# Patient Record
Sex: MALE | Race: WHITE | Employment: OTHER | URBAN - METROPOLITAN AREA
[De-identification: names, ages, dates, MRNs, and addresses within clinical notes are randomized per-mention and may not be internally consistent; named-entity substitution may affect disease eponyms.]

---

## 2017-09-26 ENCOUNTER — HOSPITAL ENCOUNTER (OUTPATIENT)
Dept: GENERAL RADIOLOGY | Age: 82
Discharge: HOME | End: 2017-09-26
Payer: MEDICARE

## 2017-09-26 DIAGNOSIS — C31.0 MALIGNANT NEOPLASM OF MAXILLARY SINUS (HCC): ICD-10-CM

## 2017-09-26 DIAGNOSIS — R05.9 COUGH: ICD-10-CM

## 2017-09-26 PROCEDURE — 71020 XR CHEST PA LAT: CPT

## 2017-10-05 ENCOUNTER — HOSPITAL ENCOUNTER (OUTPATIENT)
Dept: MRI IMAGING | Age: 82
Discharge: HOME | End: 2017-10-05
Attending: OTOLARYNGOLOGY
Payer: MEDICARE

## 2017-10-05 DIAGNOSIS — R05.9 COUGH: ICD-10-CM

## 2017-10-05 DIAGNOSIS — G51.0 FACIAL PARESIS: ICD-10-CM

## 2017-10-05 DIAGNOSIS — Z78.9 NONSMOKER: ICD-10-CM

## 2017-10-05 DIAGNOSIS — C31.0 MALIGNANT NEOPLASM OF MAXILLARY SINUS (HCC): ICD-10-CM

## 2017-10-05 PROCEDURE — 70543 MRI ORBT/FAC/NCK W/O &W/DYE: CPT

## 2017-10-05 PROCEDURE — 74011250636 HC RX REV CODE- 250/636: Performed by: OTOLARYNGOLOGY

## 2017-10-05 PROCEDURE — A9576 INJ PROHANCE MULTIPACK: HCPCS | Performed by: OTOLARYNGOLOGY

## 2017-10-05 RX ADMIN — GADOTERIDOL 18 ML: 279.3 INJECTION, SOLUTION INTRAVENOUS at 19:00

## 2017-10-13 ENCOUNTER — HOSPITAL ENCOUNTER (OUTPATIENT)
Dept: PREADMISSION TESTING | Age: 82
Discharge: HOME | End: 2017-10-13
Payer: MEDICARE

## 2017-10-13 VITALS
SYSTOLIC BLOOD PRESSURE: 125 MMHG | WEIGHT: 187.38 LBS | BODY MASS INDEX: 26.82 KG/M2 | TEMPERATURE: 97.7 F | DIASTOLIC BLOOD PRESSURE: 76 MMHG | HEART RATE: 56 BPM | HEIGHT: 70 IN

## 2017-10-13 LAB
ABO + RH BLD: NORMAL
ANION GAP SERPL CALC-SCNC: 9 MMOL/L (ref 5–15)
APTT PPP: 25.4 SEC (ref 22.1–32.5)
BASOPHILS # BLD: 0 K/UL (ref 0–0.1)
BASOPHILS NFR BLD: 0 % (ref 0–1)
BLOOD GROUP ANTIBODIES SERPL: NORMAL
BUN SERPL-MCNC: 28 MG/DL (ref 6–20)
BUN/CREAT SERPL: 28 (ref 12–20)
CALCIUM SERPL-MCNC: 8.4 MG/DL (ref 8.5–10.1)
CHLORIDE SERPL-SCNC: 100 MMOL/L (ref 97–108)
CO2 SERPL-SCNC: 26 MMOL/L (ref 21–32)
CREAT SERPL-MCNC: 1 MG/DL (ref 0.7–1.3)
EOSINOPHIL # BLD: 0 K/UL (ref 0–0.4)
EOSINOPHIL NFR BLD: 0 % (ref 0–7)
ERYTHROCYTE [DISTWIDTH] IN BLOOD BY AUTOMATED COUNT: 13.9 % (ref 11.5–14.5)
GLUCOSE SERPL-MCNC: 97 MG/DL (ref 65–100)
HCT VFR BLD AUTO: 37 % (ref 36.6–50.3)
HGB BLD-MCNC: 12.5 G/DL (ref 12.1–17)
INR PPP: 1.1 (ref 0.9–1.1)
LYMPHOCYTES # BLD: 1.6 K/UL (ref 0.8–3.5)
LYMPHOCYTES NFR BLD: 20 % (ref 12–49)
MCH RBC QN AUTO: 32 PG (ref 26–34)
MCHC RBC AUTO-ENTMCNC: 33.8 G/DL (ref 30–36.5)
MCV RBC AUTO: 94.6 FL (ref 80–99)
MONOCYTES # BLD: 0.7 K/UL (ref 0–1)
MONOCYTES NFR BLD: 8 % (ref 5–13)
NEUTS SEG # BLD: 5.7 K/UL (ref 1.8–8)
NEUTS SEG NFR BLD: 72 % (ref 32–75)
PLATELET # BLD AUTO: 272 K/UL (ref 150–400)
POTASSIUM SERPL-SCNC: 4.7 MMOL/L (ref 3.5–5.1)
PROTHROMBIN TIME: 10.8 SEC (ref 9–11.1)
RBC # BLD AUTO: 3.91 M/UL (ref 4.1–5.7)
SODIUM SERPL-SCNC: 135 MMOL/L (ref 136–145)
SPECIMEN EXP DATE BLD: NORMAL
THERAPEUTIC RANGE,PTTT: NORMAL SECS (ref 58–77)
WBC # BLD AUTO: 8 K/UL (ref 4.1–11.1)

## 2017-10-13 PROCEDURE — 86900 BLOOD TYPING SEROLOGIC ABO: CPT | Performed by: NEUROLOGICAL SURGERY

## 2017-10-13 PROCEDURE — 85610 PROTHROMBIN TIME: CPT | Performed by: NEUROLOGICAL SURGERY

## 2017-10-13 PROCEDURE — 85025 COMPLETE CBC W/AUTO DIFF WBC: CPT | Performed by: NEUROLOGICAL SURGERY

## 2017-10-13 PROCEDURE — 80048 BASIC METABOLIC PNL TOTAL CA: CPT | Performed by: NEUROLOGICAL SURGERY

## 2017-10-13 PROCEDURE — 36415 COLL VENOUS BLD VENIPUNCTURE: CPT | Performed by: NEUROLOGICAL SURGERY

## 2017-10-13 PROCEDURE — 85730 THROMBOPLASTIN TIME PARTIAL: CPT | Performed by: NEUROLOGICAL SURGERY

## 2017-10-13 PROCEDURE — 93005 ELECTROCARDIOGRAM TRACING: CPT

## 2017-10-13 RX ORDER — LISINOPRIL 40 MG/1
40 TABLET ORAL
COMMUNITY
End: 2017-11-25

## 2017-10-13 RX ORDER — DEXAMETHASONE 4 MG/1
4 TABLET ORAL EVERY 12 HOURS
Status: ON HOLD | COMMUNITY
End: 2017-10-20

## 2017-10-13 RX ORDER — GLUCOSAMINE SULFATE 1500 MG
1000 POWDER IN PACKET (EA) ORAL DAILY
COMMUNITY

## 2017-10-13 RX ORDER — LANOLIN ALCOHOL/MO/W.PET/CERES
1000 CREAM (GRAM) TOPICAL DAILY
COMMUNITY

## 2017-10-13 RX ORDER — GABAPENTIN 600 MG/1
600 TABLET ORAL 2 TIMES DAILY
COMMUNITY
End: 2017-11-25

## 2017-10-13 RX ORDER — ACETAMINOPHEN 500 MG
1000 TABLET ORAL AS NEEDED
Status: ON HOLD | COMMUNITY
End: 2017-11-25

## 2017-10-13 NOTE — PERIOP NOTES
PT/FAMILY PROVIDED AND REVIEWED PRE-OP INSTRUCTION SHEET. PATIENT GIVEN SURGICAL SITE INFORMATION FAQS INFORMATION HANDOUT. PT PROVIDED WITH GOOD HAND HYGIENE TIPS. PT GIVEN OPPORTUNITY TO ASK QUESTIONS.   PATIENT PROVIDED WITH PARAM WIPES, ORAL AND WRITTEN INSTRUCTIONS

## 2017-10-14 LAB
ATRIAL RATE: 47 BPM
CALCULATED P AXIS, ECG09: 60 DEGREES
CALCULATED R AXIS, ECG10: -14 DEGREES
CALCULATED T AXIS, ECG11: 2 DEGREES
DIAGNOSIS, 93000: NORMAL
P-R INTERVAL, ECG05: 224 MS
Q-T INTERVAL, ECG07: 494 MS
QRS DURATION, ECG06: 136 MS
QTC CALCULATION (BEZET), ECG08: 437 MS
VENTRICULAR RATE, ECG03: 47 BPM

## 2017-10-18 ENCOUNTER — ANESTHESIA EVENT (OUTPATIENT)
Dept: SURGERY | Age: 82
DRG: 025 | End: 2017-10-18
Payer: MEDICARE

## 2017-10-19 ENCOUNTER — ANESTHESIA (OUTPATIENT)
Dept: SURGERY | Age: 82
DRG: 025 | End: 2017-10-19
Payer: MEDICARE

## 2017-10-19 ENCOUNTER — HOSPITAL ENCOUNTER (INPATIENT)
Age: 82
LOS: 2 days | Discharge: HOME | DRG: 025 | End: 2017-10-21
Attending: NEUROLOGICAL SURGERY | Admitting: NEUROLOGICAL SURGERY
Payer: MEDICARE

## 2017-10-19 ENCOUNTER — APPOINTMENT (OUTPATIENT)
Dept: CT IMAGING | Age: 82
DRG: 025 | End: 2017-10-19
Attending: NEUROLOGICAL SURGERY
Payer: MEDICARE

## 2017-10-19 PROBLEM — D49.6 BRAIN TUMOR (HCC): Status: ACTIVE | Noted: 2017-10-19

## 2017-10-19 LAB
GLUCOSE BLD STRIP.AUTO-MCNC: 91 MG/DL (ref 65–100)
SERVICE CMNT-IMP: NORMAL

## 2017-10-19 PROCEDURE — 74011250636 HC RX REV CODE- 250/636: Performed by: NEUROLOGICAL SURGERY

## 2017-10-19 PROCEDURE — 77030026438 HC STYL ET INTUB CARD -A: Performed by: ANESTHESIOLOGY

## 2017-10-19 PROCEDURE — 82962 GLUCOSE BLOOD TEST: CPT

## 2017-10-19 PROCEDURE — 77030003892 HC BIT DRL TWST MEDT -B: Performed by: NEUROLOGICAL SURGERY

## 2017-10-19 PROCEDURE — 77030005645 HC GRFT SUB DURAGN INLC -F: Performed by: NEUROLOGICAL SURGERY

## 2017-10-19 PROCEDURE — 77030002933 HC SUT MCRYL J&J -A: Performed by: NEUROLOGICAL SURGERY

## 2017-10-19 PROCEDURE — 77030020782 HC GWN BAIR PAWS FLX 3M -B

## 2017-10-19 PROCEDURE — 77030034849: Performed by: NEUROLOGICAL SURGERY

## 2017-10-19 PROCEDURE — 74011250636 HC RX REV CODE- 250/636

## 2017-10-19 PROCEDURE — 77030003029 HC SUT VCRL J&J -B: Performed by: NEUROLOGICAL SURGERY

## 2017-10-19 PROCEDURE — 77030012893

## 2017-10-19 PROCEDURE — 77030009081 HC CLP NEUR GUN SET MEDT -B: Performed by: NEUROLOGICAL SURGERY

## 2017-10-19 PROCEDURE — 77030002946 HC SUT NRLN J&J -B: Performed by: NEUROLOGICAL SURGERY

## 2017-10-19 PROCEDURE — 65610000006 HC RM INTENSIVE CARE

## 2017-10-19 PROCEDURE — 77030014647 HC SEAL FBRN TISSL BAXT -D: Performed by: NEUROLOGICAL SURGERY

## 2017-10-19 PROCEDURE — 77030012602 HC SPNG PTTY NEUR J&J -B: Performed by: NEUROLOGICAL SURGERY

## 2017-10-19 PROCEDURE — 74011250636 HC RX REV CODE- 250/636: Performed by: ANESTHESIOLOGY

## 2017-10-19 PROCEDURE — 74011000272 HC RX REV CODE- 272: Performed by: NEUROLOGICAL SURGERY

## 2017-10-19 PROCEDURE — 77030020263 HC SOL INJ SOD CL0.9% LFCR 1000ML: Performed by: NEUROLOGICAL SURGERY

## 2017-10-19 PROCEDURE — 74011636320 HC RX REV CODE- 636/320: Performed by: NEUROLOGICAL SURGERY

## 2017-10-19 PROCEDURE — 74011000250 HC RX REV CODE- 250: Performed by: NEUROLOGICAL SURGERY

## 2017-10-19 PROCEDURE — 88331 PATH CONSLTJ SURG 1 BLK 1SPC: CPT | Performed by: NEUROLOGICAL SURGERY

## 2017-10-19 PROCEDURE — 76010000172 HC OR TIME 2.5 TO 3 HR INTENSV-TIER 1: Performed by: NEUROLOGICAL SURGERY

## 2017-10-19 PROCEDURE — 70460 CT HEAD/BRAIN W/DYE: CPT

## 2017-10-19 PROCEDURE — 77030011640 HC PAD GRND REM COVD -A: Performed by: NEUROLOGICAL SURGERY

## 2017-10-19 PROCEDURE — 77030004391 HC BUR FLUT MEDT -C: Performed by: NEUROLOGICAL SURGERY

## 2017-10-19 PROCEDURE — 77030013797 HC KT TRNSDUC PRSSR EDWD -A

## 2017-10-19 PROCEDURE — 76060000036 HC ANESTHESIA 2.5 TO 3 HR: Performed by: NEUROLOGICAL SURGERY

## 2017-10-19 PROCEDURE — 8E09XBZ COMPUTER ASSISTED PROCEDURE OF HEAD AND NECK REGION: ICD-10-PCS | Performed by: NEUROLOGICAL SURGERY

## 2017-10-19 PROCEDURE — 77030014355 HC CVR BUR H TI BIOM -C: Performed by: NEUROLOGICAL SURGERY

## 2017-10-19 PROCEDURE — 77030018846 HC SOL IRR STRL H20 ICUM -A: Performed by: NEUROLOGICAL SURGERY

## 2017-10-19 PROCEDURE — 88307 TISSUE EXAM BY PATHOLOGIST: CPT | Performed by: NEUROLOGICAL SURGERY

## 2017-10-19 PROCEDURE — 74011250637 HC RX REV CODE- 250/637: Performed by: NEUROLOGICAL SURGERY

## 2017-10-19 PROCEDURE — 77030010507 HC ADH SKN DERMBND J&J -B: Performed by: NEUROLOGICAL SURGERY

## 2017-10-19 PROCEDURE — 77010033678 HC OXYGEN DAILY

## 2017-10-19 PROCEDURE — 77030018836 HC SOL IRR NACL ICUM -A: Performed by: NEUROLOGICAL SURGERY

## 2017-10-19 PROCEDURE — 77030013079 HC BLNKT BAIR HGGR 3M -A: Performed by: ANESTHESIOLOGY

## 2017-10-19 PROCEDURE — 76210000016 HC OR PH I REC 1 TO 1.5 HR: Performed by: NEUROLOGICAL SURGERY

## 2017-10-19 PROCEDURE — 00B00ZZ EXCISION OF BRAIN, OPEN APPROACH: ICD-10-PCS | Performed by: NEUROLOGICAL SURGERY

## 2017-10-19 PROCEDURE — 77030004472 HC BUR TAPR MEDT -B: Performed by: NEUROLOGICAL SURGERY

## 2017-10-19 PROCEDURE — 77030019908 HC STETH ESOPH SIMS -A: Performed by: ANESTHESIOLOGY

## 2017-10-19 PROCEDURE — 74011000258 HC RX REV CODE- 258: Performed by: NEUROLOGICAL SURGERY

## 2017-10-19 PROCEDURE — 77030008684 HC TU ET CUF COVD -B: Performed by: ANESTHESIOLOGY

## 2017-10-19 PROCEDURE — 77030032490 HC SLV COMPR SCD KNE COVD -B: Performed by: NEUROLOGICAL SURGERY

## 2017-10-19 PROCEDURE — C1713 ANCHOR/SCREW BN/BN,TIS/BN: HCPCS | Performed by: NEUROLOGICAL SURGERY

## 2017-10-19 PROCEDURE — 74011000250 HC RX REV CODE- 250

## 2017-10-19 PROCEDURE — 77030005402 HC CATH RAD ART LN KT TELE -B

## 2017-10-19 DEVICE — DURAGEN® PLUS DURAL REGENERATION MATRIX, 3 IN X 3 IN (7.5 CM X 7.5 CM)
Type: IMPLANTABLE DEVICE | Site: BRAIN | Status: FUNCTIONAL
Brand: DURAGEN® PLUS

## 2017-10-19 DEVICE — PLATE BONE LNG L16MM THK0.6MM 2 H TI STR FOR 1.5MM SCR: Type: IMPLANTABLE DEVICE | Site: SKULL | Status: FUNCTIONAL

## 2017-10-19 DEVICE — SCREW BNE L3.5MM DIA1.5MM CORT MAXILLOMANDIBULAR GRN TI: Type: IMPLANTABLE DEVICE | Site: SKULL | Status: FUNCTIONAL

## 2017-10-19 DEVICE — COVER BUR H SM DIA13MM THK0.5MM 5 H NEURO TI FOR 1.5MM SCR: Type: IMPLANTABLE DEVICE | Site: SKULL | Status: FUNCTIONAL

## 2017-10-19 RX ORDER — LIDOCAINE HYDROCHLORIDE 20 MG/ML
INJECTION, SOLUTION EPIDURAL; INFILTRATION; INTRACAUDAL; PERINEURAL AS NEEDED
Status: DISCONTINUED | OUTPATIENT
Start: 2017-10-19 | End: 2017-10-19 | Stop reason: HOSPADM

## 2017-10-19 RX ORDER — ONDANSETRON 2 MG/ML
4 INJECTION INTRAMUSCULAR; INTRAVENOUS
Status: DISCONTINUED | OUTPATIENT
Start: 2017-10-19 | End: 2017-10-21 | Stop reason: HOSPADM

## 2017-10-19 RX ORDER — HYDROMORPHONE HYDROCHLORIDE 1 MG/ML
0.2 INJECTION, SOLUTION INTRAMUSCULAR; INTRAVENOUS; SUBCUTANEOUS
Status: COMPLETED | OUTPATIENT
Start: 2017-10-19 | End: 2017-10-19

## 2017-10-19 RX ORDER — GLYCOPYRROLATE 0.2 MG/ML
INJECTION INTRAMUSCULAR; INTRAVENOUS AS NEEDED
Status: DISCONTINUED | OUTPATIENT
Start: 2017-10-19 | End: 2017-10-19 | Stop reason: HOSPADM

## 2017-10-19 RX ORDER — MELATONIN
1000 DAILY
Status: DISCONTINUED | OUTPATIENT
Start: 2017-10-20 | End: 2017-10-21 | Stop reason: HOSPADM

## 2017-10-19 RX ORDER — SODIUM CHLORIDE 0.9 % (FLUSH) 0.9 %
5-10 SYRINGE (ML) INJECTION EVERY 8 HOURS
Status: DISCONTINUED | OUTPATIENT
Start: 2017-10-19 | End: 2017-10-21 | Stop reason: HOSPADM

## 2017-10-19 RX ORDER — SODIUM CHLORIDE, SODIUM LACTATE, POTASSIUM CHLORIDE, CALCIUM CHLORIDE 600; 310; 30; 20 MG/100ML; MG/100ML; MG/100ML; MG/100ML
100 INJECTION, SOLUTION INTRAVENOUS CONTINUOUS
Status: DISCONTINUED | OUTPATIENT
Start: 2017-10-19 | End: 2017-10-19 | Stop reason: HOSPADM

## 2017-10-19 RX ORDER — MORPHINE SULFATE 10 MG/ML
2 INJECTION, SOLUTION INTRAMUSCULAR; INTRAVENOUS
Status: DISCONTINUED | OUTPATIENT
Start: 2017-10-19 | End: 2017-10-19 | Stop reason: HOSPADM

## 2017-10-19 RX ORDER — ACETAMINOPHEN 10 MG/ML
INJECTION, SOLUTION INTRAVENOUS AS NEEDED
Status: DISCONTINUED | OUTPATIENT
Start: 2017-10-19 | End: 2017-10-19 | Stop reason: HOSPADM

## 2017-10-19 RX ORDER — SODIUM CHLORIDE 0.9 % (FLUSH) 0.9 %
5-10 SYRINGE (ML) INJECTION EVERY 8 HOURS
Status: DISCONTINUED | OUTPATIENT
Start: 2017-10-19 | End: 2017-10-19 | Stop reason: HOSPADM

## 2017-10-19 RX ORDER — CEFAZOLIN SODIUM IN 0.9 % NACL 2 G/50 ML
2 INTRAVENOUS SOLUTION, PIGGYBACK (ML) INTRAVENOUS
Status: COMPLETED | OUTPATIENT
Start: 2017-10-19 | End: 2017-10-19

## 2017-10-19 RX ORDER — MIDAZOLAM HYDROCHLORIDE 1 MG/ML
1 INJECTION, SOLUTION INTRAMUSCULAR; INTRAVENOUS AS NEEDED
Status: DISCONTINUED | OUTPATIENT
Start: 2017-10-19 | End: 2017-10-19 | Stop reason: HOSPADM

## 2017-10-19 RX ORDER — SODIUM CHLORIDE 0.9 % (FLUSH) 0.9 %
5-10 SYRINGE (ML) INJECTION AS NEEDED
Status: DISCONTINUED | OUTPATIENT
Start: 2017-10-19 | End: 2017-10-21 | Stop reason: HOSPADM

## 2017-10-19 RX ORDER — OXYCODONE AND ACETAMINOPHEN 5; 325 MG/1; MG/1
1 TABLET ORAL
Status: DISCONTINUED | OUTPATIENT
Start: 2017-10-19 | End: 2017-10-21 | Stop reason: HOSPADM

## 2017-10-19 RX ORDER — SODIUM CHLORIDE 0.9 % (FLUSH) 0.9 %
5-10 SYRINGE (ML) INJECTION AS NEEDED
Status: DISCONTINUED | OUTPATIENT
Start: 2017-10-19 | End: 2017-10-19 | Stop reason: HOSPADM

## 2017-10-19 RX ORDER — FENTANYL CITRATE 50 UG/ML
50 INJECTION, SOLUTION INTRAMUSCULAR; INTRAVENOUS AS NEEDED
Status: DISCONTINUED | OUTPATIENT
Start: 2017-10-19 | End: 2017-10-19 | Stop reason: HOSPADM

## 2017-10-19 RX ORDER — NEOSTIGMINE METHYLSULFATE 1 MG/ML
INJECTION INTRAVENOUS AS NEEDED
Status: DISCONTINUED | OUTPATIENT
Start: 2017-10-19 | End: 2017-10-19 | Stop reason: HOSPADM

## 2017-10-19 RX ORDER — ONDANSETRON 2 MG/ML
INJECTION INTRAMUSCULAR; INTRAVENOUS AS NEEDED
Status: DISCONTINUED | OUTPATIENT
Start: 2017-10-19 | End: 2017-10-19 | Stop reason: HOSPADM

## 2017-10-19 RX ORDER — CEFAZOLIN SODIUM IN 0.9 % NACL 2 G/50 ML
2 INTRAVENOUS SOLUTION, PIGGYBACK (ML) INTRAVENOUS EVERY 8 HOURS
Status: COMPLETED | OUTPATIENT
Start: 2017-10-19 | End: 2017-10-20

## 2017-10-19 RX ORDER — MIDAZOLAM HYDROCHLORIDE 1 MG/ML
0.5 INJECTION, SOLUTION INTRAMUSCULAR; INTRAVENOUS
Status: DISCONTINUED | OUTPATIENT
Start: 2017-10-19 | End: 2017-10-19 | Stop reason: HOSPADM

## 2017-10-19 RX ORDER — GABAPENTIN 600 MG/1
600 TABLET ORAL 2 TIMES DAILY
Status: DISCONTINUED | OUTPATIENT
Start: 2017-10-19 | End: 2017-10-21 | Stop reason: HOSPADM

## 2017-10-19 RX ORDER — ATORVASTATIN CALCIUM 10 MG/1
10 TABLET, FILM COATED ORAL
Status: DISCONTINUED | OUTPATIENT
Start: 2017-10-19 | End: 2017-10-21 | Stop reason: HOSPADM

## 2017-10-19 RX ORDER — ROPIVACAINE HYDROCHLORIDE 5 MG/ML
150 INJECTION, SOLUTION EPIDURAL; INFILTRATION; PERINEURAL AS NEEDED
Status: DISCONTINUED | OUTPATIENT
Start: 2017-10-19 | End: 2017-10-19 | Stop reason: HOSPADM

## 2017-10-19 RX ORDER — FENTANYL CITRATE 50 UG/ML
INJECTION, SOLUTION INTRAMUSCULAR; INTRAVENOUS AS NEEDED
Status: DISCONTINUED | OUTPATIENT
Start: 2017-10-19 | End: 2017-10-19 | Stop reason: HOSPADM

## 2017-10-19 RX ORDER — DEXAMETHASONE SODIUM PHOSPHATE 4 MG/ML
4 INJECTION, SOLUTION INTRA-ARTICULAR; INTRALESIONAL; INTRAMUSCULAR; INTRAVENOUS; SOFT TISSUE EVERY 6 HOURS
Status: DISCONTINUED | OUTPATIENT
Start: 2017-10-19 | End: 2017-10-20

## 2017-10-19 RX ORDER — FAMOTIDINE 20 MG/1
20 TABLET, FILM COATED ORAL 2 TIMES DAILY
Status: DISCONTINUED | OUTPATIENT
Start: 2017-10-19 | End: 2017-10-21 | Stop reason: HOSPADM

## 2017-10-19 RX ORDER — LIDOCAINE HYDROCHLORIDE 10 MG/ML
0.1 INJECTION, SOLUTION EPIDURAL; INFILTRATION; INTRACAUDAL; PERINEURAL AS NEEDED
Status: DISCONTINUED | OUTPATIENT
Start: 2017-10-19 | End: 2017-10-19 | Stop reason: HOSPADM

## 2017-10-19 RX ORDER — SODIUM CHLORIDE 0.9 % (FLUSH) 0.9 %
10 SYRINGE (ML) INJECTION
Status: COMPLETED | OUTPATIENT
Start: 2017-10-19 | End: 2017-10-19

## 2017-10-19 RX ORDER — DEXAMETHASONE SODIUM PHOSPHATE 4 MG/ML
INJECTION, SOLUTION INTRA-ARTICULAR; INTRALESIONAL; INTRAMUSCULAR; INTRAVENOUS; SOFT TISSUE AS NEEDED
Status: DISCONTINUED | OUTPATIENT
Start: 2017-10-19 | End: 2017-10-19 | Stop reason: HOSPADM

## 2017-10-19 RX ORDER — DOCUSATE SODIUM 100 MG/1
200 CAPSULE, LIQUID FILLED ORAL DAILY
Status: DISCONTINUED | OUTPATIENT
Start: 2017-10-20 | End: 2017-10-21 | Stop reason: HOSPADM

## 2017-10-19 RX ORDER — ACETAMINOPHEN 325 MG/1
650 TABLET ORAL
Status: DISCONTINUED | OUTPATIENT
Start: 2017-10-19 | End: 2017-10-21 | Stop reason: HOSPADM

## 2017-10-19 RX ORDER — HYDROMORPHONE HYDROCHLORIDE 1 MG/ML
0.5 INJECTION, SOLUTION INTRAMUSCULAR; INTRAVENOUS; SUBCUTANEOUS
Status: DISCONTINUED | OUTPATIENT
Start: 2017-10-19 | End: 2017-10-21 | Stop reason: HOSPADM

## 2017-10-19 RX ORDER — LISINOPRIL 20 MG/1
40 TABLET ORAL
Status: DISCONTINUED | OUTPATIENT
Start: 2017-10-19 | End: 2017-10-21 | Stop reason: HOSPADM

## 2017-10-19 RX ORDER — ROCURONIUM BROMIDE 10 MG/ML
INJECTION, SOLUTION INTRAVENOUS AS NEEDED
Status: DISCONTINUED | OUTPATIENT
Start: 2017-10-19 | End: 2017-10-19 | Stop reason: HOSPADM

## 2017-10-19 RX ORDER — DIPHENHYDRAMINE HYDROCHLORIDE 50 MG/ML
12.5 INJECTION, SOLUTION INTRAMUSCULAR; INTRAVENOUS AS NEEDED
Status: DISCONTINUED | OUTPATIENT
Start: 2017-10-19 | End: 2017-10-19 | Stop reason: HOSPADM

## 2017-10-19 RX ORDER — OXCARBAZEPINE 150 MG/1
150 TABLET, FILM COATED ORAL DAILY
Status: DISCONTINUED | OUTPATIENT
Start: 2017-10-20 | End: 2017-10-21 | Stop reason: HOSPADM

## 2017-10-19 RX ORDER — SODIUM CHLORIDE, SODIUM LACTATE, POTASSIUM CHLORIDE, CALCIUM CHLORIDE 600; 310; 30; 20 MG/100ML; MG/100ML; MG/100ML; MG/100ML
INJECTION, SOLUTION INTRAVENOUS
Status: DISCONTINUED | OUTPATIENT
Start: 2017-10-19 | End: 2017-10-19 | Stop reason: HOSPADM

## 2017-10-19 RX ORDER — LIDOCAINE HYDROCHLORIDE AND EPINEPHRINE 10; 10 MG/ML; UG/ML
INJECTION, SOLUTION INFILTRATION; PERINEURAL AS NEEDED
Status: DISCONTINUED | OUTPATIENT
Start: 2017-10-19 | End: 2017-10-19 | Stop reason: HOSPADM

## 2017-10-19 RX ORDER — SODIUM CHLORIDE AND POTASSIUM CHLORIDE .9; .15 G/100ML; G/100ML
SOLUTION INTRAVENOUS CONTINUOUS
Status: DISCONTINUED | OUTPATIENT
Start: 2017-10-19 | End: 2017-10-20

## 2017-10-19 RX ORDER — BACITRACIN 500 [USP'U]/G
OINTMENT OPHTHALMIC DAILY
Status: DISCONTINUED | OUTPATIENT
Start: 2017-10-20 | End: 2017-10-21 | Stop reason: HOSPADM

## 2017-10-19 RX ORDER — FENTANYL CITRATE 50 UG/ML
25 INJECTION, SOLUTION INTRAMUSCULAR; INTRAVENOUS
Status: DISCONTINUED | OUTPATIENT
Start: 2017-10-19 | End: 2017-10-19 | Stop reason: HOSPADM

## 2017-10-19 RX ORDER — PROPOFOL 10 MG/ML
INJECTION, EMULSION INTRAVENOUS AS NEEDED
Status: DISCONTINUED | OUTPATIENT
Start: 2017-10-19 | End: 2017-10-19 | Stop reason: HOSPADM

## 2017-10-19 RX ORDER — LANOLIN ALCOHOL/MO/W.PET/CERES
1000 CREAM (GRAM) TOPICAL DAILY
Status: DISCONTINUED | OUTPATIENT
Start: 2017-10-20 | End: 2017-10-21 | Stop reason: HOSPADM

## 2017-10-19 RX ADMIN — GABAPENTIN 600 MG: 600 TABLET, FILM COATED ORAL at 18:38

## 2017-10-19 RX ADMIN — ACETAMINOPHEN 1000 MG: 10 INJECTION, SOLUTION INTRAVENOUS at 15:14

## 2017-10-19 RX ADMIN — LISINOPRIL 40 MG: 20 TABLET ORAL at 22:39

## 2017-10-19 RX ADMIN — SODIUM CHLORIDE, POTASSIUM CHLORIDE, SODIUM LACTATE AND CALCIUM CHLORIDE: 600; 310; 30; 20 INJECTION, SOLUTION INTRAVENOUS at 12:06

## 2017-10-19 RX ADMIN — DEXAMETHASONE SODIUM PHOSPHATE 4 MG: 4 INJECTION, SOLUTION INTRAMUSCULAR; INTRAVENOUS at 18:38

## 2017-10-19 RX ADMIN — HYDROMORPHONE HYDROCHLORIDE 0.2 MG: 1 INJECTION, SOLUTION INTRAMUSCULAR; INTRAVENOUS; SUBCUTANEOUS at 16:08

## 2017-10-19 RX ADMIN — LEVETIRACETAM 500 MG: 100 INJECTION, SOLUTION, CONCENTRATE INTRAVENOUS at 18:43

## 2017-10-19 RX ADMIN — PROPOFOL 80 MG: 10 INJECTION, EMULSION INTRAVENOUS at 12:57

## 2017-10-19 RX ADMIN — IOPAMIDOL 100 ML: 612 INJECTION, SOLUTION INTRAVENOUS at 11:08

## 2017-10-19 RX ADMIN — PROPOFOL 50 MG: 10 INJECTION, EMULSION INTRAVENOUS at 14:40

## 2017-10-19 RX ADMIN — DEXAMETHASONE SODIUM PHOSPHATE 8 MG: 4 INJECTION, SOLUTION INTRA-ARTICULAR; INTRALESIONAL; INTRAMUSCULAR; INTRAVENOUS; SOFT TISSUE at 13:22

## 2017-10-19 RX ADMIN — ROCURONIUM BROMIDE 20 MG: 10 INJECTION, SOLUTION INTRAVENOUS at 14:00

## 2017-10-19 RX ADMIN — ROCURONIUM BROMIDE 45 MG: 10 INJECTION, SOLUTION INTRAVENOUS at 12:58

## 2017-10-19 RX ADMIN — CEFAZOLIN 2 G: 1 INJECTION, POWDER, FOR SOLUTION INTRAMUSCULAR; INTRAVENOUS; PARENTERAL at 20:31

## 2017-10-19 RX ADMIN — FAMOTIDINE 20 MG: 20 TABLET ORAL at 18:38

## 2017-10-19 RX ADMIN — SODIUM CHLORIDE, SODIUM LACTATE, POTASSIUM CHLORIDE, CALCIUM CHLORIDE: 600; 310; 30; 20 INJECTION, SOLUTION INTRAVENOUS at 12:01

## 2017-10-19 RX ADMIN — HYDROMORPHONE HYDROCHLORIDE 0.2 MG: 1 INJECTION, SOLUTION INTRAMUSCULAR; INTRAVENOUS; SUBCUTANEOUS at 16:38

## 2017-10-19 RX ADMIN — HYDROMORPHONE HYDROCHLORIDE 0.2 MG: 1 INJECTION, SOLUTION INTRAMUSCULAR; INTRAVENOUS; SUBCUTANEOUS at 16:48

## 2017-10-19 RX ADMIN — POTASSIUM CHLORIDE AND SODIUM CHLORIDE: 900; 150 INJECTION, SOLUTION INTRAVENOUS at 17:08

## 2017-10-19 RX ADMIN — SODIUM CHLORIDE 100 ML: 900 INJECTION, SOLUTION INTRAVENOUS at 11:08

## 2017-10-19 RX ADMIN — ONDANSETRON 4 MG: 2 INJECTION INTRAMUSCULAR; INTRAVENOUS at 15:22

## 2017-10-19 RX ADMIN — HYDROMORPHONE HYDROCHLORIDE 0.2 MG: 1 INJECTION, SOLUTION INTRAMUSCULAR; INTRAVENOUS; SUBCUTANEOUS at 16:18

## 2017-10-19 RX ADMIN — GLYCOPYRROLATE 0.4 MG: 0.2 INJECTION INTRAMUSCULAR; INTRAVENOUS at 15:22

## 2017-10-19 RX ADMIN — NEOSTIGMINE METHYLSULFATE 3 MG: 1 INJECTION INTRAVENOUS at 15:22

## 2017-10-19 RX ADMIN — CEFAZOLIN 2 G: 1 INJECTION, POWDER, FOR SOLUTION INTRAMUSCULAR; INTRAVENOUS; PARENTERAL at 13:23

## 2017-10-19 RX ADMIN — Medication 10 ML: at 22:40

## 2017-10-19 RX ADMIN — FENTANYL CITRATE 100 MCG: 50 INJECTION, SOLUTION INTRAMUSCULAR; INTRAVENOUS at 12:57

## 2017-10-19 RX ADMIN — HYDROMORPHONE HYDROCHLORIDE 0.2 MG: 1 INJECTION, SOLUTION INTRAMUSCULAR; INTRAVENOUS; SUBCUTANEOUS at 16:28

## 2017-10-19 RX ADMIN — LIDOCAINE HYDROCHLORIDE 60 MG: 20 INJECTION, SOLUTION EPIDURAL; INFILTRATION; INTRACAUDAL; PERINEURAL at 12:57

## 2017-10-19 RX ADMIN — Medication 10 ML: at 11:08

## 2017-10-19 RX ADMIN — ROCURONIUM BROMIDE 5 MG: 10 INJECTION, SOLUTION INTRAVENOUS at 12:57

## 2017-10-19 RX ADMIN — ATORVASTATIN CALCIUM 10 MG: 10 TABLET, FILM COATED ORAL at 22:39

## 2017-10-19 RX ADMIN — Medication 10 ML: at 18:38

## 2017-10-19 NOTE — ANESTHESIA PREPROCEDURE EVALUATION
Anesthetic History   No history of anesthetic complications            Review of Systems / Medical History  Patient summary reviewed, nursing notes reviewed and pertinent labs reviewed    Pulmonary  Within defined limits                 Neuro/Psych   Within defined limits           Cardiovascular    Hypertension        Dysrhythmias            GI/Hepatic/Renal     GERD           Endo/Other        Arthritis     Other Findings              Physical Exam    Airway  Mallampati: II  TM Distance: > 6 cm  Neck ROM: normal range of motion   Mouth opening: Normal     Cardiovascular  Regular rate and rhythm,  S1 and S2 normal,  no murmur, click, rub, or gallop             Dental  No notable dental hx       Pulmonary  Breath sounds clear to auscultation               Abdominal  GI exam deferred       Other Findings            Anesthetic Plan    ASA: 3  Anesthesia type: general    Monitoring Plan: Arterial line      Induction: Intravenous  Anesthetic plan and risks discussed with: Patient

## 2017-10-19 NOTE — PERIOP NOTES
TRANSFER - OUT REPORT:    Verbal report given to Aryan Lew RN on Leslie Salazar  being transferred to [address] [place] [address] for routine post - op       Report consisted of patients Situation, Background, Assessment and   Recommendations(SBAR). Time Pre op antibiotic given:1323  Anesthesia Stop time: 4679  Diaz Present on Transfer to floor:Yes   Order for Diaz on Chart:yes    Information from the following report(s) SBAR, Kardex, OR Summary, Procedure Summary, Intake/Output and MAR was reviewed with the receiving nurse. Opportunity for questions and clarification was provided. Is the patient on 02? YES       L/Min 3       Other     Is the patient on a monitor? YES    Is the nurse transporting with the patient? YES    Surgical Waiting Area notified of patient's transfer from PACU? YES      The following personal items collected during your admission accompanied patient upon transfer:   Dental Appliance: Dental Appliances:  (dentures in cup placed inside clothing bag in pacu)  Vision: Visual Aid: Glasses  Hearing Aid: Hearing Aid: Bilateral  Jewelry: Jewelry: None  Clothing: Clothing:  (clothing bag placed on pt's bed in pacu)  Other Valuables:  Other Valuables:  (specs and hearing aids placed inside clothing bag in pacu)  Valuables sent to safe:

## 2017-10-19 NOTE — PERIOP NOTES
[facility] /  REF [id number] LOT [id number] EXP 2018-11-30   TO [facility] FOR PRN HEMOSTASIS INTRAOPERATIVELY

## 2017-10-19 NOTE — BRIEF OP NOTE
BRIEF OPERATIVE NOTE    Date of Procedure: 10/19/2017   Preoperative Diagnosis: LEFT TEMPORAL TUMOR   Postoperative Diagnosis: LEFT TEMPORAL TUMOR     Procedure(s):  BRAIN LAB CT GUIDED LEFT TEMPORAL CRANIOTOMY RESECTION OF TUMOR   Surgeon(s) and Role:     * Abelardo Gallo MD - Primary         Assistant Staff:       Surgical Staff:  Circ-1: Colby Rocha RN  Circ-2: Anabell Cool RN  Circ-Relief: Theresa Chua; Anabell Cool RN  Scrub RN-1: Theresa Chua  Scrub RN-2: Sylvia Chiu RN  Scrub RN-Relief: Anabell Cool RN  Surg Asst-1: Maral Escobar  Event Time In   Incision Start 1330   Incision Close      Anesthesia: General   Estimated Blood Loss: 100  Specimens:   ID Type Source Tests Collected by Time Destination   1 : LEFT TEMPORAL MASS Frozen Section Tissue  Abelardo Gallo MD 10/19/2017 1414 Pathology   2 : LEFT TEMPORAL MASS Fresh Tissue  Abelardo Gallo MD 10/19/2017 1439 Pathology      Findings: necrotic tissue   Complications: no  Implants:   Implant Name Type Inv.  Item Serial No.  Lot No. LRB No. Used Action   COVER BUR H SM 0.5X13MM TI --  - [id number]  COVER BUR H SM 0.5X13MM TI --   [employer] NA Left 2 Implanted   PLATE BNE NEUR GAP STR 1.5MM -- 2H - [id number]  PLATE BNE NEUR GAP STR 1.5MM -- 2H  [employer] NA Left 2 Implanted   SCR BNE ST HI TORQ 1.5X3.5 --  - SN/A  SCR BNE ST HI TORQ 1.5X3.5 --  N/A [employer] N/A Left 10 Implanted   GRAFT CLLGN MTRX 3X3IN 5PK -- DURAG[employer] PLUS - SN/A   GRAFT CLLGN MTRX 3X3IN 5PK -- [employer]UR[employer] PLUS N/A [employer] [id number] Left 1 Implanted

## 2017-10-19 NOTE — ANESTHESIA PROCEDURE NOTES
Arterial Line Placement    Performed by: Nilam Agustin  Authorized by: Nilam Agustin     Pre-Procedure  Indications:  Arterial pressure monitoring and blood sampling  Preanesthetic Checklist: patient identified, risks and benefits discussed, anesthesia consent, site marked, patient being monitored, timeout performed and patient being monitored      Procedure:   Prep:  Chlorhexidine  Seldinger Technique?: Yes    Orientation:  Right  Location:  Radial artery  Catheter size:  20 G  Number of attempts:  1    Assessment:   Post-procedure:  Line secured and sterile dressing applied  Patient Tolerance:  Patient tolerated the procedure well with no immediate complications

## 2017-10-19 NOTE — IP AVS SNAPSHOT
[address] [facility] [address] [place] [address] 
[phone] Patient: Zurdo Watt MRN: [id number] QNW:[id number] You are allergic to the following Allergen Reactions Adhesive Tape-Silicones Other (comments) REDNESS Aggrenox (Aspirin-Dipyridamole) Other (comments)  
 headache Amlodipine Rash Hydrochlorothiazide Other (comments) LOW NA  
  
Recent Documentation Height Weight BMI Smoking Status 1.765 m 84.8 kg 27.21 kg/m2 Former Smoker Emergency Contacts Name Discharge Info Relation Home Work Mobile [facility] DISCHARGE CAREGIVER [3] Girlfriend [18] [phone] About your hospitalization You were admitted on:  October 19, 2017 You last received care in the:  [facility] 6S NEURO-SCI TELE You were discharged on:  October 21, 2017 Unit phone number:  [phone] Why you were hospitalized Your primary diagnosis was:  Not on File Your diagnoses also included:  Brain Tumor (Hcc) Providers Seen During Your Hospitalizations Provider Role Specialty Primary office phone Latrice Ayala MD Attending Provider Neurosurgery [phone] Your Primary Care Physician (PCP) Primary Care Physician Office Phone Office Fax Nadia Carrasco [phone] Follow-up Information Follow up With Details Comments Contact Info Latrice Ayala MD Schedule an appointment as soon as possible for a visit in 2 weeks For wound re-check [place] [address] [facility] [address] [phone] Braydon Diaz MD   [address] [place] [address] 
[phone] Current Discharge Medication List  
  
START taking these medications Dose & Instructions Dispensing Information Comments Morning Noon Evening Bedtime  
 levETIRAcetam 500 mg tablet Commonly known as:  KEPPRA Your last dose was: Your next dose is:    
   
   
 Dose:  500 mg Take 1 Tab by mouth two (2) times a day. Quantity:  60 Tab Refills:  0  
     
   
   
   
  
 oxyCODONE-acetaminophen 5-325 mg per tablet Commonly known as:  PERCOCET Your last dose was: Your next dose is:    
   
   
 Dose:  1 Tab Take 1 Tab by mouth every four (4) hours as needed. Max Daily Amount: 6 Tabs. Quantity:  22 Tab Refills:  0 CONTINUE these medications which have CHANGED Dose & Instructions Dispensing Information Comments Morning Noon Evening Bedtime  
 dexamethasone 4 mg tablet Commonly known as:  DECADRON What changed:   
- how much to take 
- how to take this - when to take this 
- additional instructions Your last dose was: Your next dose is: Take 1 tab PO every 6 hours x 2 days then 1 tab PO every 8 hours x 2 days then 1/2 tab PO every 8 hours x 2 days then 1/2 tab PO every 12 hours x 2 days then 1/2 tab PO daily x 2 days then stop. Quantity:  20 Tab Refills:  0 CONTINUE these medications which have NOT CHANGED Dose & Instructions Dispensing Information Comments Morning Noon Evening Bedtime  
 bacitracin ophthalmic ointment Your last dose was: Your next dose is:    
   
   
 Administer  to left eye daily. Refills:  2  
     
   
   
   
  
 * docusate sodium 100 mg capsule Commonly known as:  Vickey Carney Your last dose was: Your next dose is:    
   
   
 Dose:  200 mg Take 200 mg by mouth daily. 200mg in the morning and 100mg QHS Refills:  0  
     
   
   
   
  
 * docusate sodium 100 mg capsule Commonly known as:  Vickey Carney Your last dose was: Your next dose is:    
   
   
 Dose:  100 mg Take 100 mg by mouth nightly. Refills:  0  
     
   
   
   
  
 gabapentin 600 mg tablet Commonly known as:  NEURONTIN Your last dose was: Your next dose is:    
   
   
 Dose:  600 mg Take 600 mg by mouth two (2) times a day. Refills:  0 GLUCOSAMINE-CHONDROITIN PO Your last dose was: Your next dose is:    
   
   
 Dose:  1 Tab Take 1 Tab by mouth daily. Refills:  0 LIPITOR 10 mg tablet Generic drug:  atorvastatin Your last dose was: Your next dose is:    
   
   
 Dose:  10 mg Take 10 mg by mouth nightly. Refills:  0  
     
   
   
   
  
 lisinopril 40 mg tablet Commonly known as:  Katia Hamilton Your last dose was: Your next dose is:    
   
   
 Dose:  40 mg Take 40 mg by mouth nightly. Refills:  0 PLAVIX 75 mg Tab Generic drug:  clopidogrel Your last dose was: Your next dose is:    
   
   
 Dose:  75 mg Take 75 mg by mouth daily. TAKES IN AM  
 Refills:  0  
     
   
   
   
  
 TRILEPTAL 300 mg tablet Generic drug:  OXcarbazepine Your last dose was: Your next dose is:    
   
   
 Dose:  150 mg Take 150 mg by mouth daily. Indications: TRIGEMINAL NEURALGIA Refills:  0  
     
   
   
   
  
 TYLENOL EXTRA STRENGTH 500 mg tablet Generic drug:  acetaminophen Your last dose was: Your next dose is:    
   
   
 Dose:  1000 mg Take 1,000 mg by mouth as needed for Pain. Refills:  0  
     
   
   
   
  
 VITAMIN B-12 1,000 mcg tablet Generic drug:  cyanocobalamin Your last dose was: Your next dose is:    
   
   
 Dose:  1000 mcg Take 1,000 mcg by mouth daily. Refills:  0  
     
   
   
   
  
 VITAMIN D3 1,000 unit Cap Generic drug:  cholecalciferol Your last dose was: Your next dose is:    
   
   
 Dose:  1000 Units Take 1,000 Units by mouth daily. Refills:  0 * Notice: This list has 2 medication(s) that are the same as other medications prescribed for you. Read the directions carefully, and ask your doctor or other care provider to review them with you. Where to Get Your Medications Information on where to get these meds will be given to you by the nurse or doctor. ! Ask your nurse or doctor about these medications  
  dexamethasone 4 mg tablet  
 levETIRAcetam 500 mg tablet  
 oxyCODONE-acetaminophen 5-325 mg per tablet Discharge Instructions After Hospital Care Plan:  Discharge Instructions [facility] Patient Name: Dennise Summers Date of procedure: 10/19/2017  Date of discharge: 10/20/2017 Procedure: Procedure(s): BRAIN LAB CT GUIDED LEFT TEMPORAL CRANIOTOMY RESECTION OF TUMOR   PCP: Kady Bailey MD 
 
Follow up appointments Follow up with your neurosurgeon in 10-14 days. Call [phone] to make an appointment as soon as you get home from the hospital.  Follow-up care is a key part of your treatment and safety. Be sure to make and go to all appointments, and call your doctor if you are having problems. It's also a good idea to know your test results and keep a list of the medicines you take. A craniotomy is surgery to open your skull to fix a problem in your brain. It can be done for many reasons. For example, you may need a craniotomy if your brain or blood vessels are damaged or if you have a tumor or an infection in your brain. You will probably feel very tired for several weeks after surgery. You may also have headaches or problems concentrating. It can take 4 to 8 weeks to recover from surgery. Your cuts (incisions) may be sore for about 5 days after surgery. You may also have numbness and shooting pains near your wound, or swelling and bruising around your eyes.   As your wound starts to heal, it may begin to itch. Medicines and ice packs can help with headaches, pain, swelling, and itching. The stitches that hold your incisions together may go away on their own or will be removed in 7 to 10 days. This depends on the type of stitches the doctor uses. Staples are usually removed in 10-14 days. It is common for your scalp to swell with fluid. After the swelling goes down, you may have a dent in your head. Some kinds of plates stay attached to hold the skull flap to your head. If your head was shaved, you may wear clean hats or scarves on your head until your hair grows back. This care sheet gives you a general idea about how long it will take for you to recover. But each person recovers at a different pace. Follow the steps below to get better as quickly as possible. When to call your Neurosurgeon:  You have trouble thinking clearly.  You are sleeping more than you are awake.  You have a fever with a stiff neck or a severe headache.  You have any sudden vision changes.  Nausea or vomiting, severe headache.  Loss of bowel or bladder function, inability to urinate.  Increased weakness-greater than before your surgery.  Severe pain or pain not relieved by medications.  Signs of a blood clot in your leg-calf pain, tenderness, redness, swelling of lower leg.  Signs of infection-if your incision is red; continues to have drainage; drainage has a foul odor or if you have a persistent fever over 101 degrees for 24 hours.  You fall and hit your head. When to call your Primary Care Physician:  Concerns about medical conditions such as diabetes, high blood pressure, asthma, congestive heart failure.  Call if blood sugars are elevated, persistent headache or dizziness, coughing or congestion, constipation or diarrhea, burning with urination, abnormal heart rate. When to call 911 and go to the nearest emergency room:  Acute onset of chest pain, shortness of breath, difficulty breathing  You passed out (lost consciousness).  It is hard to think, move, speak, or see.  Your body is jerking or shaking. Activity ? Rest when you feel tired. It is normal to want to sleep during the day. It is a good idea to plan to take a nap every day. Getting enough sleep will help you recover. ? Try not to lie flat when you rest or sleep. You can use a wedge pillow, or you can put a rolled towel or foam padding under your pillow. You can also raise the head of your bed by putting bricks or wooden blocks under the bed legs. ? After lying down, bring your head up slowly. This can prevent headaches or dizziness. ? Try to walk each day. Start by walking a little more than you did the day before. Bit by bit, increase the amount you walk. Walking boosts blood flow and helps prevent pneumonia and constipation. ? Avoid heavy lifting until your doctor says it is okay. ? Do not drive for 2 to 3 weeks or until your doctor says it is okay. When you begin driving again, start with short, familiar routes in the daytime. ? Ask your doctor if it is safe for you to travel by plane. ? Avoid risky activities, such as climbing a ladder, for 3 months after surgery. ? Avoid strenuous activities, such as bicycle riding, jogging, weight lifting, or aerobic exercise, for 3 months or until your doctor says it is okay. ? Do not play any rough or contact sports for 3 months or until your doctor says it is okay. ? You may engage in sexual activity. Diet ? Resume usual diet; drink plenty of fluids; eat foods high in fiber ? It is important to have regular bowel movements. Pain medications may cause constipation. You may want to take a stool softener (such as Senokot-S or Colace) to prevent constipation. ? If constipation occurs, take a laxative (such as Dulcolax tablets, Milk of Magnesia, or a suppository).   Laxatives should only be used if the above preventable measures have failed and you still have not had a bowel movement after three days ? Try to avoid constipation and straining with bowel movements. Incision Care     You can wash your hair 2 to 3 days after your surgery. But do not soak your head or swim for 2 to 3 weeks.  Do not dye or color your hair for 4 weeks after your surgery.  Do not rub or apply any lotions or ointments to your incision site.  Do not scrub your wound Medicines  If your doctor or nurse practitioner prescribed antibiotics, take them as directed. Do not stop taking them just because you feel better. You need to take the full course of antibiotics.  If you get medicines to prevent seizures, take them exactly as directed.  If the doctor or nurse practitioner gave you a prescription medicine for pain, take it as prescribed.  If you are not taking a prescription pain medicine, ask your doctor or nurse practitioner if you can take an over-the-counter medicine.   
Pain Medication Safety DO: 
 Read the Medication Guide  Take your medicine exactly as prescribed  Store your medicine away from children and in a safe place  Call your healthcare provider for medical advice about side effects. You may report side effects to FDA at 8-177-FDA-9434.  Please be aware that many medications contain Tylenol. We do not want you to over medicate so please read the information below as a guide. Do not take more than 4 Grams of Tylenol in a 24 hour period if you are under age 79 or 3 Grams in 24 hours if you are over 79years old. (There are 1000 milligrams in one Gram) o Percocet contains 325 mg of Tylenol per tablet (do not take more than 12 tablets in 24 hours) 
o Lortab contains 500 mg of Tylenol per tablet (do not take more than 8 tablets in 24 hours) o Norco contains 325 mg of Tylenol per tablet (do not take more than 12 tablets in 24 hours). DO NOT: 
 Do not give your medicine to others.  Do not take medicine unless it was prescribed for you.  Do not stop taking your medicine without talking to your healthcare provider.  Do not break, chew, crush, dissolve, or inject your medicine. If you cannot swallow your medicine whole, talk to your healthcare provider.  Do not drink alcohol while taking this medicine.  Do not take anti-inflammatory medications or aspirin unless instructed by your physician. Discharge Orders None [employer] Announcement We are excited to announce that we are making your provider's discharge notes available to you in [employer]. You will see these notes when they are completed and signed by the physician that discharged you from your recent hospital stay. If you have any questions or concerns about any information you see in [employer], please call the Health Information Department where you were seen or reach out to your Primary Care Provider for more information about your plan of care. Introducing [facility] SERVICES! Petr Wilson introduces [employer] patient portal. Now you can access parts of your medical record, email your doctor's office, and request medication refills online. 1. In your internet browser, go to [email or web address] 2. Click on the First Time User? Click Here link in the Sign In box. You will see the New Member Sign Up page. 3. Enter your [employer] Access Code exactly as it appears below. You will not need to use this code after youve completed the sign-up process. If you do not sign up before the expiration date, you must request a new code. · [employer] Access Code: Gaby Brewster Expires: 1/2/2018 12:02 PM 
 
4. Enter the last four digits of your Social Security Number (xxxx) and Date of Birth (mm/dd/yyyy) as indicated and click Submit. You will be taken to the next sign-up page. 5. Create a [employer] ID. This will be your [employer] login ID and cannot be changed, so think of one that is secure and easy to remember. 6. Create a [employer] password. You can change your password at any time. 7. Enter your Password Reset Question and Answer. This can be used at a later time if you forget your password. 8. Enter your e-mail address. You will receive e-mail notification when new information is available in [address] 9. Click Sign Up. You can now view and download portions of your medical record. 10. Click the Download Summary menu link to download a portable copy of your medical information. If you have questions, please visit the Frequently Asked Questions section of the [employer] website. Remember, [employer] is NOT to be used for urgent needs. For medical emergencies, dial 911. Now available from your iPhone and Android! General Information Please provide this summary of care documentation to your next provider. Patient Signature:  ____________________________________________________________ Date:  ____________________________________________________________  
  
Nabeel Montana Provider Signature:  ____________________________________________________________ Date:  ____________________________________________________________

## 2017-10-19 NOTE — ANESTHESIA POSTPROCEDURE EVALUATION
Post-Anesthesia Evaluation and Assessment    Patient: Author Anaya MRN: [id number]  SSN: [id number]    YOB: 1930  Age: 80 y.o. Sex: male       Cardiovascular Function/Vital Signs  Visit Vitals    /70    Pulse (!) 43    Temp 36.4 °C (97.5 °F)    Resp 13    Ht 5' 9.5\" (1.765 m)    Wt 85 kg (187 lb 6 oz)    SpO2 95%    BMI 27.27 kg/m2       Patient is status post general anesthesia for Procedure(s):  BRAIN LAB CT GUIDED LEFT TEMPORAL CRANIOTOMY RESECTION OF TUMOR . Nausea/Vomiting: None    Postoperative hydration reviewed and adequate. Pain:  Pain Scale 1: Numeric (0 - 10) (10/19/17 1606)  Pain Intensity 1: 10 (10/19/17 1608)   Managed    Neurological Status:   Neuro (WDL): Exceptions to WDL (left side drooping ) (10/19/17 1014)   At baseline    Mental Status and Level of Consciousness: Arousable    Pulmonary Status:   O2 Device: Nasal cannula (transported to PACU with nc 3l) (10/19/17 2297)   Adequate oxygenation and airway patent    Complications related to anesthesia: None    Post-anesthesia assessment completed.  No concerns    Signed By: Wendi Roman MD     October 19, 2017

## 2017-10-19 NOTE — ROUTINE PROCESS
Patient: Brayan Antony MRN: [id number]  SSN: [id number]   YOB: 1930  Age: 80 y.o. Sex: male     Patient is status post Procedure(s):  BRAIN LAB CT GUIDED LEFT TEMPORAL CRANIOTOMY RESECTION OF TUMOR .     Surgeon(s) and Role:     * Aaron Velazquez MD - Primary    Local/Dose/Irrigation:  See Emar; lidocaine 1% with epi 10 ml                    Arterial Line 10/19/17 Right Radial artery (Active)          Airway - Endotracheal Tube 10/19/17 Oral (Active)                   Dressing/Packing:  Wound Head Left-DRESSING TYPE: Topical skin adhesive/glue (10/19/17 1500)  Splint/Cast:  ]    Other:  Scds; schafer

## 2017-10-19 NOTE — ADDENDUM NOTE
Addendum  created 10/19/17 1732 by Little Holstein, CRNA    Anesthesia Intra Flowsheets edited, Flowsheet data copied forward

## 2017-10-19 NOTE — PROGRESS NOTES
1705- TRANSFER - IN REPORT:    Verbal report received from CRUZ Holland(name) on Mary Estrella  being received from [facility]) for routine post - op      Report consisted of patients Situation, Background, Assessment and   Recommendations(SBAR). Information from the following report(s) SBAR, Kardex, OR Summary, Intake/Output, MAR, Accordion, Recent Results, Med Rec Status, Cardiac Rhythm sinus mary lou and Alarm Parameters  was reviewed with the receiving nurse. Opportunity for questions and clarification was provided. Assessment completed upon patients arrival to unit and care assumed. 1930- Bedside shift change report given to Sonu Panda RN (oncoming nurse) by Yeimi Richardson RN (offgoing nurse). Report included the following information SBAR, Kardex, ED Summary, OR Summary, Intake/Output, MAR, Accordion, Recent Results, Med Rec Status, Cardiac Rhythm sinus mary lou and Alarm Parameters .

## 2017-10-20 ENCOUNTER — APPOINTMENT (OUTPATIENT)
Dept: CT IMAGING | Age: 82
DRG: 025 | End: 2017-10-20
Attending: NEUROLOGICAL SURGERY
Payer: MEDICARE

## 2017-10-20 LAB
ANION GAP SERPL CALC-SCNC: 6 MMOL/L (ref 5–15)
BASOPHILS # BLD: 0 K/UL (ref 0–0.1)
BASOPHILS NFR BLD: 0 % (ref 0–1)
BUN SERPL-MCNC: 14 MG/DL (ref 6–20)
BUN/CREAT SERPL: 17 (ref 12–20)
CALCIUM SERPL-MCNC: 7.4 MG/DL (ref 8.5–10.1)
CHLORIDE SERPL-SCNC: 101 MMOL/L (ref 97–108)
CO2 SERPL-SCNC: 25 MMOL/L (ref 21–32)
CREAT SERPL-MCNC: 0.82 MG/DL (ref 0.7–1.3)
DIFFERENTIAL METHOD BLD: ABNORMAL
EOSINOPHIL # BLD: 0 K/UL (ref 0–0.4)
EOSINOPHIL NFR BLD: 0 % (ref 0–7)
ERYTHROCYTE [DISTWIDTH] IN BLOOD BY AUTOMATED COUNT: 14.4 % (ref 11.5–14.5)
GLUCOSE SERPL-MCNC: 124 MG/DL (ref 65–100)
HCT VFR BLD AUTO: 34.1 % (ref 36.6–50.3)
HGB BLD-MCNC: 11.4 G/DL (ref 12.1–17)
LYMPHOCYTES # BLD: 0.8 K/UL (ref 0.8–3.5)
LYMPHOCYTES NFR BLD: 6 % (ref 12–49)
MCH RBC QN AUTO: 31.8 PG (ref 26–34)
MCHC RBC AUTO-ENTMCNC: 33.4 G/DL (ref 30–36.5)
MCV RBC AUTO: 95.3 FL (ref 80–99)
MONOCYTES # BLD: 1 K/UL (ref 0–1)
MONOCYTES NFR BLD: 7 % (ref 5–13)
NEUTS SEG # BLD: 12 K/UL (ref 1.8–8)
NEUTS SEG NFR BLD: 87 % (ref 32–75)
PLATELET # BLD AUTO: 178 K/UL (ref 150–400)
POTASSIUM SERPL-SCNC: 4.7 MMOL/L (ref 3.5–5.1)
RBC # BLD AUTO: 3.58 M/UL (ref 4.1–5.7)
RBC MORPH BLD: ABNORMAL
SODIUM SERPL-SCNC: 132 MMOL/L (ref 136–145)
WBC # BLD AUTO: 13.8 K/UL (ref 4.1–11.1)

## 2017-10-20 PROCEDURE — 85025 COMPLETE CBC W/AUTO DIFF WBC: CPT | Performed by: NEUROLOGICAL SURGERY

## 2017-10-20 PROCEDURE — G8978 MOBILITY CURRENT STATUS: HCPCS

## 2017-10-20 PROCEDURE — G8979 MOBILITY GOAL STATUS: HCPCS

## 2017-10-20 PROCEDURE — 65660000000 HC RM CCU STEPDOWN

## 2017-10-20 PROCEDURE — 77010033678 HC OXYGEN DAILY

## 2017-10-20 PROCEDURE — 92610 EVALUATE SWALLOWING FUNCTION: CPT

## 2017-10-20 PROCEDURE — 70450 CT HEAD/BRAIN W/O DYE: CPT

## 2017-10-20 PROCEDURE — 74011250636 HC RX REV CODE- 250/636: Performed by: NEUROLOGICAL SURGERY

## 2017-10-20 PROCEDURE — 80048 BASIC METABOLIC PNL TOTAL CA: CPT | Performed by: NEUROLOGICAL SURGERY

## 2017-10-20 PROCEDURE — 36415 COLL VENOUS BLD VENIPUNCTURE: CPT | Performed by: NEUROLOGICAL SURGERY

## 2017-10-20 PROCEDURE — 74011250637 HC RX REV CODE- 250/637: Performed by: NEUROLOGICAL SURGERY

## 2017-10-20 PROCEDURE — 74011000258 HC RX REV CODE- 258: Performed by: NEUROLOGICAL SURGERY

## 2017-10-20 PROCEDURE — 97165 OT EVAL LOW COMPLEX 30 MIN: CPT | Performed by: OCCUPATIONAL THERAPIST

## 2017-10-20 PROCEDURE — 97116 GAIT TRAINING THERAPY: CPT

## 2017-10-20 PROCEDURE — 97161 PT EVAL LOW COMPLEX 20 MIN: CPT

## 2017-10-20 PROCEDURE — 74011000250 HC RX REV CODE- 250: Performed by: NEUROLOGICAL SURGERY

## 2017-10-20 RX ORDER — LEVETIRACETAM 500 MG/1
500 TABLET ORAL 2 TIMES DAILY
Qty: 60 TAB | Refills: 0 | Status: SHIPPED | OUTPATIENT
Start: 2017-10-20 | End: 2017-11-20

## 2017-10-20 RX ORDER — DEXAMETHASONE 4 MG/1
4 TABLET ORAL EVERY 8 HOURS
Status: DISCONTINUED | OUTPATIENT
Start: 2017-10-20 | End: 2017-10-21

## 2017-10-20 RX ORDER — LEVETIRACETAM 500 MG/1
500 TABLET ORAL 2 TIMES DAILY
Status: DISCONTINUED | OUTPATIENT
Start: 2017-10-20 | End: 2017-10-21 | Stop reason: HOSPADM

## 2017-10-20 RX ORDER — DEXAMETHASONE 4 MG/1
TABLET ORAL
Qty: 20 TAB | Refills: 0 | Status: SHIPPED | OUTPATIENT
Start: 2017-10-20 | End: 2017-11-20

## 2017-10-20 RX ORDER — OXYCODONE AND ACETAMINOPHEN 5; 325 MG/1; MG/1
1 TABLET ORAL
Qty: 22 TAB | Refills: 0 | Status: SHIPPED | OUTPATIENT
Start: 2017-10-20 | End: 2017-11-20

## 2017-10-20 RX ADMIN — CEFAZOLIN 2 G: 1 INJECTION, POWDER, FOR SOLUTION INTRAMUSCULAR; INTRAVENOUS; PARENTERAL at 05:47

## 2017-10-20 RX ADMIN — LISINOPRIL 40 MG: 20 TABLET ORAL at 23:00

## 2017-10-20 RX ADMIN — DOCUSATE SODIUM 200 MG: 100 CAPSULE, LIQUID FILLED ORAL at 08:39

## 2017-10-20 RX ADMIN — ATORVASTATIN CALCIUM 10 MG: 10 TABLET, FILM COATED ORAL at 23:00

## 2017-10-20 RX ADMIN — BACITRACIN: 500 OINTMENT OPHTHALMIC at 08:55

## 2017-10-20 RX ADMIN — FAMOTIDINE 20 MG: 20 TABLET ORAL at 08:39

## 2017-10-20 RX ADMIN — CEFAZOLIN 2 G: 1 INJECTION, POWDER, FOR SOLUTION INTRAMUSCULAR; INTRAVENOUS; PARENTERAL at 13:12

## 2017-10-20 RX ADMIN — FAMOTIDINE 20 MG: 20 TABLET ORAL at 17:31

## 2017-10-20 RX ADMIN — LEVETIRACETAM 500 MG: 100 INJECTION, SOLUTION, CONCENTRATE INTRAVENOUS at 06:47

## 2017-10-20 RX ADMIN — DEXAMETHASONE SODIUM PHOSPHATE 4 MG: 4 INJECTION, SOLUTION INTRAMUSCULAR; INTRAVENOUS at 06:47

## 2017-10-20 RX ADMIN — Medication 10 ML: at 23:01

## 2017-10-20 RX ADMIN — Medication 1000 MCG: at 08:39

## 2017-10-20 RX ADMIN — Medication 10 ML: at 05:47

## 2017-10-20 RX ADMIN — LEVETIRACETAM 500 MG: 500 TABLET, FILM COATED ORAL at 08:39

## 2017-10-20 RX ADMIN — LEVETIRACETAM 500 MG: 500 TABLET, FILM COATED ORAL at 17:31

## 2017-10-20 RX ADMIN — DEXAMETHASONE SODIUM PHOSPHATE 4 MG: 4 INJECTION, SOLUTION INTRAMUSCULAR; INTRAVENOUS at 00:35

## 2017-10-20 RX ADMIN — GABAPENTIN 600 MG: 600 TABLET, FILM COATED ORAL at 08:39

## 2017-10-20 RX ADMIN — OXCARBAZEPINE 150 MG: 150 TABLET ORAL at 08:39

## 2017-10-20 RX ADMIN — GABAPENTIN 600 MG: 600 TABLET, FILM COATED ORAL at 17:31

## 2017-10-20 RX ADMIN — VITAMIN D, TAB 1000IU (100/BT) 1000 UNITS: 25 TAB at 08:39

## 2017-10-20 RX ADMIN — DEXAMETHASONE 4 MG: 4 TABLET ORAL at 23:01

## 2017-10-20 RX ADMIN — DEXAMETHASONE 4 MG: 4 TABLET ORAL at 13:14

## 2017-10-20 RX ADMIN — DEXAMETHASONE 4 MG: 4 TABLET ORAL at 08:39

## 2017-10-20 NOTE — PROGRESS NOTES
Speech Pathology bedside swallow evaluation/discharge  Patient: Rosales Gonzalez (65 y.o. male)  Date: 10/20/2017  Primary Diagnosis: LEFT TEMPORAL TUMOR   Brain tumor ([address]  Procedure(s) (LRB):  BRAIN LAB CT GUIDED LEFT TEMPORAL CRANIOTOMY RESECTION OF TUMOR  (N/A) 1 Day Post-Op   Precautions:        ASSESSMENT :  Patient with trigeminal neuralgia and L side facial numbness which is baseline. This does not impact his ability to masticate solid foods and clear oral cavity completely. Observed patient eating a regular breakfast tray. He demonstrated timely mastication of solids. Mild lingual residue that cleared with liquid wash. Pharyngeal swallow initiation wfl for age. No overt s/s aspiration with successive straw sips of thin or solid. Patient went to Dr. Omkar Barrera office with expressive aphasia and balance problems over the summer and a MRI showed a mass in the left temporal lobe with marked cerebral edema of the left temporal lobe, extending into the left parieto-occipital lobe. Dr. Jimenez started him on a course of dexamethasone and patient reports improvements in speech. Patient now POD 1 s/p resection. Patient and his daughter initially denied any changes in speech/language. When probed further about trouble finding the right words, patient and daughter bedside report improvement even from yesterday with word finding. Suspect this will continue to improve as swelling reduces. This morning, patient was able to carry on a fluent conversation, effectively explain to daughter how to put in his hearing aid batteries. He was oriented. He is anxious to move downstairs and then go home. Per neurosurgery note, likely d/c tomorrow. RN did note some word finding deficits this morning. No word retrieval deficits noted during my time in room and does not seem to be impacting patient's ability to communicate at this time. He and his daughter do not feel like he needs ST.  Certainly if difficulties arise post d/c could always go to OP. Skilled therapy provided by a speech-language pathologist is not indicated at this time. PLAN :  Recommendations:  -- If any word finding deficits persist upon discharge and patient feels he would like ST, could always consider OP but after discussion, unlikely. Discharge Recommendations: None vs OP     SUBJECTIVE:   Patient stated I am doing fine. OBJECTIVE:     Past Medical History:   Diagnosis Date    Arthritis     HANDS    Beta-blocker therapy     Cancer [address] [address] 2013    MAXILLARY SINUS CANCER, RADIATION AND CHEMO    Cancer ([facility] [address]     SKIN CA ON NOSE    GERD (gastroesophageal reflux disease)     Hypercholesterolemia     Hypertension     pt denies    Nasal sinus tumor     Numbness of LEFT hand & LEFT face 2010    RBBB (right bundle branch block)     TIA (transient ischemic attack)     12 TIA's over 9 YRS.1ST ONE IN 8/03- LAST IN 2/12      Trigeminal neuralgia      Past Surgical History:   Procedure Laterality Date    HX CATARACT REMOVAL Bilateral     HX GI      COLONOSCOPY    HX HEENT      BIOPSY OF SINUS     HX HEENT Left     sew eye closed    HX HERNIA REPAIR Right     INGUINAL    HX KNEE ARTHROSCOPY Right 2007    HX KNEE REPLACEMENT Right 2012    Total Knee replacement- right    HX ROTATOR CUFF REPAIR Right     right rotator cuff repair     Prior Level of Function/Home Situation:      Diet prior to admission: regular/thin   Current Diet:  Regular/thin    Cognitive and Communication Status:  Neurologic State: Alert  Orientation Level: Oriented X4  Cognition: Follows commands  Perception: Appears intact  Perseveration: No perseveration noted  Safety/Judgement: Awareness of environment  Oral Assessment:  Oral Assessment  Labial: Left droop (baseline )  Dentition: Upper & lower dentures  Oral Hygiene:  (clean, moist)  Lingual: Decreased rate  Velum: Unable to visualize  Mandible: No impairment  P.O.  Trials:  Patient Position:  (upright in bed)  Vocal quality prior to P.O.: No impairment  Consistency Presented: Thin liquid; Solid  How Presented: Successive swallows;Straw;Self-fed/presented     Bolus Acceptance: No impairment  Bolus Formation/Control: No impairment     Propulsion: No impairment  Oral Residue: Less than 10% of bolus; Lingual (cleared with liquid wash)  Initiation of Swallow: Delayed (# of seconds)  Laryngeal Elevation: Functional  Aspiration Signs/Symptoms: None                Oral Phase Severity: No impairment  Pharyngeal Phase Severity : No impairment  NOMS:   The NOMS functional outcome measure was used to quantify this patient's level of swallowing impairment. Based on the NOMS, the patient was determined to be at level 7 for swallow function     G Codes: In compliance with CMSs Claims Based Outcome Reporting, the following G-code set was chosen for this patient based the use of the NOMS functional outcome to quantify this patient's level of swallowing impairment. Using the NOMS, the patient was determined to be at level 7 for swallow function which correlates with the CH= 0% level of severity. Based on the objective assessment provided within this note, the current, goal, and discharge g-codes are as follows:    Swallow  Swallowing:   Swallow Current Status CH= 0%   Swallow Goal Status CH= 0%   Swallow D/C Status CH= 0%        NOMS Swallowing Levels:  Level 1 (CN): NPO  Level 2 (CM): NPO but takes consistency in therapy  Level 3 (CL): Takes less than 50% of nutrition p.o. and continues with nonoral feedings; and/or safe with mod cues; and/or max diet restriction  Level 4 (CK): Safe swallow but needs mod cues; and/or mod diet restriction; and/or still requires some nonoral feeding/supplements  Level 5 (CJ): Safe swallow with min diet restriction; and/or needs min cues  Level 6 (CI): Independent with p.o.; rare cues; usually self cues; may need to avoid some foods or needs extra time  Level 7 [facility]): Independent for all p.o.  GIFTY. (2003). National Outcomes Measurement System (NOMS): Adult Speech-Language Pathology User's Guide. Pain:Pain Scale 1: Numeric (0 - 10)  Pain Intensity 1: 0     After treatment:   [] Patient left in no apparent distress sitting up in chair  [x] Patient left in no apparent distress in bed   [x] Call bell left within reach  [x] Nursing notified  [x] Caregiver present  [] Bed alarm activated    COMMUNICATION/EDUCATION:   The patients plan of care including findings, recommendations, and recommended diet changes were discussed with: Registered Nurse.    [] Posted safety precautions in patient's room. [x] Patient/family have participated as able and agree with findings and recommendations. [] Patient is unable to participate in plan of care at this time.     Thank you for this referral.  Arville Shone M.S. CCC-SLP  Time Calculation: 15 mins

## 2017-10-20 NOTE — PROGRESS NOTES
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 He was treated with gamma knife radiosurgery for left trigeminal neuralgia pain in . He underwent chemotherapy and radiation for the squamous cell cancer in . Over this past summer, the patient had an acute onset of word finding difficulties and balance issues. He saw Dr. Jimenez who ordered an MRI of his brain. This revealed an approximately 3 cm mass in the left temporal lobe with cerebral edema. There was concern for malignancy versus radiation necrosis. He has had some improvement from decadron. He underwent a left temporal craniotomy yesterday with resection of the tumor. 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 He is doing great this morning and wants to go home tomorrow. Objective:     Vital signs  Temp (24hrs), Av.6 °F (36.4 °C), Min:97.3 °F (36.3 °C), Max:98.5 °F (36.9 °C)      10/18 1901 - 10/20 0700  In: 2986.7 [I.V.:2986.7]  Out: 8970 [Urine:5075]    Visit Vitals    /63    Pulse (!) 47    Temp 98.5 °F (36.9 °C)    Resp 14    Ht 5' 9.5\" (1.765 m)    Wt 85 kg (187 lb 6 oz)    SpO2 91%    BMI 27.27 kg/m2    O2 Flow Rate (L/min): 2 l/min O2 Device: Room air     Pain control  Pain Assessment  Pain Scale 1: Numeric (0 - 10)  Pain Intensity 1: 0  Pain Onset 1: post op  Pain Location 1: Head  Pain Orientation 1: Posterior  Pain Description 1: Pressure, Aching  Pain Intervention(s) 1: Emotional support    PT/OT  Gait                 Physical Exam:  Gen:NAD. Neuro: A&Ox3. Follows commands. Speech clear.  Affect normal.  Right pupil round and reactive to light. Left eye enucleated. REOMI. Face with some flattening of left nasolabial fold. Tongue midline. YEH. Strength 5/5 in UE and LE BL. Negative drift. Gait deferred. Skin: Left temporal dressing C/D/I    CT head without contrast on 10/20/17 shows radiology read pending. 24 hour results:    Recent Results (from the past 24 hour(s))   GLUCOSE, POC    Collection Time: 10/19/17 10:34 AM   Result Value Ref Range    Glucose (POC) 91 65 - 100 mg/dL    Performed by Sosa Mrainelli    CBC WITH AUTOMATED DIFF    Collection Time: 10/20/17  4:06 AM   Result Value Ref Range    WBC 13.8 (H) 4.1 - 11.1 K/uL    RBC 3.58 (L) 4.10 - 5.70 M/uL    HGB 11.4 (L) 12.1 - 17.0 g/dL    HCT 34.1 (L) 36.6 - 50.3 %    MCV 95.3 80.0 - 99.0 FL    MCH 31.8 26.0 - 34.0 PG    MCHC 33.4 30.0 - 36.5 g/dL    RDW 14.4 11.5 - 14.5 %    PLATELET 451 628 - 480 K/uL    NEUTROPHILS 87 (H) 32 - 75 %    LYMPHOCYTES 6 (L) 12 - 49 %    MONOCYTES 7 5 - 13 %    EOSINOPHILS 0 0 - 7 %    BASOPHILS 0 0 - 1 %    ABS. NEUTROPHILS 12.0 (H) 1.8 - 8.0 K/UL    ABS. LYMPHOCYTES 0.8 0.8 - 3.5 K/UL    ABS. MONOCYTES 1.0 0.0 - 1.0 K/UL    ABS. EOSINOPHILS 0.0 0.0 - 0.4 K/UL    ABS. BASOPHILS 0.0 0.0 - 0.1 K/UL    DF SMEAR SCANNED      RBC COMMENTS ANISOCYTOSIS  1+        RBC COMMENTS MACROCYTOSIS  1+        RBC COMMENTS OVALOCYTES  PRESENT       METABOLIC PANEL, BASIC    Collection Time: 10/20/17  4:06 AM   Result Value Ref Range    Sodium 132 (L) 136 - 145 mmol/L    Potassium 4.7 3.5 - 5.1 mmol/L    Chloride 101 97 - 108 mmol/L    CO2 25 21 - 32 mmol/L    Anion gap 6 5 - 15 mmol/L    Glucose 124 (H) 65 - 100 mg/dL    BUN 14 6 - 20 MG/DL    Creatinine 0.82 0.70 - 1.30 MG/DL    BUN/Creatinine ratio 17 12 - 20      GFR est AA >60 >60 ml/min/1.73m2    GFR est non-AA >60 >60 ml/min/1.73m2    Calcium 7.4 (L) 8.5 - 10.1 MG/DL          Assessment:     Active Problems:    Brain tumor ([facility] [address] (10/19/2017)        Plan:   1. Left temporal mass   - s/p crani 10/19.  Pathology pending   - cont decadron   - cont keppra   - PT/OT/Speech   - Normalize and mobilize   - Transfer to [facility]  2. Brain compression and cerebral edema   - due to #1   - plans as above  3. Chronic hyponatremia   - at baseline   - cont to monitor  4. Metastatic squamous cell carcinoma   - followed by Dr. Jerica Hammond [facility]  5. Left trigeminal neuralgia   - Cont gabapentin and trileptal  6. Leukocytosis   - due to steroids   - afebrile   - cont to monitor  7. HTN   - Cont lisinopril from home  8.  Dyslipidemia   - cont Lipitor from home    Activity: up with assist  DVT ppx: SCDs  Dispo: home likely tomorrow    Plan d/w Dr. Joel Hinton, NP

## 2017-10-20 NOTE — PROGRESS NOTES
0101- Primary Nurse Hakan Edwards RN and Nuzhat Landis RN performed a dual skin assessment on this patient No impairment noted  Davy score is 20

## 2017-10-20 NOTE — DISCHARGE SUMMARY
Discharge Summary     Patient ID:  Christo Castillo  [id number]   39 y.o.  11/10/1930    Admit date: 10/19/2017    Discharge Date: 10/20/2017      Admitting Physician: Praveen Eli MD     Discharge Physician: Margarita Nogueira NP    Admission Diagnoses: LEFT TEMPORAL TUMOR   Brain tumor [facility])    Last Procedure: Procedure(s):  BRAIN LAB CT GUIDED LEFT TEMPORAL CRANIOTOMY RESECTION OF TUMOR     Discharge Diagnoses: Active Problems:    Brain tumor ([address] (10/19/2017)         Consults: None    Significant Diagnostic Studies:   1. CT head with contrast on 10/19/17 shows mildly ring-enhancing lesion of the left temporal lobe with surrounding edema. 2. CT head without contrast on 10/20/17 shows expected postoperative changes following left parietotemporal craniotomy. No midline shift or herniation. 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 He was treated with gamma knife radiosurgery for left trigeminal neuralgia pain in 2013. He underwent chemotherapy and radiation for the squamous cell cancer in 2015. Over this past summer, the patient had an acute onset of word finding difficulties and balance issues. He saw Dr. Jimenez who ordered an MRI of his brain. This revealed an approximately 3 cm mass in the left temporal lobe with cerebral edema. There was concern for malignancy versus radiation necrosis. He has had some improvement from decadron. He underwent a left temporal craniotomy on 10/19/17 with resection of the tumor. The intraoperative findings can be found in Dr. Magda Lofton operative report. 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 Post-operatively, the patient was transferred to the ICU. He was doing well. He was taking PO well, voiding on his own, and ambulating. He was transferred to the [facility] on POD1.  He worked with physical therapy, occupational therapy and speech therapies. He was afebrile and vital signs stable. He was ready for discharge on POD2 and will follow-up with Dr. Stacie Sherwood in 2 weeks. Disposition: Home    Patient Instructions:   Current Discharge Medication List      START taking these medications    Details   levETIRAcetam (KEPPRA) 500 mg tablet Take 1 Tab by mouth two (2) times a day. Qty: 60 Tab, Refills: 0      oxyCODONE-acetaminophen (PERCOCET) 5-325 mg per tablet Take 1 Tab by mouth every four (4) hours as needed. Max Daily Amount: 6 Tabs. Qty: 22 Tab, Refills: 0         CONTINUE these medications which have CHANGED    Details   dexamethasone (DECADRON) 4 mg tablet Take 1 tab PO every 6 hours x 2 days then 1 tab PO every 8 hours x 2 days then 1/2 tab PO every 8 hours x 2 days then 1/2 tab PO every 12 hours x 2 days then 1/2 tab PO daily x 2 days then stop. Qty: 20 Tab, Refills: 0         CONTINUE these medications which have NOT CHANGED    Details   lisinopril (PRINIVIL, ZESTRIL) 40 mg tablet Take 40 mg by mouth nightly.      gabapentin (NEURONTIN) 600 mg tablet Take 600 mg by mouth two (2) times a day. cyanocobalamin (VITAMIN B-12) 1,000 mcg tablet Take 1,000 mcg by mouth daily. cholecalciferol (VITAMIN D3) 1,000 unit cap Take 1,000 Units by mouth daily. acetaminophen (TYLENOL EXTRA STRENGTH) 500 mg tablet Take 1,000 mg by mouth as needed for Pain. !! docusate sodium (COLACE) 100 mg capsule Take 100 mg by mouth nightly. OXcarbazepine (TRILEPTAL) 300 mg tablet Take 150 mg by mouth daily. Indications: TRIGEMINAL NEURALGIA      bacitracin ophthalmic ointment Administer  to left eye daily. Refills: 2      !! docusate sodium (COLACE) 100 mg capsule Take 200 mg by mouth daily. 200mg in the morning and 100mg QHS      GLUCOSAMINE/CHONDROITIN SULF A (GLUCOSAMINE-CHONDROITIN PO) Take 1 Tab by mouth daily. atorvastatin (LIPITOR) 10 mg tablet Take 10 mg by mouth nightly.       clopidogrel (PLAVIX) 75 mg tablet Take 75 mg by mouth daily. TAKES IN AM       !! - Potential duplicate medications found. Please discuss with provider. Diet: Reference my discharge instructions. Activity: Reference my discharge instructions. EXAM:   Gen:NAD. Neuro: A&Ox3. Follows commands. Speech clear. Affect normal.  Right pupil round and reactive to light. Left eye enucleated. REOMI. Face with some flattening of left nasolabial fold. Tongue midline. YEH. Strength 5/5 in UE and LE BL. Negative drift. Gait deferred. Skin: Left temporal dressing C/D/I  Heart RRR  Lungs CTA BL  Abd soft, NT  Ext no edema       Total time discharging patient took greater than 30 minutes.     Signed:  Alis Newman NP  11/08/17 0910

## 2017-10-20 NOTE — PROGRESS NOTES
0800: Report received from Marycruz Boone RN using SBAR format. Care assumed. Pt resting in bed, rouses easily to voice. Denies any c/o pain. See doc flow sheets for assessment. 1645: TRANSFER - OUT REPORT:  Verbal report given to [facility] RN on Geremias Blackmon  being transferred to [facility] for routine progression of careReport consisted of patients Situation, Background, Assessment and Recommendations(SBAR). Information from the following report(s) SBAR, MAR, Recent Results and Cardiac Rhythm SB with 1st degree block was reviewed with the receiving nurse. Lines:   Peripheral IV 10/19/17 Right Wrist (Active)   Site Assessment Clean, dry, & intact 10/20/2017  4:00 PM   Phlebitis Assessment 0 10/20/2017  4:00 PM   Infiltration Assessment 0 10/20/2017  4:00 PM   Dressing Status Clean, dry, & intact 10/20/2017  4:00 PM   Dressing Type Tape;Transparent 10/20/2017  4:00 PM   Hub Color/Line Status Green;Capped 10/20/2017  4:00 PM   Action Taken Open ports on tubing capped 10/20/2017  4:00 PM   Alcohol Cap Used Yes 10/20/2017  4:00 PM       Peripheral IV 10/19/17 Left Hand (Active)   Site Assessment Clean, dry, & intact 10/20/2017  4:00 PM   Phlebitis Assessment 0 10/20/2017  4:00 PM   Infiltration Assessment 0 10/20/2017  4:00 PM   Dressing Status Clean, dry, & intact 10/20/2017  4:00 PM   Dressing Type Tape;Transparent 10/20/2017  4:00 PM   Hub Color/Line Status Green;Capped 10/20/2017  4:00 PM   Action Taken Open ports on tubing capped 10/20/2017  4:00 PM   Alcohol Cap Used Yes 10/20/2017  4:00 PM   Opportunity for questions and clarification was provided.     Patient transported with:  Monitor  Patient-specific medications from Pharmacy  Registered Nurse

## 2017-10-20 NOTE — PROGRESS NOTES
Problem: Mobility Impaired (Adult and Pediatric)  Goal: *Acute Goals and Plan of Care (Insert Text)  Physical Therapy Goals  Initiated 10/20/2017  1. Patient will move from supine to sit and sit to supine  and scoot up and down in bed with modified independence within 7 day(s). 2.  Patient will transfer from bed to chair and chair to bed with modified independence using the least restrictive device within 7 day(s). 3.  Patient will perform sit to stand with modified independence within 7 day(s). 4.  Patient will ambulate with modified independence for 300 feet with the least restrictive device within 7 day(s). 5.  Patient will ascend/descend 3 stairs with handrail(s) with supervision/set-up within 7 day(s). 6.  Patient will complete Ramon Balance  within 7 days. physical Therapy EVALUATION- neuro population    Patient: Pat Cordero (89 y.o. male)  Date: 10/20/2017  Primary Diagnosis: LEFT TEMPORAL TUMOR   Brain tumor ([facility] [address]  Procedure(s) (LRB):  BRAIN LAB CT GUIDED LEFT TEMPORAL CRANIOTOMY RESECTION OF TUMOR  (N/A) 1 Day Post-Op   Precautions:   Fall    ASSESSMENT :  Based on the objective data described below, the patient presents with mildly impaired functional mobility as compared to baseline level 2* gait and balance impairments s/p L temporal craniotomy with tumor resection, POD 1. Cleared for mobility by RN however somewhat limited by Nelly. Prior to this admission, pt reports that he lived with his significant other and was indep with all ADLs and mobility w/o use of AD - does own SPC and RW \"just in case\". He presents with symmetrical LE and  strengths, good coordination, and ability to track x4 quadrants with R eye (decreased smooth pursuit noted), minor L sided facial droop but reports this is baseline from previous sx. He was able to mobilize to EOB and take several steps at EOB over to chair with SUP to SBA w/o overt LOB or difficulty observed.  Anticipate that pt his near his functional baseline and will be appropriate for discharge home without additional therapy pending continued progress. Will plan for at least one additional visit while admitted to assess longer gait distance and higher level dynamic balance activities. Recommend OOB to chair and ambulating to bathroom with RN staff assist to reduce adverse effects of prolonged immobility. Patient will benefit from skilled intervention to address the above impairments. Patients rehabilitation potential is considered to be Good  Factors which may influence rehabilitation potential include:   [x]           None noted  []           Mental ability/status  []           Medical condition  []           Home/family situation and support systems  []           Safety awareness  []           Pain tolerance/management  []           Other:      PLAN :  Recommendations and Planned Interventions:  [x]             Bed Mobility Training             [x]      Neuromuscular Re-Education  [x]             Transfer Training                   []      Orthotic/Prosthetic Training  [x]             Gait Training                         []      Modalities  [x]             Therapeutic Exercises           []      Edema Management/Control  [x]             Therapeutic Activities            [x]      Patient and Family Training/Education  []             Other (comment):  Frequency/Duration: Patient will be followed by physical therapy daily to address goals. Discharge Recommendations: None and To Be Determined  Further Equipment Recommendations for Discharge: None- has Rw and SPC     SUBJECTIVE:   Patient stated I am feeling great.     OBJECTIVE DATA SUMMARY:   HISTORY:    Past Medical History:   Diagnosis Date    Arthritis     HANDS    Beta-blocker therapy     Cancer ([facility] [address] 2013    MAXILLARY SINUS CANCER, RADIATION AND CHEMO    [facility]     SKIN CA ON NOSE    GERD (gastroesophageal reflux disease)     Hypercholesterolemia     Hypertension     pt denies    Nasal sinus tumor     Numbness of LEFT hand & LEFT face 2010    RBBB (right bundle branch block)     TIA (transient ischemic attack)     12 TIA's over 9 YRS.1ST ONE IN 8/03- LAST IN 2/12      Trigeminal neuralgia      Past Surgical History:   Procedure Laterality Date    HX CATARACT REMOVAL Bilateral     HX GI      COLONOSCOPY    HX HEENT      BIOPSY OF SINUS     HX HEENT Left     sew eye closed    HX HERNIA REPAIR Right     INGUINAL    HX KNEE ARTHROSCOPY Right 2007    HX KNEE REPLACEMENT Right 2012    Total Knee replacement- right    HX ROTATOR CUFF REPAIR Right     right rotator cuff repair     Prior Level of Function/Home Situation: Lives at home with significant other; was indep with all ADLs and mobility w/o use of AD. Good family support  Personal factors and/or comorbidities impacting plan of care:     Home Situation  Home Environment: Private residence  # Steps to Enter: 3  Rails to Enter: Yes  One/Two Story Residence: One story  Living Alone: No  Support Systems: Spouse/Significant Other/Partner  Patient Expects to be Discharged to[de-identified] Private residence  Current DME Used/Available at Home: [place], straight, Walker, rolling  Tub or Shower Type: Shower    EXAMINATION/PRESENTATION/DECISION MAKING:   Critical Behavior:  Neurologic State: Alert, Appropriate for age  Orientation Level: Oriented X4  Cognition: Appropriate for age attention/concentration, Follows commands  Safety/Judgement: Awareness of environment, Fall prevention, Insight into deficits, Driving appropriateness, Home safety  Hearing:   Auditory  Auditory Impairment: Hard of hearing, bilateral  Hearing Aids/Status: Bilateral  Skin:  Incisions open to air, no significant drainage noted  Edema: none noted  Range Of Motion:  AROM: Generally decreased, functional  PROM: Generally decreased, functional     Strength:    Strength: Generally decreased, functional     Tone & Sensation:   Tone: Normal   Sensation: Intact      Coordination:  Coordination: Generally decreased, functional  Vision:   Tracking: Able to track stimulus in all quadrants w/o difficulty  Diplopia: No  Acuity: Able to read clock/calendar on wall without difficulty (blind in L eye)  Functional Mobility:  Bed Mobility:     Supine to Sit: Supervision      Transfers:  Sit to Stand: Stand-by asssistance  Stand to Sit: Stand-by asssistance     Balance:   Sitting: Intact  Standing: Impaired  Standing - Static: Good  Standing - Dynamic : Fair  Ambulation/Gait Training:  Distance (ft): 5 Feet (ft)  Assistive Device: Gait belt  Ambulation - Level of Assistance: Contact guard assistance;Stand-by asssistance  Gait Description (WDL): Exceptions to WDL  Gait Abnormalities: Decreased step clearance;Shuffling gait  Speed/Olive: Slow  Step Length: Right shortened;Left shortened         G codes: In compliance with CMSs Claims Based Outcome Reporting, the following G-code set was chosen for this patient based on their primary functional limitation being treated:      ?  Mobility - Walking and Moving Around:     - CURRENT STATUS: CK - 40%-59% impaired, limited or restricted    - GOAL STATUS: CJ - 20%-39% impaired, limited or restricted    - D/C STATUS:  ---------------To be determined---------------     Physical Therapy Evaluation Charge Determination   History Examination Presentation Decision-Making   HIGH Complexity :3+ comorbidities / personal factors will impact the outcome/ POC  MEDIUM Complexity : 3 Standardized tests and measures addressing body structure, function, activity limitation and / or participation in recreation  LOW Complexity : Stable, uncomplicated  MEDIUM Complexity : FOTO score of 26-74      Based on the above components, the patient evaluation is determined to be of the following complexity level: LOW     Pain:  Pain Scale 1: Numeric (0 - 10)  Pain Intensity 1: 0     Activity Tolerance:   VSS  Please refer to the flowsheet for vital signs taken during this treatment. After treatment:   [x]     Patient left in no apparent distress sitting up in chair  []     Patient left in no apparent distress in bed  [x]     Call bell left within reach  [x]     Nursing notified  []     Caregiver present  []     Bed alarm activated    COMMUNICATION/EDUCATION:   The patients plan of care was discussed with: Occupational Therapist and Registered Nurse. [x]  Fall prevention education was provided and the patient/caregiver indicated understanding. [x]  Patient/family have participated as able in goal setting and plan of care. []  Patient/family agree to work toward stated goals and plan of care. []  Patient understands intent and goals of therapy, but is neutral about his/her participation. []  Patient is unable to participate in goal setting and plan of care.     Thank you for this referral.  Tatiana Armas, PT, DPT   Time Calculation: 23 mins

## 2017-10-20 NOTE — DISCHARGE INSTRUCTIONS
After Hospital Care Plan:  Discharge Instructions Craniot[facility]    Patient Name: Carlos A Huntley    Date of procedure: 10/19/2017  Date of discharge: 10/20/2017    Procedure: Procedure(s):  BRAIN LAB CT GUIDED LEFT TEMPORAL CRANIOTOMY RESECTION OF TUMOR   PCP: Fremont Severs, MD    Follow up appointments  Follow up with your neurosurgeon in 10-14 days. Call [phone] to make an appointment as soon as you get home from the hospital.  Follow-up care is a key part of your treatment and safety. Be sure to make and go to all appointments, and call your doctor if you are having problems. It's also a good idea to know your test results and keep a list of the medicines you take. A craniotomy is surgery to open your skull to fix a problem in your brain. It can be done for many reasons. For example, you may need a craniotomy if your brain or blood vessels are damaged or if you have a tumor or an infection in your brain. You will probably feel very tired for several weeks after surgery. You may also have headaches or problems concentrating. It can take 4 to 8 weeks to recover from surgery. Your cuts (incisions) may be sore for about 5 days after surgery. You may also have numbness and shooting pains near your wound, or swelling and bruising around your eyes. As your wound starts to heal, it may begin to itch. Medicines and ice packs can help with headaches, pain, swelling, and itching. The stitches that hold your incisions together may go away on their own or will be removed in 7 to 10 days. This depends on the type of stitches the doctor uses. Staples are usually removed in 10-14 days. It is common for your scalp to swell with fluid. After the swelling goes down, you may have a dent in your head. Some kinds of plates stay attached to hold the skull flap to your head. If your head was shaved, you may wear clean hats or scarves on your head until your hair grows back.      This care sheet gives you a general idea about how long it will take for you to recover. But each person recovers at a different pace. Follow the steps below to get better as quickly as possible. When to call your Neurosurgeon:   You have trouble thinking clearly.  You are sleeping more than you are awake.  You have a fever with a stiff neck or a severe headache.  You have any sudden vision changes.  Nausea or vomiting, severe headache.  Loss of bowel or bladder function, inability to urinate.  Increased weakness-greater than before your surgery.  Severe pain or pain not relieved by medications.  Signs of a blood clot in your leg-calf pain, tenderness, redness, swelling of lower leg.  Signs of infection-if your incision is red; continues to have drainage; drainage has a foul odor or if you have a persistent fever over 101 degrees for 24 hours.  You fall and hit your head. When to call your Primary Care Physician:   Concerns about medical conditions such as diabetes, high blood pressure, asthma, congestive heart failure.  Call if blood sugars are elevated, persistent headache or dizziness, coughing or congestion, constipation or diarrhea, burning with urination, abnormal heart rate. When to call 911 and go to the nearest emergency room:   Acute onset of chest pain, shortness of breath, difficulty breathing   You passed out (lost consciousness).  It is hard to think, move, speak, or see.  Your body is jerking or shaking. Activity   Rest when you feel tired. It is normal to want to sleep during the day. It is a good idea to plan to take a nap every day. Getting enough sleep will help you recover.  Try not to lie flat when you rest or sleep. You can use a wedge pillow, or you can put a rolled towel or foam padding under your pillow. You can also raise the head of your bed by putting bricks or wooden blocks under the bed legs.  After lying down, bring your head up slowly.  This can prevent headaches or dizziness.  Try to walk each day. Start by walking a little more than you did the day before. Bit by bit, increase the amount you walk. Walking boosts blood flow and helps prevent pneumonia and constipation.  Avoid heavy lifting until your doctor says it is okay.  Do not drive for 2 to 3 weeks or until your doctor says it is okay. When you begin driving again, start with short, familiar routes in the daytime.  Ask your doctor if it is safe for you to travel by plane.  Avoid risky activities, such as climbing a ladder, for 3 months after surgery.  Avoid strenuous activities, such as bicycle riding, jogging, weight lifting, or aerobic exercise, for 3 months or until your doctor says it is okay.  Do not play any rough or contact sports for 3 months or until your doctor says it is okay.  You may engage in sexual activity. Diet   Resume usual diet; drink plenty of fluids; eat foods high in fiber   It is important to have regular bowel movements. Pain medications may cause constipation. You may want to take a stool softener (such as Senokot-S or Colace) to prevent constipation.  If constipation occurs, take a laxative (such as Dulcolax tablets, Milk of Magnesia, or a suppository). Laxatives should only be used if the above preventable measures have failed and you still have not had a bowel movement after three days   Try to avoid constipation and straining with bowel movements. Incision Care      You can wash your hair 2 to 3 days after your surgery. But do not soak your head or swim for 2 to 3 weeks.  Do not dye or color your hair for 4 weeks after your surgery.  Do not rub or apply any lotions or ointments to your incision site.  Do not scrub your wound    Medicines   If your doctor or nurse practitioner prescribed antibiotics, take them as directed. Do not stop taking them just because you feel better. You need to take the full course of antibiotics.    If you get medicines to prevent seizures, take them exactly as directed.  If the doctor or nurse practitioner gave you a prescription medicine for pain, take it as prescribed.  If you are not taking a prescription pain medicine, ask your doctor or nurse practitioner if you can take an over-the-counter medicine.    Pain Medication Safety  DO:   Read the Medication Guide    Take your medicine exactly as prescribed    Store your medicine away from children and in a safe place    Call your healthcare provider for medical advice about side effects. You may report side effects to FDA at 1-825-FDA-0291.  Please be aware that many medications contain Tylenol. We do not want you to over medicate so please read the information below as a guide. Do not take more than 4 Grams of Tylenol in a 24 hour period if you are under age 79 or 3 Grams in 24 hours if you are over 79years old. (There are 1000 milligrams in one Gram)  o Percocet contains 325 mg of Tylenol per tablet (do not take more than 12 tablets in 24 hours)  o Lortab contains 500 mg of Tylenol per tablet (do not take more than 8 tablets in 24 hours)  o Norco contains 325 mg of Tylenol per tablet (do not take more than 12 tablets in 24 hours). DO NOT:   Do not give your medicine to others.  Do not take medicine unless it was prescribed for you.  Do not stop taking your medicine without talking to your healthcare provider.  Do not break, chew, crush, dissolve, or inject your medicine. If you cannot swallow your medicine whole, talk to your healthcare provider.  Do not drink alcohol while taking this medicine.  Do not take anti-inflammatory medications or aspirin unless instructed by your physician.

## 2017-10-20 NOTE — PROGRESS NOTES
*ATTENTION:  This note has been created by a medical student for educational purposes only. Please do not refer to the content of this note for clinical decision-making, billing, or other purposes. Please see attending physicians note to obtain clinical information on this patient. *      Neurosurgery Progress Note  Smiley Brown [facility]-NP Student         Admit Date: 10/19/2017   LOS: 1 day        Daily Progress Note: 10/20/2017    POD:1 Day Post-Op    S/P: Procedure(s):  BRAIN LAB CT GUIDED LEFT TEMPORAL CRANIOTOMY RESECTION OF TUMOR    HPI: This 80-year-old pleasant male has a history of trigeminal neuralgia followed by Dr. 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 Mitchell Jimenez of ENT surgery. He was originally treated for the trigeminal neuralgia by Dr. Mike Lane with Gamma Knife in 2013 on the left. He was then diagnosed with maxillary cancer on the left side which was causing facial numbness involving the left orbit. He had chemo and radiation last in 2015 by Dr. Celestino Luu and has been followed by Dr. Lamine Velazquez since then. He presented to Dr. Ken Player office on 10/18/17 because of acute onset of expressive aphasia, inability to word find and also some balance problems over the summer. As this progressed, a MRI of the brain was ordered by Dr. Jimenez which revealed a 3cm mass heterogeneously enhancing in the left temporal lobe with marked cerebral edema of the left temporal lobe, extending into the left parieto-occipital lobe. Dr. Jimenez started him on a course of dexamethasone last week and patient reports some improvement of his speech. Dr. Rl Beach felt this was likely neoplasm vs radiation necrosis and surgical resection was offered to the patient. The patient presented to [facility] on 10/19/17 for a scheduled resection of the tumor. He was then transferred to ICU for routine post-op care. Subjective:   He is alert, pleasant, oriented x3.  No acute events overnight. He states he had a good nights sleep, is hungry and is ready to move out of ICU. 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 Denies numbness, tingling, chest pain, leg pain, nausea, vomiting, difficulty swallowing, headache, and dyspnea. Objective:     Vital signs  Temp (24hrs), Av.6 °F (36.4 °C), Min:97.3 °F (36.3 °C), Max:98.5 °F (36.9 °C)      10/18 1901 - 10/20 0700  In: 2986.7 [I.V.:2986.7]  Out: 09 [Urine:5075]    Visit Vitals    /63    Pulse (!) 47    Temp 98.5 °F (36.9 °C)    Resp 14    Ht 5' 9.5\" (1.765 m)    Wt 85 kg (187 lb 6 oz)    SpO2 91%    BMI 27.27 kg/m2    O2 Flow Rate (L/min): 2 l/min O2 Device: Room air     Pain control  Pain Assessment  Pain Scale 1: Numeric (0 - 10)  Pain Intensity 1: 0  Pain Onset 1: post op  Pain Location 1: Head  Pain Orientation 1: Posterior  Pain Description 1: Pressure, Aching  Pain Intervention(s) 1: Emotional support    PT/OT  Gait            Physical Exam:  Gen:NAD. Neuro: A&Ox3. Follows commands. Speech clear. Affect normal.  PERRL right eye. EOMI right eye. Left eye chronically sewn shut. Face symmetric. Tongue midline. Complete left facial numbness. YEH. Strength 5/5 in UE and LE BL. Negative drift. Gait deferred. Skin: Left scalp incision open to air with dermabond intact, incision well approximated. No drainage.      CT of head without contrast on 10/20/17: Results pending     24 hour results:    Recent Results (from the past 24 hour(s))   GLUCOSE, POC    Collection Time: 10/19/17 10:34 AM   Result Value Ref Range    Glucose (POC) 91 65 - 100 mg/dL    Performed by Lavonne Dillon    CBC WITH AUTOMATED DIFF    Collection Time: 10/20/17  4:06 AM   Result Value Ref Range    WBC 13.8 (H) 4.1 - 11.1 K/uL    RBC 3.58 (L) 4.10 - 5.70 M/uL    HGB 11.4 (L) 12.1 - 17.0 g/dL    HCT 34.1 (L) 36.6 - 50.3 %    MCV 95.3 80.0 - 99.0 FL    MCH 31.8 26.0 - 34.0 PG    MCHC 33.4 30.0 - 36.5 g/dL    RDW 14.4 11.5 - 14.5 %    PLATELET 278 231 - 473 K/uL    NEUTROPHILS 87 (H) 32 - 75 %    LYMPHOCYTES 6 (L) 12 - 49 %    MONOCYTES 7 5 - 13 %    EOSINOPHILS 0 0 - 7 %    BASOPHILS 0 0 - 1 %    ABS. NEUTROPHILS 12.0 (H) 1.8 - 8.0 K/UL    ABS. LYMPHOCYTES 0.8 0.8 - 3.5 K/UL    ABS. MONOCYTES 1.0 0.0 - 1.0 K/UL    ABS. EOSINOPHILS 0.0 0.0 - 0.4 K/UL    ABS. BASOPHILS 0.0 0.0 - 0.1 K/UL    DF SMEAR SCANNED      RBC COMMENTS ANISOCYTOSIS  1+        RBC COMMENTS MACROCYTOSIS  1+        RBC COMMENTS OVALOCYTES  PRESENT       METABOLIC PANEL, BASIC    Collection Time: 10/20/17  4:06 AM   Result Value Ref Range    Sodium 132 (L) 136 - 145 mmol/L    Potassium 4.7 3.5 - 5.1 mmol/L    Chloride 101 97 - 108 mmol/L    CO2 25 21 - 32 mmol/L    Anion gap 6 5 - 15 mmol/L    Glucose 124 (H) 65 - 100 mg/dL    BUN 14 6 - 20 MG/DL    Creatinine 0.82 0.70 - 1.30 MG/DL    BUN/Creatinine ratio 17 12 - 20      GFR est AA >60 >60 ml/min/1.73m2    GFR est non-AA >60 >60 ml/min/1.73m2    Calcium 7.4 (L) 8.5 - 10.1 MG/DL          Assessment:     Active Problems:    Brain tumor ([facility] [address] (10/19/2017)      Plan:   1. 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 Cerebral edema with brain compression  - Related to #1  - Dexamethasone taper     3. Hypertension  - SBP goal < 160  - Restart home lisinopril     4. Trigeminal Neuralgia  - Restart home dose oxcarbazepine and gabapentin     5. Hypercholesterolemia  - Restart home dose atorvastin     6. History of TIAs   - Plavix on hold     7. History of hyponatremia  - Na 132 on AM labs  - Tolerating PO fluids  - Monitor     8.  Leukocytosis  - WBC 13.8  - Likely reactive  - Afebrile  - Last dose of post-op Ancef today @ 1300   - Monitor    Activity: Up with assist   DVT ppx: SCDs  Dispo: Likely home tomorrow     Plan d/w Dr. Chava Romo, ICU RN, patient     [facility]-NP Student

## 2017-10-20 NOTE — OP NOTES
[address]   [address] [place] [address], [address]   OP NOTE       Name:  Geoffrey Cruz   MR#:  [id number]   :  11/10/1930   Account #:  [de-identified]    Surgery Date:  10/19/2017   Date of Adm:  10/19/2017       PREOPERATIVE DIAGNOSIS: Left temporal brain mass. POSTOPERATIVE DIAGNOSIS: Left temporal brain mass. PROCEDURES PERFORMED: Left BrainLAB guided stereotactic left   temporal craniotomy with resection of necrotic-appearing mass. Differential diagnosis is radiation necrosis versus metastatic tumor. Use of operating microscope and [address] stereotaxy. SURGEON: Dionisio Augustin. Chava Romo MD    ASSISTANT: jaron moore    ANESTHESIA: General endotracheal anesthesia. ESTIMATED BLOOD LOSS: 100 mL. COMPLICATIONS: None. SPECIMENS REMOVED: Mass for frozen and permanent section. OPERATIVE INDICATIONS: An 80year-old gentleman with a history   of head and neck squamous cell carcinoma. He had received previous   fractionated and stereotactic radiosurgery to the skull base. He   presented with more confusion and some aphasic episodes. MRI   showed a necrotic enhancing appearing lesion just lateral to the   temporal horn of the left ventricle with significant brain edema   extending from the temporal lobe into the parietal and frontal lobes. After talking with the patient and his family about the options and   differential diagnosis, was decided would proceed with surgery. DESCRIPTION OF PROCEDURE: The patient was taken to the   operating room, placed under general endotracheal anesthesia. All   necessary lines and monitors were placed. He was given appropriate   dose of IV antibiotics. SCDs and Diaz were placed. He was placed   supine on the operating table, a large shoulder roll was placed under   left shoulder, head turned to the right and affixed in Ortiz 3-point   head fixation.  He was registered for [employer], which was   used throughout the case for localization. The left temporal region was   clipped, prepped and draped in the standard sterile fashion. A linear incision was made anterior to the tragus at the level of the   zygoma rostrally to approximately the superficial temporal line with a   skin knife, carried down with Bovie electrocautery through the scalp. Francois clips were used for hemostasis. Periosteal was used to dissect   this from the temporalis muscle and fascia, this was then divided in a T   shape and spread with the  with a cerebellar retractor to expose the   temporal region. A high-speed Midas Joseph was used to perform a   craniotomy. The dura was torn somewhat in removing the cranial plate,   but there was no evidence of injury to the underlying brain. The dura   was further opened and identified pertinent anatomic landmarks. An   area in the inferior middle part of the middle temporal gyrus was then   dissected first with a corticectomy using bipolar to cauterize the tutu   arachnoid and then using microscissors to open this. I then performed   a subcortical resection down approximately 1.5 to 2 cm using   BrainLAB for guidance, there was edematous brain. I got to an area   where there was very bland whitish hypovascular firm tissue, some of   this was easily suckable, some was more rubbery, frozen section was   sent, this came back as necrotic tissue. I then circumferentially   removed this tumor or mass with suction and bipolar. I dissected   around the mass with microdissection using cotton patties to spread it   from the adjacent brain. On the deep surface of this I did enter the   temporal horn. I did have to cauterize a small amount of choroid plexus   and ependyma in order to provide hemostasis. The rest of the mass   was removed back to edematous white matter partially inferiorly and   anteriorly. This was sent for permanent section.  The wound was   copiously irrigated out, hemostasis was achieved with FloSeal and   cotton balls soaked in thrombin. and irrigated out. The surgical bed   was covered with Surgicel. The microscope was taken out of the field. The dura was reapproximated with 4-0 Nurolon. Duragen dural   allograft was then placed over the defect in the dura. The bone flap   was reaffixed with the Biomet craniofacial plating system. The wound   was irrigated with antibiotics. The temporalis muscle and fascia was   closed with 2-0 Vicryl. The galea was closed with inverted 2-0 Vicryl   and the skin was closed with running 4-0 Monocryl suture and   Dermabond. Wounds were cleaned, dried, dressed with sterile   dressing. The patient was then extubated and taken to recovery room   in stable condition. MD MANJINDER Zendejas / ILA   D:  10/19/2017   16:33   T:  10/19/2017   21:33   Job #:  [id number]

## 2017-10-20 NOTE — PROGRESS NOTES
Problem: Falls - Risk of  Goal: *Absence of Falls  Document Madhu Fall Risk and appropriate interventions in the flowsheet.    Outcome: Progressing Towards Goal  Fall Risk Interventions:            Medication Interventions: Assess postural VS orthostatic hypotension, Evaluate medications/consider consulting pharmacy, Patient to call before getting OOB, Teach patient to arise slowly, Utilize gait belt for transfers/ambulation    Elimination Interventions: Call light in reach, Patient to call for help with toileting needs, Toileting schedule/hourly rounds    History of Falls Interventions: Door open when patient unattended, Room close to nurse's station, Utilize gait belt for transfer/ambulation

## 2017-10-20 NOTE — PROGRESS NOTES
Occupational Therapy EVALUATION/discharge  Patient: Maulik Martinez (68 y.o. male)  Date: 10/20/2017  Primary Diagnosis: LEFT TEMPORAL TUMOR   Brain tumor ([facility] [address]  Procedure(s) (LRB):  BRAIN LAB CT GUIDED LEFT TEMPORAL CRANIOTOMY RESECTION OF TUMOR  (N/A) 1 Day Post-Op   Precautions:  Fall    ASSESSMENT:   Based on the objective data described below, the patient presents at an overall SBA level with LE ADLs, toileting and functional mobility in room s/p L temporal craniotomy to remove tumor. Per pt and his family his memory and balance have improved and he is doing much better now then he has in over 3 months. Pt demonstrated fair safety awareness and has good family and friend support to provide assist for safety at discharge. Further skilled acute occupational therapy is not indicated at this time. Discharge Recommendations: None for OT  Further Equipment Recommendations for Discharge: none for OT      SUBJECTIVE:   Patient stated I feel so much better.     OBJECTIVE DATA SUMMARY:   HISTORY:   Past Medical History:   Diagnosis Date    Arthritis     HANDS    Beta-blocker therapy     Cancer ([facility] [address] 2013    MAXILLARY SINUS CANCER, RADIATION AND CHEMO    Cancer ([facility] [address]     SKIN CA ON NOSE    GERD (gastroesophageal reflux disease)     Hypercholesterolemia     Hypertension     pt denies    Nasal sinus tumor     Numbness of LEFT hand & LEFT face 2010    RBBB (right bundle branch block)     TIA (transient ischemic attack)     12 TIA's over 9 YRS.1ST ONE IN 8/03- LAST IN 2/12      Trigeminal neuralgia      Past Surgical History:   Procedure Laterality Date    HX CATARACT REMOVAL Bilateral     HX GI      COLONOSCOPY    HX HEENT      BIOPSY OF SINUS     HX HEENT Left     sew eye closed    HX HERNIA REPAIR Right     INGUINAL    HX KNEE ARTHROSCOPY Right 2007    HX KNEE REPLACEMENT Right 2012    Total Knee replacement- right    HX ROTATOR CUFF REPAIR Right     right rotator cuff repair       Prior Level of Function/Home Situation: Independent with ADLs ans driving up until 3 weeks ago  [address]: Private residence  # Steps to Enter: 3  Rails to Enter: Yes  One/Two Story Residence: One story  Living Alone: No  Support Systems: Spouse/Significant Other/Partner  Patient Expects to be Discharged to[de-identified] Private residence  Current DME Used/Available at Home: Cane, straight, Walker, rolling  Tub or Shower Type: Shower  [x]  Right hand dominant   []  Left hand dominant    EXAMINATION OF PERFORMANCE DEFICITS:  Cognitive/Behavioral Status:  Neurologic State: Alert; Appropriate for age  Orientation Level: Oriented X4  Cognition: Appropriate for age attention/concentration; Follows commands  Perception: Appears intact  Perseveration: No perseveration noted  Safety/Judgement: Awareness of environment; Fall prevention; Insight into deficits;Driving appropriateness;Home safety    Hearing: Auditory  Auditory Impairment: Hard of hearing, bilateral  Hearing Aids/Status: Bilateral    Vision/Perceptual:    Acuity: Able to read clock/calendar on wall without difficulty (blind in L eye)         Range of Motion:  AROM: Generally decreased, functional  PROM: Generally decreased, functional                      Strength:  Strength: Generally decreased, functional                Coordination:  Coordination: Generally decreased, functional  Fine Motor Skills-Upper: Left Intact; Right Intact    Gross Motor Skills-Upper: Left Intact; Right Intact    Tone & Sensation:  Tone: Normal  Sensation: Intact                      Balance:  Sitting: Intact  Standing: Impaired  Standing - Static: Good  Standing - Dynamic : Fair    Functional Mobility and Transfers for ADLs:  Transfers:  Sit to Stand: Stand-by asssistance  Stand to Sit: Stand-by asssistance  Toilet Transfer : Stand-by asssistance    ADL Assessment:  Feeding: Modified independent    Oral Facial Hygiene/Grooming: Stand-by assistance (standing at sink)    Bathing: Stand-by assistance; Additional time (sponge bath- SBA for safety standing at sink)    Upper Body Dressing: Setup; Additional time    Lower Body Dressing: Stand-by assistance; Additional time (crossed leg to reach feet)    Toileting: Stand by assistance                ADL Intervention and task modifications:  Patient was educated on the benefits of maintaining activity tolerance, functional mobility, and independence with self care tasks during acute stay. Encouraged patient to be out of bed for all meals, perform daily ADLs (as approved by RN/MD regarding bathing etc), performing functional mobility to/from bathroom, and increasing time OOB daily with assist. Patient educated about the importance of maintaining activity tolerance to ensure safe return home and to baseline. Patient verbalized understanding of education. Cognitive Retraining  Safety/Judgement: Awareness of environment; Fall prevention; Insight into deficits;Driving appropriateness;Home safety    Functional Measure:  Barthel Index:    Bathin  Bladder: 0 (schafer)  Bowels: 10  Groomin  Dressin  Feeding: 10  Mobility: 0  Stairs: 0  Toilet Use: 5  Transfer (Bed to Chair and Back): 10  Total: 45       Barthel and G-code impairment scale:  Percentage of impairment CH  0% CI  1-19% CJ  20-39% CK  40-59% CL  60-79% CM  80-99% CN  100%   Barthel Score 0-100 100 99-80 79-60 59-40 20-39 1-19   0   Barthel Score 0-20 20 17-19 13-16 9-12 5-8 1-4 0      The Barthel ADL Index: Guidelines  1. The index should be used as a record of what a patient does, not as a record of what a patient could do. 2. The main aim is to establish degree of independence from any help, physical or verbal, however minor and for whatever reason. 3. The need for supervision renders the patient not independent. 4. A patient's performance should be established using the best available evidence.  Asking the patient, friends/relatives and nurses are the usual sources, but direct observation and common sense are also important. However direct testing is not needed. 5. Usually the patient's performance over the preceding 24-48 hours is important, but occasionally longer periods will be relevant. 6. Middle categories imply that the patient supplies over 50 per cent of the effort. 7. Use of aids to be independent is allowed. Janene Leonard., Barthel, D.W. ([id number]). Functional evaluation: the Barthel Index. [address] (14)2. Lissa Shaw cailin CHARLI Myers, Marilee Calix, Salbador Hooper., [place], [address] [facility] (1999). Measuring the change indisability after inpatient rehabilitation; comparison of the responsiveness of the Barthel Index and Functional Joes Measure. Journal of Neurology, Neurosurgery, and Psychiatry, 66(4), 473-596. ISMA Celaya, CHRISTINA Gregory, & Kassandra Saldana MLENNY. (2004.) Assessment of post-stroke quality of life in cost-effectiveness studies: The usefulness of the Barthel Index and the EuroQoL-5D. Quality of Life Research, 13, 473-05       G codes: In compliance with CMSs Claims Based Outcome Reporting, the following G-code set was chosen for this patient based on their primary functional limitation being treated: The outcome measure chosen to determine the severity of the functional limitation was the Barthel Index with a score of 45/100 which was correlated with the impairment scale. ?  Self Care:     - CURRENT STATUS: CK - 40%-59% impaired, limited or restricted    - GOAL STATUS: CK - 40%-59% impaired, limited or restricted    - D/C STATUS:  CK - 40%-59% impaired, limited or restricted       Occupational Therapy Evaluation Charge Determination   History Examination Decision-Making   LOW Complexity : Brief history review  LOW Complexity : 1-3 performance deficits relating to physical, cognitive , or psychosocial skils that result in activity limitations and / or participation restrictions  LOW Complexity : No comorbidities that affect functional and no verbal or physical assistance needed to complete eval tasks       Based on the above components, the patient evaluation is determined to be of the following complexity level: LOW   Pain:Pain Scale 1: Numeric (0 - 10)  Pain Intensity 1: 0              Activity Tolerance:   Good  Please refer to the flowsheet for vital signs taken during this treatment. After treatment:   [x]  Patient left in no apparent distress sitting up in chair  []  Patient left in no apparent distress in bed  [x]  Call bell left within reach  [x]  Nursing notified  [x]  Caregiver present  []  Bed alarm activated    COMMUNICATION/EDUCATION:   Communication/Collaboration:  [x]      Home safety education was provided and the patient/caregiver indicated understanding. [x]      Patient/family have participated as able and agree with findings and recommendations. []      Patient is unable to participate in plan of care at this time.   Findings and recommendations were discussed with: Physical Therapist and Registered Nurse    Elena Lee OT  Time Calculation: 15 mins

## 2017-10-21 VITALS
WEIGHT: 186.95 LBS | OXYGEN SATURATION: 94 % | SYSTOLIC BLOOD PRESSURE: 116 MMHG | HEART RATE: 71 BPM | HEIGHT: 70 IN | TEMPERATURE: 97.8 F | RESPIRATION RATE: 18 BRPM | BODY MASS INDEX: 26.76 KG/M2 | DIASTOLIC BLOOD PRESSURE: 65 MMHG

## 2017-10-21 PROCEDURE — 74011250637 HC RX REV CODE- 250/637: Performed by: NEUROLOGICAL SURGERY

## 2017-10-21 PROCEDURE — 97116 GAIT TRAINING THERAPY: CPT

## 2017-10-21 PROCEDURE — 97530 THERAPEUTIC ACTIVITIES: CPT

## 2017-10-21 PROCEDURE — 74011250636 HC RX REV CODE- 250/636: Performed by: NEUROLOGICAL SURGERY

## 2017-10-21 RX ORDER — DEXAMETHASONE 4 MG/1
4 TABLET ORAL EVERY 12 HOURS
Status: DISCONTINUED | OUTPATIENT
Start: 2017-10-21 | End: 2017-10-21 | Stop reason: HOSPADM

## 2017-10-21 RX ADMIN — Medication 1000 MCG: at 09:21

## 2017-10-21 RX ADMIN — GABAPENTIN 600 MG: 600 TABLET, FILM COATED ORAL at 09:21

## 2017-10-21 RX ADMIN — FAMOTIDINE 20 MG: 20 TABLET ORAL at 09:23

## 2017-10-21 RX ADMIN — LEVETIRACETAM 500 MG: 500 TABLET, FILM COATED ORAL at 09:22

## 2017-10-21 RX ADMIN — DOCUSATE SODIUM 200 MG: 100 CAPSULE, LIQUID FILLED ORAL at 09:21

## 2017-10-21 RX ADMIN — DEXAMETHASONE 4 MG: 4 TABLET ORAL at 05:13

## 2017-10-21 RX ADMIN — VITAMIN D, TAB 1000IU (100/BT) 1000 UNITS: 25 TAB at 09:21

## 2017-10-21 RX ADMIN — Medication 10 ML: at 05:09

## 2017-10-21 RX ADMIN — BACITRACIN: 500 OINTMENT OPHTHALMIC at 09:00

## 2017-10-21 RX ADMIN — OXCARBAZEPINE 150 MG: 150 TABLET ORAL at 09:21

## 2017-10-21 NOTE — PROGRESS NOTES
Problem: Mobility Impaired (Adult and Pediatric)  Goal: *Acute Goals and Plan of Care (Insert Text)  Physical Therapy Goals  Initiated 10/20/2017  1. Patient will move from supine to sit and sit to supine  and scoot up and down in bed with modified independence within 7 day(s). 2.  Patient will transfer from bed to chair and chair to bed with modified independence using the least restrictive device within 7 day(s). 3.  Patient will perform sit to stand with modified independence within 7 day(s). 4.  Patient will ambulate with modified independence for 300 feet with the least restrictive device within 7 day(s). 5.  Patient will ascend/descend 3 stairs with handrail(s) with supervision/set-up within 7 day(s). 6.  Patient will complete Valentino Balance  within 7 days. physical Therapy TREATMENT  Patient: Kaylan Cabrera (16 y.o. male)  Date: 10/21/2017  Diagnosis: LEFT TEMPORAL TUMOR   Brain tumor ([facility] [address] <principal problem not specified>  Procedure(s) (LRB):  BRAIN LAB CT GUIDED LEFT TEMPORAL CRANIOTOMY RESECTION OF TUMOR  (N/A) 2 Days Post-Op  Precautions: Fall    ASSESSMENT:  Patient progressing well with functional mobility. He was able to ambulate 300' without DME and ascended/descended 8 stairs modified independently. No overt LOB during gait with moderate challenge on the VALENTINO but he score a 41/56 indicating low fall risk. He lives with a supportive wife and has both a RW and cane at home. Discussed gradual activity progression at home to assess tolerance. He is clear for discharge with family when medically stable. His wife expressed some concern regarding pm confusion but therapy is unable to address this. Recommended additional assist in the home until this clears and to contact his MD if further mobility concerns arise post discharge.   Progression toward goals:  [x]    Improving appropriately and progressing toward goals  []    Improving slowly and progressing toward goals  [] Not making progress toward goals and plan of care will be adjusted     PLAN:  Patient continues to benefit from skilled intervention to address the above impairments. Continue treatment per established plan of care. Discharge Recommendations:  None  Further Equipment Recommendations for Discharge:  None     SUBJECTIVE:   Patient stated I feel great. Lets go.  \"It is so wonderful to have pants on. \"    OBJECTIVE DATA SUMMARY:   Critical Behavior:  Neurologic State: Alert  Orientation Level: Oriented X4  Cognition: Appropriate for age attention/concentration, Follows commands  Safety/Judgement: Awareness of environment, Fall prevention, Insight into deficits, Driving appropriateness, Home safety  Functional Mobility Training:  Bed Mobility:                    Transfers:  Sit to Stand: Modified independent  Stand to Sit: Modified independent                             Balance:     Ambulation/Gait Training:  Distance (ft): 300 Feet (ft)  Assistive Device: Gait belt  Ambulation - Level of Assistance: Independent        Gait Abnormalities: Decreased step clearance              Speed/Olive: Slow                       Stairs:  Number of Stairs Trained: 8  Stairs - Level of Assistance: Modified independent   Rail Use: Right   Neuro Re-Education:        Ramon Balance Test:    Sitting to Standing: 3  Standing Unsupported: 3  Sitting with Back Unsupported: 4  Standing to Sitting: 3  Transfers: 4  Standing Unsupported with Eyes Closed: 4  Standing Unsupported with Feet Together: 3  Reach Forward with Outstretched Arm: 3   Object: 4  Turn to Look Over Shoulders: 3  Turn 360 Degrees: 2  Alternate Foot on Step/Stool: 2  Standing Unsupported One Foot in Front: 2  Stand on One Le  Total: 41         56=Maximum possible score;   0-20=High fall risk  21-40=Moderate fall risk   41-56=Low fall risk     Ramon Balance Test and G-code impairment scale:  Percentage of Impairment CH    0%   CI    1-19% CJ    20-39% CK    40-59% CL    60-79% CM    80-99% CN     100%   Ramon   Score 0-56 56 45-55 34-44 23-33 12-22 1-11 0         Pain:  Pain Scale 1: Numeric (0 - 10)  Pain Intensity 1: 0              Activity Tolerance:     Please refer to the flowsheet for vital signs taken during this treatment.   After treatment:   [x]    Patient left in no apparent distress sitting up in chair  []    Patient left in no apparent distress in bed  [x]    Call bell left within reach  [x]    Nursing notified  []    Caregiver present  []    Bed alarm activated    COMMUNICATION/COLLABORATION:   The patients plan of care was discussed with: Registered Nurse    Artemio Jarrett, PT, DPT   Time Calculation: 31 mins

## 2017-10-21 NOTE — ROUTINE PROCESS
Bedside RN performed patient education and medication education. Discharge concerns initiated and discussed with patient, including clarification on \"who\" assists the patient at their home and instructions for when the home going patient should call their provider after discharge. Opportunity for questions and clarification was provided. Patient receptive to education: YES  Patient stated: I'm ready  Barriers to Education: None  Diagnosis Education given:  YES    Length of stay: 2  Expected Day of Discharge: 2  Ask if they have \"Help at Home\" & add to white board?   YES    Education Day #: 2    Medication Education Given:  YES  M in the box Medication name: Decadron    Pt aware of [facility]CA[facility] survey: YES

## 2017-10-21 NOTE — PROGRESS NOTES
Doing well. No deficits. Wants to go home. Mobilize further with pt/ot today. Home later today or tomorrow.   Wean dex

## 2017-10-21 NOTE — PROGRESS NOTES
Bedside shift change report given to Sienna (oncoming nurse) by Chapito Bray (offgoing nurse). Report included the following information SBAR, Kardex, Intake/Output, MAR, Accordion, Recent Results, Med Rec Status and Alarm Parameters .

## 2017-10-21 NOTE — PROGRESS NOTES
Problem: Falls - Risk of  Goal: *Absence of Falls  Document Madhu Fall Risk and appropriate interventions in the flowsheet.    Outcome: Progressing Towards Goal  Fall Risk Interventions:  Mobility Interventions: Bed/chair exit alarm         Medication Interventions: Bed/chair exit alarm    Elimination Interventions: Call light in reach, Bed/chair exit alarm    History of Falls Interventions: Bed/chair exit alarm

## 2017-11-20 ENCOUNTER — HOSPITAL ENCOUNTER (INPATIENT)
Age: 82
LOS: 5 days | Discharge: NURSING, REHAB OR LONG-TERM CARE | DRG: 871 | End: 2017-11-25
Attending: EMERGENCY MEDICINE | Admitting: FAMILY MEDICINE
Payer: MEDICARE

## 2017-11-20 ENCOUNTER — APPOINTMENT (OUTPATIENT)
Dept: CT IMAGING | Age: 82
DRG: 871 | End: 2017-11-20
Attending: EMERGENCY MEDICINE
Payer: MEDICARE

## 2017-11-20 ENCOUNTER — APPOINTMENT (OUTPATIENT)
Dept: GENERAL RADIOLOGY | Age: 82
DRG: 871 | End: 2017-11-20
Attending: FAMILY MEDICINE
Payer: MEDICARE

## 2017-11-20 ENCOUNTER — APPOINTMENT (OUTPATIENT)
Dept: MRI IMAGING | Age: 82
DRG: 871 | End: 2017-11-20
Attending: FAMILY MEDICINE
Payer: MEDICARE

## 2017-11-20 ENCOUNTER — APPOINTMENT (OUTPATIENT)
Dept: GENERAL RADIOLOGY | Age: 82
DRG: 871 | End: 2017-11-20
Attending: EMERGENCY MEDICINE
Payer: MEDICARE
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
## 2017-11-20 DIAGNOSIS — E87.1 HYPONATREMIA: ICD-10-CM

## 2017-11-20 DIAGNOSIS — D72.828 OTHER ELEVATED WHITE BLOOD CELL (WBC) COUNT: Primary | ICD-10-CM

## 2017-11-20 DIAGNOSIS — I67.89 NECROSIS OF BRAIN DUE TO RADIATION THERAPY: ICD-10-CM

## 2017-11-20 PROBLEM — R41.82 ALTERED MENTAL STATUS: Status: ACTIVE | Noted: 2017-11-20

## 2017-11-20 LAB
ALBUMIN SERPL-MCNC: 3 G/DL (ref 3.5–5)
ALBUMIN/GLOB SERPL: 0.6 {RATIO} (ref 1.1–2.2)
ALP SERPL-CCNC: 96 U/L (ref 45–117)
ALT SERPL-CCNC: 20 U/L (ref 12–78)
AMMONIA PLAS-SCNC: <10 UMOL/L
ANION GAP SERPL CALC-SCNC: 9 MMOL/L (ref 5–15)
APPEARANCE UR: CLEAR
APTT PPP: 33.9 SEC (ref 22.1–32.5)
AST SERPL-CCNC: 51 U/L (ref 15–37)
ATRIAL RATE: 86 BPM
BACTERIA URNS QL MICRO: NEGATIVE /HPF
BASOPHILS # BLD: 0.1 K/UL (ref 0–0.1)
BASOPHILS NFR BLD: 1 % (ref 0–1)
BILIRUB SERPL-MCNC: 0.7 MG/DL (ref 0.2–1)
BILIRUB UR QL: NEGATIVE
BUN SERPL-MCNC: 9 MG/DL (ref 6–20)
BUN/CREAT SERPL: 10 (ref 12–20)
CALCIUM SERPL-MCNC: 8.9 MG/DL (ref 8.5–10.1)
CALCULATED P AXIS, ECG09: 44 DEGREES
CALCULATED R AXIS, ECG10: -5 DEGREES
CALCULATED T AXIS, ECG11: -16 DEGREES
CHLORIDE SERPL-SCNC: 89 MMOL/L (ref 97–108)
CHOLEST SERPL-MCNC: 127 MG/DL
CK SERPL-CCNC: 1150 U/L (ref 39–308)
CO2 SERPL-SCNC: 27 MMOL/L (ref 21–32)
COLOR UR: ABNORMAL
CREAT SERPL-MCNC: 0.88 MG/DL (ref 0.7–1.3)
D DIMER PPP FEU-MCNC: 2.04 MG/L FEU (ref 0–0.65)
DIAGNOSIS, 93000: NORMAL
DIFFERENTIAL METHOD BLD: ABNORMAL
EOSINOPHIL # BLD: 0.1 K/UL (ref 0–0.4)
EOSINOPHIL NFR BLD: 1 % (ref 0–7)
EPITH CASTS URNS QL MICRO: ABNORMAL /LPF
ERYTHROCYTE [DISTWIDTH] IN BLOOD BY AUTOMATED COUNT: 14.1 % (ref 11.5–14.5)
FOLATE SERPL-MCNC: 8.6 NG/ML (ref 5–21)
GLOBULIN SER CALC-MCNC: 4.8 G/DL (ref 2–4)
GLUCOSE BLD STRIP.AUTO-MCNC: 108 MG/DL (ref 65–100)
GLUCOSE SERPL-MCNC: 101 MG/DL (ref 65–100)
GLUCOSE UR STRIP.AUTO-MCNC: NEGATIVE MG/DL
HCT VFR BLD AUTO: 38.5 % (ref 36.6–50.3)
HDLC SERPL-MCNC: 44 MG/DL
HDLC SERPL: 2.9 {RATIO} (ref 0–5)
HGB BLD-MCNC: 13.3 G/DL (ref 12.1–17)
HGB UR QL STRIP: NEGATIVE
HYALINE CASTS URNS QL MICRO: ABNORMAL /LPF (ref 0–5)
INR PPP: 1.1 (ref 0.9–1.1)
KETONES UR QL STRIP.AUTO: ABNORMAL MG/DL
LACTATE SERPL-SCNC: 1.1 MMOL/L (ref 0.4–2)
LACTATE SERPL-SCNC: 1.4 MMOL/L (ref 0.4–2)
LDLC SERPL CALC-MCNC: 68 MG/DL (ref 0–100)
LEUKOCYTE ESTERASE UR QL STRIP.AUTO: NEGATIVE
LIPID PROFILE,FLP: NORMAL
LYMPHOCYTES # BLD: 2.1 K/UL (ref 0.8–3.5)
LYMPHOCYTES NFR BLD: 23 % (ref 12–49)
MCH RBC QN AUTO: 31.7 PG (ref 26–34)
MCHC RBC AUTO-ENTMCNC: 34.5 G/DL (ref 30–36.5)
MCV RBC AUTO: 91.9 FL (ref 80–99)
METAMYELOCYTES NFR BLD MANUAL: 1 %
MONOCYTES # BLD: 1.5 K/UL (ref 0–1)
MONOCYTES NFR BLD: 17 % (ref 5–13)
MYELOCYTES NFR BLD MANUAL: 1 %
NEUTS BAND NFR BLD MANUAL: 1 %
NEUTS SEG # BLD: 5.1 K/UL (ref 1.8–8)
NEUTS SEG NFR BLD: 55 % (ref 32–75)
NITRITE UR QL STRIP.AUTO: NEGATIVE
OSMOLALITY UR: 691 MOSM/KG H2O
P-R INTERVAL, ECG05: 238 MS
PH UR STRIP: 7 [PH] (ref 5–8)
PLATELET # BLD AUTO: 351 K/UL (ref 150–400)
POTASSIUM SERPL-SCNC: 4.2 MMOL/L (ref 3.5–5.1)
PROT SERPL-MCNC: 7.8 G/DL (ref 6.4–8.2)
PROT UR STRIP-MCNC: NEGATIVE MG/DL
PROTHROMBIN TIME: 11.1 SEC (ref 9–11.1)
Q-T INTERVAL, ECG07: 402 MS
QRS DURATION, ECG06: 132 MS
QTC CALCULATION (BEZET), ECG08: 481 MS
RBC # BLD AUTO: 4.19 M/UL (ref 4.1–5.7)
RBC #/AREA URNS HPF: ABNORMAL /HPF (ref 0–5)
RBC MORPH BLD: ABNORMAL
SERVICE CMNT-IMP: ABNORMAL
SODIUM SERPL-SCNC: 125 MMOL/L (ref 136–145)
SODIUM UR-SCNC: 202 MMOL/L
SP GR UR REFRACTOMETRY: 1.02 (ref 1–1.03)
THERAPEUTIC RANGE,PTTT: ABNORMAL SECS (ref 58–77)
TRIGL SERPL-MCNC: 75 MG/DL (ref ?–150)
TROPONIN I SERPL-MCNC: <0.04 NG/ML
TROPONIN I SERPL-MCNC: <0.04 NG/ML
TSH SERPL DL<=0.05 MIU/L-ACNC: 1.23 UIU/ML (ref 0.36–3.74)
UA: UC IF INDICATED,UAUC: ABNORMAL
UROBILINOGEN UR QL STRIP.AUTO: 1 EU/DL (ref 0.2–1)
VENTRICULAR RATE, ECG03: 86 BPM
VIT B12 SERPL-MCNC: 1737 PG/ML (ref 211–911)
VLDLC SERPL CALC-MCNC: 15 MG/DL
WBC # BLD AUTO: 9.1 K/UL (ref 4.1–11.1)
WBC MORPH BLD: ABNORMAL
WBC URNS QL MICRO: ABNORMAL /HPF (ref 0–4)

## 2017-11-20 PROCEDURE — 84443 ASSAY THYROID STIM HORMONE: CPT | Performed by: FAMILY MEDICINE

## 2017-11-20 PROCEDURE — 83935 ASSAY OF URINE OSMOLALITY: CPT | Performed by: NEUROLOGICAL SURGERY

## 2017-11-20 PROCEDURE — 87040 BLOOD CULTURE FOR BACTERIA: CPT | Performed by: EMERGENCY MEDICINE

## 2017-11-20 PROCEDURE — 65660000000 HC RM CCU STEPDOWN

## 2017-11-20 PROCEDURE — 81001 URINALYSIS AUTO W/SCOPE: CPT | Performed by: EMERGENCY MEDICINE

## 2017-11-20 PROCEDURE — 74011250636 HC RX REV CODE- 250/636: Performed by: FAMILY MEDICINE

## 2017-11-20 PROCEDURE — 82550 ASSAY OF CK (CPK): CPT | Performed by: FAMILY MEDICINE

## 2017-11-20 PROCEDURE — 85730 THROMBOPLASTIN TIME PARTIAL: CPT | Performed by: EMERGENCY MEDICINE

## 2017-11-20 PROCEDURE — 74011250637 HC RX REV CODE- 250/637: Performed by: EMERGENCY MEDICINE

## 2017-11-20 PROCEDURE — 74011000258 HC RX REV CODE- 258: Performed by: FAMILY MEDICINE

## 2017-11-20 PROCEDURE — 80177 DRUG SCRN QUAN LEVETIRACETAM: CPT | Performed by: EMERGENCY MEDICINE

## 2017-11-20 PROCEDURE — C1758 CATHETER, URETERAL: HCPCS

## 2017-11-20 PROCEDURE — 82746 ASSAY OF FOLIC ACID SERUM: CPT | Performed by: FAMILY MEDICINE

## 2017-11-20 PROCEDURE — 71275 CT ANGIOGRAPHY CHEST: CPT

## 2017-11-20 PROCEDURE — 82962 GLUCOSE BLOOD TEST: CPT

## 2017-11-20 PROCEDURE — 77030011943

## 2017-11-20 PROCEDURE — 36415 COLL VENOUS BLD VENIPUNCTURE: CPT | Performed by: FAMILY MEDICINE

## 2017-11-20 PROCEDURE — 85379 FIBRIN DEGRADATION QUANT: CPT | Performed by: EMERGENCY MEDICINE

## 2017-11-20 PROCEDURE — 74011250636 HC RX REV CODE- 250/636: Performed by: EMERGENCY MEDICINE

## 2017-11-20 PROCEDURE — 84300 ASSAY OF URINE SODIUM: CPT | Performed by: NEUROLOGICAL SURGERY

## 2017-11-20 PROCEDURE — 80061 LIPID PANEL: CPT | Performed by: FAMILY MEDICINE

## 2017-11-20 PROCEDURE — 93005 ELECTROCARDIOGRAM TRACING: CPT

## 2017-11-20 PROCEDURE — 51701 INSERT BLADDER CATHETER: CPT

## 2017-11-20 PROCEDURE — 85610 PROTHROMBIN TIME: CPT | Performed by: EMERGENCY MEDICINE

## 2017-11-20 PROCEDURE — 83605 ASSAY OF LACTIC ACID: CPT | Performed by: FAMILY MEDICINE

## 2017-11-20 PROCEDURE — 71010 XR CHEST PORT: CPT

## 2017-11-20 PROCEDURE — 85025 COMPLETE CBC W/AUTO DIFF WBC: CPT | Performed by: EMERGENCY MEDICINE

## 2017-11-20 PROCEDURE — 70450 CT HEAD/BRAIN W/O DYE: CPT

## 2017-11-20 PROCEDURE — 70553 MRI BRAIN STEM W/O & W/DYE: CPT

## 2017-11-20 PROCEDURE — 99285 EMERGENCY DEPT VISIT HI MDM: CPT

## 2017-11-20 PROCEDURE — 74011636320 HC RX REV CODE- 636/320: Performed by: EMERGENCY MEDICINE

## 2017-11-20 PROCEDURE — 96360 HYDRATION IV INFUSION INIT: CPT

## 2017-11-20 PROCEDURE — 74011000258 HC RX REV CODE- 258: Performed by: EMERGENCY MEDICINE

## 2017-11-20 PROCEDURE — 82607 VITAMIN B-12: CPT | Performed by: FAMILY MEDICINE

## 2017-11-20 PROCEDURE — 84484 ASSAY OF TROPONIN QUANT: CPT | Performed by: EMERGENCY MEDICINE

## 2017-11-20 PROCEDURE — A9576 INJ PROHANCE MULTIPACK: HCPCS | Performed by: EMERGENCY MEDICINE

## 2017-11-20 PROCEDURE — 82140 ASSAY OF AMMONIA: CPT | Performed by: FAMILY MEDICINE

## 2017-11-20 PROCEDURE — 74177 CT ABD & PELVIS W/CONTRAST: CPT

## 2017-11-20 PROCEDURE — 80053 COMPREHEN METABOLIC PANEL: CPT | Performed by: EMERGENCY MEDICINE

## 2017-11-20 PROCEDURE — 83605 ASSAY OF LACTIC ACID: CPT | Performed by: EMERGENCY MEDICINE

## 2017-11-20 RX ORDER — SODIUM CHLORIDE 9 MG/ML
10 INJECTION, SOLUTION INTRAVENOUS
Status: ACTIVE | OUTPATIENT
Start: 2017-11-20 | End: 2017-11-21

## 2017-11-20 RX ORDER — ACETAMINOPHEN 325 MG/1
650 TABLET ORAL
Status: DISCONTINUED | OUTPATIENT
Start: 2017-11-20 | End: 2017-11-21

## 2017-11-20 RX ORDER — ATORVASTATIN CALCIUM 10 MG/1
10 TABLET, FILM COATED ORAL
Status: DISCONTINUED | OUTPATIENT
Start: 2017-11-20 | End: 2017-11-25 | Stop reason: HOSPADM

## 2017-11-20 RX ORDER — SODIUM CHLORIDE 9 MG/ML
75 INJECTION, SOLUTION INTRAVENOUS CONTINUOUS
Status: DISCONTINUED | OUTPATIENT
Start: 2017-11-20 | End: 2017-11-21

## 2017-11-20 RX ORDER — CEFEPIME HYDROCHLORIDE 2 G/1
2 INJECTION, POWDER, FOR SOLUTION INTRAVENOUS EVERY 12 HOURS
Status: DISCONTINUED | OUTPATIENT
Start: 2017-11-20 | End: 2017-11-20 | Stop reason: SDUPTHER

## 2017-11-20 RX ORDER — ACETAMINOPHEN 650 MG/1
650 SUPPOSITORY RECTAL
Status: COMPLETED | OUTPATIENT
Start: 2017-11-20 | End: 2017-11-20

## 2017-11-20 RX ORDER — SODIUM CHLORIDE 0.9 % (FLUSH) 0.9 %
10 SYRINGE (ML) INJECTION
Status: COMPLETED | OUTPATIENT
Start: 2017-11-20 | End: 2017-11-20

## 2017-11-20 RX ORDER — LEVETIRACETAM 250 MG/1
250 TABLET ORAL
COMMUNITY
End: 2017-11-25

## 2017-11-20 RX ORDER — VANCOMYCIN/0.9 % SOD CHLORIDE 1 G/100 ML
1000 PLASTIC BAG, INJECTION (ML) INTRAVENOUS
Status: DISCONTINUED | OUTPATIENT
Start: 2017-11-21 | End: 2017-11-22

## 2017-11-20 RX ORDER — VANCOMYCIN 2 GRAM/500 ML IN 0.9 % SODIUM CHLORIDE INTRAVENOUS
2000
Status: COMPLETED | OUTPATIENT
Start: 2017-11-20 | End: 2017-11-21

## 2017-11-20 RX ORDER — LEVETIRACETAM 500 MG/1
500 TABLET ORAL EVERY EVENING
COMMUNITY
End: 2017-11-25

## 2017-11-20 RX ORDER — LEVETIRACETAM 5 MG/ML
500 INJECTION INTRAVASCULAR EVERY 12 HOURS
Status: DISCONTINUED | OUTPATIENT
Start: 2017-11-20 | End: 2017-11-21 | Stop reason: CLARIF

## 2017-11-20 RX ORDER — CLOPIDOGREL BISULFATE 75 MG/1
75 TABLET ORAL DAILY
Status: DISCONTINUED | OUTPATIENT
Start: 2017-11-21 | End: 2017-11-20

## 2017-11-20 RX ORDER — OXCARBAZEPINE 150 MG/1
75 TABLET, FILM COATED ORAL
COMMUNITY
End: 2017-11-25

## 2017-11-20 RX ORDER — LEVOFLOXACIN 5 MG/ML
750 INJECTION, SOLUTION INTRAVENOUS EVERY 24 HOURS
Status: COMPLETED | OUTPATIENT
Start: 2017-11-20 | End: 2017-11-25

## 2017-11-20 RX ORDER — BACITRACIN 500 [USP'U]/G
OINTMENT OPHTHALMIC DAILY
Status: DISCONTINUED | OUTPATIENT
Start: 2017-11-21 | End: 2017-11-25 | Stop reason: HOSPADM

## 2017-11-20 RX ORDER — SODIUM CHLORIDE 0.9 % (FLUSH) 0.9 %
5-10 SYRINGE (ML) INJECTION EVERY 8 HOURS
Status: DISCONTINUED | OUTPATIENT
Start: 2017-11-20 | End: 2017-11-25 | Stop reason: HOSPADM

## 2017-11-20 RX ORDER — SODIUM CHLORIDE 0.9 % (FLUSH) 0.9 %
5-10 SYRINGE (ML) INJECTION AS NEEDED
Status: DISCONTINUED | OUTPATIENT
Start: 2017-11-20 | End: 2017-11-25 | Stop reason: HOSPADM

## 2017-11-20 RX ADMIN — SODIUM CHLORIDE 1000 ML: 900 INJECTION, SOLUTION INTRAVENOUS at 12:59

## 2017-11-20 RX ADMIN — CEFEPIME HYDROCHLORIDE 2 G: 2 INJECTION, POWDER, FOR SOLUTION INTRAVENOUS at 14:21

## 2017-11-20 RX ADMIN — Medication 10 ML: at 17:27

## 2017-11-20 RX ADMIN — LEVOFLOXACIN 750 MG: 5 INJECTION, SOLUTION INTRAVENOUS at 21:09

## 2017-11-20 RX ADMIN — SODIUM CHLORIDE 75 ML/HR: 900 INJECTION, SOLUTION INTRAVENOUS at 20:00

## 2017-11-20 RX ADMIN — ACETAMINOPHEN 650 MG: 650 SUPPOSITORY RECTAL at 13:11

## 2017-11-20 RX ADMIN — Medication 10 ML: at 21:09

## 2017-11-20 RX ADMIN — GADOTERIDOL 18 ML: 279.3 INJECTION, SOLUTION INTRAVENOUS at 16:50

## 2017-11-20 RX ADMIN — CEFTRIAXONE 2 G: 2 INJECTION, POWDER, FOR SOLUTION INTRAMUSCULAR; INTRAVENOUS at 23:10

## 2017-11-20 RX ADMIN — IOPAMIDOL 100 ML: 755 INJECTION, SOLUTION INTRAVENOUS at 18:10

## 2017-11-20 RX ADMIN — Medication 10 ML: at 16:50

## 2017-11-20 RX ADMIN — VANCOMYCIN HYDROCHLORIDE 2000 MG: 10 INJECTION, POWDER, LYOPHILIZED, FOR SOLUTION INTRAVENOUS at 15:33

## 2017-11-20 RX ADMIN — LEVETIRACETAM 500 MG: 5 INJECTION INTRAVENOUS at 15:04

## 2017-11-20 RX ADMIN — SODIUM CHLORIDE 100 ML: 900 INJECTION, SOLUTION INTRAVENOUS at 18:10

## 2017-11-20 NOTE — IP AVS SNAPSHOT
Summary of Care Report The Summary of Care report has been created to help improve care coordination. Users with access to [employer] or [address] (Web-based application) may access additional patient information including the Discharge Summary. If you are not currently a [address] user and need more information, please call the number listed below in the [place] [address] section and ask to be connected with Medical Records. Facility Information Name Address Phone [address] [facility] [place] [address] [phone] Patient Information Patient Name Sex  Johnny Vincent ([id number]) Male 11/10/1930 Discharge Information Admitting Provider Service Area Unit Rebekah Chew MD /  [address] [facility] / [phone] Discharge Provider Discharge Date/Time Discharge Disposition Destination (none) 2017 Midday (Pending) SHARATH (none) Patient Language Language ENGLISH [13] Hospital Problems as of 2017  Reviewed: 2017 10:33 AM by Shana Cannon MD  
  
  
  
 Class Noted - Resolved Last Modified POA Active Problems GERD (gastroesophageal reflux disease)  3/13/2012 - Present 2017 by Shana Cannon MD Yes Entered by Rickey Prado PA-C Leucocytosis  2017 - Present 2017 by Shana Cannon MD Unknown Entered by Shana Cannon MD  
  Hyponatremia  2017 - Present 2017 by Shana Cannon MD Yes Entered by Shana Cannon MD  
  Overview Signed 2017 10:31 AM by Shana Cannon MD  
   Acute on chronic Rhabdomyolysis  2017 - Present 2017 by Shana Cannon MD Yes Entered by Shana Cannon MD  
  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  
 Aspiration into airway  11/25/2017 - Present 11/25/2017 by Abraham Davis MD Yes Entered by Abraham Davis MD  
  Sepsis ([facility] [address]  11/25/2017 - Present 11/25/2017 by Abraham Davis MD Yes Entered by Abraham Davis MD  
  Trigeminal neuralgia (Chronic)  11/25/2017 - Present 11/25/2017 by Abraham Davis MD Clinically Undetermined Entered by Abraham Davis MD  
  
Non-Hospital Problems as of 11/25/2017  Reviewed: 11/25/2017 10:33 AM by Abraham Davis MD  
  
  
  
 Class Noted - Resolved Last Modified Active Problems TIA (transient ischemic attack)  12/23/2010 - Present 12/23/2010 Entered by Po Morrison HTN (hypertension)  12/23/2010 - Present 12/23/2010 Entered by Po Morrison Numbness and tingling in left hand  2/25/2012 - Present 2/25/2012 Entered by Shay Thomas MD  
  Right knee DJD  3/13/2012 - Present 3/16/2012 Entered by Viridiana Campuzano PA-C Overview Signed 3/13/2012  2:30 PM by Viridiana Campuzano PA-C Scheduled for RIGHT UNI vs TKR on 03-14-12 History of TIAs  3/13/2012 - Present 3/13/2012 by Viridiana Campuzano PA-C Entered by Viridiana Campuzano PA-C Overview Addendum 3/13/2012  2:31 PM by Viridiana Campuzano PA-C  
   11 in last 7 yrs Stroke ([facility] [address]  3/13/2012 - Present 3/13/2012 by Viridiana Campuzano PA-C Entered by Viridiana Campuzano PA-C Overview Signed 3/13/2012  2:32 PM by Viridiana Campuzano PA-C Residual left-sided facial numbness Hypercholesteremia  3/13/2012 - Present 3/13/2012 by Viridiana Campuzano PA-C Entered by Viridiana Campuzano PA-C Head and neck cancer ([facility] [address]  10/13/2014 - Present 10/13/2014 by Gabriel Guthrie NP Entered by Gabriel Guthrie NP Cancer of sinus ([facility] [address]  10/13/2014 - Present 10/13/2014 by Gabriel Guthrie NP Entered by Gabriel Guthrie NP   Pneumonia  10/23/2015 - Present 10/23/2015 by Kate Ricketts MD  
  Entered by Kate Ricketts MD  
 Brain tumor ([facility] [address]  10/19/2017 - Present 10/19/2017 by Rogelio Goldstein MD  
  Entered by Rogelio Goldstein MD  
  
You are allergic to the following Allergen Reactions Adhesive Tape-Silicones Other (comments) REDNESS Aggrenox (Aspirin-Dipyridamole) Other (comments)  
 headache Amlodipine Rash Hydrochlorothiazide Other (comments) LOW NA  
  
  
Current Discharge Medication List  
  
START taking these medications Dose & Instructions Dispensing Information Comments  
 dexamethasone 1 mg tablet Commonly known as:  DECADRON Take 4mg po x1 on 11/26, 3mg po x1 on 11/27, 2mg po x1 on 11/28, and 1mg po x1 on 11/29 then stop Quantity:  10 Tab Refills:  0  
   
 scopolamine 1 mg over 3 days Pt3d Commonly known as:  TRANSDERM-SCOP Start taking on:  11/27/2017 Dose:  1.5 mg  
1 Patch by TransDERmal route every seventy-two (72) hours. Indications: excessive secretions Quantity:  1 Patch Refills:  0 CONTINUE these medications which have CHANGED Dose & Instructions Dispensing Information Comments  
 acetaminophen 500 mg tablet Commonly known as:  [address] [facility] What changed:  additional instructions Dose:  1000 mg Take 2 Tabs by mouth as needed for Pain. No more than 3grams in 24hour period  Indications: Pain Quantity:  10 Tab Refills:  0  
   
 gabapentin 600 mg tablet Commonly known as:  NEURONTIN What changed:  how much to take Dose:  300 mg Take 0.5 Tabs by mouth two (2) times a day. Indications: trigeminal neuralgia Quantity:  30 Tab Refills:  0  
   
 levETIRAcetam 500 mg tablet Commonly known as:  KEPPRA What changed:   
- when to take this - Another medication with the same name was removed. Continue taking this medication, and follow the directions you see here. Dose:  500 mg Take 1 Tab by mouth two (2) times a day. Quantity:  60 Tab Refills:  0 CONTINUE these medications which have NOT CHANGED Dose & Instructions Dispensing Information Comments  
 bacitracin ophthalmic ointment Administer  to left eye daily. Refills:  2  
   
 docusate sodium 100 mg capsule Commonly known as:  Elisabeth Neighbor Dose:  100 mg Take 100 mg by mouth nightly. Refills:  0 LIPITOR 10 mg tablet Generic drug:  atorvastatin Dose:  10 mg Take 10 mg by mouth nightly. Refills:  0 PLAVIX 75 mg Tab Generic drug:  clopidogrel Dose:  75 mg Take 75 mg by mouth daily. TAKES IN AM  
 Refills:  0  
   
 SYSTANE (PROPYLENE GLYCOL) 0.4-0.3 % Drop Generic drug:  peg 400-propylene glycol Dose:  1 Drop Administer 1 Drop to right eye as needed. Indications: Dry Eye Refills:  0  
   
 VITAMIN B-12 1,000 mcg tablet Generic drug:  cyanocobalamin Dose:  1000 mcg Take 1,000 mcg by mouth daily. Refills:  0  
   
 VITAMIN D3 1,000 unit Cap Generic drug:  cholecalciferol Dose:  1000 Units Take 1,000 Units by mouth daily. Refills:  0 STOP taking these medications Comments GLUCOSAMINE-CHONDROITIN PO  
   
   
 lisinopril 40 mg tablet Commonly known as:  Kim Needy OXcarbazepine 150 mg tablet Commonly known as:  TRILEPTAL Current Immunizations Name Date Influenza Vaccine 10/1/2014 Influenza Vaccine Whole 10/15/2011 TDAP Vaccine 9/30/2012 ZZZ-RETIRED (DO NOT USE) Pneumococcal Vaccine (Unspecified Type) 10/15/2009 Follow-up Information Follow up With Details Comments Contact Info Kimmy Claudio MD In 1 week hospital follow up [address] [place] [address] [address] [place] [address] 
[phone] [address] [place] [address], [address]   3[address] [facility] [address] 
[phone] Erica Christian MD On 12/1/2017 Neurosurgery as scheduled on 12/1/17 at 11:30am [facility] [address] [address] [phone] Discharge Instructions ADDITIONAL CARE RECOMMENDATIONS:  
1. Take medications as prescribed. 2. Keep appointment(s) as recommended/scheduled/ 
3. If confusion develops, please check serum sodium and if low, may need to be placed on free water restriction. DIET: dental soft ACTIVITY: PT/OT Eval and Treat WOUND CARE: none EQUIPMENT needed: as per PT/OT Hyponatremia: Care Instructions Your Care Instructions Hyponatremia (say \"dc-rg-akx-TREE-lopez-uh\") means that you don't have enough sodium in your blood. It can cause nausea, vomiting, and headaches. Or you may not feel hungry. In serious cases, it can cause seizures, a coma, or even death. Hyponatremia is not a disease. It is a problem caused by something else, such as medicines or exercising for a long time in hot weather. You can get hyponatremia if you lose a lot of fluids and then you drink a lot of water or other liquids that don't have much sodium. You can also get it if you have kidney, liver, heart, or other health problems. Treatment is focused on getting your sodium levels back to normal. 
Follow-up care is a key part of your treatment and safety. Be sure to make and go to all appointments, and call your doctor if you are having problems. It's also a good idea to know your test results and keep a list of the medicines you take. How can you care for yourself at home? · If your doctor recommends it, drink fluids that have sodium. Sports drinks are a good choice. Or you can eat salty foods. · If your doctor recommends it, limit the amount of water you drink. And limit fluids that are mostly water. These include tea, coffee, and juice. · Take your medicines exactly as prescribed. Call your doctor if you have any problems with your medicine. · Get your sodium levels tested when your doctor tells you to. When should you call for help? Call 911 anytime you think you may need emergency care. For example, call if: 
? · You have a seizure. ? · You passed out (lost consciousness). ?Call your doctor now or seek immediate medical care if: 
? · You are confused or it is hard to focus. ? · You have little or no appetite. ? · You feel sick to your stomach or you vomit. ? · You have a headache. ? · You have mood changes. ? · You feel more tired than usual. ? Watch closely for changes in your health, and be sure to contact your doctor if: 
? · You do not get better as expected. Where can you learn more? Go to http://aixa-josette.info/. Enter G758 in the search box to learn more about \"Hyponatremia: Care Instructions. \" Current as of: October 14, 2016 Content Version: 11.4 © 7134-7973 [employer]. Care instructions adapted under license by [employer] (which disclaims liability or warranty for this information). If you have questions about a medical condition or this instruction, always ask your healthcare professional. [address] any warranty or liability for your use of this information. Rhabdomyolysis: Care Instructions Your Care Instructions When you have rhabdomyolysis (say \"xkb-njk-gf-AH-stephen-suss\"), dying muscle cells cause toxins to build up in the blood. If not treated, it can cause life-threatening damage to the body's organs. It can be caused by many things, such as severe muscle injury, some medicines (like statins), the flu, and certain blood infections. Symptoms may include weak muscles, pain, stiffness, fever, and nausea. Your urine may also be dark. You will get treatment in the hospital. If possible, the doctor will stop the cause of muscle cell death. The doctor will take steps to protect your organs. You may have to stop taking certain medicines if they are the cause of the problem. You will also get treatment to help the kidneys remove the toxins from your blood. This includes plenty of fluids. You may get fluids through a vein (by IV). You may also need dialysis. Follow-up care is a key part of your treatment and safety. Be sure to make and go to all appointments, and call your doctor if you are having problems. It's also a good idea to know your test results and keep a list of the medicines you take. How can you care for yourself at home? · Take pain medicines exactly as directed. ¨ If the doctor gave you a prescription medicine for pain, take it as prescribed. ¨ If you are not taking a prescription pain medicine, ask your doctor if you can take an over-the-counter medicine. · Talk to your doctor about whether you need to stop taking any medicines. Follow your doctor's instructions about stopping medicines. · Drink plenty of fluids, enough so that your urine is light yellow or clear like water. If you have kidney, heart, or liver disease and have to limit fluids, talk with your doctor before you increase the amount of fluids you drink. When should you call for help? Call your doctor now or seek immediate medical care if: 
? · You have new or worse muscle pain. ? · You have less urine than normal or no urine. ? · You have new swelling in your arms or feet. ? · You have blood in your urine. ? Watch closely for changes in your health, and be sure to contact your doctor if you do not get better as expected. Where can you learn more? Go to http://aixa-josette.info/. Enter F129 in the search box to learn more about \"Rhabdomyolysis: Care Instructions. \" Current as of: May 12, 2017 Content Version: 11.4 © 2471-6098 [employer]. Care instructions adapted under license by [employer] (which disclaims liability or warranty for this information).  If you have questions about a medical condition or this instruction, always ask your healthcare professional. [place] [address] any warranty or liability for your use of this information. Chart Review Routing History Recipient Method Report Sent By Pat Rosales MD [[id number]] 2/3/2012  2:45 AM 02/03/2012 Carli Kelly MD  
Phone: [phone] In Basket [facility] amb office visit enc summ w/hx Stef Bunn MD [[id number]] 9/15/2014  8:45 AM 09/12/2014 Danielito Rosales MD  
Fax: [phone] Phone: [phone] Fax Note Review Carli Kelly MD [[id number]] 9/21/2014 12:07 PM 09/21/2014 Darwin Gonzales MD  
Phone: [phone] In Basket Note Review Carli Kelly MD [[id number]] 9/21/2014 12:07 PM 09/21/2014 Li Patterson MD  
Phone: [phone] In Basket Note Review Carli Kelly MD [[id number]] 9/21/2014 12:07 PM 09/21/2014  
 Coni Joe MD  
Phone: [phone] In Basket Note Review Carli Kelly MD [[id number]] 9/21/2014 12:07 PM 09/21/2014 Danielito Rosales MD  
Fax: [phone] Phone: [phone] Fax Note Review Carli Kelly MD [[id number]] 10/13/2014  2:53 PM 10/13/2014 Darwin Gonzales MD  
Phone: [phone] In Basket Note Review Carli Kelly MD [[id number]] 10/13/2014  2:53 PM 10/13/2014  
 Coni Joe MD  
Phone: [phone] In Basket Note Review Carli Kelly MD [[id number]] 10/13/2014  2:53 PM 10/13/2014 Ya Castorena MD  
Fax: [phone] Phone: [phone] Fax Note Review Carli Kelly MD [[id number]] 10/13/2014  2:53 PM 10/13/2014 Sam Batista MD  
Phone: [phone] In Basket Note Review Carli Kelly MD [[id number]] 10/13/2014  2:53 PM 10/13/2014 Danielito Rosales MD  
Fax: [phone] Phone: [phone] Fax CYNDEEERIC CAMARA MD NOTES AUTO ROUTING REPORT Leonel Burr MD [[id number]] 10/23/2014  7:52 AM 10/23/2014 Danielito Rosales MD  
Fax: [phone] Phone: [phone]  Fax Parviz Kyle MD NOTES AUTO ROUTING REPORT Pablo Pascual MD [[id number]] 12/5/2014  3:58 PM 12/05/2014 Braydon Diaz MD  
Fax: [phone] Phone: [phone] Fax Note Review Pippa Olivia MD [[id number]] 12/29/2014 12:37 PM 12/29/2014 Chuy Díaz MD  
Phone: [phone] In Basket Note Review Pippa Olivia MD [[id number]] 12/29/2014 12:37 PM 12/29/2014 Christo Cuadra MD  
Phone: [phone] In Basket Note Review Pippa Olivia MD [[id number]] 12/29/2014 12:37 PM 12/29/2014 Yessica Griggs MD  
Fax: [phone] Phone: [phone] Fax Note Review Pippa Olivia MD [[id number]] 12/29/2014 12:37 PM 12/29/2014 Braydon Diaz MD  
Fax: [phone] Phone: [phone] Fax Note Review Pippa Olivia MD [[id number]] 3/6/2015 11:03 AM 03/06/2015 Sylvester Souza MD  
Phone: [phone] In Basket Note Review Pippa Olivia MD [[id number]] 3/6/2015 11:03 AM 03/06/2015 Christo Cuadra MD  
Phone: [phone] In Basket Note Review Pippa Olivia MD [[id number]] 3/6/2015 11:03 AM 03/06/2015 Braydon Diaz MD  
Fax: [phone] Phone: [phone] Fax Note Review Chuy Díaz MD [[id number]] 3/14/2015 10:45 AM 03/11/2015 Christo Cuadra MD  
Phone: [phone] In Basket Note Review Chuy Díaz MD [[id number]] 3/14/2015 10:45 AM 03/11/2015 Pippa Olivia MD  
Phone: [phone] In Basket Note Review Chuy Díaz MD [[id number]] 3/14/2015 10:45 AM 03/11/2015 Braydon Diaz MD  
Fax: [phone] Phone: [phone] Fax Keyla Marie MD NOTES AUTO ROUTING REPORT Bernard Zhao MD [[id number]] 6/26/2015  2:22 PM 06/26/2015 Braydon Diaz MD  
Fax: [phone] Phone: [phone] Fax Note Review Pippa Olivia MD [[id number]] 9/9/2015 12:04 PM 09/09/2015 Chuy Díaz MD  
Phone: [phone] In Basket Note Review Pippa Olivia MD [[id number]] 9/9/2015 12:04 PM 09/09/2015 Christo Cuadra MD  
Phone: [phone] In Basket Note Review Pippa Olivia MD [[id number]] 9/9/2015 12:04 PM 09/09/2015  Enrique Cuadra MD  
 Fax: [phone] Phone: [phone] Fax Note Review Radha Garcia MD [[id number]] 9/9/2015 12:04 PM 09/09/2015 Sendy Bernstein MD  
Fax: [phone] Phone: [phone] Fax Note Review Radha Garcia MD [[id number]] 9/9/2015 12:04 PM 09/09/2015 Jeff Garcia MD  
Fax: [phone] Phone: [phone] Fax BSI IP MD NOTES AUTO ROUTING REPORT Sueann Kawasaki, MD [[id number]] 10/23/2015 11:44 PM 10/23/2015 Jeff Garcia MD  
Fax: [phone] Phone: [phone] Fax [facility] MD NOTES AUTO ROUTING REPORT Sueann Kawasaki, MD [[id number]] 10/25/2015  6:50 AM 10/25/2015 Jeff Garcia MD  
Fax: [phone] Phone: [phone] Fax [facility] MD NOTES AUTO ROUTING REPORT Carolina Muñoz MD [[id number]] 10/27/2015  3:56 PM 10/27/2015 Jeff Garcia MD  
Fax: [phone] Phone: [phone] Fax IP Auto Routed Amadeo Larsen MD [[id number]] 10/23/2017  4:44 PM 10/23/2017 Praveen Eli MD  
Phone: [phone] In Basket IP Auto Routed Trans Praveen lEi MD [[id number]] 10/23/2017  4:44 PM 10/23/2017 Sendy Bernstein MD  
Fax: [phone] Phone: [phone] Fax IP Auto Routed Amadeo Larsen MD [[id number]] 10/23/2017  4:44 PM 10/23/2017 Jeff Garcia MD  
Fax: [phone] Phone: [phone] Fax [facility] Yvonne CAMARA MD NOTES AUTO ROUTING REPORT Otilio Restrepo MD [[id number]] 11/23/2017  1:01 PM 11/23/2017 Otilio Restrepo MD  
Fax: [phone] Phone: [phone] Fax Belen CAMARA MD NOTES AUTO ROUTING REPORT Otilio Restrepo MD [[id number]] 11/23/2017  1:01 PM 11/23/2017 Jeff Garcia MD  
Fax: [phone] Phone: [phone] Fax Belen CAMARA MD NOTES AUTO ROUTING REPORT Paul Anthony MD [[id number]] 11/25/2017 10:47 AM 11/25/2017

## 2017-11-20 NOTE — IP AVS SNAPSHOT
[address] [facility] [address] [place] [address] 
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  
hospital follow up Phone:  [phone] Where:  [facility] [address] [place] [id number] Follow up with [address] [facility], [address] Phone:  [phone] Where:  [address] [facility], [address] [facility] [address] Friday Dec 01, 2017 Follow up with Wilder Solano MD  
Neurosurgery as scheduled on 12/1/17 at 11:30am  
  
Phone:  [phone] Where:  [facility], [address] [place] [id number] Discharge Orders None A check radha indicates which time of day the medication should be taken. My Medications STOP taking these medications GLUCOSAMINE-CHONDROITIN PO  
   
  
 lisinopril 40 mg tablet Commonly known as:  Arnold Files OXcarbazepine 150 mg tablet Commonly known as:  TRILEPTAL  
   
  
  
TAKE these medications as instructed Instructions Each Dose to Equal  
 Morning Noon Evening Bedtime  
 acetaminophen 500 mg tablet Commonly known as:  [address] [facility] Your last dose was: Your next dose is: Take 2 Tabs by mouth as needed for Pain.  No more than 3grams in 24hour period  Indications: Pain  
 1000 mg  
    
   
   
   
 bacitracin ophthalmic ointment Your last dose was: Your next dose is:    
   
   
 Administer  to left eye daily. dexamethasone 1 mg tablet Commonly known as:  DECADRON Your last dose was: Your next dose is: Take 4mg po x1 on 11/26, 3mg po x1 on 11/27, 2mg po x1 on 11/28, and 1mg po x1 on 11/29 then stop  
     
   
   
   
  
 docusate sodium 100 mg capsule Commonly known as:  Mary Rai Your last dose was: Your next dose is: Take 100 mg by mouth nightly. 100 mg  
    
   
   
   
  
 gabapentin 600 mg tablet Commonly known as:  NEURONTIN Your last dose was: Your next dose is: Take 0.5 Tabs by mouth two (2) times a day. Indications: trigeminal neuralgia 300 mg  
    
   
   
   
  
 levETIRAcetam 500 mg tablet Commonly known as:  KEPPRA Your last dose was: Your next dose is: Take 1 Tab by mouth two (2) times a day. 500 mg  
    
   
   
   
  
 LIPITOR 10 mg tablet Generic drug:  atorvastatin Your last dose was: Your next dose is: Take 10 mg by mouth nightly. 10 mg  
    
   
   
   
  
 PLAVIX 75 mg Tab Generic drug:  clopidogrel Your last dose was: Your next dose is: Take 75 mg by mouth daily. TAKES IN AM  
 75 mg  
    
   
   
   
  
 scopolamine 1 mg over 3 days Pt3d Commonly known as:  TRANSDERM-SCOP Start taking on:  11/27/2017 Your last dose was: Your next dose is:    
   
   
 1 Patch by TransDERmal route every seventy-two (72) hours. Indications: excessive secretions 1.5 mg  
    
   
   
   
  
 SYSTANE (PROPYLENE GLYCOL) 0.4-0.3 % Drop Generic drug:  peg 400-propylene glycol Your last dose was: Your next dose is:    
   
   
 Administer 1 Drop to right eye as needed. Indications: Dry Eye  
 1 Drop VITAMIN B-12 1,000 mcg tablet Generic drug:  cyanocobalamin Your last dose was: Your next dose is: Take 1,000 mcg by mouth daily. 1000 mcg VITAMIN D3 1,000 unit Cap Generic drug:  cholecalciferol Your last dose was: Your next dose is: Take 1,000 Units by mouth daily. 1000 Units Where to Get Your Medications Information on where to get these meds will be given to you by the nurse or doctor. ! Ask your nurse or doctor about these medications  
  acetaminophen 500 mg tablet  
 dexamethasone 1 mg tablet  
 gabapentin 600 mg tablet  
 levETIRAcetam 500 mg tablet  
 scopolamine 1 mg over 3 days Pt3d Discharge Instructions ADDITIONAL CARE RECOMMENDATIONS:  
1. Take medications as prescribed. 2. Keep appointment(s) as recommended/scheduled/ 
3. If confusion develops, please check serum sodium and if low, may need to be placed on free water restriction. DIET: dental soft ACTIVITY: PT/OT Eval and Treat WOUND CARE: none EQUIPMENT needed: as per PT/OT Hyponatremia: Care Instructions Your Care Instructions Hyponatremia (say \"tf-or-uds-TREE-lopez-uh\") means that you don't have enough sodium in your blood. It can cause nausea, vomiting, and headaches. Or you may not feel hungry. In serious cases, it can cause seizures, a coma, or even death. Hyponatremia is not a disease. It is a problem caused by something else, such as medicines or exercising for a long time in hot weather. You can get hyponatremia if you lose a lot of fluids and then you drink a lot of water or other liquids that don't have much sodium. You can also get it if you have kidney, liver, heart, or other health problems. Treatment is focused on getting your sodium levels back to normal. 
Follow-up care is a key part of your treatment and safety.  Be sure to make and go to all appointments, and call your doctor if you are having problems. It's also a good idea to know your test results and keep a list of the medicines you take. How can you care for yourself at home? · If your doctor recommends it, drink fluids that have sodium. Sports drinks are a good choice. Or you can eat salty foods. · If your doctor recommends it, limit the amount of water you drink. And limit fluids that are mostly water. These include tea, coffee, and juice. · Take your medicines exactly as prescribed. Call your doctor if you have any problems with your medicine. · Get your sodium levels tested when your doctor tells you to. When should you call for help? Call 911 anytime you think you may need emergency care. For example, call if: 
? · You have a seizure. ? · You passed out (lost consciousness). ?Call your doctor now or seek immediate medical care if: 
? · You are confused or it is hard to focus. ? · You have little or no appetite. ? · You feel sick to your stomach or you vomit. ? · You have a headache. ? · You have mood changes. ? · You feel more tired than usual. ? Watch closely for changes in your health, and be sure to contact your doctor if: 
? · You do not get better as expected. Where can you learn more? Go to http://aixa-josette.info/. Enter S141 in the search box to learn more about \"Hyponatremia: Care Instructions. \" Current as of: October 14, 2016 Content Version: 11.4 © 1595-1312 [employer]wise[employer]. Care instructions adapted under license by [employer] (which disclaims liability or warranty for this information). If you have questions about a medical condition or this instruction, always ask your healthcare professional. [address] any warranty or liability for your use of this information. Rhabdomyolysis: Care Instructions Your Care Instructions When you have rhabdomyolysis (say \"vmo-pcx-ip-AH-stephen-brandys\"), dying muscle cells cause toxins to build up in the blood. If not treated, it can cause life-threatening damage to the body's organs. It can be caused by many things, such as severe muscle injury, some medicines (like statins), the flu, and certain blood infections. Symptoms may include weak muscles, pain, stiffness, fever, and nausea. Your urine may also be dark. You will get treatment in the hospital. If possible, the doctor will stop the cause of muscle cell death. The doctor will take steps to protect your organs. You may have to stop taking certain medicines if they are the cause of the problem. You will also get treatment to help the kidneys remove the toxins from your blood. This includes plenty of fluids. You may get fluids through a vein (by IV). You may also need dialysis. Follow-up care is a key part of your treatment and safety. Be sure to make and go to all appointments, and call your doctor if you are having problems. It's also a good idea to know your test results and keep a list of the medicines you take. How can you care for yourself at home? · Take pain medicines exactly as directed. ¨ If the doctor gave you a prescription medicine for pain, take it as prescribed. ¨ If you are not taking a prescription pain medicine, ask your doctor if you can take an over-the-counter medicine. · Talk to your doctor about whether you need to stop taking any medicines. Follow your doctor's instructions about stopping medicines. · Drink plenty of fluids, enough so that your urine is light yellow or clear like water. If you have kidney, heart, or liver disease and have to limit fluids, talk with your doctor before you increase the amount of fluids you drink. When should you call for help? Call your doctor now or seek immediate medical care if: 
? · You have new or worse muscle pain. ? · You have less urine than normal or no urine. ? · You have new swelling in your arms or feet. ? · You have blood in your urine. ? Watch closely for changes in your health, and be sure to contact your doctor if you do not get better as expected. Where can you learn more? Go to http://aixa-josette.info/. Enter F129 in the search box to learn more about \"Rhabdomyolysis: Care Instructions. \" Current as of: May 12, 2017 Content Version: 11.4 © 7960-2696 [employer]. Care instructions adapted under license by [employer] (which disclaims liability or warranty for this information). If you have questions about a medical condition or this instruction, always ask your healthcare professional. [address] any warranty or liability for your use of this information. Managed Objects Announcement We are excited to announce that we are making your provider's discharge notes available to you in Managed Objects. You will see these notes when they are completed and signed by the physician that discharged you from your recent hospital stay. If you have any questions or concerns about any information you see in Managed Objects, please call the Health Information Department where you were seen or reach out to your Primary Care Provider for more information about your plan of care. Introducing [facility]! Patience Leblanc introduces Managed Objects patient portal. Now you can access parts of your medical record, email your doctor's office, and request medication refills online. 1. In your internet browser, go to [email or web address] 2. Click on the First Time User? Click Here link in the Sign In box. You will see the New Member Sign Up page. 3. Enter your Managed Objects Access Code exactly as it appears below. You will not need to use this code after youve completed the sign-up process. If you do not sign up before the expiration date, you must request a new code. · GetMeMedia Access Code: Tenzin Lacey Expires: 1/2/2018 11:02 AM 
 
4. Enter the last four digits of your Social Security Number (xxxx) and Date of Birth (mm/dd/yyyy) as indicated and click Submit. You will be taken to the next sign-up page. 5. Create a PlateJoyt ID. This will be your GetMeMedia login ID and cannot be changed, so think of one that is secure and easy to remember. 6. Create a GetMeMedia password. You can change your password at any time. 7. Enter your Password Reset Question and Answer. This can be used at a later time if you forget your password. 8. Enter your e-mail address. You will receive e-mail notification when new information is available in [address] 9. Click Sign Up. You can now view and download portions of your medical record. 10. Click the Download Summary menu link to download a portable copy of your medical information. If you have questions, please visit the Frequently Asked Questions section of the GetMeMedia website. Remember, GetMeMedia is NOT to be used for urgent needs. For medical emergencies, dial 911. Now available from your iPhone and Android! Providers Seen During Your Hospitalization Provider Specialty Primary office phone Margot Ponce MD Emergency Medicine [phone] Jarad Nixon MD Hospitalist [phone] Marbella Dejesus MD Internal Medicine [phone] Your Primary Care Physician (PCP) Primary Care Physician Office Phone Office Fax Dora Wallace [phone] You are allergic to the following Allergen Reactions Adhesive Tape-Silicones Other (comments) REDNESS Aggrenox (Aspirin-Dipyridamole) Other (comments)  
 headache Amlodipine Rash Hydrochlorothiazide Other (comments) LOW NA  
  
Recent Documentation Height Weight BMI Smoking Status 1.753 m 84.3 kg 27.44 kg/m2 Former Smoker Emergency Contacts Name Discharge Info Relation Home Work Mobile [facility] DISCHARGE CAREGIVER [3] [employer] [18] [phone] Patient Belongings The following personal items are in your possession at time of discharge: 
  Dental Appliances: None  Visual Aid: Glasses, With patient      Home Medications: None   Jewelry: None  Clothing: At bedside    Other Valuables: None Please provide this summary of care documentation to your next provider. Signatures-by signing, you are acknowledging that this After Visit Summary has been reviewed with you and you have received a copy. Patient Signature:  ____________________________________________________________ Date:  ____________________________________________________________  
  
[facility] Provider Signature:  ____________________________________________________________ Date:  ____________________________________________________________

## 2017-11-20 NOTE — PROGRESS NOTES
Pt of Dr Ajay Connor s/p L T crani for what looks like Rad Necrosis. Apparently came into to ED today, was noted to have a fever, and question raised regarding infection  CT from 10/20 and 11/20 compared personally and no gross change. Worrisome features for infection such as increased edema and worsened pneumocephalus are not seen. We will see in consult later but he needs an MRI +/- Alvin to assess further.   Reportedly his scalp incision looks fine

## 2017-11-20 NOTE — ED PROVIDER NOTES
HPI Comments: 80 y.o. male with past medical history significant for GERD, hypercholesterolemia, beta blocker therapy, trigeminal neuralgia, nasal sinus tremor, TIA,  Skin CA, hernia repair, and orthopedic surgeries who presents from home with chief complaint of AMS. According to the relative, the pt has not been acting himself over the last 2 days in the he is somnolent and has had episodes of incontinence. The pt had recent brain surgery and was found not to have CA in the brain. The pt was put on Keppra prophylacticly after the seizure. After the surgery, the pt did well and his memory improved. Decadron has helped too. 2 days after being cleared to drive by the NP, the pt started to gradually deteriorate starting with confusion. The is on 500 mg BID, and the wife started to cut the morning pill in half. The pt complained about R thoracic pain a few days ago. The pt does not drive. The pt has not had a fever, vomiting, or HA. There are no other acute medical concerns at this time. Social hx: former smoker, EtOH use  PCP: Mikey Mariee MD  Neurosurgery: Aaron Walters MD    Full history, physical exam, and ROS unable to be obtained due to:  AMS. Note written by Kofi Maxwell, as dictated by Candis Montgomery MD 9:42 AM      The history is provided by the patient. No  was used.         Past Medical History:   Diagnosis Date    Arthritis     HANDS    Beta-blocker therapy     Cancer ([facility] [address] 2013    MAXILLARY SINUS CANCER, RADIATION AND CHEMO    Cancer ([facility] [address]     SKIN CA ON NOSE    GERD (gastroesophageal reflux disease)     Hypercholesterolemia     Hypertension     pt denies    Nasal sinus tumor     Numbness of LEFT hand & LEFT face 2010    RBBB (right bundle branch block)     TIA (transient ischemic attack)     12 TIA's over 9 YRS.1ST ONE IN 8/03- LAST IN 2/12      Trigeminal neuralgia        Past Surgical History:   Procedure Laterality Date    HX CATARACT REMOVAL Bilateral     HX GI      COLONOSCOPY    HX HEENT      BIOPSY OF SINUS     HX HEENT Left     sew eye closed    HX HERNIA REPAIR Right     INGUINAL    HX KNEE ARTHROSCOPY Right 2007    HX KNEE REPLACEMENT Right 2012    Total Knee replacement- right    HX ROTATOR CUFF REPAIR Right     right rotator cuff repair         Family History:   Problem Relation Age of Onset    Cancer Mother      colon    Cancer Father      throat    No Known Problems Brother     Anesth Problems Neg Hx        Social History     Social History    Marital status:      Spouse name: N/A    Number of children: N/A    Years of education: N/A     Occupational History    Not on file. Social History Main Topics    Smoking status: Former Smoker     Packs/day: 1.00     Years: 40.00     Quit date: 1/1/1982    Smokeless tobacco: Never Used      Comment: OCCAS CIGAR    Alcohol use 10.5 oz/week     21 Glasses of wine per week    Drug use: No    Sexual activity: Not on file     Other Topics Concern    Not on file     Social History Narrative         ALLERGIES: Adhesive tape-silicones; Aggrenox [aspirin-dipyridamole]; Amlodipine; and Hydrochlorothiazide    Review of Systems    There were no vitals filed for this visit. Physical Exam   Constitutional: He is oriented to person, place, and time. He appears well-developed and well-nourished. No distress. Elderly;  Chronically ill appearing; Somewhat confused;  Debilitated;   HENT:   Head: Normocephalic and atraumatic. L eye sown shut;     Eyes: Conjunctivae are normal. No scleral icterus. Neck: Neck supple. No tracheal deviation present. Cardiovascular: Normal rate, regular rhythm, normal heart sounds and intact distal pulses. Exam reveals no gallop and no friction rub. No murmur heard. Pulmonary/Chest: Effort normal and breath sounds normal. He has no wheezes. He has no rales. Abdominal: Soft. He exhibits no distension. There is no tenderness.  There is no rebound and no guarding. Musculoskeletal: He exhibits no edema. Neurological: He is alert and oriented to person, place, and time. No new focal neurological deficits;     Skin: Skin is warm and dry. No rash noted. Psychiatric: He has a normal mood and affect. Nursing note and vitals reviewed. Note written by Kofi Frias, as dictated by Savannah Miranda MD 10:00 AM    MDM  Number of Diagnoses or Management Options     Amount and/or Complexity of Data Reviewed  Clinical lab tests: ordered and reviewed  Tests in the radiology section of CPT®: ordered and reviewed  Tests in the medicine section of CPT®: ordered and reviewed  Discussion of test results with the performing providers: yes  Obtain history from someone other than the patient: yes  Discuss the patient with other providers: yes      ED Course       Procedures  ED EKG interpretation:  Rhythm: normal sinus rhythm; and regular . Rate (approx.): 86; Axis: normal; ST/T wave: normal; 1st degree AV block; RBBB  Note written by Kofi Frias, as dictated by Savannah Miranda MD 10:46 AM    12:27 PM  The pt has a sodium of 125 which may be a partial contributing factor to his AMS. Ct shows no acute findings. Will admit to the hospitalist service and consult neurosurgery. Also sent Keppra levels. 1:03 PM  The pt has spiked a fever of 101     CONSULT NOTE:  1:23 PM Savannah Miranda MD spoke with Dr. Amparo Looney MD, Consult for Hospitalist.  Discussed available diagnostic tests and clinical findings. He is in agreement with care plans as outlined. He will see and admit the pt. CONSULT NOTE:  1:32 PM Savannah Miranda MD spoke with Dr. Blair Cooper MD, Consult for Neurosurgery. Discussed available diagnostic tests and clinical findings. He is in agreement with care plans as outlined.

## 2017-11-20 NOTE — PROGRESS NOTES
Primary Nurse Maddie Chambers RN and Jaimee Darby RN performed a dual skin assessment on this patient No impairment noted  Davy score is 16

## 2017-11-20 NOTE — PROGRESS NOTES
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 Bull Hernandez on 10/19/17. He was discharged from the hospital on 10/21/17. He had some confusion last Monday, so the patient's seizure medication was decreased to 250 mg bid. His wife states his confusion has not changed since then. He has had some word finding problems. This morning, she was unable to wake the patient up. She states he has not talked to her today. She called EMS to take him to the ER. He had a fever when he presented to the ER of 101.4. His wife states his hand has been hot and cold at home, but no complaint of chills, headache, nausea, vomiting, neck stiffness. He has had some shortness of breath and pleuritic chest pain per his wife. His head CT shows increasing edema. There is no abscess on the head CT and his WBC count is normal. He has some bibasilar atelectasis on his chest CT. He is on Plavix at home for a history of TIAs. His last dose of Plavix was yesterday. He takes gabapentin and Trileptal for trigeminal neuralgia. His Trileptal is likely contributing to his hyponatremia. Objective:     Vital signs  Temp (24hrs), Av.8 °F (38.2 °C), Min:99.5 °F (37.5 °C), Max:101.4 °F (38.6 °C)   701 - 1900  In: -   Out: 500 [Urine:500]       Visit Vitals    /79    Pulse 88    Temp (!) 101.4 °F (38.6 °C)    Resp 22    Ht 5' 9\" (1.753 m)    Wt 88.2 kg (194 lb 8 oz)    SpO2 94%    BMI 28.72 kg/m2      O2 Device: Room air     Pain control  Pain Assessment  Pain Scale 1: Numeric (0 - 10)  Pain Intensity 1: 8  Pain Onset 1: 2 days (increased pain)  Pain Location 1:  (generalized)  Pain Orientation 1: Right  Pain Description 1: Constant    PT/OT  Gait                 Physical Exam:  Gen:NAD. No nuchal rigidity. Neuro: Lethargic. Opens right eye to voice. Left eye enucleated. Oriented to person. Cannot tell me where he is or year. Intermittently follows commands. Right pupil round and reactive to light. YEH spontaneously, just not always to command. Left temporal incision well-healed. No erythema, edema, or drainage. CT head without contrast on 11/20/17 shows status post left temporal craniotomy with left temporoparietal encephalomalacia and edema unchanged. No acute intracranial abnormality     24 hour results:    Recent Results (from the past 24 hour(s))   EKG, 12 LEAD, INITIAL    Collection Time: 11/20/17  9:30 AM   Result Value Ref Range    Ventricular Rate 86 BPM    Atrial Rate 86 BPM    P-R Interval 238 ms    QRS Duration 132 ms    Q-T Interval 402 ms    QTC Calculation (Bezet) 481 ms    Calculated P Axis 44 degrees    Calculated R Axis -5 degrees    Calculated T Axis -16 degrees    Diagnosis       Sinus rhythm with 1st degree AV block  Right bundle branch block  When compared with ECG of 13-OCT-2017 15:49,  Vent. rate has increased BY  39 BPM  T wave inversion now evident in Anterior leads  Confirmed by aSbi Campos MD, Antonio Bryant ([id number]) on 11/20/2017 11:36:58 AM     CBC WITH AUTOMATED DIFF    Collection Time: 11/20/17  9:45 AM   Result Value Ref Range    WBC 9.1 4.1 - 11.1 K/uL    RBC 4.19 4. 10 - 5.70 M/uL    HGB 13.3 12.1 - 17.0 g/dL    HCT 38.5 36.6 - 50.3 %    MCV 91.9 80.0 - 99.0 FL    MCH 31.7 26.0 - 34.0 PG    MCHC 34.5 30.0 - 36.5 g/dL    RDW 14.1 11.5 - 14.5 %    PLATELET 482 515 - 323 K/uL    NEUTROPHILS 55 32 - 75 %    BAND NEUTROPHILS 1 %    LYMPHOCYTES 23 12 - 49 %    MONOCYTES 17 (H) 5 - 13 %    EOSINOPHILS 1 0 - 7 %    BASOPHILS 1 0 - 1 %    METAMYELOCYTES 1 %    MYELOCYTES 1 %    ABS. NEUTROPHILS 5.1 1.8 - 8.0 K/UL    ABS. LYMPHOCYTES 2.1 0.8 - 3.5 K/UL    ABS. MONOCYTES 1.5 (H) 0.0 - 1.0 K/UL    ABS. EOSINOPHILS 0.1 0.0 - 0.4 K/UL    ABS.  BASOPHILS 0.1 0.0 - 0.1 K/UL    DF MANUAL RBC COMMENTS NORMOCYTIC, NORMOCHROMIC      WBC COMMENTS REACTIVE LYMPHS     METABOLIC PANEL, COMPREHENSIVE    Collection Time: 11/20/17  9:45 AM   Result Value Ref Range    Sodium 125 (L) 136 - 145 mmol/L    Potassium 4.2 3.5 - 5.1 mmol/L    Chloride 89 (L) 97 - 108 mmol/L    CO2 27 21 - 32 mmol/L    Anion gap 9 5 - 15 mmol/L    Glucose 101 (H) 65 - 100 mg/dL    BUN 9 6 - 20 MG/DL    Creatinine 0.88 0.70 - 1.30 MG/DL    BUN/Creatinine ratio 10 (L) 12 - 20      GFR est AA >60 >60 ml/min/1.73m2    GFR est non-AA >60 >60 ml/min/1.73m2    Calcium 8.9 8.5 - 10.1 MG/DL    Bilirubin, total 0.7 0.2 - 1.0 MG/DL    ALT (SGPT) 20 12 - 78 U/L    AST (SGOT) 51 (H) 15 - 37 U/L    Alk.  phosphatase 96 45 - 117 U/L    Protein, total 7.8 6.4 - 8.2 g/dL    Albumin 3.0 (L) 3.5 - 5.0 g/dL    Globulin 4.8 (H) 2.0 - 4.0 g/dL    A-G Ratio 0.6 (L) 1.1 - 2.2     LACTIC ACID    Collection Time: 11/20/17  9:45 AM   Result Value Ref Range    Lactic acid 1.4 0.4 - 2.0 MMOL/L   TROPONIN I    Collection Time: 11/20/17  9:45 AM   Result Value Ref Range    Troponin-I, Qt. <0.04 <0.05 ng/mL   PTT    Collection Time: 11/20/17  9:45 AM   Result Value Ref Range    aPTT 33.9 (H) 22.1 - 32.5 sec    aPTT, therapeutic range     58.0 - 77.0 SECS   PROTHROMBIN TIME + INR    Collection Time: 11/20/17  9:45 AM   Result Value Ref Range    INR 1.1 0.9 - 1.1      Prothrombin time 11.1 9.0 - 11.1 sec   D DIMER    Collection Time: 11/20/17  9:45 AM   Result Value Ref Range    D-dimer 2.04 (H) 0.00 - 0.65 mg/L FEU   URINALYSIS W/ REFLEX CULTURE    Collection Time: 11/20/17 10:04 AM   Result Value Ref Range    Color YELLOW/STRAW      Appearance CLEAR CLEAR      Specific gravity 1.018 1.003 - 1.030      pH (UA) 7.0 5.0 - 8.0      Protein NEGATIVE  NEG mg/dL    Glucose NEGATIVE  NEG mg/dL    Ketone TRACE (A) NEG mg/dL    Bilirubin NEGATIVE  NEG      Blood NEGATIVE  NEG      Urobilinogen 1.0 0.2 - 1.0 EU/dL    Nitrites NEGATIVE  NEG      Leukocyte Esterase NEGATIVE NEG      WBC 0-4 0 - 4 /hpf    RBC 0-5 0 - 5 /hpf    Epithelial cells FEW FEW /lpf    Bacteria NEGATIVE  NEG /hpf    UA:UC IF INDICATED CULTURE NOT INDICATED BY UA RESULT CNI      Hyaline cast 0-2 0 - 5 /lpf          Assessment:     Principal Problem:    Altered mental status (11/20/2017)        Plan:   1. Altered mental status   - Likely due to #2 and #3   - Correct underlying cause   - Meningitis would be low on differential. No HA or nuchal rigidity. LP will have to wait due to Plavix   - Hospitalist following  2. Hyponatremia   - Na 125   - Likely due to Trileptal   - May need to consider other agents for trigeminal neuralgia   - Hospitalist following  3. Fever   - UA negative   - Blood cx pending   - Bibasilar atlectasis on CXR. CT chest pending to rule out PE   - Pt started on Cefepime, Levaquin, and Vancomycin   - Hospitalist following  4. Cerebral edema    - Could potentially benefit from steroids but need to finish infectious work-up first   - Likely cause of word finding difficulties  5. TIA history    - Hold Plavix for now if LP desired    Activity: up with assist  DVT ppx: SCDs  Dispo: tbd    Plan d/w Dr. Rl Beach and [facility] nurse.       Candelario Juarez NP

## 2017-11-20 NOTE — ED TRIAGE NOTES
PT transported to the ER from home via EMS. PT with altered mental status and increased right sided weakness for past 2 days. PT usually able to walk with assistance and hold conversation, no longer able to do so. 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 PT also with complaints of frequent urination and feeling warm. PT s/p brain surgery 4 weeks ago.

## 2017-11-20 NOTE — H&P
[address] [place] [address], [address] [place] [address]   HISTORY AND PHYSICAL       Name:  Geoffrey Cruz   MR#:  [id number]   :  11/10/1930   Account #:  [de-identified]        Date of Adm:  2017       CHIEF COMPLAINT: Altered mental status. HISTORY OF PRESENT ILLNESS: The patient is an 55-year-old   gentleman with past medical history of nasal sinus tumor, status post   radiation, history of skin cancer, GERD, hypercholesterolemia,   trigeminal neuralgia, history of recent craniotomy. 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 He was treated with gamma knife   radiosurgery for left trigeminal neuralgia pain in . He underwent   chemotherapy and radiation for the squamous cell cancer in . The   patient started developing some word-finding difficulties and balance   issues, was seen by Neurosurgery, an MRI was done which showed a   3 cm mass in the left temporal lobe with cerebral edema. This was   concerned for malignancy versus radiation necrosis. He was given   some steroids that improved his symptoms to a certain degree, but he   underwent a left temporal craniotomy on 10/19/2017 with resection of   the tumor. 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 The patient comes in today with confusion, altered mental   status and a gradual decline in mental acuity per his caregiver. I could   not obtain any history from the patient because of his altered mental   status along with inability to hear well. The caregiver reports that the   patient post-surgery went home and was doing okay, was at baseline   health, was alert, awake and functioning well at home. Around postop   day number 10, they went to Dr. Chris Orona office and Dr. Eden Rodríguez was   very happy with his progress.  Two days after that, the patient started   having some decline in the mental status, became slightly more   confused, somewhat disoriented. The wife thought that it was because   of the [address] that the patient had been taking for the last 2 weeks and   she cut down the dose of Keppra to half, thinking that that should help. Initially, that helped a little bit, but he continued to have a gradual   decline. She called the primary care physician, who told her to monitor   the patient and if symptoms do not get better, bring him to the hospital.   The patient reports that in the last 2 days, the patient had a significant   decline in his mental status. He has not been able to get up. He has   been complaining of some left lateral chest pain when he tries to get   up with help, but has been having very poor appetite, not eating or   drinking well and has been very confused. The caregiver also reports   that the patient had an episode of hyponatremia late last year and that   was attributed to the patient taking the oxcarbazepine and that was   stopped, but the patient started having significant trigeminal neuralgia   and thus was started back on the same. She reports that this morning   the patient was not able to get out of bed, was very confused and   lethargic and had urinated on himself. She denies the patient   complaining of any blurry vision, any headache, any trouble   swallowing, any chest pain or shortness of breath. Does admit to the   patient having some cough, but reports that has been a chronic issue. Denies any constipation or diarrhea, any falls, injuries, hematemesis,   melena, hemoptysis. The patient's caregiver denies any other   complaints or problems. The patient's caregiver identifies herself as the   patient's girlfriend who has the durable power of . PAST MEDICAL HISTORY: See above. HOME MEDICATIONS: Currently the patient is on:   1. Keppra 500 mg b.i.d. that has been cut down to 250 mg b.i.d.   2. Trileptal  mg every morning. 3.  mg daily. 4. Gabapentin 600 mg b.i.d.   5. Cholecalciferol. 6. Acetaminophen. 7. Bacitracin. 8. Plavix 75 mg daily. 9. Lipitor 10 mg daily. SOCIAL HISTORY: Per chart, the patient is a former smoker, used to   smoke 1 pack per day for 40 years. The patient used to drink 21   glasses of wine per week, but has not been drinking much in the last 3-  4 weeks per his caregiver. No IV drug use. ALLERGIES   1. ADHESIVE TAPE. 2. AGGRENOX. 3. AMLODIPINE. 4. HYDROCHLOROTHIAZIDE. REVIEW OF SYSTEMS: Could not be obtained from the patient. FAMILY HISTORY: Per chart, mother had history of colon cancer. Father had history of throat cancer. PHYSICAL EXAMINATION   VITAL SIGNS: Temperature 101.4, pulse 91, respiratory rate 18, blood   pressure 162/77, pulse oximetry 95% on room air. GENERAL: The patient opens eyes to verbal command, but is alert x0,   is mumbling incoherently and that could be because the patient does   not have his hearing aids. Appears to be stated age. Does not appear   to be in any acute distress. HEENT: Surgical scar in the left temporal area appears to be clean,   dry and intact. Pupils equal and reactive to light. Dry mucous   membranes. Extraocular muscles could not be tested secondary to   patient noncompliance. NECK: Supple. No JVD. No meningeal signs. CHEST: Decreased basal breath sounds. CORONARY: S1, S2 were heard. ABDOMEN: Soft, nontender, nondistended. Bowel sounds are   physiological.   EXTREMITIES: No clubbing, no cyanosis, no edema. NEUROPSYCHIATRIC: Very limited exam. DTR 1+/4. Rest of the   exam could not be performed as the patient was not cooperating with   the exam. Does appear to be moving all 4, but strength could not be   tested. LABORATORY DATA: White count 9.1, hemoglobin 13.3, hematocrit   38.5, platelets 556. Urine shows no signs of infection.  Sodium 125,   potassium 4.2, chloride 89, bicarbonate 27, anion gap 9, glucose 101,   BUN 9, creatinine 0.88, calcium 8.9, bilirubin total 0.7, ALT 20, AST 51,   alkaline phosphatase 96. Lactic acid 1.4. Troponin less than 0.04. CT   of the head shows status post left temporal craniotomy with left   temporoparietal encephalomalacia and edema changes. No acute   intracranial abnormality that was noted. X-ray of the chest shows   shallow inspiration with bibasilar atelectasis. EKG shows normal sinus rhythm with first-degree AV block. ASSESSMENT AND PLAN:   1. Altered mental status, not sure of the etiology at this point of time. Differential includes infective pathology versus hyponatremia versus   postoperative intracranial abnormalities versus metabolic   encephalopathy versus other etiology. The patient will be admitted on   a telemetry bed. Will start the patient on IV fluids. Start the patient on   broad-spectrum IV antibiotics and I believe that the patient needs a   lumbar puncture. I spoke with the ER physician, who is hesitant in   performing the same. Thus, IR consult has been requested. Will check   the CSF. Will empirically cover with broad-spectrum IV antibiotics. Blood cultures and a UA has been ordered. The patient was   complaining of some  chest pain and the ER physician did order a CT   of the chest that has been ordered. Will await the results. Will provide   neurovascular checks, MRI of the brain. Neurosurgery consult has   been requested and their recommendations will be incorporated. TSH,   B12, folate, troponins and further intervention will be per hospital   course. Will keep a close eye and may consider further diagnostic   imaging if symptoms persist. Will reassess as needed. 2. Hyponatremia. Replace sodium. Will start gentle IV hydration and   repeat BMP in the morning, neurovascular checks, close monitoring. Further intervention will be per hospital course.  May consider getting a   Renal consult if symptoms persist. Will hold oxcarbazepine at this point   of time and further intervention will be per hospital course. 3. History of recent necrotic tissue removal, status post craniotomy. Will continue the patient on Keppra. Will put the patient on seizure   precautions and continue to monitor. 4. History of hypercholesterolemia. Continue statin. 5. Gastroesophageal reflux disease. Continue home medication. 6. Gastrointestinal/deep venous thrombosis. The patient will be on   sequential compression devices.         Susan Malloy MD MM / [employer] [facility]   D:  11/20/2017   14:26   T:  11/20/2017   15:36   Job #:  [id number]

## 2017-11-20 NOTE — PROGRESS NOTES
Pharmacist Note - Vancomycin Dosing    Consult provided for this 80 y.o. male for indication of sepsis/possible meningitis. Antibiotic regimen(s): Vanc + Levaquin + Ceftriaxone    Recent Labs      17   0945   WBC  9.1   CREA  0.88   BUN  9     Frequency of BMP: daily x 3  Height: 175.3 cm  Weight: 88.2 kg  Est CrCl: 65 ml/min; UO: n/a ml/kg/hr  Temp (24hrs), Av.5 °F (38.1 °C), Min:99.5 °F (37.5 °C), Max:101.4 °F (38.6 °C)    Cultures:   blood - pending    Goal trough = 15 - 20 mcg/mL    Therapy will be initiated with a loading dose of 2000 mg IV x 1 to be followed by a maintenance dose of 1250 mg IV every 16 hours. Pharmacy to follow patient daily and order levels / make dose adjustments as appropriate.

## 2017-11-20 NOTE — ED NOTES
TRANSFER - OUT REPORT:    Verbal report given to Gabbi Castellon RN(name) on [place]  being transferred to [facility](unit) for routine progression of care       Report consisted of patients Situation, Background, Assessment and   Recommendations(SBAR). Information from the following report(s) SBAR, ED Summary, [facility] and Recent Results was reviewed with the receiving nurse. Lines:   Peripheral IV 11/20/17 Right Forearm (Active)   Site Assessment Clean, dry, & intact 11/20/2017  9:47 AM   Phlebitis Assessment 0 11/20/2017  9:47 AM   Infiltration Assessment 0 11/20/2017  9:47 AM   Dressing Status Clean, dry, & intact 11/20/2017  9:47 AM   Dressing Type Bacteriocidal 11/20/2017  9:47 AM       Peripheral IV 11/20/17 Left Wrist (Active)   Site Assessment Clean, dry, & intact 11/20/2017  9:49 AM   Phlebitis Assessment 0 11/20/2017  9:49 AM   Infiltration Assessment 0 11/20/2017  9:49 AM   Dressing Status Clean, dry, & intact 11/20/2017  9:49 AM   Hub Color/Line Status Blue 11/20/2017  9:49 AM       Peripheral IV 11/20/17 Left Arm (Active)   Site Assessment Clean, dry, & intact 11/20/2017  3:05 PM   Phlebitis Assessment 0 11/20/2017  3:05 PM   Infiltration Assessment 0 11/20/2017  3:05 PM   Dressing Status Clean, dry, & intact 11/20/2017  3:05 PM   Hub Color/Line Status Pink 11/20/2017  3:05 PM        Opportunity for questions and clarification was provided.       Patient transported with:   Monitor

## 2017-11-20 NOTE — IP AVS SNAPSHOT
[address] 
[phone] Patient: Huang Moreno MRN: [id number] My Medications STOP taking these medications GLUCOSAMINE-CHONDROITIN PO  
   
  
 lisinopril 40 mg tablet Commonly known as:  Lawanda Brito OXcarbazepine 150 mg tablet Commonly known as:  TRILEPTAL  
   
  
  
TAKE these medications as instructed Instructions Each Dose to Equal  
 Morning Noon Evening Bedtime  
 acetaminophen 500 mg tablet Commonly known as:  Jr Maegan Martinez  Your last dose was: Your next dose is: Take 2 Tabs by mouth as needed for Pain. No more than 3grams in 24hour period  Indications: Pain  
 1000 mg  
    
   
   
   
  
 bacitracin ophthalmic ointment Your last dose was: Your next dose is:    
   
   
 Administer  to left eye daily. dexamethasone 1 mg tablet Commonly known as:  DECADRON Your last dose was: Your next dose is: Take 4mg po x1 on 11/26, 3mg po x1 on 11/27, 2mg po x1 on 11/28, and 1mg po x1 on 11/29 then stop  
     
   
   
   
  
 docusate sodium 100 mg capsule Commonly known as:  Lennie Peels Your last dose was: Your next dose is: Take 100 mg by mouth nightly. 100 mg  
    
   
   
   
  
 gabapentin 600 mg tablet Commonly known as:  NEURONTIN Your last dose was: Your next dose is: Take 0.5 Tabs by mouth two (2) times a day. Indications: trigeminal neuralgia 300 mg  
    
   
   
   
  
 levETIRAcetam 500 mg tablet Commonly known as:  KEPPRA Your last dose was: Your next dose is: Take 1 Tab by mouth two (2) times a day. 500 mg  
    
   
   
   
  
 LIPITOR 10 mg tablet Generic drug:  atorvastatin Your last dose was: Your next dose is: Take 10 mg by mouth nightly. 10 mg PLAVIX 75 mg Tab Generic drug:  clopidogrel Your last dose was: Your next dose is: Take 75 mg by mouth daily. TAKES IN AM  
 75 mg  
    
   
   
   
  
 scopolamine 1 mg over 3 days Pt3d Commonly known as:  TRANSDERM-SCOP Start taking on:  11/27/2017 Your last dose was: Your next dose is:    
   
   
 1 Patch by TransDERmal route every seventy-two (72) hours. Indications: excessive secretions 1.5 mg  
    
   
   
   
  
 SYSTANE (PROPYLENE GLYCOL) 0.4-0.3 % Drop Generic drug:  peg 400-propylene glycol Your last dose was: Your next dose is:    
   
   
 Administer 1 Drop to right eye as needed. Indications: Dry Eye  
 1 Drop VITAMIN B-12 1,000 mcg tablet Generic drug:  cyanocobalamin Your last dose was: Your next dose is: Take 1,000 mcg by mouth daily. 1000 mcg VITAMIN D3 1,000 unit Cap Generic drug:  cholecalciferol Your last dose was: Your next dose is: Take 1,000 Units by mouth daily. 1000 Units Where to Get Your Medications Information on where to get these meds will be given to you by the nurse or doctor. ! Ask your nurse or doctor about these medications  
  acetaminophen 500 mg tablet  
 dexamethasone 1 mg tablet  
 gabapentin 600 mg tablet  
 levETIRAcetam 500 mg tablet  
 scopolamine 1 mg over 3 days Pt3d

## 2017-11-20 NOTE — PROGRESS NOTES
Admission Medication Reconciliation:    Information obtained from: patient's wife, rx query, written medication list    Significant PMH/Disease States:   Past Medical History:   Diagnosis Date    Arthritis     HANDS    Beta-blocker therapy     Cancer ([facility] [address] 2013    MAXILLARY SINUS CANCER, RADIATION AND CHEMO    Cancer ([facility] [address]     SKIN CA ON NOSE    GERD (gastroesophageal reflux disease)     Hypercholesterolemia     Hypertension     pt denies    Nasal sinus tumor     Numbness of LEFT hand & LEFT face 2010    RBBB (right bundle branch block)     TIA (transient ischemic attack)     12 TIA's over 9 YRS.1ST ONE IN 8/03- LAST IN 2/12      Trigeminal neuralgia        Chief Complaint for this Admission:  AMS    Allergies:  Adhesive tape-silicones; Aggrenox [aspirin-dipyridamole]; Amlodipine; and Hydrochlorothiazide    Prior to Admission Medications:   Prior to Admission Medications   Prescriptions Last Dose Informant Patient Reported? Taking? GLUCOSAMINE/CHONDROITIN SULF A (GLUCOSAMINE-CHONDROITIN PO) 11/19/2017 Self Yes Yes   Sig: Take 1 Tab by mouth daily. OXcarbazepine (TRILEPTAL) 150 mg tablet 11/19/2017  Yes Yes   Sig: Take 75 mg by mouth every morning. acetaminophen (TYLENOL EXTRA STRENGTH) 500 mg tablet 11/19/2017 Self Yes Yes   Sig: Take 1,000 mg by mouth as needed for Pain. atorvastatin (LIPITOR) 10 mg tablet 11/19/2017 Self Yes Yes   Sig: Take 10 mg by mouth nightly. bacitracin ophthalmic ointment 11/19/2017 Self Yes Yes   Sig: Administer  to left eye daily. cholecalciferol (VITAMIN D3) 1,000 unit cap 11/19/2017 Self Yes Yes   Sig: Take 1,000 Units by mouth daily. clopidogrel (PLAVIX) 75 mg tablet 11/19/2017  Yes Yes   Sig: Take 75 mg by mouth daily. TAKES IN AM   cyanocobalamin (VITAMIN B-12) 1,000 mcg tablet 11/19/2017 Self Yes Yes   Sig: Take 1,000 mcg by mouth daily.    docusate sodium (COLACE) 100 mg capsule 11/19/2017 Self Yes Yes   Sig: Take 100 mg by mouth nightly.   gabapentin (NEURONTIN) 600 mg tablet 11/19/2017 Self Yes Yes   Sig: Take 600 mg by mouth two (2) times a day. levETIRAcetam (KEPPRA) 250 mg tablet 11/19/2017  Yes Yes   Sig: Take 250 mg by mouth every morning. levETIRAcetam (KEPPRA) 500 mg tablet 11/19/2017  Yes Yes   Sig: Take 500 mg by mouth every evening. lisinopril (PRINIVIL, ZESTRIL) 40 mg tablet 11/19/2017 Self Yes Yes   Sig: Take 40 mg by mouth nightly. peg 400-propylene glycol (SYSTANE, PROPYLENE GLYCOL,) 0.4-0.3 % drop 11/19/2017  Yes Yes   Sig: Administer 1 Drop to right eye as needed. Indications: Dry Eye      Facility-Administered Medications: None         Comments/Recommendations: Removed dexamethasone and percocet. Changed dose of docusate from 300 daily to 100 daily. Changed dose of keppra from 500 mg BID to 250 mg in the morning and 500 mg in the evening. Changed dose of trileptal from 150 mg daily to 75 mg daily. Added systane. Confirmed allergies.

## 2017-11-21 ENCOUNTER — APPOINTMENT (OUTPATIENT)
Dept: GENERAL RADIOLOGY | Age: 82
DRG: 871 | End: 2017-11-21
Attending: INTERNAL MEDICINE
Payer: MEDICARE

## 2017-11-21 ENCOUNTER — APPOINTMENT (OUTPATIENT)
Dept: GENERAL RADIOLOGY | Age: 82
DRG: 871 | End: 2017-11-21
Attending: FAMILY MEDICINE
Payer: MEDICARE

## 2017-11-21 LAB
ALBUMIN SERPL-MCNC: 2.5 G/DL (ref 3.5–5)
ALBUMIN/GLOB SERPL: 0.7 {RATIO} (ref 1.1–2.2)
ALP SERPL-CCNC: 79 U/L (ref 45–117)
ALT SERPL-CCNC: 16 U/L (ref 12–78)
ANION GAP SERPL CALC-SCNC: 11 MMOL/L (ref 5–15)
ANION GAP SERPL CALC-SCNC: 11 MMOL/L (ref 5–15)
ANION GAP SERPL CALC-SCNC: 12 MMOL/L (ref 5–15)
AST SERPL-CCNC: 38 U/L (ref 15–37)
BILIRUB SERPL-MCNC: 0.4 MG/DL (ref 0.2–1)
BUN SERPL-MCNC: 7 MG/DL (ref 6–20)
BUN SERPL-MCNC: 8 MG/DL (ref 6–20)
BUN SERPL-MCNC: 9 MG/DL (ref 6–20)
BUN/CREAT SERPL: 10 (ref 12–20)
BUN/CREAT SERPL: 12 (ref 12–20)
BUN/CREAT SERPL: 15 (ref 12–20)
CALCIUM SERPL-MCNC: 7.8 MG/DL (ref 8.5–10.1)
CALCIUM SERPL-MCNC: 8 MG/DL (ref 8.5–10.1)
CALCIUM SERPL-MCNC: 8.3 MG/DL (ref 8.5–10.1)
CHLORIDE SERPL-SCNC: 87 MMOL/L (ref 97–108)
CHLORIDE SERPL-SCNC: 88 MMOL/L (ref 97–108)
CHLORIDE SERPL-SCNC: 90 MMOL/L (ref 97–108)
CK SERPL-CCNC: 1038 U/L (ref 39–308)
CK SERPL-CCNC: 918 U/L (ref 39–308)
CK SERPL-CCNC: 988 U/L (ref 39–308)
CK SERPL-CCNC: 999 U/L (ref 39–308)
CO2 SERPL-SCNC: 22 MMOL/L (ref 21–32)
CO2 SERPL-SCNC: 22 MMOL/L (ref 21–32)
CO2 SERPL-SCNC: 23 MMOL/L (ref 21–32)
CORTIS SERPL-MCNC: 25.3 UG/DL
CREAT SERPL-MCNC: 0.61 MG/DL (ref 0.7–1.3)
CREAT SERPL-MCNC: 0.66 MG/DL (ref 0.7–1.3)
CREAT SERPL-MCNC: 0.69 MG/DL (ref 0.7–1.3)
GLOBULIN SER CALC-MCNC: 3.8 G/DL (ref 2–4)
GLUCOSE SERPL-MCNC: 109 MG/DL (ref 65–100)
GLUCOSE SERPL-MCNC: 129 MG/DL (ref 65–100)
GLUCOSE SERPL-MCNC: 90 MG/DL (ref 65–100)
LEVETIRACETAM SERPL-MCNC: 8.3 UG/ML (ref 10–40)
POTASSIUM SERPL-SCNC: 3.8 MMOL/L (ref 3.5–5.1)
POTASSIUM SERPL-SCNC: 4 MMOL/L (ref 3.5–5.1)
POTASSIUM SERPL-SCNC: 4.3 MMOL/L (ref 3.5–5.1)
PROT SERPL-MCNC: 6.3 G/DL (ref 6.4–8.2)
SODIUM SERPL-SCNC: 121 MMOL/L (ref 136–145)
SODIUM SERPL-SCNC: 121 MMOL/L (ref 136–145)
SODIUM SERPL-SCNC: 124 MMOL/L (ref 136–145)
TROPONIN I SERPL-MCNC: <0.04 NG/ML

## 2017-11-21 PROCEDURE — 82533 TOTAL CORTISOL: CPT | Performed by: NEUROLOGICAL SURGERY

## 2017-11-21 PROCEDURE — 77030032490 HC SLV COMPR SCD KNE COVD -B

## 2017-11-21 PROCEDURE — 80053 COMPREHEN METABOLIC PANEL: CPT | Performed by: INTERNAL MEDICINE

## 2017-11-21 PROCEDURE — 80048 BASIC METABOLIC PNL TOTAL CA: CPT | Performed by: INTERNAL MEDICINE

## 2017-11-21 PROCEDURE — 74011250636 HC RX REV CODE- 250/636: Performed by: INTERNAL MEDICINE

## 2017-11-21 PROCEDURE — 71010 XR CHEST PORT: CPT

## 2017-11-21 PROCEDURE — 74011250636 HC RX REV CODE- 250/636: Performed by: FAMILY MEDICINE

## 2017-11-21 PROCEDURE — 92610 EVALUATE SWALLOWING FUNCTION: CPT | Performed by: SPEECH-LANGUAGE PATHOLOGIST

## 2017-11-21 PROCEDURE — 74011250636 HC RX REV CODE- 250/636: Performed by: NEUROLOGICAL SURGERY

## 2017-11-21 PROCEDURE — 74011000250 HC RX REV CODE- 250: Performed by: FAMILY MEDICINE

## 2017-11-21 PROCEDURE — 74011250637 HC RX REV CODE- 250/637: Performed by: FAMILY MEDICINE

## 2017-11-21 PROCEDURE — 36415 COLL VENOUS BLD VENIPUNCTURE: CPT | Performed by: FAMILY MEDICINE

## 2017-11-21 PROCEDURE — 80048 BASIC METABOLIC PNL TOTAL CA: CPT | Performed by: FAMILY MEDICINE

## 2017-11-21 PROCEDURE — 74011000258 HC RX REV CODE- 258: Performed by: FAMILY MEDICINE

## 2017-11-21 PROCEDURE — 74011250637 HC RX REV CODE- 250/637: Performed by: INTERNAL MEDICINE

## 2017-11-21 PROCEDURE — 82550 ASSAY OF CK (CPK): CPT | Performed by: FAMILY MEDICINE

## 2017-11-21 PROCEDURE — 65660000000 HC RM CCU STEPDOWN

## 2017-11-21 PROCEDURE — 74011000258 HC RX REV CODE- 258: Performed by: INTERNAL MEDICINE

## 2017-11-21 RX ORDER — 3% SODIUM CHLORIDE 3 G/100ML
30 INJECTION, SOLUTION INTRAVENOUS CONTINUOUS
Status: DISPENSED | OUTPATIENT
Start: 2017-11-21 | End: 2017-11-21

## 2017-11-21 RX ORDER — SCOLOPAMINE TRANSDERMAL SYSTEM 1 MG/1
1.5 PATCH, EXTENDED RELEASE TRANSDERMAL
Status: DISCONTINUED | OUTPATIENT
Start: 2017-11-21 | End: 2017-11-25 | Stop reason: HOSPADM

## 2017-11-21 RX ORDER — DEXAMETHASONE SODIUM PHOSPHATE 4 MG/ML
4 INJECTION, SOLUTION INTRA-ARTICULAR; INTRALESIONAL; INTRAMUSCULAR; INTRAVENOUS; SOFT TISSUE EVERY 8 HOURS
Status: DISCONTINUED | OUTPATIENT
Start: 2017-11-21 | End: 2017-11-25 | Stop reason: HOSPADM

## 2017-11-21 RX ORDER — LEVETIRACETAM 5 MG/ML
500 INJECTION INTRAVASCULAR EVERY 12 HOURS
Status: DISCONTINUED | OUTPATIENT
Start: 2017-11-21 | End: 2017-11-24

## 2017-11-21 RX ORDER — ACETAMINOPHEN 10 MG/ML
1000 INJECTION, SOLUTION INTRAVENOUS ONCE
Status: COMPLETED | OUTPATIENT
Start: 2017-11-21 | End: 2017-11-22

## 2017-11-21 RX ADMIN — ACETAMINOPHEN 1000 MG: 10 INJECTION, SOLUTION INTRAVENOUS at 13:21

## 2017-11-21 RX ADMIN — LEVOFLOXACIN 750 MG: 5 INJECTION, SOLUTION INTRAVENOUS at 13:20

## 2017-11-21 RX ADMIN — DEXAMETHASONE SODIUM PHOSPHATE 4 MG: 4 INJECTION, SOLUTION INTRAMUSCULAR; INTRAVENOUS at 21:55

## 2017-11-21 RX ADMIN — LEVETIRACETAM 500 MG: 5 INJECTION INTRAVENOUS at 04:34

## 2017-11-21 RX ADMIN — SODIUM CHLORIDE 30 ML/HR: 3 INJECTION, SOLUTION INTRAVENOUS at 15:48

## 2017-11-21 RX ADMIN — ATORVASTATIN CALCIUM 10 MG: 10 TABLET, FILM COATED ORAL at 21:55

## 2017-11-21 RX ADMIN — Medication 10 ML: at 13:21

## 2017-11-21 RX ADMIN — VANCOMYCIN HYDROCHLORIDE 1000 MG: 10 INJECTION, POWDER, LYOPHILIZED, FOR SOLUTION INTRAVENOUS at 21:56

## 2017-11-21 RX ADMIN — CEFTRIAXONE 2 G: 2 INJECTION, POWDER, FOR SOLUTION INTRAMUSCULAR; INTRAVENOUS at 09:13

## 2017-11-21 RX ADMIN — Medication 10 ML: at 06:59

## 2017-11-21 RX ADMIN — DEXAMETHASONE SODIUM PHOSPHATE 4 MG: 4 INJECTION, SOLUTION INTRAMUSCULAR; INTRAVENOUS at 13:21

## 2017-11-21 RX ADMIN — Medication 10 ML: at 21:55

## 2017-11-21 RX ADMIN — CEFTRIAXONE 2 G: 2 INJECTION, POWDER, FOR SOLUTION INTRAMUSCULAR; INTRAVENOUS at 21:56

## 2017-11-21 RX ADMIN — VANCOMYCIN HYDROCHLORIDE 1000 MG: 10 INJECTION, POWDER, LYOPHILIZED, FOR SOLUTION INTRAVENOUS at 06:59

## 2017-11-21 RX ADMIN — LEVETIRACETAM 500 MG: 5 INJECTION INTRAVENOUS at 18:17

## 2017-11-21 RX ADMIN — BACITRACIN: 500 OINTMENT OPHTHALMIC at 09:13

## 2017-11-21 NOTE — PROGRESS NOTES
Problem: Dysphagia (Adult)  Goal: *Acute Goals and Plan of Care (Insert Text)  Speech Goals   Initiated 11/21/2017  1. Patient will participate in re-assessment of swallow function within 7 days  Speech LAnguage Pathology bedside swallow evaluation  Patient: Shelton Garcia (53 y.o. male)  Date: 11/21/2017  Primary Diagnosis: Altered mental status        Precautions: Aspiration       ASSESSMENT :  Based on the objective data described below, the patient presents with severe oral and  pharyngeal dysphagia. Patient with absent bolus acceptance despite max verbal and tactile cues. Patient with severely decreased hyolaryngeal elevation and excursion via palpation when swallowing saliva. Repeated audible and wet swallow noted with increasing wetness in vocal quality with each swallow initiated. Patient with repeated cough and throat clear on secretions with frequent oral suction required throughout session for thick, bloody, brown secretions. Of note, deep suction performed by RT just prior to SLP arrival and within 3 minutes, severely wet vocal quality was noted. Patient with inability to manage secretions and is at high risk for aspiration pneumonia based on secretion management. D/w MD concerns regarding secretions and consideration of anticholinergenic to aid in management. Patient will benefit from skilled intervention to address the above impairments.   Patients rehabilitation potential is considered to be Fair   Factors which may influence rehabilitation potential include:   []            None noted  [x]            Mental ability/status  [x]            Medical condition  []            Home/family situation and support systems  []            Safety awareness  []            Pain tolerance/management  []            Other:      PLAN :  Recommendations and Planned Interventions:  -- Recommend strict NPO with oral/deep suction to aid in secretion management as tolerated   --consideration of anticholinergenic to reduce secretions and risks of aspiration pneumonia  -- SLP to follow for re-assessment of swallow function as mental status and secretion management allow  Frequency/Duration: Patient will be followed by speech-language pathology 3 times a week to address goals. Discharge Recommendations: To Be Determined     SUBJECTIVE:   Patient stated No no when positioning bed. Patient alert, with no command following and did not recognize ice chip when presented. Patient's family at bedside educated on aspiration and pneumonia risks. Family with appropriate questions all of which were answered.      OBJECTIVE:     Past Medical History:   Diagnosis Date    Arthritis     HANDS    Beta-blocker therapy     Cancer ([facility] [address] 2013    MAXILLARY SINUS CANCER, RADIATION AND CHEMO    Cancer ([facility] [address]     SKIN CA ON NOSE    GERD (gastroesophageal reflux disease)     Hypercholesterolemia     Hypertension     pt denies    Nasal sinus tumor     Numbness of LEFT hand & LEFT face 2010    RBBB (right bundle branch block)     TIA (transient ischemic attack)     12 TIA's over 9 YRS.1ST ONE IN 8/03- LAST IN 2/12      Trigeminal neuralgia      Past Surgical History:   Procedure Laterality Date    HX CATARACT REMOVAL Bilateral     HX GI      COLONOSCOPY    HX HEENT      BIOPSY OF SINUS     HX HEENT Left     sew eye closed    HX HERNIA REPAIR Right     INGUINAL    HX KNEE ARTHROSCOPY Right 2007    HX KNEE REPLACEMENT Right 2012    Total Knee replacement- right    HX ROTATOR CUFF REPAIR Right     right rotator cuff repair     Prior Level of Function/Home Situation:   Home Situation  Home Environment: Private residence  One/Two Story Residence: One story  Living Alone: Yes  Support Systems: Friends \ neighbors, Family member(s)  Patient Expects to be Discharged to[de-identified] Other (comment)  Current DME Used/Available at Home: Other (comment)  Diet prior to admission: Regular  Current Diet:  Regular   Cognitive and Communication Status:  Neurologic State: Alert  Orientation Level: Unable to verbalize  Cognition: No command following, Decreased attention/concentration  Perception: Appears intact  Perseveration: No perseveration noted  Safety/Judgement: Decreased awareness of environment, Decreased awareness of need for assistance, Decreased awareness of need for safety, Decreased insight into deficits  Oral Assessment:  Oral Assessment  Labial: Other (comment) (would not follow commands to assess)  Dentition: Natural  Oral Hygiene: moist mucosa  Lingual: Other (comment) (would not follow commands to assess)  Velum: Unable to visualize  Mandible: No impairment  P.O. Trials:  Patient Position: upright in bed  Vocal quality prior to P.O.: Wet  Consistency Presented: Ice chips  How Presented: SLP-fed/presented;Spoon     Bolus Acceptance: Absent  Bolus Formation/Control:  (could not assess due to absent acceptance)              Laryngeal Elevation: Decreased  Aspiration Signs/Symptoms: Clear throat;Weak cough (with secretions)                Oral Phase Severity: Severe  Pharyngeal Phase Severity : Severe    NOMS:   The NOMS functional outcome measure was used to quantify this patient's level of swallowing impairment. Based on the NOMS, the patient was determined to be at level 1 for swallow function     G Codes: In compliance with CMSs Claims Based Outcome Reporting, the following G-code set was chosen for this patient based the use of the NOMS functional outcome to quantify this patient's level of swallowing impairment. Using the NOMS, the patient was determined to be at level 1 for swallow function which correlates with the CN= 100% level of severity.     Based on the objective assessment provided within this note, the current, goal, and discharge g-codes are as follows:    Swallow  Swallowing:   Swallow Current Status CN= 100%   Swallow Goal Status CM= 80-99%      NOMS Swallowing Levels:  Level 1 (CN): NPO  Level 2 (CM): NPO but takes consistency in therapy  Level 3 (CL): Takes less than 50% of nutrition p.o. and continues with nonoral feedings; and/or safe with mod cues; and/or max diet restriction  Level 4 (CK): Safe swallow but needs mod cues; and/or mod diet restriction; and/or still requires some nonoral feeding/supplements  Level 5 (CJ): Safe swallow with min diet restriction; and/or needs min cues  Level 6 (CI): Independent with p.o.; rare cues; usually self cues; may need to avoid some foods or needs extra time  Level 7 ([address] [place] [address]): Independent for all p.o.  GIFTY. (2003). National Outcomes Measurement System (NOMS): Adult Speech-Language Pathology User's Guide. Pain:  Pain Scale 1: Numeric (0 - 10)  Pain Intensity 1: 0     After treatment:   []            Patient left in no apparent distress sitting up in chair  [x]            Patient left in no apparent distress in bed  [x]            Call bell left within reach  [x]            Nursing notified  [x]            Caregiver present  []            Bed alarm activated    COMMUNICATION/EDUCATION:   The patients plan of care including recommendations, planned interventions, and recommended diet changes were discussed with: Registered Nurse. Patient was educated regarding His deficit(s) of dysphagia as this relates to His diagnosis of altered mental status. He demonstrated Guarded understanding as evidenced by no response to education. [x]            Patient/family have participated as able in goal setting and plan of care. [x]            Patient/family agree to work toward stated goals and plan of care. []            Patient understands intent and goals of therapy, but is neutral about his/her participation. []            Patient is unable to participate in goal setting and plan of care. Thank you for this referral.  Vernadine Epley Student SLP   Time Calculation: 18 mins    Regarding student involvement in patient care:  A student participated in this treatment session. Per CMS Medicare statements and APTA guidelines I certify that the following was true:  1. I was present and directly observed the entire session. 2. I made all skilled judgments and clinical decisions regarding care. 3. I am the practitioner responsible for assessment, treatment, and documentation.

## 2017-11-21 NOTE — PROGRESS NOTES
Pt seen and family counseled. Hard to interpret mri but no abscess, cva or clear signs of infxn. May be having progression of xrt necrosis. Clearly with worsening hyponatremia. trileptal stopped. Renal to see. No signs of infxn other than temp yesterday. Will re-start steroids in case there is addisonian component or xrt nec progression. On broad spectrum abx.   Could not get lp b/c plavix

## 2017-11-21 NOTE — CONSULTS
NEPHROLOGY CONSULT NOTE     Patient: Author Anaya MRN: [id number]  PCP: Cecilia Polanco MD   :     11/10/1930  Age:   80 y.o. Sex:  male      Referring physician: Traci Aldridge MD  Reason for consultation: 80 y.o. 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  - the NS from yesterday likely dropped his Na further  - Trileptal can make this worse, so agree with holding this  - on decadron for cerebral edema  - cortisol sent this AM, TSH ok  - repeat Na now, if lower, will start 3% saline to slowly correct his Na and prevent further cerebral edema  - Q 6 hr Na checks for now    Fever:  - on ceftiazone, levaquin, and vanco  - no evidence of abscess on CT or MRI  - w/u per primary team    AMS:  - 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  male who has been admitted to the hospital for AMS. 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 He was brought in for confusion. CT head was negative for infectious process or abscess and showed stable edema. His Na was found to be 125 yesterday on admission. He was given 1 liter of NS in the ED yesterday. Repeat Na this AM was 121. The patient was on trileptal at home.   He did have hyponatremia in the past, probably related to this, and it was stopped. However, the patient began to experience trigenimal neuralgia and this was restarted recently. Urine Na was 202 and urine osms from 11/20 were 600s. He is also febrile, tmax 101.4 yesterday. He is altered at this time and unable to provide any information. Past Medical Hx:   Past Medical History:   Diagnosis Date    Arthritis     HANDS    Beta-blocker therapy     Cancer ([facility] [address] 2013    MAXILLARY SINUS CANCER, RADIATION AND CHEMO    Cancer ([facility] [address]     SKIN CA ON NOSE    GERD (gastroesophageal reflux disease)     Hypercholesterolemia     Hypertension     pt denies    Nasal sinus tumor     Numbness of LEFT hand & LEFT face 2010    RBBB (right bundle branch block)     TIA (transient ischemic attack)     12 TIA's over 9 YRS.1ST ONE IN 8/03- LAST IN 2/12      Trigeminal neuralgia         Past Surgical Hx:     Past Surgical History:   Procedure Laterality Date    HX CATARACT REMOVAL Bilateral     HX GI      COLONOSCOPY    HX HEENT      BIOPSY OF SINUS     HX HEENT Left     sew eye closed    HX HERNIA REPAIR Right     INGUINAL    HX KNEE ARTHROSCOPY Right 2007    HX KNEE REPLACEMENT Right 2012    Total Knee replacement- right    HX ROTATOR CUFF REPAIR Right     right rotator cuff repair       Medications:  Prior to Admission medications    Medication Sig Start Date End Date Taking? Authorizing Provider   levETIRAcetam (KEPPRA) 500 mg tablet Take 500 mg by mouth every evening. Yes Historical Provider   levETIRAcetam (KEPPRA) 250 mg tablet Take 250 mg by mouth every morning. Yes Historical Provider   OXcarbazepine (TRILEPTAL) 150 mg tablet Take 75 mg by mouth every morning. Yes Historical Provider   peg 400-propylene glycol (SYSTANE, PROPYLENE GLYCOL,) 0.4-0.3 % drop Administer 1 Drop to right eye as needed. Indications: Dry Eye   Yes Historical Provider   lisinopril (PRINIVIL, ZESTRIL) 40 mg tablet Take 40 mg by mouth nightly.    Yes Historical Provider   gabapentin (NEURONTIN) 600 mg tablet Take 600 mg by mouth two (2) times a day. Yes Historical Provider   cyanocobalamin (VITAMIN B-12) 1,000 mcg tablet Take 1,000 mcg by mouth daily. Yes Historical Provider   cholecalciferol (VITAMIN D3) 1,000 unit cap Take 1,000 Units by mouth daily. Yes Historical Provider   acetaminophen (TYLENOL EXTRA STRENGTH) 500 mg tablet Take 1,000 mg by mouth as needed for Pain. Yes Historical Provider   docusate sodium (COLACE) 100 mg capsule Take 100 mg by mouth nightly. Yes Historical Provider   bacitracin ophthalmic ointment Administer  to left eye daily. 1/2/15  Yes Historical Provider   clopidogrel (PLAVIX) 75 mg tablet Take 75 mg by mouth daily. TAKES IN AM   Yes Historical Provider   GLUCOSAMINE/CHONDROITIN SULF A (GLUCOSAMINE-CHONDROITIN PO) Take 1 Tab by mouth daily. Yes Historical Provider   atorvastatin (LIPITOR) 10 mg tablet Take 10 mg by mouth nightly. Yes Historical Provider       Allergies   Allergen Reactions    Adhesive Tape-Silicones Other (comments)     REDNESS    Aggrenox [Aspirin-Dipyridamole] Other (comments)     headache    Amlodipine Rash    Hydrochlorothiazide Other (comments)     LOW NA       Social Hx:  reports that he quit smoking about 35 years ago. He has a 40.00 pack-year smoking history. He has never used smokeless tobacco. He reports that he drinks about 10.5 oz of alcohol per week  He reports that he does not use illicit drugs.      Family History   Problem Relation Age of Onset    Cancer Mother      colon    Cancer Father      throat    No Known Problems Brother     Anesth Problems Neg Hx        Review of Systems:  Unable to obtain due to patient's mental status     Objective:    Vitals:    Vitals:    11/20/17 2300 11/21/17 0300 11/21/17 0700 11/21/17 1100   BP: 154/80 146/73 146/78 146/77   Pulse: 90 96 94 89   Resp: 15 23 25 25   Temp: 98.2 °F (36.8 °C) 98.9 °F (37.2 °C) 99.4 °F (37.4 °C) 97.3 °F (36.3 °C)   SpO2: 97% 98% 98% 100%   Weight:  85.3 kg (188 lb 0.8 oz)     Height:         I&O's:  11/20 0701 - 11/21 0700  In: 1000 [I.V.:1000]  Out: 1100 [Urine:1100]  Visit Vitals    /77 (BP 1 Location: Right arm)    Pulse 89    Temp 97.3 °F (36.3 °C)    Resp 25    Ht 5' 9\" (1.753 m)    Wt 85.3 kg (188 lb 0.8 oz)    SpO2 100%    BMI 27.77 kg/m2       Physical Exam:  General:Altered, chronically ill-appearing  HEENT: old craniotomy incisions c/d/i  Neck:Supple,no mass palpable  Lungs : Clears to auscultation Bilaterally, Normal respiratory effort  CVS: RRR, S1 S2 normal, No rub,  no LE edema  Abdomen: Soft, Non tender, No hepatosplenomegaly, bowel sounds present  Extremities: No cyanosis, No clubbing  Skin: No rash or lesions.   Lymph nodes: No palpable nodes  MS: No joint swelling, erythema, warmth  Neurologic: confused on exam  Psych: unable to assess    Laboratory Results:    Lab Results   Component Value Date    BUN 7 11/21/2017     (L) 11/21/2017    K 3.8 11/21/2017    CL 88 (L) 11/21/2017    CO2 22 11/21/2017       Lab Results   Component Value Date    BUN 7 11/21/2017    BUN 9 11/20/2017    BUN 14 10/20/2017    BUN 28 (H) 10/13/2017    BUN 9 10/25/2015    K 3.8 11/21/2017    K 4.2 11/20/2017    K 4.7 10/20/2017    K 4.7 10/13/2017    K 3.6 10/25/2015       Lab Results   Component Value Date    WBC 9.1 11/20/2017    RBC 4.19 11/20/2017    HGB 13.3 11/20/2017    HCT 38.5 11/20/2017    MCV 91.9 11/20/2017    MCH 31.7 11/20/2017    RDW 14.1 11/20/2017     11/20/2017       Lab Results   Component Value Date    PHOS 3.0 10/24/2015       Urine dipstick:   Lab Results   Component Value Date/Time    Color YELLOW/STRAW 11/20/2017 10:04 AM    Appearance CLEAR 11/20/2017 10:04 AM    Specific gravity 1.018 11/20/2017 10:04 AM    pH (UA) 7.0 11/20/2017 10:04 AM    Protein NEGATIVE  11/20/2017 10:04 AM    Glucose NEGATIVE  11/20/2017 10:04 AM    Ketone TRACE 11/20/2017 10:04 AM    Bilirubin NEGATIVE  11/20/2017 10:04 AM    Urobilinogen 1.0 11/20/2017 10:04 AM Nitrites NEGATIVE  11/20/2017 10:04 AM    Leukocyte Esterase NEGATIVE  11/20/2017 10:04 AM    Epithelial cells FEW 11/20/2017 10:04 AM    Bacteria NEGATIVE  11/20/2017 10:04 AM    WBC 0-4 11/20/2017 10:04 AM    RBC 0-5 11/20/2017 10:04 AM       I have reviewed the following: All pertinent labs, microbiology data, radiology imaging for my assessment             Thank you for allowing us to participate in the care of this patient. We will follow patient.  Please dont hesitate to call with any questions    Clovis Palacios MD  11/21/2017    [address] [place] [address]

## 2017-11-21 NOTE — PROGRESS NOTES
Bedside and Verbal shift change report given to [address] (oncoming nurse) by ARACELIS Courtney RN (offgoing nurse). Report given with SBAR, Kardex, Intake/Output, MAR and Recent Results.      Daughter Perez Sharpe # [phone]

## 2017-11-21 NOTE — PROGRESS NOTES
Hospitalist Progress Note  Sudhir Sharpe MD  Answering service: [phone] from in house phone      Date of Service:  2017  NAME:  Thea Simon  :  11/10/1930  MRN:  [id number]      Admission Summary:   81 yo man with GERD, HLD, trigeminal neuralgia, h/o left maxillary sinus SCC, h/o TIA, h/o skin CA, and s/p recent craniotomy presented to the ED from home on 17 with confusion. He was treated with gamma knife radiosurgery for left trigeminal neuralgia pain in . He underwent chemotherapy and radiation for the squamous cell cancer in . The patient started developing some word-finding difficulties and balance   issues, was seen by Neurosurgery, an MRI was done which showed a 3 cm mass in the left temporal lobe with cerebral edema. This was concerning for malignancy versus radiation necrosis. He was given some steroids that improved his symptoms to a certain degree, but he underwent a left temporal craniotomy on 10/19/2017 with resection of the tumor. 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 He was admitted for acute encephalopathy.     Interval history / Subjective:   Keeps saying \"ya\" to every question including pain; limited due to garbled speech; daughter concerned about his nasal and chest congestion; Tmax 101.4F; seen with nurse and CM during rounds; family at bedside     Assessment & Plan:     Acute encephalopathy (POA)  - likely due to acute on chronic hyponatremia  - stop IVNS and place on free water restriction  - imaging no acute pathology  - NSGY consulted  - continue steroid  - TSH WNL; check B12, ammonia  - minimally improved per nurse and family at bedside    Acute on chronic hyponatremia (POA)  - likely due to SIADH +/- oxcarbazepine (on hold)  - stop IVNS and place on free water restriction  - Renal consulted at [address] [place] [address] rec    Acute rhabdomyolysis (POA) - CK trending down with IVF    S/p craniotomy - continue Keppra and steroid; NSGY consulted    GERD - resume home med    Nasal and chest congestion  - difficulty clearing secretions; deep NT suction ordered  - start scopolamine patch per Nutrition rec    Sepsis with possible aspiration PNA (POA)  - fever, tachypnea on admission  - SLP following  - empiric ceftriaxone, vancomycin, levofloxacin started; de-escalate as indicated  - BCx 11/20 NGTD    Code status: full  DVT prophylaxis: SCDs    Care Plan discussed with: Patient/Family, Nurse,  and Consultant NSGY, Renal  Disposition: TBD. Came from home but may need placement on discharge     Hospital Problems  Date Reviewed: 11/20/2017          Codes Class Noted POA    * (Principal)Altered mental status ICD-10-CM: R41.82  ICD-9-CM: 780.97  11/20/2017 Unknown            Review of Systems:   Review of systems not obtained due to patient factors. Vital Signs:    Last 24hrs VS reviewed since prior progress note.  Most recent are:  Visit Vitals    /77 (BP 1 Location: Right arm)    Pulse 89    Temp 97.3 °F (36.3 °C)    Resp 25    Ht 5' 9\" (1.753 m)    Wt 85.3 kg (188 lb 0.8 oz)    SpO2 100%    BMI 27.77 kg/m2       Intake/Output Summary (Last 24 hours) at 11/21/17 1227  Last data filed at 11/21/17 0434   Gross per 24 hour   Intake             1000 ml   Output              600 ml   Net              400 ml      Physical Examination:     Constitutional:  lethargic but arousable, groaning, no acute respiratory distress   ENT:  oral mucosa dry with dried blood  Neck supple,    Resp:  diffuse rhonchi b/l   CV:  regular rhythm, normal rate, no m/r/g appreciated, no edema, +pulses    GI:  +BS, soft, non distended, non tender     Musculoskeletal:  moves all extremities    Neurologic:  confused, agitated, groaning, not following commands     Skin:  warm, dry  Eyes:  unable to open left eyelid    Data Review:    Review and/or order of clinical lab test  Review and/or order of tests in the radiology section of CPT  Review and/or order of tests in the medicine section of CPT    Labs:     Recent Labs      11/20/17   0945   WBC  9.1   HGB  13.3   HCT  38.5   PLT  351     Recent Labs      11/21/17   0436  11/20/17   0945   NA  121*  125*   K  3.8  4.2   CL  88*  89*   CO2  22  27   BUN  7  9   CREA  0.69*  0.88   GLU  90  101*   CA  8.0*  8.9     Recent Labs      11/20/17   0945   SGOT  51*   ALT  20   AP  96   TBILI  0.7   TP  7.8   ALB  3.0*   GLOB  4.8*     Recent Labs      11/20/17   0945   INR  1.1   PTP  11.1   APTT  33.9*      No results for input(s): FE, TIBC, PSAT, FERR in the last 72 hours. Lab Results   Component Value Date/Time    Folate 8.6 11/20/2017 03:03 PM      No results for input(s): PH, PCO2, PO2 in the last 72 hours. Recent Labs      11/21/17   1113  11/21/17   0436  11/20/17   2323  11/20/17   1503   11/20/17   0945   CPK  988*  999*  1038*  1150*   < >   --    TROIQ   --    --   <0.04  <0.04   --   <0.04    < > = values in this interval not displayed.      Lab Results   Component Value Date/Time    Cholesterol, total 127 11/20/2017 03:03 PM    HDL Cholesterol 44 11/20/2017 03:03 PM    LDL, calculated 68 11/20/2017 03:03 PM    Triglyceride 75 11/20/2017 03:03 PM    CHOL/HDL Ratio 2.9 11/20/2017 03:03 PM     Lab Results   Component Value Date/Time    Glucose (POC) 108 11/20/2017 06:45 PM    Glucose (POC) 91 10/19/2017 10:34 AM    Glucose (POC) 112 12/22/2010 11:21 PM    Glucose (POC) 77 07/30/2009 10:57 AM    Glucose (POC) 102 07/30/2009 10:47 AM     Lab Results   Component Value Date/Time    Color YELLOW/STRAW 11/20/2017 10:04 AM    Appearance CLEAR 11/20/2017 10:04 AM    Specific gravity 1.018 11/20/2017 10:04 AM    pH (UA) 7.0 11/20/2017 10:04 AM    Protein NEGATIVE  11/20/2017 10:04 AM    Glucose NEGATIVE  11/20/2017 10:04 AM    Ketone TRACE 11/20/2017 10:04 AM    Bilirubin NEGATIVE  11/20/2017 10:04 AM Urobilinogen 1.0 11/20/2017 10:04 AM    Nitrites NEGATIVE  11/20/2017 10:04 AM    Leukocyte Esterase NEGATIVE  11/20/2017 10:04 AM    Epithelial cells FEW 11/20/2017 10:04 AM    Bacteria NEGATIVE  11/20/2017 10:04 AM    WBC 0-4 11/20/2017 10:04 AM    RBC 0-5 11/20/2017 10:04 AM     Medications Reviewed:     Current Facility-Administered Medications   Medication Dose Route Frequency    levETIRAcetam (KEPPRA) 500 mg in saline (iso-osm) 100 ml IVPB  500 mg IntraVENous Q12H    dexamethasone (DECADRON) 4 mg/mL injection 4 mg  4 mg IntraVENous Q8H    acetaminophen (OFIRMEV) infusion 1,000 mg  1,000 mg IntraVENous ONCE    atorvastatin (LIPITOR) tablet 10 mg  10 mg Oral QHS    bacitracin 500 unit/gram ophthalmic ointment   Left Eye DAILY    sodium chloride (NS) flush 5-10 mL  5-10 mL IntraVENous Q8H    sodium chloride (NS) flush 5-10 mL  5-10 mL IntraVENous PRN    vancomycin (VANCOCIN) 1000 mg in  ml infusion  1,000 mg IntraVENous Q16H    levoFLOXacin (LEVAQUIN) 750 mg in D5W IVPB  750 mg IntraVENous Q24H    cefTRIAXone (ROCEPHIN) 2 g in 0.9% sodium chloride (MBP/ADV) 50 mL  2 g IntraVENous Q12H    Vancomycin - pharmacy to dose   Other Rx Dosing/Monitoring     ______________________________________________________________________  EXPECTED LENGTH OF STAY: 3d 12h  ACTUAL LENGTH OF STAY:          1                 Marbella Dejesus MD

## 2017-11-21 NOTE — PROGRESS NOTES
Rounded on Christian patients and provided An[facility]ing [facility] the Sick at request of family.     Tanesha Flanagan

## 2017-11-21 NOTE — PROGRESS NOTES
NUTRITION COMPLETE ASSESSMENT    RECOMMENDATIONS:   1. If unable to allow PO, place Dobbhoff and once placement confirmed begin TF with flushes     2. TF: Begin Jevity 1.5 at 30 mL/hr & increase by 10mL every 12 hrs to goal of 60mL/hr continuous, flush with 60 mL every 4 hours (adjust IVF accordingly, increase flush to 150mL if IVF stopped)     3. Give 1 packet of Liquid ProSource via feeding tube & flush with 30mL (Do not mix with TF formula)      Interventions/Plan:   Food/Nutrient Delivery:            Consider Jevity 1.5 @ goal of 60mL/hr continuous, 1 packet ProSource daily to provide: 2220 kcal/d, 1094 mL free water, (92+15) 107g Protein/day   water flushes: 360mL with IVF or 900mL w/o IVF. Nutrition Education:     Coordination of Care: Collaboration with other providers  Nutrition Counseling:        Assessment:   Reason for Assessment:   [] Provider Consult  [x]BPA/MST Referral - poor PO  []LOS   []Reassessment   []NPO/Clear Liquid   []At Nutrition Risk  []Other    Diet: NPO  Supplements: none   Nutritionally Significant Medications: [x] Reviewed & Includes: rocephin, keppra, levaquin,     Meal Intake: No data found. NPO    Subjective:  Daughter and girlfriend at bedside when checked on, pt with difficulty communicating, nodded head when asked if he was in pain, notified RN who then DW NP.     Objective:  Elderly male appears well-built, nourished admitted with AMS, CECELIA. Has recent hx of squamous cell Ca of L maxillary sinus s/p radiation, temporal craniotomy with mass consistent with radiation necrosis on 10/19/17. Pt's weight appears to have been suprisingly stable over last 2 years per medical records. Pt's symptoms are new onset. Appetite and intake were appropriate prior to events over this last few days per girlfriend. Await SLP to evaluate swallow however, pt does not appear safe for PO at this time. Nutrition Support may be indicated, RD recs as above to meet nutrient needs.      Estimated Nutrition Needs:   Kcals/day: 1975 Kcals/day  Protein: 102 g (1.2 g/kg of current wt)  Fluid: 1975 ml (1 mL/kcal of est needs)     Based On: Yuri Chino (AF 1.3)  Weight Used: Actual wt (85.3 kg)    Pt expected to meet estimated nutrient needs:    []   Yes     []  No       [x] Unable to predict at this time      Nutrition Diagnosis:   1. Inadequate energy intake related to NPO status  as evidenced by does not appear safe for PO at this time, unable to manipulate own secretions, await SLP evaluation, AMS, difficulty communicating - all new onset with unclear etilogy       Goals:     Meet 90% of nutrient needs within 3 days     Monitoring & Evaluation:    - Total energy intake, Enteral/parenteral nutrition intake   -     - Acceptance of assist with eating    Previous Nutrition Goals Met:  N/A    Previous Recommendations:      N/A    Education & Discharge Needs:   [x] None Identified   [] Identified and addressed    [x] Participated in care plan, discharge planning, and/or interdisciplinary rounds        Cultural, Cheondoism and ethnic food preferences identified:   None    Skin Integrity: []Intact  [x] mouth cut or bitten tongue   Edema: [x]None []Other  Last BM: 11/20/17  ABD: active  Food Allergies: [x]None []Other    Anthropometrics:    Weight Loss Metrics 11/21/2017 10/21/2017 10/19/2017 10/13/2017 3/14/2016 10/23/2015 9/9/2015   Today's Wt 188 lb 0.8 oz 186 lb 15.2 oz - 187 lb 6 oz 190 lb 184 lb 183 lb   BMI 27.77 kg/m2 - 27.21 kg/m2 27.27 kg/m2 27.26 kg/m2 26.4 kg/m2 26.65 kg/m2      Last 3 Recorded Weights in this Encounter    11/20/17 0941 11/21/17 0300 11/21/17 1155   Weight: 88.2 kg (194 lb 8 oz) 85.3 kg (188 lb 0.8 oz) 85.3 kg (188 lb 0.8 oz)      Weight Source: Bed  Height: 5' 9\" (175.3 cm),    Body mass index is 27.77 kg/(m^2).   IBW : 72.6 kg (160 lb), % IBW (Calculated): 117.53 %   ,      Labs:    Lab Results   Component Value Date/Time    Sodium 121 11/21/2017 04:36 AM    Potassium 3.8 11/21/2017 04:36 AM    Chloride 88 11/21/2017 04:36 AM    CO2 22 11/21/2017 04:36 AM    Glucose 90 11/21/2017 04:36 AM    BUN 7 11/21/2017 04:36 AM    Creatinine 0.69 11/21/2017 04:36 AM    Calcium 8.0 11/21/2017 04:36 AM    Magnesium 1.9 10/24/2015 05:57 AM    Phosphorus 3.0 10/24/2015 04:34 AM    Albumin 3.0 11/20/2017 09:45 AM     Lab Results   Component Value Date/Time    Hemoglobin A1c 5.9 02/23/2012 09:30 AM     Lab Results   Component Value Date/Time    Glucose 90 11/21/2017 04:36 AM    Glucose (POC) 108 11/20/2017 06:45 PM      Lab Results   Component Value Date/Time    ALT (SGPT) 20 11/20/2017 09:45 AM    AST (SGOT) 51 11/20/2017 09:45 AM    Alk.  phosphatase 96 11/20/2017 09:45 AM    Bilirubin, total 0.7 11/20/2017 09:45 AM        Micheline Dickey RD, MS, CDE

## 2017-11-21 NOTE — INTERDISCIPLINARY ROUNDS
IDR/SLIDR Summary          Patient: Johnny Jo MRN: [id number]    Age: 80 y.o. YOB: 1930 Room/Bed: [facility] [place]   Admit Diagnosis: Altered mental status  Principal Diagnosis: Altered mental status   Goals: safety  Readmission: NO  Quality Measure:   VTE Prophylaxis: Mechanical  Influenza Vaccine screening completed? YES  Pneumococcal Vaccine screening completed? NO  Mobility needs: Yes   Nutrition plan:Yes  Consults:P.T, O.T. and Case Management    Financial concerns:No  Escalated to CM? YES  RRAT Score: 14   Interventions:  Testing due for pt today?  NO  LOS: 1 days Expected length of stay 2 days  Discharge plan: TBD   PCP: Emmett Maldonado MD  Transportation needs: Yes    Days before discharge:two or more days before discharge   Discharge disposition:     Signed:     Rumalda Boas  11/21/2017  8:30 AM

## 2017-11-21 NOTE — PROGRESS NOTES
CM met with pt and his girlfriend Cookie Aguilar at pt's bedside. Pt unable to participate in conversation. Cm introduced Ms. Bonilla to the role of CM and transition of care. She verbalized understanding. This pt was staying with Ms. Bonilla prior to admission. He was fairly independent prior to admission. CM will follow for d/c needs. [address] Management Interventions  PCP Verified by CM: Yes  Palliative Care Criteria Met (RRAT>21 & CHF Dx)?: No  Transition of Care Consult (CM Consult): Discharge Planning  MyChart Signup: No  Discharge Durable Medical Equipment: No  Physical Therapy Consult: No  Occupational Therapy Consult: No  Speech Therapy Consult: No  Current Support Network: Other (This pt was staying at his girlfriend's home.)  Confirm Follow Up Transport: Family  Plan discussed with Pt/Family/Caregiver: Yes  Freedom of Choice Offered:  Yes

## 2017-11-22 ENCOUNTER — APPOINTMENT (OUTPATIENT)
Dept: GENERAL RADIOLOGY | Age: 82
DRG: 871 | End: 2017-11-22
Attending: FAMILY MEDICINE
Payer: MEDICARE

## 2017-11-22 LAB
ANION GAP SERPL CALC-SCNC: 10 MMOL/L (ref 5–15)
ANION GAP SERPL CALC-SCNC: 13 MMOL/L (ref 5–15)
ANION GAP SERPL CALC-SCNC: 13 MMOL/L (ref 5–15)
ANION GAP SERPL CALC-SCNC: 9 MMOL/L (ref 5–15)
BUN SERPL-MCNC: 11 MG/DL (ref 6–20)
BUN SERPL-MCNC: 15 MG/DL (ref 6–20)
BUN SERPL-MCNC: 15 MG/DL (ref 6–20)
BUN SERPL-MCNC: 9 MG/DL (ref 6–20)
BUN/CREAT SERPL: 14 (ref 12–20)
BUN/CREAT SERPL: 14 (ref 12–20)
BUN/CREAT SERPL: 21 (ref 12–20)
BUN/CREAT SERPL: 21 (ref 12–20)
CALCIUM SERPL-MCNC: 8.2 MG/DL (ref 8.5–10.1)
CALCIUM SERPL-MCNC: 8.3 MG/DL (ref 8.5–10.1)
CALCIUM SERPL-MCNC: 8.5 MG/DL (ref 8.5–10.1)
CALCIUM SERPL-MCNC: 8.8 MG/DL (ref 8.5–10.1)
CHLORIDE SERPL-SCNC: 101 MMOL/L (ref 97–108)
CHLORIDE SERPL-SCNC: 91 MMOL/L (ref 97–108)
CHLORIDE SERPL-SCNC: 92 MMOL/L (ref 97–108)
CHLORIDE SERPL-SCNC: 92 MMOL/L (ref 97–108)
CK SERPL-CCNC: 637 U/L (ref 39–308)
CK SERPL-CCNC: 717 U/L (ref 39–308)
CK SERPL-CCNC: 845 U/L (ref 39–308)
CO2 SERPL-SCNC: 22 MMOL/L (ref 21–32)
CO2 SERPL-SCNC: 22 MMOL/L (ref 21–32)
CO2 SERPL-SCNC: 23 MMOL/L (ref 21–32)
CO2 SERPL-SCNC: 25 MMOL/L (ref 21–32)
CREAT SERPL-MCNC: 0.65 MG/DL (ref 0.7–1.3)
CREAT SERPL-MCNC: 0.71 MG/DL (ref 0.7–1.3)
CREAT SERPL-MCNC: 0.72 MG/DL (ref 0.7–1.3)
CREAT SERPL-MCNC: 0.77 MG/DL (ref 0.7–1.3)
DATE LAST DOSE: NORMAL
GLUCOSE BLD STRIP.AUTO-MCNC: 134 MG/DL (ref 65–100)
GLUCOSE BLD STRIP.AUTO-MCNC: 142 MG/DL (ref 65–100)
GLUCOSE SERPL-MCNC: 114 MG/DL (ref 65–100)
GLUCOSE SERPL-MCNC: 124 MG/DL (ref 65–100)
GLUCOSE SERPL-MCNC: 125 MG/DL (ref 65–100)
GLUCOSE SERPL-MCNC: 126 MG/DL (ref 65–100)
OSMOLALITY UR: 212 MOSM/KG H2O
OSMOLALITY UR: 470 MOSM/KG H2O
POTASSIUM SERPL-SCNC: 3.7 MMOL/L (ref 3.5–5.1)
POTASSIUM SERPL-SCNC: 3.8 MMOL/L (ref 3.5–5.1)
POTASSIUM SERPL-SCNC: 3.9 MMOL/L (ref 3.5–5.1)
POTASSIUM SERPL-SCNC: 4.2 MMOL/L (ref 3.5–5.1)
REPORTED DOSE,DOSE: NORMAL UNITS
REPORTED DOSE/TIME,TMG: NORMAL
SERVICE CMNT-IMP: ABNORMAL
SERVICE CMNT-IMP: ABNORMAL
SODIUM SERPL-SCNC: 126 MMOL/L (ref 136–145)
SODIUM SERPL-SCNC: 127 MMOL/L (ref 136–145)
SODIUM SERPL-SCNC: 127 MMOL/L (ref 136–145)
SODIUM SERPL-SCNC: 133 MMOL/L (ref 136–145)
VANCOMYCIN TROUGH SERPL-MCNC: 9.4 UG/ML (ref 5–10)

## 2017-11-22 PROCEDURE — 80048 BASIC METABOLIC PNL TOTAL CA: CPT | Performed by: INTERNAL MEDICINE

## 2017-11-22 PROCEDURE — 74011250636 HC RX REV CODE- 250/636: Performed by: FAMILY MEDICINE

## 2017-11-22 PROCEDURE — 65660000000 HC RM CCU STEPDOWN

## 2017-11-22 PROCEDURE — 92526 ORAL FUNCTION THERAPY: CPT | Performed by: SPEECH-LANGUAGE PATHOLOGIST

## 2017-11-22 PROCEDURE — 80202 ASSAY OF VANCOMYCIN: CPT | Performed by: FAMILY MEDICINE

## 2017-11-22 PROCEDURE — 80048 BASIC METABOLIC PNL TOTAL CA: CPT | Performed by: FAMILY MEDICINE

## 2017-11-22 PROCEDURE — G8978 MOBILITY CURRENT STATUS: HCPCS

## 2017-11-22 PROCEDURE — 74011000258 HC RX REV CODE- 258: Performed by: INTERNAL MEDICINE

## 2017-11-22 PROCEDURE — 82962 GLUCOSE BLOOD TEST: CPT

## 2017-11-22 PROCEDURE — 82550 ASSAY OF CK (CPK): CPT | Performed by: FAMILY MEDICINE

## 2017-11-22 PROCEDURE — 97535 SELF CARE MNGMENT TRAINING: CPT

## 2017-11-22 PROCEDURE — 97165 OT EVAL LOW COMPLEX 30 MIN: CPT

## 2017-11-22 PROCEDURE — 74011250636 HC RX REV CODE- 250/636: Performed by: NEUROLOGICAL SURGERY

## 2017-11-22 PROCEDURE — 83935 ASSAY OF URINE OSMOLALITY: CPT | Performed by: INTERNAL MEDICINE

## 2017-11-22 PROCEDURE — 74011000258 HC RX REV CODE- 258: Performed by: FAMILY MEDICINE

## 2017-11-22 PROCEDURE — 97116 GAIT TRAINING THERAPY: CPT

## 2017-11-22 PROCEDURE — G8979 MOBILITY GOAL STATUS: HCPCS

## 2017-11-22 PROCEDURE — 36415 COLL VENOUS BLD VENIPUNCTURE: CPT | Performed by: FAMILY MEDICINE

## 2017-11-22 PROCEDURE — 97161 PT EVAL LOW COMPLEX 20 MIN: CPT

## 2017-11-22 RX ORDER — 3% SODIUM CHLORIDE 3 G/100ML
30 INJECTION, SOLUTION INTRAVENOUS CONTINUOUS
Status: DISPENSED | OUTPATIENT
Start: 2017-11-22 | End: 2017-11-22

## 2017-11-22 RX ORDER — VANCOMYCIN/0.9 % SOD CHLORIDE 1 G/100 ML
1000 PLASTIC BAG, INJECTION (ML) INTRAVENOUS EVERY 12 HOURS
Status: DISCONTINUED | OUTPATIENT
Start: 2017-11-23 | End: 2017-11-23

## 2017-11-22 RX ADMIN — Medication 10 ML: at 05:57

## 2017-11-22 RX ADMIN — LEVOFLOXACIN 750 MG: 5 INJECTION, SOLUTION INTRAVENOUS at 14:19

## 2017-11-22 RX ADMIN — Medication 10 ML: at 21:22

## 2017-11-22 RX ADMIN — BACITRACIN: 500 OINTMENT OPHTHALMIC at 09:18

## 2017-11-22 RX ADMIN — SODIUM CHLORIDE 30 ML/HR: 3 INJECTION, SOLUTION INTRAVENOUS at 16:10

## 2017-11-22 RX ADMIN — DEXAMETHASONE SODIUM PHOSPHATE 4 MG: 4 INJECTION, SOLUTION INTRAMUSCULAR; INTRAVENOUS at 21:21

## 2017-11-22 RX ADMIN — Medication 10 ML: at 13:28

## 2017-11-22 RX ADMIN — DEXAMETHASONE SODIUM PHOSPHATE 4 MG: 4 INJECTION, SOLUTION INTRAMUSCULAR; INTRAVENOUS at 05:57

## 2017-11-22 RX ADMIN — CEFTRIAXONE 2 G: 2 INJECTION, POWDER, FOR SOLUTION INTRAMUSCULAR; INTRAVENOUS at 21:20

## 2017-11-22 RX ADMIN — LEVETIRACETAM 500 MG: 5 INJECTION INTRAVENOUS at 05:57

## 2017-11-22 RX ADMIN — VANCOMYCIN HYDROCHLORIDE 1000 MG: 10 INJECTION, POWDER, LYOPHILIZED, FOR SOLUTION INTRAVENOUS at 13:27

## 2017-11-22 RX ADMIN — DEXAMETHASONE SODIUM PHOSPHATE 4 MG: 4 INJECTION, SOLUTION INTRAMUSCULAR; INTRAVENOUS at 13:27

## 2017-11-22 RX ADMIN — LEVETIRACETAM 500 MG: 5 INJECTION INTRAVENOUS at 18:19

## 2017-11-22 RX ADMIN — CEFTRIAXONE 2 G: 2 INJECTION, POWDER, FOR SOLUTION INTRAMUSCULAR; INTRAVENOUS at 09:17

## 2017-11-22 NOTE — PROGRESS NOTES
Pharmacist Note - Vancomycin Dosing  Therapy day 3  Indication: Sepsis, possible aspiration PNA  Current regimen: 1250mg Q16h    A Trough Level resulted at 9.4 mcg/mL which was obtained 15.25 hrs post-dose. The extrapolated \"true\" trough is approximately 8-9 mcg/mL based on the patient's known kinetics. Goal trough: 15 - 20 mcg/mL     Plan: Change to 1250mg Q12h . Pharmacy will continue to monitor this patient daily for changes in clinical status and renal function.

## 2017-11-22 NOTE — PROGRESS NOTES
2051-Spoke with On call Dr. Francisco Horta with post completion of 3% Na lab result of 124.  Orders received to recheck at 2330 and to call with a result below 124 or above 128.    0103- Na 126

## 2017-11-22 NOTE — PROGRESS NOTES
Nephrology Progress Note  Stef Huitron  Date of Admission : 11/20/2017    CC: Follow up for  hyponatremia       Assessment and Plan     Acute on Chronic Hyponatremia:  - this appears to be SIADH, likely from his CNS process  - urine osms support this  - Na up to 127 (6meq in 24 hrs) which is appropriate  - repeat Na at noon - goal 130  - daily Na if stable at noon     Fever:  - on ceftiazone, levaquin, and vanco  - no evidence of abscess on CT or MRI  - w/u per primary team     AMS:  - infectious vs worsening radiation necrosis  - on broad spectrum abx     Cerebral Edema:  - decadron per NSG     Radiation necrosis s/p L temporal craniotomy:  - on steroids now and NSG following  - concern for progression since his last admission     Hx of squamous cell ca of maxillary sinus s/p radtation       Interval History:  Seen and examined. Na 127 this am.  More alert and talkative. No cp, or sob. Current Medications: all current  Medications have been eviewed in EPIC  Review of Systems: Pertinent items are noted in HPI.     Objective:  Vitals:    Vitals:    11/22/17 0300 11/22/17 0700 11/22/17 0757 11/22/17 1100   BP: 118/61 138/84  130/75   Pulse: 69 68  61   Resp: 13 16  20   Temp: 98 °F (36.7 °C) 98.4 °F (36.9 °C)  97 °F (36.1 °C)   SpO2: 100% 100% 100% 100%   Weight: 86.3 kg (190 lb 4.1 oz)      Height:         Intake and Output:     11/20 1901 - 11/22 0700  In: 1000 [I.V.:1000]  Out: 2000 [Urine:2000]    Physical Examination:  General: NAD,Conversant   Neck:  Supple, no mass  Resp:  Lungs CTA B/L, no wheezing , normal respiratory effort  CV:  RRR,  no murmur or rub, trace LE edema  GI:  Soft, NT, + Bowel sounds, no hepatosplenomegaly  Neurologic:  Non focal  Psych:             AAO x 3 appropriate affect   Skin:  No Rash    []    High complexity decision making was performed  []    Patient is at high-risk of decompensation with multiple organ involvement    Lab Data Personally Reviewed: I have reviewed all the pertinent labs, microbiology data and radiology studies during assessment. Recent Labs      11/22/17   0621  11/22/17   0006  11/21/17   1930  11/21/17   1113   11/20/17   0945   NA  127*  126*  124*  121*   < >  125*   K  3.8  4.2  4.0  4.3   < >  4.2   CL  92*  91*  90*  87*   < >  89*   CO2  22  22  22  23   < >  27   GLU  125*  124*  129*  109*   < >  101*   BUN  11  9  9  8   < >  9   CREA  0.77  0.65*  0.61*  0.66*   < >  0.88   CA  8.3*  8.5  8.3*  7.8*   < >  8.9   ALB   --    --    --   2.5*   --   3.0*   SGOT   --    --    --   38*   --   51*   ALT   --    --    --   16   --   20   INR   --    --    --    --    --   1.1    < > = values in this interval not displayed.      Recent Labs      11/20/17   0945   WBC  9.1   HGB  13.3   HCT  38.5   PLT  351     No results found for: SDES  Lab Results   Component Value Date/Time    Culture result: NO GROWTH 2 DAYS 11/20/2017 09:45 AM    Culture result: HEAVY  NORMAL RESPIRATORY TRACIE   10/24/2015 04:03 PM    Culture result: MODERATE  YEAST   10/24/2015 04:03 PM     Recent Results (from the past 24 hour(s))   CK    Collection Time: 11/21/17  6:22 PM   Result Value Ref Range     (H) 39 - 586 U/L   METABOLIC PANEL, BASIC    Collection Time: 11/21/17  7:30 PM   Result Value Ref Range    Sodium 124 (L) 136 - 145 mmol/L    Potassium 4.0 3.5 - 5.1 mmol/L    Chloride 90 (L) 97 - 108 mmol/L    CO2 22 21 - 32 mmol/L    Anion gap 12 5 - 15 mmol/L    Glucose 129 (H) 65 - 100 mg/dL    BUN 9 6 - 20 MG/DL    Creatinine 0.61 (L) 0.70 - 1.30 MG/DL    BUN/Creatinine ratio 15 12 - 20      GFR est AA >60 >60 ml/min/1.73m2    GFR est non-AA >60 >60 ml/min/1.73m2    Calcium 8.3 (L) 8.5 - 10.1 MG/DL   CK    Collection Time: 11/22/17 12:06 AM   Result Value Ref Range     (H) 39 - 928 U/L   METABOLIC PANEL, BASIC    Collection Time: 11/22/17 12:06 AM   Result Value Ref Range    Sodium 126 (L) 136 - 145 mmol/L    Potassium 4.2 3.5 - 5.1 mmol/L    Chloride 91 (L) 97 - 108 mmol/L    CO2 22 21 - 32 mmol/L    Anion gap 13 5 - 15 mmol/L    Glucose 124 (H) 65 - 100 mg/dL    BUN 9 6 - 20 MG/DL    Creatinine 0.65 (L) 0.70 - 1.30 MG/DL    BUN/Creatinine ratio 14 12 - 20      GFR est AA >60 >60 ml/min/1.73m2    GFR est non-AA >60 >60 ml/min/1.73m2    Calcium 8.5 8.5 - 10.1 MG/DL   CK    Collection Time: 11/22/17  6:21 AM   Result Value Ref Range     (H) 39 - 701 U/L   METABOLIC PANEL, BASIC    Collection Time: 11/22/17  6:21 AM   Result Value Ref Range    Sodium 127 (L) 136 - 145 mmol/L    Potassium 3.8 3.5 - 5.1 mmol/L    Chloride 92 (L) 97 - 108 mmol/L    CO2 22 21 - 32 mmol/L    Anion gap 13 5 - 15 mmol/L    Glucose 125 (H) 65 - 100 mg/dL    BUN 11 6 - 20 MG/DL    Creatinine 0.77 0.70 - 1.30 MG/DL    BUN/Creatinine ratio 14 12 - 20      GFR est AA >60 >60 ml/min/1.73m2    GFR est non-AA >60 >60 ml/min/1.73m2    Calcium 8.3 (L) 8.5 - 10.1 MG/DL               Tina Balderas MD  [address]  Phone - [phone]   Fax - [phone]  [email or web address]

## 2017-11-22 NOTE — PROGRESS NOTES
Problem: Mobility Impaired (Adult and Pediatric)  Goal: *Acute Goals and Plan of Care (Insert Text)  Physical Therapy Goals  Initiated 11/22/2017  1. Patient will move from supine to sit and sit to supine , scoot up and down and roll side to side in bed with modified independence within 7 day(s). 2.  Patient will transfer from bed to chair and chair to bed with modified independence using the least restrictive device within 7 day(s). 3.  Patient will perform sit to stand with modified independence within 7 day(s). 4.  Patient will ambulate with modified independence for 300 feet with the least restrictive device within 7 day(s). 5.  Patient will ascend/descend 2 stairs with 0 handrail(s) with supervision/set-up within 7 day(s). physical Therapy EVALUATION  Patient: Cristina Melvin (6 y.o. male)  Date: 11/22/2017  Primary Diagnosis: Altered mental status        Precautions:   Fall    ASSESSMENT :  Based on the objective data described below, the patient presents with generalized weakness, decreased balance especially with dynamic standing, confusion, and overall decreased independence from baseline following admission for AMS. Patient recently discharged from this facility home, independent for mobility and had return to driving. Currently, patient requires mod A for bed mobility and min A for transfers. Requires increased time for processing and command following this session. Patient able to ambulate 20 feet with HHA and min A x 2. Patient with slight path deviations to the R with ambulation, decreased awareness to balance deficits noted. Remained OOB in chair at end of session. Recommend IP rehab at discharge to decrease risk of readmission and improve patient ability to return to independent level of mobility. Patient will benefit from skilled intervention to address the above impairments.   Patients rehabilitation potential is considered to be Good  Factors which may influence rehabilitation potential include:   []         None noted  [x]         Mental ability/status  [x]         Medical condition  []         Home/family situation and support systems  [x]         Safety awareness  []         Pain tolerance/management  []         Other:      PLAN :  Recommendations and Planned Interventions:  [x]           Bed Mobility Training             [x]    Neuromuscular Re-Education  [x]           Transfer Training                   []    Orthotic/Prosthetic Training  [x]           Gait Training                         []    Modalities  [x]           Therapeutic Exercises           []    Edema Management/Control  [x]           Therapeutic Activities            [x]    Patient and Family Training/Education  []           Other (comment):    Frequency/Duration: Patient will be followed by physical therapy  5 times a week to address goals. Discharge Recommendations: Inpatient Rehab  Further Equipment Recommendations for Discharge: TBD     SUBJECTIVE:   Patient stated Wait a minute.     OBJECTIVE DATA SUMMARY:   HISTORY:    Past Medical History:   Diagnosis Date    Arthritis     HANDS    Beta-blocker therapy     Cancer ([facility] [address] 2013    MAXILLARY SINUS CANCER, RADIATION AND CHEMO    Cancer ([facility] [address]     SKIN CA ON NOSE    GERD (gastroesophageal reflux disease)     Hypercholesterolemia     Hypertension     pt denies    Nasal sinus tumor     Numbness of LEFT hand & LEFT face 2010    RBBB (right bundle branch block)     TIA (transient ischemic attack)     12 TIA's over 9 YRS.1ST ONE IN 8/03- LAST IN 2/12      Trigeminal neuralgia      Past Surgical History:   Procedure Laterality Date    HX CATARACT REMOVAL Bilateral     HX GI      COLONOSCOPY    HX HEENT      BIOPSY OF SINUS     HX HEENT Left     sew eye closed    HX HERNIA REPAIR Right     INGUINAL    HX KNEE ARTHROSCOPY Right 2007    HX KNEE REPLACEMENT Right 2012    Total Knee replacement- right    HX ROTATOR CUFF REPAIR Right     right rotator cuff repair     Prior Level of Function/Home Situation: had returned to independence with ambulation and driving following last admission  Personal factors and/or comorbidities impacting plan of care:     Home Situation  Home Environment: Private residence  # Steps to Enter: 3  Rails to Enter: No  One/Two Story Residence: One story  Living Alone: No  Support Systems: Friends \ neighbors, Family member(s)  Patient Expects to be Discharged to[de-identified] Other (comment)  Current DME Used/Available at Home: Other (comment)    EXAMINATION/PRESENTATION/DECISION MAKING:   Critical Behavior:  Neurologic State: Alert  Orientation Level: Oriented to place, Oriented to person  Cognition: Follows commands  Safety/Judgement: Decreased awareness of environment, Decreased awareness of need for assistance, Decreased awareness of need for safety, Decreased insight into deficits  Hearing: Auditory  Auditory Impairment: Hard of hearing, bilateral  Skin:    Edema:   Range Of Motion:  AROM: Generally decreased, functional           PROM: Within functional limits           Strength:    Strength: Generally decreased, functional                    Tone & Sensation:                                  Coordination:     Vision:      Functional Mobility:  Bed Mobility:     Supine to Sit: Moderate assistance; Additional time        Transfers:  Sit to Stand: Minimum assistance;Contact guard assistance;Assist x2  Stand to Sit: Minimum assistance        Bed to Chair: Minimum assistance;Assist x2              Balance:   Sitting: Intact; With support  Standing: Impaired  Standing - Static: Good;Constant support  Standing - Dynamic : Fair  Ambulation/Gait Training:  Distance (ft): 20 Feet (ft)  Assistive Device: Gait belt (HHA)  Ambulation - Level of Assistance: Minimal assistance;Assist x2        Gait Abnormalities: Decreased step clearance; Path deviations (slight veer to the R)        Base of Support: Narrowed     Speed/Olive: Pace decreased (<100 feet/min)  Step Length: Right shortened;Left shortened                     Stairs: Therapeutic Exercises:       Functional Measure:  Timed up and go:    Timed Get Up And Go Test: 20 (inferred)     Timed Up and Go and G-code impairment scale:  Percentage of Impairment CH    0%   CI    1-19% CJ    20-39% CK    40-59% CL    60-79% CM    80-99% CN     100%   Timed   Score 0-56 10 11-12 13-14 15-16 17-18 19 20       < than 10 seconds=Normal  Greater then 13.5 seconds (in elderly)=Increased fall risk   Renny WHITEHEAD, Neno Pate. Predicting the probability for falls in community dwelling older adults using the Timed Up and Go Test. Phys Ther. 2000;80:896-903. G codes: In compliance with CMSs Claims Based Outcome Reporting, the following G-code set was chosen for this patient based on their primary functional limitation being treated: The outcome measure chosen to determine the severity of the functional limitation was the TUG with a score of 20 seconds which was correlated with the impairment scale. ? Mobility - Walking and Moving Around:     - CURRENT STATUS: CN - 100% impaired, limited or restricted    - GOAL STATUS: CM - 80%-99% impaired, limited or restricted    - D/C STATUS:  ---------------To be determined---------------          Pain:  Pain Scale 1: Numeric (0 - 10)  Pain Intensity 1: 0              Activity Tolerance:   Improving, limited by confusion  Please refer to the flowsheet for vital signs taken during this treatment. After treatment:   [x]         Patient left in no apparent distress sitting up in chair  []         Patient left in no apparent distress in bed  [x]         Call bell left within reach  [x]         Nursing notified  []         Caregiver present  [x]         Chair alarm activated    COMMUNICATION/EDUCATION:   The patients plan of care was discussed with: Registered Nurse.   [x]         Fall prevention education was provided and the patient/caregiver indicated understanding. [x]         Patient/family have participated as able in goal setting and plan of care. [x]         Patient/family agree to work toward stated goals and plan of care. []         Patient understands intent and goals of therapy, but is neutral about his/her participation. []         Patient is unable to participate in goal setting and plan of care.     Thank you for this referral.  Myrna Poon, PT   Time Calculation: 22 mins

## 2017-11-22 NOTE — PROGRESS NOTES
Better. Awakens. Speaking phrases. fc bilat. Course bs.   cont correction of na per renal. Iv steroids for now.   Follow pulm status

## 2017-11-22 NOTE — PROGRESS NOTES
Repeat Na still at 127  Pt not cleared for po intake  Will give 3% saline - 30cc/hr x 4 hours  Repeat Na 8pm

## 2017-11-22 NOTE — PROGRESS NOTES
Problem: Dysphagia (Adult)  Goal: *Acute Goals and Plan of Care (Insert Text)  Speech Goals   Initiated 11/21/2017  1. Patient will participate in re-assessment of swallow function within 7 days   Speech language pathology dysphagia treatment  Patient: Lorena Garay (24 y.o. male)  Date: 11/22/2017  Diagnosis: Altered mental status Altered mental status       Precautions: aspiration      ASSESSMENT:  Patient presents with significantly improved mental status and secretion management with increased recognition of PO and command following. Tolerated ice chips with timely mastication and propulsion, however trials were limited and deficits may present with other consistencies. Continues to present with severe pharyngeal dysphagia characterized by decreased hyolaryngeal elevation and excursion. Patient with cough and throat clear on ice chip trials and multiple swallows concerning for pharyngeal residue. PO remains inappropriate due to high risk of aspiration. Educated family on aspiration risks and progress to date and they are in agreement with continued NPO. Progression toward goals:  []         Improving appropriately and progressing toward goals  [x]         Improving slowly and progressing toward goals  []         Not making progress toward goals and plan of care will be adjusted     PLAN:  Recommendations and Planned Interventions:  -- Recommend continue NPO  -- SLP to follow for swallow re-assessment  Patient continues to benefit from skilled intervention to address the above impairments. Continue treatment per established plan of care. Discharge Recommendations: To Be Determined     SUBJECTIVE:   Patient stated Chay Patel you all for coming after session. Patient was alert and conversing with short phrases.      OBJECTIVE:   Cognitive and Communication Status:  Neurologic State: Alert  Orientation Level: Oriented to person  Cognition: Decreased attention/concentration  Perception: Appears intact  Perseveration: No perseveration noted  Safety/Judgement: Decreased awareness of environment, Decreased awareness of need for assistance, Decreased awareness of need for safety, Decreased insight into deficits  Dysphagia Treatment:  Oral Assessment:  Oral Assessment  Labial: No impairment  Dentition: Natural  Oral Hygiene: moist mucosa  Lingual: Decreased rate  Mandible: No impairment  P.O. Trials:  Patient Position: upright in bed  Vocal quality prior to P.O.: Wet, but cleared with spontaneous cough   Consistency Presented: Ice chips  How Presented: SLP-fed/presented;Spoon     Bolus Acceptance: No impairment  Bolus Formation/Control: No impairment     Propulsion: No impairment  Oral Residue: None  Initiation of Swallow: No impairment  Laryngeal Elevation: Weak  Aspiration Signs/Symptoms: Clear throat;Weak cough  Pharyngeal Phase Characteristics: Audible swallow, multiple swallows, suspect pharyngeal residue             Oral Phase Severity: No impairment (with limited trials of ice chips)  Pharyngeal Phase Severity : Severe     Pain:  Pain Scale 1: Numeric (0 - 10)  Pain Intensity 1: 0     After treatment:   []              Patient left in no apparent distress sitting up in chair  [x]              Patient left in no apparent distress in bed  [x]              Call bell left within reach  [x]              Nursing notified  [x]              Caregiver present  []              Bed alarm activated    COMMUNICATION/EDUCATION:       The patients plan of care including recommendations, planned interventions, and recommended diet changes were discussed with: Registered Nurse. [x]              Posted safety precautions in patient's room. Ino Mosqueda Student SLP   Time Calculation: 10 mins     Regarding student involvement in patient care:  A student participated in this treatment session. Per CMS Medicare statements and APTA guidelines I certify that the following was true:  1.  I was present and directly observed the entire session. 2. I made all skilled judgments and clinical decisions regarding care. 3. I am the practitioner responsible for assessment, treatment, and documentation.

## 2017-11-22 NOTE — PROGRESS NOTES
Bedside shift change report given to Sanam Wagner (oncoming nurse) by Gumaro Stapleton RN (offgoing nurse). Report included the following information SBAR, Kardex, MAR, Recent Results and Cardiac Rhythm NSR 1st AVB.

## 2017-11-22 NOTE — INTERDISCIPLINARY ROUNDS
IDR/SLIDR Summary          Patient: Carlos A Huntley MRN: [id number]    Age: 80 y.o. YOB: 1930 Room/Bed: [facility]   Admit Diagnosis: Altered mental status  Principal Diagnosis: Altered mental status   Goals: safety  Readmission: NO  Quality Measure:   VTE Prophylaxis: Mechanical  Influenza Vaccine screening completed? YES  Pneumococcal Vaccine screening completed? NO  Mobility needs: Yes   Nutrition plan:Yes  Consults:P.T, O.T. and Case Management    Financial concerns:No  Escalated to CM? YES  RRAT Score: 14   Interventions:  Testing due for pt today?  NO  LOS: 2 days Expected length of stay 2 days  Discharge plan: TBD   PCP: Fremont Severs, MD  Transportation needs: Yes    Days before discharge:two or more days before discharge   Discharge disposition:     Signed:     Simran Mcintosh RN  11/22/17  [id number]

## 2017-11-22 NOTE — PROGRESS NOTES
Problem: Falls - Risk of  Goal: *Absence of Falls  Document Madhu Fall Risk and appropriate interventions in the flowsheet.    Outcome: Progressing Towards Goal  Fall Risk Interventions:  Mobility Interventions: Bed/chair exit alarm, PT Consult for mobility concerns, PT Consult for assist device competence, Strengthening exercises (ROM-active/passive)    Mentation Interventions: Bed/chair exit alarm, Door open when patient unattended, Eyeglasses and hearing aids, Increase mobility, More frequent rounding, Reorient patient    Medication Interventions: Bed/chair exit alarm    Elimination Interventions: Call light in reach, Patient to call for help with toileting needs, Toileting schedule/hourly rounds

## 2017-11-22 NOTE — ROUTINE PROCESS
Bedside and Verbal shift change report given to [address] Maegan (oncoming nurse) by Renard Navarro (offgoing nurse). Report included the following information SBAR, Kardex, Procedure Summary, Intake/Output, MAR, Accordion, Recent Results, Med Rec Status and Cardiac Rhythm NSR/ 1*AVB.

## 2017-11-22 NOTE — PROGRESS NOTES
Problem: Self Care Deficits Care Plan (Adult)  Goal: *Acute Goals and Plan of Care (Insert Text)  Occupational Therapy Goals  Initiated 11/22/2017  1. Patient will perform ADLs standing 5 mins without fatigue or LOB with modified independence within 7 day(s). 2.  Patient will perform lower body ADLs with modified independence within 7 day(s). 3.  Patient will perform bathing with modified independence within 7 day(s). 4.  Patient will perform toilet transfers with modified independence within 7 day(s). 5.  Patient will perform all aspects of toileting with independence within 7 day(s). 6.  Patient will participate in upper extremity therapeutic exercise/activities to increase independence with ADLs with independence for 5 minutes within 7 day(s). Occupational Therapy EVALUATION  Patient: Taj Lara (89 y.o. male)  Date: 11/22/2017  Primary Diagnosis: Altered mental status        Precautions:   Fall    ASSESSMENT :  Based on the objective data described below, the patient presents with overall Min A x2 for functional mobility, up to Total A for lower body ADLs, and independent-supervision for upper body ADLs s/p AMS. Patient underwent L temporal craniotomy on 10/19/2017, discharged home at independent level, cognitively intact. Girlfriend reporting decline in mental status and mobility leading up to this admission. Patient presenting with decreased insight into deficits, disorientation to situation/time, decreased command following, impaired balance, impaired functional reach to BLEs, and requiring assist in 50% of his daily ADLs. Patient will require inpatient rehab at this time, will continue to follow 4-5x/week and reassess for d/c needs as patient progresses. Patient will benefit from skilled intervention to address the above impairments.   Patients rehabilitation potential is considered to be Good  Factors which may influence rehabilitation potential include:   []             None noted  []             Mental ability/status  []             Medical condition  []             Home/family situation and support systems  []             Safety awareness  []             Pain tolerance/management  []             Other:      PLAN :  Recommendations and Planned Interventions:  [x]               Self Care Training                  [x]        Therapeutic Activities  [x]               Functional Mobility Training    []        Cognitive Retraining  [x]               Therapeutic Exercises           [x]        Endurance Activities  [x]               Balance Training                   []        Neuromuscular Re-Education  []               Visual/Perceptual Training     [x]   Home Safety Training  [x]               Patient Education                 [x]        Family Training/Education  []               Other (comment):    Frequency/Duration: Patient will be followed by occupational therapy 5 times a week to address goals. Discharge Recommendations: Inpatient Rehab  Further Equipment Recommendations for Discharge: TBD     SUBJECTIVE:   Patient stated Wait a minute. Wait a minute.     OBJECTIVE DATA SUMMARY:   HISTORY:   Past Medical History:   Diagnosis Date    Arthritis     HANDS    Beta-blocker therapy     Cancer ([facility] [address] 2013    MAXILLARY SINUS CANCER, RADIATION AND CHEMO    Cancer ([facility] [address]     SKIN CA ON NOSE    GERD (gastroesophageal reflux disease)     Hypercholesterolemia     Hypertension     pt denies    Nasal sinus tumor     Numbness of LEFT hand & LEFT face 2010    RBBB (right bundle branch block)     TIA (transient ischemic attack)     12 TIA's over 9 YRS.1ST ONE IN 8/03- LAST IN 2/12      Trigeminal neuralgia      Past Surgical History:   Procedure Laterality Date    HX CATARACT REMOVAL Bilateral     HX GI      COLONOSCOPY    HX HEENT      BIOPSY OF SINUS     HX HEENT Left     sew eye closed    HX HERNIA REPAIR Right     INGUINAL    HX KNEE ARTHROSCOPY Right 2007    HX KNEE REPLACEMENT Right 2012    Total Knee replacement- right    HX ROTATOR CUFF REPAIR Right     right rotator cuff repair       Prior Level of Function/Environment/Context: Per patient report, lives at home with girlfriend. Patient is independent at baseline. Drives. Completes all ADLs. Home Situation  Home Environment: Private residence  # Steps to Enter: 3  Rails to Enter: No  One/Two Story Residence: One story  Living Alone: No  Support Systems: Friends \ neighbors, Family member(s)  Patient Expects to be Discharged to[de-identified] Other (comment)  Current DME Used/Available at Home: Other (comment)  []  Right hand dominant   []  Left hand dominant    EXAMINATION OF PERFORMANCE DEFICITS:  Cognitive/Behavioral Status:  Neurologic State: Alert  Orientation Level: Oriented to place;Oriented to person  Cognition: Follows commands  Perception: Appears intact  Perseveration: No perseveration noted  Safety/Judgement: Decreased awareness of environment;Decreased awareness of need for assistance;Decreased awareness of need for safety;Decreased insight into deficits    Skin: Appears intact    Edema: None noted in BUEs    Hearing: Auditory  Auditory Impairment: Hard of hearing, bilateral    Vision/Perceptual:    Tracking: Able to track stimulus in all quadrants w/o difficulty (L eye surgical closed)                      Acuity: Within Defined Limits    Corrective Lenses: Glasses    Range of Motion:  AROM: Generally decreased, functional  PROM: Within functional limits                      Strength:  Strength: Generally decreased, functional                Coordination:  Coordination: Within functional limits  Fine Motor Skills-Upper: Left Intact; Right Intact    Gross Motor Skills-Upper: Left Intact; Right Intact    Tone & Sensation:  Tone: Normal  Sensation: Intact                      Balance:  Sitting: Intact; With support  Standing: Impaired  Standing - Static: Good;Constant support  Standing - Dynamic : Fair    Functional Mobility and Transfers for ADLs:  Bed Mobility:  Supine to Sit: Moderate assistance; Additional time    Transfers:  Sit to Stand: Minimum assistance;Contact guard assistance;Assist x2  Stand to Sit: Minimum assistance  Bed to Chair: Minimum assistance;Assist x2  Toilet Transfer : Minimum assistance;Assist x2 (Inferred per observations of functional mobility)    ADL Assessment:  Feeding: Supervision    Oral Facial Hygiene/Grooming: Supervision    Bathing: Moderate assistance    Upper Body Dressing: Supervision    Lower Body Dressing: Moderate assistance    Toileting: Moderate assistance      * Inferred per obs of BUE ROM, functional mobility, activity tolerance, safety awareness, and cognitive state          ADL Intervention and task modifications:     Lower Body Dressing Assistance  Socks: Total assistance (dependent)  Leg Crossed Method Used: Yes  Position Performed: Seated edge of bed    Cognitive Retraining  Safety/Judgement: Decreased awareness of environment;Decreased awareness of need for assistance;Decreased awareness of need for safety;Decreased insight into deficits    Functional Measure:  Barthel Index:    Bathin  Bladder: 10  Bowels: 10  Groomin  Dressin  Feedin  Mobility: 0  Stairs: 0  Toilet Use: 5  Transfer (Bed to Chair and Back): 10  Total: 50       Barthel and G-code impairment scale:  Percentage of impairment CH  0% CI  1-19% CJ  20-39% CK  40-59% CL  60-79% CM  80-99% CN  100%   Barthel Score 0-100 100 99-80 79-60 59-40 20-39 1-19   0   Barthel Score 0-20 20 17-19 13-16 9-12 5-8 1-4 0      The Barthel ADL Index: Guidelines  1. The index should be used as a record of what a patient does, not as a record of what a patient could do. 2. The main aim is to establish degree of independence from any help, physical or verbal, however minor and for whatever reason. 3. The need for supervision renders the patient not independent.   4. A patient's performance should be established using the best available evidence. Asking the patient, friends/relatives and nurses are the usual sources, but direct observation and common sense are also important. However direct testing is not needed. 5. Usually the patient's performance over the preceding 24-48 hours is important, but occasionally longer periods will be relevant. 6. Middle categories imply that the patient supplies over 50 per cent of the effort. 7. Use of aids to be independent is allowed. Niki Marcelino., Barthel, D.W. ([id number]). Functional evaluation: the Barthel Index. [address] [facility] (14)2. Robby Mendoza cailin CHARLI Myers, Germaine Hernandez., Brad Dodd., [place], [address] (1999). Measuring the change indisability after inpatient rehabilitation; comparison of the responsiveness of the Barthel Index and Functional [place] Measure. Journal of Neurology, Neurosurgery, and Psychiatry, 66(4), 375-866. ISMA Le, CHRISTINA Gregory, & Demar Beckman M.A. (2004.) Assessment of post-stroke quality of life in cost-effectiveness studies: The usefulness of the Barthel Index and the EuroQoL-5D. Quality of Life Research, 13, 903-15         G codes: In compliance with CMSs Claims Based Outcome Reporting, the following G-code set was chosen for this patient based on their primary functional limitation being treated: The outcome measure chosen to determine the severity of the functional limitation was the Barthel Index with a score of 50/100 which was correlated with the impairment scale. ?  Self Care:     - CURRENT STATUS: CK - 40%-59% impaired, limited or restricted    - GOAL STATUS: CJ - 20%-39% impaired, limited or restricted    - D/C STATUS:  ---------------To be determined---------------     Occupational Therapy Evaluation Charge Determination   History Examination Decision-Making   LOW Complexity : Brief history review  MEDIUM Complexity : 3-5 performance deficits relating to physical, cognitive , or psychosocial skils that result in activity limitations and / or participation restrictions MEDIUM Complexity : Patient may present with comorbidities that affect occupational performnce. Miniml to moderate modification of tasks or assistance (eg, physical or verbal ) with assesment(s) is necessary to enable patient to complete evaluation       Based on the above components, the patient evaluation is determined to be of the following complexity level: LOW   Pain:  Pain Scale 1: Numeric (0 - 10)  Pain Intensity 1: 0              Activity Tolerance:   Good. Please refer to the flowsheet for vital signs taken during this treatment. After treatment:   [x] Patient left in no apparent distress sitting up in chair  [] Patient left in no apparent distress in bed  [x] Call bell left within reach  [x] Nursing notified  [x] Caregiver present  [x] Chair alarm activated    COMMUNICATION/EDUCATION:   The patients plan of care was discussed with: Physical Therapist and Registered Nurse. [x] Home safety education was provided and the patient/caregiver indicated understanding. [x] Patient/family have participated as able in goal setting and plan of care. [] Patient/family agree to work toward stated goals and plan of care. [] Patient understands intent and goals of therapy, but is neutral about his/her participation. [] Patient is unable to participate in goal setting and plan of care. This patients plan of care is appropriate for delegation to \[facility]\"".     Thank you for this referral.  Sheyla Woo OT  Time Calculation: 22 mins

## 2017-11-22 NOTE — PROGRESS NOTES
NUTRITION      RECOMMENDATIONS:   1. Suggest to place Dobbhoff and once placement confirmed begin TF with flushes      2. TF: Begin Jevity 1.5 at 30 mL/hr & increase by 10mL every 12 hrs to goal of 60mL/hr continuous, flush with 60 mL every 4 hours (adjust IVF accordingly, increase flush to 150mL if IVF stopped)      3. Give 1 packet of Liquid ProSource via feeding tube & flush with 30mL (Do not mix with TF formula    Estimated Nutrition Needs:   Kcals/day: 1975 Kcals/day  Protein: 102 g (1.2 g/kg of current wt)  Fluid: 1975 ml (1 mL/kcal of est needs)   Based On:  Yuri Chino (AF 1.3)  Weight Used: Actual wt (85.3 kg)      Hany Lewis RD

## 2017-11-22 NOTE — PROGRESS NOTES
Hospitalist Progress Note  Hank Stubbs MD  Answering service: [phone] from in house phone      Date of Service:  2017  NAME:  Ariel Crawford  :  11/10/1930  MRN:  [id number]      Admission Summary:   81 yo man with GERD, HLD, trigeminal neuralgia, h/o left maxillary sinus SCC, h/o TIA, h/o skin CA, and s/p recent craniotomy presented to the ED from home on 17 with confusion. He was treated with gamma knife radiosurgery for left trigeminal neuralgia pain in . He underwent chemotherapy and radiation for the squamous cell cancer in . The patient started developing some word-finding difficulties and balance   issues, was seen by Neurosurgery, an MRI was done which showed a 3 cm mass in the left temporal lobe with cerebral edema. This was concerning for malignancy versus radiation necrosis. He was given some steroids that improved his symptoms to a certain degree, but he underwent a left temporal craniotomy on 10/19/2017 with resection of the tumor. 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 He was admitted for acute encephalopathy.     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 (on hold)  - stop IVNS and place on free water restriction  - Renal consulted at [address] rec  - improving s/p 3% saline yesterday    Acute rhabdomyolysis (POA) - CK trending down with IVF    S/p craniotomy - continue Keppra and steroid; NSGY following, stable    GERD - resume home med    Nasal and chest congestion  - difficulty clearing secretions; s/p deep NT suction  - much improved with scopolamine patch per Nutrition rec    Sepsis with possible aspiration PNA (POA)  - fever, tachypnea on admission  - SLP following  - empiric ceftriaxone, vancomycin, levofloxacin started; de-escalate as indicated  - BCx 11/20 NGTD    Code status: full  DVT prophylaxis: SCDs    Care Plan discussed with: Patient/Family, Nurse and   Disposition: TBD. Came from home but may need placement on discharge     Hospital Problems  Date Reviewed: 11/20/2017          Codes Class Noted POA    * (Principal)Altered mental status ICD-10-CM: R41.82  ICD-9-CM: 780.97  11/20/2017 Unknown            Review of Systems:   Review of systems not obtained due to patient factors. Vital Signs:    Last 24hrs VS reviewed since prior progress note.  Most recent are:  Visit Vitals    /84 (BP 1 Location: Right arm, BP Patient Position: At rest)    Pulse 68    Temp 98.4 °F (36.9 °C)    Resp 16    Ht 5' 9\" (1.753 m)    Wt 86.3 kg (190 lb 4.1 oz)    SpO2 100%    BMI 28.1 kg/m2       Intake/Output Summary (Last 24 hours) at 11/22/17 1022  Last data filed at 11/22/17 0000   Gross per 24 hour   Intake                0 ml   Output             1400 ml   Net            -1400 ml      Physical Examination:     Constitutional:  lethargic but arousable, groaning, no acute respiratory distress   ENT:  oral mucosa dry with dried blood  Neck supple,    Resp:  CTAB, no w/r/r   CV:  regular rhythm, normal rate, no m/r/g appreciated, no edema, +pulses    GI:  +BS, soft, non distended, non tender     Musculoskeletal:  moves all extremities    Neurologic:  less confused, AOx1, following simple commands     Skin:  warm, dry  Eyes:  unable to open left eyelid    Data Review:    Review and/or order of clinical lab test  Review and/or order of tests in the radiology section of CPT  Review and/or order of tests in the medicine section of CPT    Labs:     Recent Labs      11/20/17   0945   WBC  9.1   HGB  13.3   HCT  38.5   PLT  351     Recent Labs      11/22/17   0621  11/22/17   0006  11/21/17   1930   NA  127*  126*  124*   K  3.8  4.2  4.0   CL  92*  91*  90*   CO2  22  22  22   BUN  11  9  9   CREA  0.77  0.65*  0.61*   GLU  125*  124*  129*   CA  8.3*  8.5  8.3*     Recent Labs      11/21/17   1113  11/20/17   0945   SGOT  38*  51*   ALT  16  20   AP  79  96   TBILI  0.4  0.7   TP  6.3*  7.8   ALB  2.5*  3.0*   GLOB  3.8  4.8*     Recent Labs      11/20/17   0945   INR  1.1   PTP  11.1   APTT  33.9*      No results for input(s): FE, TIBC, PSAT, FERR in the last 72 hours. Lab Results   Component Value Date/Time    Folate 8.6 11/20/2017 03:03 PM      No results for input(s): PH, PCO2, PO2 in the last 72 hours. Recent Labs      11/22/17   0621  11/22/17   0006  11/21/17   1822   11/20/17   2323  11/20/17   1503   11/20/17   0945   CPK  717*  845*  918*   < >  1038*  1150*   < >   --    TROIQ   --    --    --    --   <0.04  <0.04   --   <0.04    < > = values in this interval not displayed.      Lab Results   Component Value Date/Time    Cholesterol, total 127 11/20/2017 03:03 PM    HDL Cholesterol 44 11/20/2017 03:03 PM    LDL, calculated 68 11/20/2017 03:03 PM    Triglyceride 75 11/20/2017 03:03 PM    CHOL/HDL Ratio 2.9 11/20/2017 03:03 PM     Lab Results   Component Value Date/Time    Glucose (POC) 108 11/20/2017 06:45 PM    Glucose (POC) 91 10/19/2017 10:34 AM    Glucose (POC) 112 12/22/2010 11:21 PM    Glucose (POC) 77 07/30/2009 10:57 AM    Glucose (POC) 102 07/30/2009 10:47 AM     Lab Results   Component Value Date/Time    Color YELLOW/STRAW 11/20/2017 10:04 AM    Appearance CLEAR 11/20/2017 10:04 AM    Specific gravity 1.018 11/20/2017 10:04 AM    pH (UA) 7.0 11/20/2017 10:04 AM    Protein NEGATIVE  11/20/2017 10:04 AM    Glucose NEGATIVE  11/20/2017 10:04 AM    Ketone TRACE 11/20/2017 10:04 AM    Bilirubin NEGATIVE  11/20/2017 10:04 AM    Urobilinogen 1.0 11/20/2017 10:04 AM    Nitrites NEGATIVE  11/20/2017 10:04 AM    Leukocyte Esterase NEGATIVE  11/20/2017 10:04 AM    Epithelial cells FEW 11/20/2017 10:04 AM    Bacteria NEGATIVE  11/20/2017 10:04 AM    WBC 0-4 11/20/2017 10:04 AM    RBC 0-5 11/20/2017 10:04 AM     Medications Reviewed:     Current Facility-Administered Medications   Medication Dose Route Frequency    levETIRAcetam (KEPPRA) 500 mg in saline (iso-osm) 100 ml IVPB  500 mg IntraVENous Q12H    dexamethasone (DECADRON) 4 mg/mL injection 4 mg  4 mg IntraVENous Q8H    sodium chloride (OCEAN) 0.65 % nasal spray 2 Spray  2 Spray Both Nostrils Q2H PRN    scopolamine (TRANSDERM-SCOP) 1.5 mg  1.5 mg TransDERmal Q72H    atorvastatin (LIPITOR) tablet 10 mg  10 mg Oral QHS    bacitracin 500 unit/gram ophthalmic ointment   Left Eye DAILY    sodium chloride (NS) flush 5-10 mL  5-10 mL IntraVENous Q8H    sodium chloride (NS) flush 5-10 mL  5-10 mL IntraVENous PRN    vancomycin (VANCOCIN) 1000 mg in  ml infusion  1,000 mg IntraVENous Q16H    levoFLOXacin (LEVAQUIN) 750 mg in D5W IVPB  750 mg IntraVENous Q24H    cefTRIAXone (ROCEPHIN) 2 g in 0.9% sodium chloride (MBP/ADV) 50 mL  2 g IntraVENous Q12H    Vancomycin - pharmacy to dose   Other Rx Dosing/Monitoring     ______________________________________________________________________  EXPECTED LENGTH OF STAY: 4d 21h  ACTUAL LENGTH OF STAY:          2                 Sudhir Sharpe MD

## 2017-11-23 ENCOUNTER — APPOINTMENT (OUTPATIENT)
Dept: GENERAL RADIOLOGY | Age: 82
DRG: 871 | End: 2017-11-23
Attending: FAMILY MEDICINE
Payer: MEDICARE

## 2017-11-23 LAB
ANION GAP SERPL CALC-SCNC: 12 MMOL/L (ref 5–15)
BUN SERPL-MCNC: 18 MG/DL (ref 6–20)
BUN/CREAT SERPL: 24 (ref 12–20)
CALCIUM SERPL-MCNC: 8.3 MG/DL (ref 8.5–10.1)
CHLORIDE SERPL-SCNC: 98 MMOL/L (ref 97–108)
CK SERPL-CCNC: 436 U/L (ref 39–308)
CO2 SERPL-SCNC: 22 MMOL/L (ref 21–32)
CREAT SERPL-MCNC: 0.76 MG/DL (ref 0.7–1.3)
ERYTHROCYTE [DISTWIDTH] IN BLOOD BY AUTOMATED COUNT: 13.9 % (ref 11.5–14.5)
GLUCOSE BLD STRIP.AUTO-MCNC: 111 MG/DL (ref 65–100)
GLUCOSE BLD STRIP.AUTO-MCNC: 130 MG/DL (ref 65–100)
GLUCOSE BLD STRIP.AUTO-MCNC: 132 MG/DL (ref 65–100)
GLUCOSE SERPL-MCNC: 133 MG/DL (ref 65–100)
HCT VFR BLD AUTO: 32.6 % (ref 36.6–50.3)
HGB BLD-MCNC: 11.5 G/DL (ref 12.1–17)
MCH RBC QN AUTO: 31.9 PG (ref 26–34)
MCHC RBC AUTO-ENTMCNC: 35.3 G/DL (ref 30–36.5)
MCV RBC AUTO: 90.3 FL (ref 80–99)
PLATELET # BLD AUTO: 302 K/UL (ref 150–400)
POTASSIUM SERPL-SCNC: 3.9 MMOL/L (ref 3.5–5.1)
RBC # BLD AUTO: 3.61 M/UL (ref 4.1–5.7)
SERVICE CMNT-IMP: ABNORMAL
SODIUM SERPL-SCNC: 132 MMOL/L (ref 136–145)
WBC # BLD AUTO: 14.6 K/UL (ref 4.1–11.1)

## 2017-11-23 PROCEDURE — 80048 BASIC METABOLIC PNL TOTAL CA: CPT | Performed by: INTERNAL MEDICINE

## 2017-11-23 PROCEDURE — 36415 COLL VENOUS BLD VENIPUNCTURE: CPT | Performed by: INTERNAL MEDICINE

## 2017-11-23 PROCEDURE — 82550 ASSAY OF CK (CPK): CPT | Performed by: INTERNAL MEDICINE

## 2017-11-23 PROCEDURE — 74011250637 HC RX REV CODE- 250/637: Performed by: INTERNAL MEDICINE

## 2017-11-23 PROCEDURE — 82962 GLUCOSE BLOOD TEST: CPT

## 2017-11-23 PROCEDURE — 74011250636 HC RX REV CODE- 250/636: Performed by: FAMILY MEDICINE

## 2017-11-23 PROCEDURE — 74011250636 HC RX REV CODE- 250/636: Performed by: NEUROLOGICAL SURGERY

## 2017-11-23 PROCEDURE — 85027 COMPLETE CBC AUTOMATED: CPT | Performed by: INTERNAL MEDICINE

## 2017-11-23 PROCEDURE — 65660000000 HC RM CCU STEPDOWN

## 2017-11-23 PROCEDURE — 74011000258 HC RX REV CODE- 258: Performed by: FAMILY MEDICINE

## 2017-11-23 RX ADMIN — Medication 10 ML: at 05:39

## 2017-11-23 RX ADMIN — LEVETIRACETAM 500 MG: 5 INJECTION INTRAVENOUS at 19:39

## 2017-11-23 RX ADMIN — VANCOMYCIN HYDROCHLORIDE 1000 MG: 10 INJECTION, POWDER, LYOPHILIZED, FOR SOLUTION INTRAVENOUS at 01:19

## 2017-11-23 RX ADMIN — DEXAMETHASONE SODIUM PHOSPHATE 4 MG: 4 INJECTION, SOLUTION INTRAMUSCULAR; INTRAVENOUS at 21:22

## 2017-11-23 RX ADMIN — Medication 10 ML: at 16:32

## 2017-11-23 RX ADMIN — LEVETIRACETAM 500 MG: 5 INJECTION INTRAVENOUS at 05:36

## 2017-11-23 RX ADMIN — LEVOFLOXACIN 750 MG: 5 INJECTION, SOLUTION INTRAVENOUS at 16:38

## 2017-11-23 RX ADMIN — CEFTRIAXONE 2 G: 2 INJECTION, POWDER, FOR SOLUTION INTRAMUSCULAR; INTRAVENOUS at 09:40

## 2017-11-23 RX ADMIN — DEXAMETHASONE SODIUM PHOSPHATE 4 MG: 4 INJECTION, SOLUTION INTRAMUSCULAR; INTRAVENOUS at 05:38

## 2017-11-23 RX ADMIN — DEXAMETHASONE SODIUM PHOSPHATE 4 MG: 4 INJECTION, SOLUTION INTRAMUSCULAR; INTRAVENOUS at 16:32

## 2017-11-23 RX ADMIN — BACITRACIN: 500 OINTMENT OPHTHALMIC at 09:49

## 2017-11-23 RX ADMIN — Medication 10 ML: at 22:24

## 2017-11-23 RX ADMIN — SALINE NASAL SPRAY 2 SPRAY: 1.5 SOLUTION NASAL at 09:46

## 2017-11-23 RX ADMIN — CEFTRIAXONE 2 G: 2 INJECTION, POWDER, FOR SOLUTION INTRAMUSCULAR; INTRAVENOUS at 21:17

## 2017-11-23 NOTE — PROGRESS NOTES
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 Na 132 today - stable; Patient conversing normally. Current Medications: all current  Medications have been eviewed in EPIC  Review of Systems: Pertinent items are noted in HPI. Objective:  Vitals:    Vitals:    11/22/17 2300 11/23/17 0300 11/23/17 0700 11/23/17 1100   BP: 131/69 135/76 133/71 138/65   Pulse: 71 74 (!) 57 67   Resp: 16 17 16 18   Temp: 98.2 °F (36.8 °C) 98 °F (36.7 °C) 98.3 °F (36.8 °C) 97.7 °F (36.5 °C)   SpO2: 99% 100% 99% 94%   Weight:  86.2 kg (190 lb 0.6 oz)     Height:         Intake and Output:     11/21 1901 - 11/23 0700  In: -   Out: 0326 [Urine:1850]    Physical Examination:  General: A+O, not SOB  Neck:  Supple, no mass  Resp:  Lungs few expir ronchi  CV:  RRR,  no murmur or rub, trace LE edema  GI:  Soft, NT, + Bowel sounds  Neurologic:  Non focal  Psych:             AAO x 3 appropriate affect   Skin:  No Rash    []    High complexity decision making was performed  []    Patient is at high-risk of decompensation with multiple organ involvement    Lab Data Personally Reviewed: I have reviewed all the pertinent labs, microbiology data and radiology studies during assessment.     Recent Labs      11/23/17   0550  11/22/17   2049  11/22/17   1311   11/21/17   1113   NA  132*  133*  127*   < >  121*   K  3.9  3.7  3.9   < >  4.3   CL  98  101  92*   < >  87* CO2  22  23  25   < >  23   GLU  133*  114*  126*   < >  109*   BUN  18  15  15   < >  8   CREA  0.76  0.72  0.71   < >  0.66*   CA  8.3*  8.2*  8.8   < >  7.8*   ALB   --    --    --    --   2.5*   SGOT   --    --    --    --   38*   ALT   --    --    --    --   16    < > = values in this interval not displayed.      Recent Labs      11/23/17   0550   WBC  14.6*   HGB  11.5*   HCT  32.6*   PLT  302     No results found for: SDES  Lab Results   Component Value Date/Time    Culture result: NO GROWTH 3 DAYS 11/20/2017 09:45 AM    Culture result: HEAVY  NORMAL RESPIRATORY TRACIE   10/24/2015 04:03 PM    Culture result: MODERATE  YEAST   10/24/2015 04:03 PM     Recent Results (from the past 24 hour(s))   OSMOLALITY, UR    Collection Time: 11/22/17  2:30 PM   Result Value Ref Range    Osmolality,urine 212 MOSM/kg H2O   OSMOLALITY, UR    Collection Time: 11/22/17  3:45 PM   Result Value Ref Range    Osmolality,urine 470 MOSM/kg H2O   GLUCOSE, POC    Collection Time: 11/22/17  5:50 PM   Result Value Ref Range    Glucose (POC) 142 (H) 65 - 100 mg/dL    Performed by [employer]    METABOLIC PANEL, BASIC    Collection Time: 11/22/17  8:49 PM   Result Value Ref Range    Sodium 133 (L) 136 - 145 mmol/L    Potassium 3.7 3.5 - 5.1 mmol/L    Chloride 101 97 - 108 mmol/L    CO2 23 21 - 32 mmol/L    Anion gap 9 5 - 15 mmol/L    Glucose 114 (H) 65 - 100 mg/dL    BUN 15 6 - 20 MG/DL    Creatinine 0.72 0.70 - 1.30 MG/DL    BUN/Creatinine ratio 21 (H) 12 - 20      GFR est AA >60 >60 ml/min/1.73m2    GFR est non-AA >60 >60 ml/min/1.73m2    Calcium 8.2 (L) 8.5 - 10.1 MG/DL   GLUCOSE, POC    Collection Time: 11/22/17 11:26 PM   Result Value Ref Range    Glucose (POC) 134 (H) 65 - 100 mg/dL    Performed by Karene Pancoast    CK    Collection Time: 11/23/17  5:50 AM   Result Value Ref Range     (H) 39 - 088 U/L   METABOLIC PANEL, BASIC    Collection Time: 11/23/17  5:50 AM   Result Value Ref Range    Sodium 132 (L) 136 - 145 mmol/L Potassium 3.9 3.5 - 5.1 mmol/L    Chloride 98 97 - 108 mmol/L    CO2 22 21 - 32 mmol/L    Anion gap 12 5 - 15 mmol/L    Glucose 133 (H) 65 - 100 mg/dL    BUN 18 6 - 20 MG/DL    Creatinine 0.76 0.70 - 1.30 MG/DL    BUN/Creatinine ratio 24 (H) 12 - 20      GFR est AA >60 >60 ml/min/1.73m2    GFR est non-AA >60 >60 ml/min/1.73m2    Calcium 8.3 (L) 8.5 - 10.1 MG/DL   CBC W/O DIFF    Collection Time: 11/23/17  5:50 AM   Result Value Ref Range    WBC 14.6 (H) 4.1 - 11.1 K/uL    RBC 3.61 (L) 4.10 - 5.70 M/uL    HGB 11.5 (L) 12.1 - 17.0 g/dL    HCT 32.6 (L) 36.6 - 50.3 %    MCV 90.3 80.0 - 99.0 FL    MCH 31.9 26.0 - 34.0 PG    MCHC 35.3 30.0 - 36.5 g/dL    RDW 13.9 11.5 - 14.5 %    PLATELET 111 807 - 182 K/uL   GLUCOSE, POC    Collection Time: 11/23/17 11:45 AM   Result Value Ref Range    Glucose (POC) 130 (H) 65 - 100 mg/dL    Performed by Yvette Cortes MD  [address]  Phone - [phone]   Fax - [phone]  www. [facility]

## 2017-11-23 NOTE — ROUTINE PROCESS
Bedside and Verbal shift change report given to [address] [place] [address] Street (oncoming nurse) by Renard Navarro (offgoing nurse). Report included the following information SBAR, Kardex, ED Summary, OR Summary, Procedure Summary, Intake/Output, MAR, Accordion, Recent Results, Med Rec Status and Cardiac Rhythm NSR.

## 2017-11-23 NOTE — PROGRESS NOTES
Hospitalist Progress Note  Iwona Durbin MD  Answering service: [phone] from in house phone      Date of Service:  2017  NAME:  Shruthi Garrett  :  11/10/1930  MRN:  [id number]      Admission Summary:   79 yo man with GERD, HLD, trigeminal neuralgia, h/o left maxillary sinus SCC, h/o TIA, h/o skin CA, and s/p recent craniotomy presented to the ED from home on 17 with confusion. He was treated with gamma knife radiosurgery for left trigeminal neuralgia pain in . He underwent chemotherapy and radiation for the squamous cell cancer in . The patient started developing some word-finding difficulties and balance   issues, was seen by Neurosurgery, an MRI was done which showed a 3 cm mass in the left temporal lobe with cerebral edema. This was concerning for malignancy versus radiation necrosis. He was given some steroids that improved his symptoms to a certain degree, but he underwent a left temporal craniotomy on 10/19/2017 with resection of the tumor. 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 He was admitted for acute encephalopathy.     Interval history / Subjective:   No complaints today; denies pain; awake and alert and appropriate; seen with nurse     Assessment & Plan:     Acute encephalopathy (POA)  - likely due to acute on chronic hyponatremia  - stop IVNS and placed on free water restriction  - imaging no acute pathology  - NSGY consulted  - continue steroid  - TSH WNL; check B12  - resolving with improvement in hyponatremia    Afebrile leucocytosis - likely due to IV steroid    Acute on chronic hyponatremia (POA)  - likely due to SIADH +/- oxcarbazepine (on hold)  - stopped IVNS and placed on free water restriction  - Renal consulted at [address] rec  - improving s/p 3% saline x2     Acute rhabdomyolysis (POA) - CK trending down with IVF    S/p craniotomy - continue Keppra and steroid; NSGY following, stable    GERD - resume home med    Nasal and chest congestion  - difficulty clearing secretions; s/p deep NT suction  - much improved with scopolamine patch per Nutrition rec    Sepsis with possible aspiration PNA (POA)  - fever, tachypnea on admission  - SLP following  - empiric ceftriaxone, levofloxacin started; de-escalate as indicated  - s/p vancomycin   - BCx 11/20 NGTD  - order DHT placement and start TFs    Code status: full  DVT prophylaxis: Alexus Carmona discussed with: Patient/Family, Nurse and   Disposition: TBD. Came from home but will need SNF on discharge     Hospital Problems  Date Reviewed: 11/20/2017          Codes Class Noted POA    * (Principal)Altered mental status ICD-10-CM: R41.82  ICD-9-CM: 780.97  11/20/2017 Unknown            Review of Systems:   Review of systems not obtained due to patient factors. Vital Signs:    Last 24hrs VS reviewed since prior progress note.  Most recent are:  Visit Vitals    /65 (BP 1 Location: Left arm, BP Patient Position: At rest;Supine)    Pulse 67    Temp 97.7 °F (36.5 °C)    Resp 18    Ht 5' 9\" (1.753 m)    Wt 86.2 kg (190 lb 0.6 oz)    SpO2 94%    BMI 28.06 kg/m2       Intake/Output Summary (Last 24 hours) at 11/23/17 1215  Last data filed at 11/22/17 1542   Gross per 24 hour   Intake                0 ml   Output              450 ml   Net             -450 ml      Physical Examination:     Constitutional:  awake, mild dysarthria, no acute respiratory distress   ENT:  oral mucosa dry  Neck supple,    Resp:  CTAB, no w/r/r   CV:  regular rhythm, normal rate, no m/r/g appreciated, no edema, +pulses    GI:  +BS, soft, non distended, non tender     Musculoskeletal:  moves all extremities    Neurologic:  awake, AOx3, following simple commands     Skin:  warm, dry  Eyes:  unable to open left eyelid    Data Review:    Review and/or order of clinical lab test  Review and/or order of tests in the radiology section of CPT  Review and/or order of tests in the medicine section of CPT    Labs:     Recent Labs      11/23/17   0550   WBC  14.6*   HGB  11.5*   HCT  32.6*   PLT  302     Recent Labs      11/23/17   0550  11/22/17   2049  11/22/17   1311   NA  132*  133*  127*   K  3.9  3.7  3.9   CL  98  101  92*   CO2  22  23  25   BUN  18  15  15   CREA  0.76  0.72  0.71   GLU  133*  114*  126*   CA  8.3*  8.2*  8.8     Recent Labs      11/21/17   1113   SGOT  38*   ALT  16   AP  79   TBILI  0.4   TP  6.3*   ALB  2.5*   GLOB  3.8     No results for input(s): INR, PTP, APTT in the last 72 hours. No lab exists for component: INREXT, INREXT   No results for input(s): FE, TIBC, PSAT, FERR in the last 72 hours. Lab Results   Component Value Date/Time    Folate 8.6 11/20/2017 03:03 PM      No results for input(s): PH, PCO2, PO2 in the last 72 hours. Recent Labs      11/23/17   0550  11/22/17   1311  11/22/17   0621   11/20/17   2323  11/20/17   1503   CPK  436*  637*  717*   < >  1038*  1150*   TROIQ   --    --    --    --   <0.04  <0.04    < > = values in this interval not displayed.      Lab Results   Component Value Date/Time    Cholesterol, total 127 11/20/2017 03:03 PM    HDL Cholesterol 44 11/20/2017 03:03 PM    LDL, calculated 68 11/20/2017 03:03 PM    Triglyceride 75 11/20/2017 03:03 PM    CHOL/HDL Ratio 2.9 11/20/2017 03:03 PM     Lab Results   Component Value Date/Time    Glucose (POC) 130 11/23/2017 11:45 AM    Glucose (POC) 134 11/22/2017 11:26 PM    Glucose (POC) 142 11/22/2017 05:50 PM    Glucose (POC) 108 11/20/2017 06:45 PM    Glucose (POC) 91 10/19/2017 10:34 AM     Lab Results   Component Value Date/Time    Color YELLOW/STRAW 11/20/2017 10:04 AM    Appearance CLEAR 11/20/2017 10:04 AM    Specific gravity 1.018 11/20/2017 10:04 AM    pH (UA) 7.0 11/20/2017 10:04 AM    Protein NEGATIVE  11/20/2017 10:04 AM    Glucose NEGATIVE  11/20/2017 10:04 AM    Ketone TRACE 11/20/2017 10:04 AM    Bilirubin NEGATIVE  11/20/2017 10:04 AM    Urobilinogen 1.0 11/20/2017 10:04 AM    Nitrites NEGATIVE  11/20/2017 10:04 AM    Leukocyte Esterase NEGATIVE  11/20/2017 10:04 AM    Epithelial cells FEW 11/20/2017 10:04 AM    Bacteria NEGATIVE  11/20/2017 10:04 AM    WBC 0-4 11/20/2017 10:04 AM    RBC 0-5 11/20/2017 10:04 AM     Medications Reviewed:     Current Facility-Administered Medications   Medication Dose Route Frequency    levETIRAcetam (KEPPRA) 500 mg in saline (iso-osm) 100 ml IVPB  500 mg IntraVENous Q12H    dexamethasone (DECADRON) 4 mg/mL injection 4 mg  4 mg IntraVENous Q8H    sodium chloride (OCEAN) 0.65 % nasal spray 2 Spray  2 Spray Both Nostrils Q2H PRN    scopolamine (TRANSDERM-SCOP) 1.5 mg  1.5 mg TransDERmal Q72H    atorvastatin (LIPITOR) tablet 10 mg  10 mg Oral QHS    bacitracin 500 unit/gram ophthalmic ointment   Left Eye DAILY    sodium chloride (NS) flush 5-10 mL  5-10 mL IntraVENous Q8H    sodium chloride (NS) flush 5-10 mL  5-10 mL IntraVENous PRN    levoFLOXacin (LEVAQUIN) 750 mg in D5W IVPB  750 mg IntraVENous Q24H    cefTRIAXone (ROCEPHIN) 2 g in 0.9% sodium chloride (MBP/ADV) 50 mL  2 g IntraVENous Q12H     ______________________________________________________________________  EXPECTED LENGTH OF STAY: 4d 21h  ACTUAL LENGTH OF STAY:          Junior Gavin MD

## 2017-11-23 NOTE — PROGRESS NOTES
2230- Orders to call Nephrologist on call if Na below 127 or above 131 for 2000 lab draw. Result was 133, Dr. Mariam Sal paged and informed. No orders received.

## 2017-11-23 NOTE — PROGRESS NOTES
Problem: Falls - Risk of  Goal: *Absence of Falls  Document Madhu Fall Risk and appropriate interventions in the flowsheet.    Outcome: Progressing Towards Goal  Fall Risk Interventions:  Mobility Interventions: Communicate number of staff needed for ambulation/transfer, OT consult for ADLs, Patient to call before getting OOB, PT Consult for mobility concerns, PT Consult for assist device competence, Strengthening exercises (ROM-active/passive), Utilize walker, cane, or other assitive device, Utilize gait belt for transfers/ambulation    Mentation Interventions: Adequate sleep, hydration, pain control, Door open when patient unattended, Eyeglasses and hearing aids, Gait belt with transfers/ambulation, Increase mobility, More frequent rounding, Toileting rounds    Medication Interventions: Assess postural VS orthostatic hypotension, Evaluate medications/consider consulting pharmacy, Patient to call before getting OOB, Teach patient to arise slowly    Elimination Interventions: Call light in reach, Patient to call for help with toileting needs, Toileting schedule/hourly rounds, Urinal in reach

## 2017-11-23 NOTE — PROGRESS NOTES
Bedside shift change report given to Cam Lyn (oncoming nurse) by Antonia Rucker RN (offgoing nurse). Report included the following information SBAR, Kardex, Intake/Output, MAR and Cardiac Rhythm NSR.

## 2017-11-24 ENCOUNTER — APPOINTMENT (OUTPATIENT)
Dept: GENERAL RADIOLOGY | Age: 82
DRG: 871 | End: 2017-11-24
Attending: FAMILY MEDICINE
Payer: MEDICARE

## 2017-11-24 ENCOUNTER — APPOINTMENT (OUTPATIENT)
Dept: GENERAL RADIOLOGY | Age: 82
DRG: 871 | End: 2017-11-24
Attending: INTERNAL MEDICINE
Payer: MEDICARE

## 2017-11-24 LAB
ANION GAP SERPL CALC-SCNC: 11 MMOL/L (ref 5–15)
BASOPHILS # BLD: 0 K/UL (ref 0–0.1)
BASOPHILS NFR BLD: 0 % (ref 0–1)
BUN SERPL-MCNC: 21 MG/DL (ref 6–20)
BUN/CREAT SERPL: 28 (ref 12–20)
CALCIUM SERPL-MCNC: 8.2 MG/DL (ref 8.5–10.1)
CHLORIDE SERPL-SCNC: 99 MMOL/L (ref 97–108)
CK SERPL-CCNC: 333 U/L (ref 39–308)
CO2 SERPL-SCNC: 23 MMOL/L (ref 21–32)
CREAT SERPL-MCNC: 0.75 MG/DL (ref 0.7–1.3)
DIFFERENTIAL METHOD BLD: ABNORMAL
EOSINOPHIL # BLD: 0 K/UL (ref 0–0.4)
EOSINOPHIL NFR BLD: 0 % (ref 0–7)
ERYTHROCYTE [DISTWIDTH] IN BLOOD BY AUTOMATED COUNT: 14.3 % (ref 11.5–14.5)
GLUCOSE BLD STRIP.AUTO-MCNC: 113 MG/DL (ref 65–100)
GLUCOSE BLD STRIP.AUTO-MCNC: 117 MG/DL (ref 65–100)
GLUCOSE BLD STRIP.AUTO-MCNC: 126 MG/DL (ref 65–100)
GLUCOSE BLD STRIP.AUTO-MCNC: 198 MG/DL (ref 65–100)
GLUCOSE SERPL-MCNC: 120 MG/DL (ref 65–100)
HCT VFR BLD AUTO: 32.5 % (ref 36.6–50.3)
HGB BLD-MCNC: 11.1 G/DL (ref 12.1–17)
LYMPHOCYTES # BLD: 0.5 K/UL (ref 0.8–3.5)
LYMPHOCYTES NFR BLD: 4 % (ref 12–49)
MCH RBC QN AUTO: 31.5 PG (ref 26–34)
MCHC RBC AUTO-ENTMCNC: 34.2 G/DL (ref 30–36.5)
MCV RBC AUTO: 92.3 FL (ref 80–99)
MONOCYTES # BLD: 0.5 K/UL (ref 0–1)
MONOCYTES NFR BLD: 4 % (ref 5–13)
MYELOCYTES NFR BLD MANUAL: 1 %
NEUTS BAND NFR BLD MANUAL: 1 % (ref 0–6)
NEUTS SEG # BLD: 11.5 K/UL (ref 1.8–8)
NEUTS SEG NFR BLD: 90 % (ref 32–75)
PLATELET # BLD AUTO: 323 K/UL (ref 150–400)
PLATELET COMMENTS,PCOM: ABNORMAL
POTASSIUM SERPL-SCNC: 3.8 MMOL/L (ref 3.5–5.1)
RBC # BLD AUTO: 3.52 M/UL (ref 4.1–5.7)
RBC MORPH BLD: ABNORMAL
RBC MORPH BLD: ABNORMAL
SERVICE CMNT-IMP: ABNORMAL
SODIUM SERPL-SCNC: 133 MMOL/L (ref 136–145)
VIT B12 SERPL-MCNC: 1322 PG/ML (ref 211–911)
WBC # BLD AUTO: 12.6 K/UL (ref 4.1–11.1)

## 2017-11-24 PROCEDURE — 82550 ASSAY OF CK (CPK): CPT | Performed by: INTERNAL MEDICINE

## 2017-11-24 PROCEDURE — 74011250637 HC RX REV CODE- 250/637: Performed by: INTERNAL MEDICINE

## 2017-11-24 PROCEDURE — 80048 BASIC METABOLIC PNL TOTAL CA: CPT | Performed by: INTERNAL MEDICINE

## 2017-11-24 PROCEDURE — 36415 COLL VENOUS BLD VENIPUNCTURE: CPT | Performed by: INTERNAL MEDICINE

## 2017-11-24 PROCEDURE — 71010 XR CHEST PORT: CPT

## 2017-11-24 PROCEDURE — 82962 GLUCOSE BLOOD TEST: CPT

## 2017-11-24 PROCEDURE — 74011250636 HC RX REV CODE- 250/636: Performed by: FAMILY MEDICINE

## 2017-11-24 PROCEDURE — 85025 COMPLETE CBC W/AUTO DIFF WBC: CPT | Performed by: INTERNAL MEDICINE

## 2017-11-24 PROCEDURE — 74011000258 HC RX REV CODE- 258: Performed by: FAMILY MEDICINE

## 2017-11-24 PROCEDURE — 92526 ORAL FUNCTION THERAPY: CPT | Performed by: SPEECH-LANGUAGE PATHOLOGIST

## 2017-11-24 PROCEDURE — 97116 GAIT TRAINING THERAPY: CPT

## 2017-11-24 PROCEDURE — 82607 VITAMIN B-12: CPT | Performed by: INTERNAL MEDICINE

## 2017-11-24 PROCEDURE — 65660000000 HC RM CCU STEPDOWN

## 2017-11-24 PROCEDURE — 74011250637 HC RX REV CODE- 250/637: Performed by: FAMILY MEDICINE

## 2017-11-24 PROCEDURE — 74011250636 HC RX REV CODE- 250/636: Performed by: NEUROLOGICAL SURGERY

## 2017-11-24 RX ORDER — LEVETIRACETAM 500 MG/1
500 TABLET ORAL 2 TIMES DAILY
Status: DISCONTINUED | OUTPATIENT
Start: 2017-11-24 | End: 2017-11-25 | Stop reason: HOSPADM

## 2017-11-24 RX ORDER — GABAPENTIN 600 MG/1
300 TABLET ORAL 2 TIMES DAILY
Status: DISCONTINUED | OUTPATIENT
Start: 2017-11-24 | End: 2017-11-25 | Stop reason: HOSPADM

## 2017-11-24 RX ADMIN — DEXAMETHASONE SODIUM PHOSPHATE 4 MG: 4 INJECTION, SOLUTION INTRAMUSCULAR; INTRAVENOUS at 16:51

## 2017-11-24 RX ADMIN — DEXAMETHASONE SODIUM PHOSPHATE 4 MG: 4 INJECTION, SOLUTION INTRAMUSCULAR; INTRAVENOUS at 06:47

## 2017-11-24 RX ADMIN — BACITRACIN: 500 OINTMENT OPHTHALMIC at 10:06

## 2017-11-24 RX ADMIN — Medication 10 ML: at 06:47

## 2017-11-24 RX ADMIN — CEFTRIAXONE 2 G: 2 INJECTION, POWDER, FOR SOLUTION INTRAMUSCULAR; INTRAVENOUS at 21:00

## 2017-11-24 RX ADMIN — DEXAMETHASONE SODIUM PHOSPHATE 4 MG: 4 INJECTION, SOLUTION INTRAMUSCULAR; INTRAVENOUS at 21:00

## 2017-11-24 RX ADMIN — LEVETIRACETAM 500 MG: 500 TABLET ORAL at 20:59

## 2017-11-24 RX ADMIN — LEVETIRACETAM 500 MG: 5 INJECTION INTRAVENOUS at 06:50

## 2017-11-24 RX ADMIN — CEFTRIAXONE 2 G: 2 INJECTION, POWDER, FOR SOLUTION INTRAMUSCULAR; INTRAVENOUS at 09:30

## 2017-11-24 RX ADMIN — Medication 10 ML: at 21:00

## 2017-11-24 RX ADMIN — Medication 10 ML: at 16:51

## 2017-11-24 RX ADMIN — GABAPENTIN 300 MG: 600 TABLET, FILM COATED ORAL at 20:59

## 2017-11-24 RX ADMIN — LEVOFLOXACIN 750 MG: 5 INJECTION, SOLUTION INTRAVENOUS at 15:28

## 2017-11-24 RX ADMIN — SALINE NASAL SPRAY 2 SPRAY: 1.5 SOLUTION NASAL at 10:06

## 2017-11-24 RX ADMIN — ATORVASTATIN CALCIUM 10 MG: 10 TABLET, FILM COATED ORAL at 21:00

## 2017-11-24 NOTE — PROGRESS NOTES
Bedside shift change report given to CRUZ Serrato (oncoming nurse) by Kelly Funez (offgoing nurse). Report included the following information SBAR, Kardex, Intake/Output, MAR, Recent Results and Cardiac Rhythm Sinus mary lou with 1degree AV block.

## 2017-11-24 NOTE — PROGRESS NOTES
Problem: Mobility Impaired (Adult and Pediatric)  Goal: *Acute Goals and Plan of Care (Insert Text)  Physical Therapy Goals  Initiated 11/22/2017  1. Patient will move from supine to sit and sit to supine , scoot up and down and roll side to side in bed with modified independence within 7 day(s). 2.  Patient will transfer from bed to chair and chair to bed with modified independence using the least restrictive device within 7 day(s). 3.  Patient will perform sit to stand with modified independence within 7 day(s). 4.  Patient will ambulate with modified independence for 300 feet with the least restrictive device within 7 day(s). 5.  Patient will ascend/descend 2 stairs with 0 handrail(s) with supervision/set-up within 7 day(s). physical Therapy TREATMENT  Patient: Melanie Leung (41 y.o. male)  Date: 11/24/2017  Diagnosis: Altered mental status Altered mental status       Precautions: Fall    ASSESSMENT:  Patient continues to progress toward goals. Requires increased time for all mobility, appears to be related to [facility] [employer] Overall remains mod A for bed mobility and min A for gait. Increased ambulation to 40 feet with HHA although mod A x 1 due to path deviations and generally unsteady, tends to demonstrate LOB posteriorly. Patient able to stand for toileting and to wash hands, min A for balance with both tasks. Patient remained OOB in chair at end of session with family present. Continue to recommend IP rehab at discharge. Over the weekend recommend patient OOB to chair for all meals-family aware. Progression toward goals:  []    Improving appropriately and progressing toward goals  [x]    Improving slowly and progressing toward goals  []    Not making progress toward goals and plan of care will be adjusted     PLAN:  Patient continues to benefit from skilled intervention to address the above impairments. Continue treatment per established plan of care.   Discharge Recommendations:  Inpatient Rehab  Further Equipment Recommendations for Discharge:  TBD     SUBJECTIVE:   Patient stated I need to pee.     OBJECTIVE DATA SUMMARY:   Critical Behavior:  Neurologic State: Alert  Orientation Level: Oriented to person, Oriented to place, Disoriented to time, Disoriented to situation (knows November)  Cognition: Decreased command following, Decreased attention/concentration  Safety/Judgement: Decreased awareness of environment, Decreased awareness of need for assistance, Decreased awareness of need for safety, Decreased insight into deficits  Functional Mobility Training:  Bed Mobility:     Supine to Sit: Moderate assistance;Assist x1;Additional time              Transfers:  Sit to Stand: Minimum assistance  Stand to Sit: Minimum assistance  Stand Pivot Transfers: Minimum assistance                          Balance:  Sitting: Intact; With support  Standing: Impaired  Standing - Static: Fair;Constant support (slight posterior COG)  Standing - Dynamic : Fair  Ambulation/Gait Training:  Distance (ft): 40 Feet (ft)  Assistive Device: Gait belt (HHA)  Ambulation - Level of Assistance: Moderate assistance        Gait Abnormalities: Decreased step clearance        Base of Support: Narrowed     Speed/Olive: Pace decreased (<100 feet/min)  Step Length: Right shortened;Left shortened                    Stairs:            Neuro Re-Education:    Therapeutic Exercises:     Pain:  Pain Scale 1: Numeric (0 - 10)  Pain Intensity 1: 0              Activity Tolerance:   Improving  Please refer to the flowsheet for vital signs taken during this treatment.   After treatment:   [x]    Patient left in no apparent distress sitting up in chair  []    Patient left in no apparent distress in bed  [x]    Call bell left within reach  [x]    Nursing notified  [x]    Caregiver present  [x]    Chair alarm activated    COMMUNICATION/COLLABORATION:   The patients plan of care was discussed with: Registered Nurse    Shelli Holland, PT   Time Calculation: 15 mins

## 2017-11-24 NOTE — PROGRESS NOTES
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 Na 133 today - stable; A+O     Current Medications: all current  Medications have been eviewed in EPIC  Review of Systems: Pertinent items are noted in HPI. Objective:  Vitals:    Vitals:    11/23/17 2300 11/24/17 0300 11/24/17 0700 11/24/17 1100   BP: 132/73 137/70 154/73 146/81   Pulse: 67 (!) 53 (!) 52 (!) 54   Resp: 21 17 17 19   Temp: 97.8 °F (36.6 °C) 97.6 °F (36.4 °C) 97.6 °F (36.4 °C) 97.6 °F (36.4 °C)   SpO2: 94%  (!) 1% 96%   Weight:  86.4 kg (190 lb 7.6 oz)     Height:         Intake and Output:          Physical Examination:  General: A+O, not SOB  Neck:  Supple, no mass  Resp:  Few ronchi  CV:  RRR,  no murmur or rub, trace LE edema  GI:  Soft, NT, + Bowel sounds  Neurologic:  Non focal  Psych:             AAO x 3 appropriate affect   Skin:  No Rash    []    High complexity decision making was performed  []    Patient is at high-risk of decompensation with multiple organ involvement    Lab Data Personally Reviewed: I have reviewed all the pertinent labs, microbiology data and radiology studies during assessment.     Recent Labs      11/24/17   0242  11/23/17   0550  11/22/17 2049   NA  133*  132*  133*   K  3.8  3.9  3.7   CL  99  98  101   CO2  23  22  23   GLU  120*  133*  114*   BUN  21*  18  15   CREA  0.75  0.76  0.72   CA  8.2*  8.3*  8.2*     Recent Labs 11/24/17   0242  11/23/17   0550   WBC  12.6*  14.6*   HGB  11.1*  11.5*   HCT  32.5*  32.6*   PLT  323  302     No results found for: SDES  Lab Results   Component Value Date/Time    Culture result: NO GROWTH 4 DAYS 11/20/2017 09:45 AM    Culture result: HEAVY  NORMAL RESPIRATORY TRACIE   10/24/2015 04:03 PM    Culture result: MODERATE  YEAST   10/24/2015 04:03 PM     Recent Results (from the past 24 hour(s))   GLUCOSE, POC    Collection Time: 11/23/17  5:56 PM   Result Value Ref Range    Glucose (POC) 132 (H) 65 - 100 mg/dL    Performed by Cheryl Anglin    GLUCOSE, POC    Collection Time: 11/23/17 11:34 PM   Result Value Ref Range    Glucose (POC) 111 (H) 65 - 100 mg/dL    Performed by Karely Durant    VITAMIN B12    Collection Time: 11/24/17  2:42 AM   Result Value Ref Range    Vitamin B12 1322 (H) 211 - 823 pg/mL   METABOLIC PANEL, BASIC    Collection Time: 11/24/17  2:42 AM   Result Value Ref Range    Sodium 133 (L) 136 - 145 mmol/L    Potassium 3.8 3.5 - 5.1 mmol/L    Chloride 99 97 - 108 mmol/L    CO2 23 21 - 32 mmol/L    Anion gap 11 5 - 15 mmol/L    Glucose 120 (H) 65 - 100 mg/dL    BUN 21 (H) 6 - 20 MG/DL    Creatinine 0.75 0.70 - 1.30 MG/DL    BUN/Creatinine ratio 28 (H) 12 - 20      GFR est AA >60 >60 ml/min/1.73m2    GFR est non-AA >60 >60 ml/min/1.73m2    Calcium 8.2 (L) 8.5 - 10.1 MG/DL   CBC WITH AUTOMATED DIFF    Collection Time: 11/24/17  2:42 AM   Result Value Ref Range    WBC 12.6 (H) 4.1 - 11.1 K/uL    RBC 3.52 (L) 4.10 - 5.70 M/uL    HGB 11.1 (L) 12.1 - 17.0 g/dL    HCT 32.5 (L) 36.6 - 50.3 %    MCV 92.3 80.0 - 99.0 FL    MCH 31.5 26.0 - 34.0 PG    MCHC 34.2 30.0 - 36.5 g/dL    RDW 14.3 11.5 - 14.5 %    PLATELET 568 145 - 480 K/uL    NEUTROPHILS 90 (H) 32 - 75 %    BAND NEUTROPHILS 1 0 - 6 %    LYMPHOCYTES 4 (L) 12 - 49 %    MONOCYTES 4 (L) 5 - 13 %    EOSINOPHILS 0 0 - 7 %    BASOPHILS 0 0 - 1 %    MYELOCYTES 1 (H) 0 %    ABS. NEUTROPHILS 11.5 (H) 1.8 - 8.0 K/UL    ABS.  LYMPHOCYTES 0.5 (L) 0.8 - 3.5 K/UL    ABS. MONOCYTES 0.5 0.0 - 1.0 K/UL    ABS. EOSINOPHILS 0.0 0.0 - 0.4 K/UL    ABS. BASOPHILS 0.0 0.0 - 0.1 K/UL    DF MANUAL      PLATELET COMMENTS LARGE PLATELETS      RBC COMMENTS NURIS CELLS  PRESENT        RBC COMMENTS ANISOCYTOSIS  1+       CK    Collection Time: 11/24/17  2:42 AM   Result Value Ref Range     (H) 39 - 308 U/L   GLUCOSE, POC    Collection Time: 11/24/17  7:23 AM   Result Value Ref Range    Glucose (POC) 126 (H) 65 - 100 mg/dL    Performed by [facility], POC    Collection Time: 11/24/17 11:57 AM   Result Value Ref Range    Glucose (POC) 198 (H) 65 - 100 mg/dL    Performed by Kreg Goodpasture, MD  [address]  Phone - [phone]   Fax - [phone]  www. [facility]

## 2017-11-24 NOTE — PROGRESS NOTES
Bedside and Verbal shift change report given to Hernesto Fong (oncoming nurse) by [address] (offgoing nurse). Report included the following information SBAR, Kardex and Recent Results.

## 2017-11-24 NOTE — PROGRESS NOTES
Care Management Update    1900  Received call from [facility], they can accept for acute inpatient rehab. Dr. Yesenia Billings Accepting MD  Please call report [phone]  Please fax discharge/AVS [phone]  Ambulance form/PCS on bedside chart **will need to call to set up ambulance**  EMTALA    1300    Received referral for acute inpatient rehab, met with patient and his girlfriend at bedside. Discussed freedom of choice, they would like referral sent to [facility]. Referral sent via electrotonic referral system. Care Management will continue to follow for discharge planning.

## 2017-11-24 NOTE — PROGRESS NOTES
Problem: Dysphagia (Adult)  Goal: *Acute Goals and Plan of Care (Insert Text)  Speech Goals   Initiated 11/24/2017   1. Patient will tolerate dental soft diet without s/s of aspiration within 7 days   Initiated 11/21/2017  1. Patient will participate in re-assessment of swallow function within 7 days MET 11/24/2017   Speech language pathology dysphagia treatment  Patient: Zurdo Watt (85 y.o. male)  Date: 11/24/2017  Diagnosis: Altered mental status Altered mental status       Precautions: aspiration Fall    ASSESSMENT:  Patient with significant improvement in swallow function. He now has appropriate management of secretions with no further need for deep suction and clear strong vocal quality. Tolerated trials with mastication appropriate for dentition, timely posterior propulsion, timely swallow initiation and only mildly reduced hyolaryngeal excursion via palpation. No s/s of aspiration with any trials including large successive straw sips of thins. Progression toward goals:  [x]         Improving appropriately and progressing toward goals  []         Improving slowly and progressing toward goals  []         Not making progress toward goals and plan of care will be adjusted     PLAN:  Recommendations and Planned Interventions:  --recommend dental soft diet. Straws for liquids given left sided facial numbness with need for straw at baseline. Place straw on right side of mouth to improve seal.    --will follow for diet tolerance  Patient continues to benefit from skilled intervention to address the above impairments. Continue treatment per established plan of care. Discharge Recommendations: To Be Determined     SUBJECTIVE:   Patient stated I live about [place] on [facility] [address] [facility].  significantly improved mental status today. Remains somewhat confused, but more interactive, following commands. Has aphasia at baseline.       OBJECTIVE:   Cognitive and Communication Status:  Neurologic State: Alert  Orientation Level: Oriented to person, Oriented to place, Disoriented to time, Disoriented to situation (knows November)  Cognition: Decreased command following, Decreased attention/concentration  Perception: Appears intact  Perseveration: No perseveration noted  Safety/Judgement: Decreased awareness of environment, Decreased awareness of need for assistance, Decreased awareness of need for safety, Decreased insight into deficits  Dysphagia Treatment:  Oral Assessment:  Oral Assessment  Labial: Decreased seal;Other (comment) (left side numbness at baseline )  Dentition: Natural;Limited (dentures at bedside )  Oral Hygiene: moist mucosa   Lingual: No impairment  Velum: Unable to visualize  Mandible: No impairment  P.O. Trials:  Patient Position: upright in bed   Vocal quality prior to P.O.: No impairment  Consistency Presented: Thin liquid;Puree; Solid; Ice chips  How Presented: SLP-fed/presented;Self-fed/presented;Cup/sip;Straw;Successive swallows;Spoon     Bolus Acceptance: No impairment  Bolus Formation/Control: No impairment     Propulsion: No impairment  Oral Residue: None  Initiation of Swallow: No impairment  Laryngeal Elevation: Decreased (mild; significantly improved )  Aspiration Signs/Symptoms: None  Pharyngeal Phase Characteristics: Other (comment) (mildly reduced strength )             Oral Phase Severity: No impairment  Pharyngeal Phase Severity : Mild                                                                                Pain:  Pain Scale 1: Numeric (0 - 10)  Pain Intensity 1: 0     After treatment:   []              Patient left in no apparent distress sitting up in chair  [x]              Patient left in no apparent distress in bed  [x]              Call bell left within reach  [x]              Nursing notified  [x]              Caregiver present  []              Bed alarm activated    COMMUNICATION/EDUCATION:       The patients plan of care including recommendations, planned interventions, and recommended diet changes were discussed with: Registered Nurse. [x]              Posted safety precautions in patient's room. Rajesh Clemons M.CD.  CCC-SLP   Time Calculation: 20 mins

## 2017-11-24 NOTE — PROGRESS NOTES
Bedside shift change report given to CRUZ Serrato (oncoming nurse) by Klever Nelson  (offgoing nurse). Report included the following information SBAR, Kardex, Intake/Output, MAR, Recent Results and Cardiac Rhythm NSR 1st degree AV Block.

## 2017-11-24 NOTE — PROGRESS NOTES
Hospitalist Progress Note  Yemi Andino MD  Answering service: [phone] from in house phone      Date of Service:  2017  NAME:  Geremias Blackmon  :  11/10/1930  MRN:  [id number]      Admission Summary:   79 yo man with GERD, HLD, trigeminal neuralgia, h/o left maxillary sinus SCC, h/o TIA, h/o skin CA, and s/p recent craniotomy presented to the ED from home on 17 with confusion. He was treated with gamma knife radiosurgery for left trigeminal neuralgia pain in . He underwent chemotherapy and radiation for the squamous cell cancer in . The patient started developing some word-finding difficulties and balance   issues, was seen by Neurosurgery, an MRI was done which showed a 3 cm mass in the left temporal lobe with cerebral edema. This was concerning for malignancy versus radiation necrosis. He was given some steroids that improved his symptoms to a certain degree, but he underwent a left temporal craniotomy on 10/19/2017 with resection of the tumor. 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 He was admitted for acute encephalopathy.     Interval history / Subjective:   No complaints today, sitting in chair; denies pain; awake and alert and appropriate; seen with nurse; multiple family members at bedside    D/c to [facility] tomorrow morning; NSGY to determine steroid dosing on discharge     Assessment & Plan:     Acute encephalopathy (POA)  - likely due to acute on chronic hyponatremia  - stop IVNS and placed on free water restriction  - imaging no acute pathology  - NSGY consulted  - continue steroid  - TSH and B12 WNL  - resolved    Afebrile leucocytosis - likely due to IV steroid    Acute on chronic hyponatremia (POA)  - likely due to SIADH +/- oxcarbazepine (on hold)  - stopped IVNS and placed on free water restriction  - Renal following  - resolving s/p 3% saline x2     Acute rhabdomyolysis (POA) - CK trending down with IVF    Radiation necrosis s/p craniotomy   - continue Keppra and steroid  - NSGY following, stable  - need to know steroid dosing on discharge    GERD - resume home med    Nasal and chest congestion  - difficulty clearing secretions; s/p deep NT suction  - much improved with scopolamine patch per Nutrition rec    Sepsis with possible aspiration PNA (POA)  - fever, tachypnea on admission  - empiric ceftriaxone, levofloxacin started; de-escalate as indicated  - s/p vancomycin   - BCx 11/20 NGTD  - SLP started po diet; no need for DHT  - check CXR and stop abx if clear    Trigeminal neuralgia   - hold oxcarbazepine indefinitely  - resume gabapentin at lower dose    Code status: full  DVT prophylaxis: SCDs    Care Plan discussed with: Patient/Family, Nurse and   Disposition: D/C to [facility] Problems  Date Reviewed: 11/20/2017          Codes Class Noted POA    * (Principal)Altered mental status ICD-10-CM: R41.82  ICD-9-CM: 780.97  11/20/2017 Unknown            Review of Systems:   Pertinent items are noted in HPI. Vital Signs:    Last 24hrs VS reviewed since prior progress note.  Most recent are:  Visit Vitals    /81 (BP 1 Location: Left arm, BP Patient Position: At rest;Supine)    Pulse (!) 54    Temp 97.6 °F (36.4 °C)    Resp 19    Ht 5' 9\" (1.753 m)    Wt 86.4 kg (190 lb 7.6 oz)    SpO2 96%    BMI 28.13 kg/m2     No intake or output data in the 24 hours ending 11/24/17 0332     Physical Examination:     Constitutional:  awake, mild dysarthria, no acute respiratory distress   ENT:  oral mucosa dry  Neck supple,    Resp:  CTAB, no w/r/r   CV:  regular rhythm, normal rate, no m/r/g appreciated, no edema, +pulses    GI:  +BS, soft, non distended, non tender     Musculoskeletal:  moves all extremities    Neurologic:  awake, AOx3, following simple commands     Skin:  warm, dry  Eyes:  unable to open left eyelid    Data Review:    Review and/or order of clinical lab test  Review and/or order of tests in the radiology section of CPT  Review and/or order of tests in the medicine section of CPT    Labs:     Recent Labs      11/24/17   0242  11/23/17   0550   WBC  12.6*  14.6*   HGB  11.1*  11.5*   HCT  32.5*  32.6*   PLT  323  302     Recent Labs      11/24/17   0242  11/23/17   0550  11/22/17   2049   NA  133*  132*  133*   K  3.8  3.9  3.7   CL  99  98  101   CO2  23  22  23   BUN  21*  18  15   CREA  0.75  0.76  0.72   GLU  120*  133*  114*   CA  8.2*  8.3*  8.2*     No results for input(s): SGOT, GPT, ALT, AP, TBIL, TBILI, TP, ALB, GLOB, GGT, AML, LPSE in the last 72 hours. No lab exists for component: AMYP, HLPSE  No results for input(s): INR, PTP, APTT in the last 72 hours. No lab exists for component: INREXT, INREXT   No results for input(s): FE, TIBC, PSAT, FERR in the last 72 hours. Lab Results   Component Value Date/Time    Folate 8.6 11/20/2017 03:03 PM      No results for input(s): PH, PCO2, PO2 in the last 72 hours.   Recent Labs      11/24/17   0242  11/23/17   0550  11/22/17   1311   CPK  333*  436*  637*     Lab Results   Component Value Date/Time    Cholesterol, total 127 11/20/2017 03:03 PM    HDL Cholesterol 44 11/20/2017 03:03 PM    LDL, calculated 68 11/20/2017 03:03 PM    Triglyceride 75 11/20/2017 03:03 PM    CHOL/HDL Ratio 2.9 11/20/2017 03:03 PM     Lab Results   Component Value Date/Time    Glucose (POC) 198 11/24/2017 11:57 AM    Glucose (POC) 126 11/24/2017 07:23 AM    Glucose (POC) 111 11/23/2017 11:34 PM    Glucose (POC) 132 11/23/2017 05:56 PM    Glucose (POC) 130 11/23/2017 11:45 AM     Lab Results   Component Value Date/Time    Color YELLOW/STRAW 11/20/2017 10:04 AM    Appearance CLEAR 11/20/2017 10:04 AM    Specific gravity 1.018 11/20/2017 10:04 AM    pH (UA) 7.0 11/20/2017 10:04 AM    Protein NEGATIVE  11/20/2017 10:04 AM    Glucose NEGATIVE 11/20/2017 10:04 AM    Ketone TRACE 11/20/2017 10:04 AM    Bilirubin NEGATIVE  11/20/2017 10:04 AM    Urobilinogen 1.0 11/20/2017 10:04 AM    Nitrites NEGATIVE  11/20/2017 10:04 AM    Leukocyte Esterase NEGATIVE  11/20/2017 10:04 AM    Epithelial cells FEW 11/20/2017 10:04 AM    Bacteria NEGATIVE  11/20/2017 10:04 AM    WBC 0-4 11/20/2017 10:04 AM    RBC 0-5 11/20/2017 10:04 AM     Medications Reviewed:     Current Facility-Administered Medications   Medication Dose Route Frequency    levETIRAcetam (KEPPRA) 500 mg in saline (iso-osm) 100 ml IVPB  500 mg IntraVENous Q12H    dexamethasone (DECADRON) 4 mg/mL injection 4 mg  4 mg IntraVENous Q8H    sodium chloride (OCEAN) 0.65 % nasal spray 2 Spray  2 Spray Both Nostrils Q2H PRN    scopolamine (TRANSDERM-SCOP) 1.5 mg  1.5 mg TransDERmal Q72H    atorvastatin (LIPITOR) tablet 10 mg  10 mg Oral QHS    bacitracin 500 unit/gram ophthalmic ointment   Left Eye DAILY    sodium chloride (NS) flush 5-10 mL  5-10 mL IntraVENous Q8H    sodium chloride (NS) flush 5-10 mL  5-10 mL IntraVENous PRN    levoFLOXacin (LEVAQUIN) 750 mg in D5W IVPB  750 mg IntraVENous Q24H    cefTRIAXone (ROCEPHIN) 2 g in 0.9% sodium chloride (MBP/ADV) 50 mL  2 g IntraVENous Q12H     ______________________________________________________________________  EXPECTED LENGTH OF STAY: 4d 21h  ACTUAL LENGTH OF STAY:          170 Giacomo Nelson MD

## 2017-11-25 VITALS
HEART RATE: 68 BPM | DIASTOLIC BLOOD PRESSURE: 66 MMHG | TEMPERATURE: 97 F | WEIGHT: 185.85 LBS | RESPIRATION RATE: 15 BRPM | SYSTOLIC BLOOD PRESSURE: 109 MMHG | HEIGHT: 69 IN | OXYGEN SATURATION: 98 % | BODY MASS INDEX: 27.53 KG/M2

## 2017-11-25 PROBLEM — E87.1 HYPONATREMIA: Status: ACTIVE | Noted: 2017-11-25

## 2017-11-25 PROBLEM — T17.908A ASPIRATION INTO AIRWAY: Status: ACTIVE | Noted: 2017-11-25

## 2017-11-25 PROBLEM — A41.9 SEPSIS (HCC): Status: ACTIVE | Noted: 2017-11-25

## 2017-11-25 PROBLEM — I67.89 NECROSIS OF BRAIN DUE TO RADIATION THERAPY: Status: ACTIVE | Noted: 2017-11-25

## 2017-11-25 PROBLEM — D72.829 LEUCOCYTOSIS: Status: ACTIVE | Noted: 2017-11-25
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
## 2017-11-25 PROBLEM — M62.82 RHABDOMYOLYSIS: Status: ACTIVE | Noted: 2017-11-25

## 2017-11-25 PROBLEM — G93.40 ACUTE ENCEPHALOPATHY: Status: RESOLVED | Noted: 2017-11-20 | Resolved: 2017-11-24

## 2017-11-25 PROBLEM — G50.0 TRIGEMINAL NEURALGIA: Chronic | Status: ACTIVE | Noted: 2017-11-25

## 2017-11-25 LAB
ALBUMIN SERPL-MCNC: 2.5 G/DL (ref 3.5–5)
ANION GAP SERPL CALC-SCNC: 10 MMOL/L (ref 5–15)
BACTERIA SPEC CULT: NORMAL
BASOPHILS # BLD: 0 K/UL (ref 0–0.1)
BASOPHILS NFR BLD: 0 % (ref 0–1)
BUN SERPL-MCNC: 19 MG/DL (ref 6–20)
BUN/CREAT SERPL: 22 (ref 12–20)
CALCIUM SERPL-MCNC: 8.3 MG/DL (ref 8.5–10.1)
CHLORIDE SERPL-SCNC: 99 MMOL/L (ref 97–108)
CK SERPL-CCNC: 173 U/L (ref 39–308)
CO2 SERPL-SCNC: 23 MMOL/L (ref 21–32)
CREAT SERPL-MCNC: 0.87 MG/DL (ref 0.7–1.3)
EOSINOPHIL # BLD: 0 K/UL (ref 0–0.4)
EOSINOPHIL NFR BLD: 0 % (ref 0–7)
ERYTHROCYTE [DISTWIDTH] IN BLOOD BY AUTOMATED COUNT: 14.3 % (ref 11.5–14.5)
GLUCOSE BLD STRIP.AUTO-MCNC: 128 MG/DL (ref 65–100)
GLUCOSE BLD STRIP.AUTO-MCNC: 197 MG/DL (ref 65–100)
GLUCOSE SERPL-MCNC: 127 MG/DL (ref 65–100)
HCT VFR BLD AUTO: 35 % (ref 36.6–50.3)
HGB BLD-MCNC: 12.2 G/DL (ref 12.1–17)
LYMPHOCYTES # BLD: 1.4 K/UL (ref 0.8–3.5)
LYMPHOCYTES NFR BLD: 13 % (ref 12–49)
MAGNESIUM SERPL-MCNC: 2.5 MG/DL (ref 1.6–2.4)
MCH RBC QN AUTO: 32.1 PG (ref 26–34)
MCHC RBC AUTO-ENTMCNC: 34.9 G/DL (ref 30–36.5)
MCV RBC AUTO: 92.1 FL (ref 80–99)
MONOCYTES # BLD: 0.7 K/UL (ref 0–1)
MONOCYTES NFR BLD: 7 % (ref 5–13)
NEUTS SEG # BLD: 9 K/UL (ref 1.8–8)
NEUTS SEG NFR BLD: 80 % (ref 32–75)
PHOSPHATE SERPL-MCNC: 2.7 MG/DL (ref 2.6–4.7)
PLATELET # BLD AUTO: 331 K/UL (ref 150–400)
POTASSIUM SERPL-SCNC: 4.1 MMOL/L (ref 3.5–5.1)
RBC # BLD AUTO: 3.8 M/UL (ref 4.1–5.7)
SERVICE CMNT-IMP: ABNORMAL
SERVICE CMNT-IMP: ABNORMAL
SERVICE CMNT-IMP: NORMAL
SODIUM SERPL-SCNC: 132 MMOL/L (ref 136–145)
WBC # BLD AUTO: 11.1 K/UL (ref 4.1–11.1)

## 2017-11-25 PROCEDURE — 85025 COMPLETE CBC W/AUTO DIFF WBC: CPT | Performed by: INTERNAL MEDICINE

## 2017-11-25 PROCEDURE — 83735 ASSAY OF MAGNESIUM: CPT | Performed by: INTERNAL MEDICINE

## 2017-11-25 PROCEDURE — 74011000258 HC RX REV CODE- 258: Performed by: INTERNAL MEDICINE

## 2017-11-25 PROCEDURE — 74011250636 HC RX REV CODE- 250/636: Performed by: NEUROLOGICAL SURGERY

## 2017-11-25 PROCEDURE — 82962 GLUCOSE BLOOD TEST: CPT

## 2017-11-25 PROCEDURE — 82550 ASSAY OF CK (CPK): CPT | Performed by: INTERNAL MEDICINE

## 2017-11-25 PROCEDURE — 36415 COLL VENOUS BLD VENIPUNCTURE: CPT | Performed by: INTERNAL MEDICINE

## 2017-11-25 PROCEDURE — 74011250637 HC RX REV CODE- 250/637: Performed by: INTERNAL MEDICINE

## 2017-11-25 PROCEDURE — 74011250636 HC RX REV CODE- 250/636: Performed by: INTERNAL MEDICINE

## 2017-11-25 PROCEDURE — 80069 RENAL FUNCTION PANEL: CPT | Performed by: INTERNAL MEDICINE

## 2017-11-25 RX ORDER — ACETAMINOPHEN 500 MG
1000 TABLET ORAL AS NEEDED
Qty: 10 TAB | Refills: 0 | Status: SHIPPED
Start: 2017-11-25

## 2017-11-25 RX ORDER — DEXAMETHASONE 1 MG/1
TABLET ORAL
Qty: 10 TAB | Refills: 0 | Status: SHIPPED
Start: 2017-11-25 | End: 2018-07-14

## 2017-11-25 RX ORDER — LEVETIRACETAM 500 MG/1
500 TABLET ORAL 2 TIMES DAILY
Qty: 60 TAB | Refills: 0 | Status: SHIPPED
Start: 2017-11-25

## 2017-11-25 RX ORDER — SCOLOPAMINE TRANSDERMAL SYSTEM 1 MG/1
1.5 PATCH, EXTENDED RELEASE TRANSDERMAL
Qty: 1 PATCH | Refills: 0 | Status: SHIPPED | OUTPATIENT
Start: 2017-11-27 | End: 2018-07-14

## 2017-11-25 RX ORDER — GABAPENTIN 600 MG/1
300 TABLET ORAL 2 TIMES DAILY
Qty: 30 TAB | Refills: 0 | Status: SHIPPED | OUTPATIENT
Start: 2017-11-25

## 2017-11-25 RX ADMIN — DEXAMETHASONE SODIUM PHOSPHATE 4 MG: 4 INJECTION, SOLUTION INTRAMUSCULAR; INTRAVENOUS at 05:48

## 2017-11-25 RX ADMIN — BACITRACIN: 500 OINTMENT OPHTHALMIC at 09:43

## 2017-11-25 RX ADMIN — CEFTRIAXONE 2 G: 2 INJECTION, POWDER, FOR SOLUTION INTRAMUSCULAR; INTRAVENOUS at 09:34

## 2017-11-25 RX ADMIN — LEVOFLOXACIN 750 MG: 5 INJECTION, SOLUTION INTRAVENOUS at 10:23

## 2017-11-25 RX ADMIN — Medication 10 ML: at 05:48

## 2017-11-25 RX ADMIN — GABAPENTIN 300 MG: 600 TABLET, FILM COATED ORAL at 09:45

## 2017-11-25 RX ADMIN — LEVETIRACETAM 500 MG: 500 TABLET ORAL at 09:44

## 2017-11-25 RX ADMIN — SALINE NASAL SPRAY 2 SPRAY: 1.5 SOLUTION NASAL at 09:44

## 2017-11-25 NOTE — PROGRESS NOTES
Stable neuro exam  Transferring to rehab today  Discussed with hospitalist that steroid taper over next several days is appropriate

## 2017-11-25 NOTE — PROGRESS NOTES
Cm informed that patient is stable for discharge today, will be going to [facility]. Patients' significant other will be providing transportation. Report can be called to [phone] and discharge instructions need to be faxed to [phone] (cover sheet on hard chart with folder). EMTALA transfer.   [facility] , [place]

## 2017-11-25 NOTE — PROGRESS NOTES
I have reviewed discharge instructions with the patient and girlfriend. The patient and girlfriend verbalized understanding. TRANSFER - OUT REPORT:    Verbal report given to CRUZ Chacko (name) on Cameron Schultz  being transferred to [address]) for routine progression of care       Report consisted of patients Situation, Background, Assessment and   Recommendations(SBAR). Information from the following report(s) SBAR, Kardex, Intake/Output, MAR, Recent Results and Cardiac Rhythm NSR with 1st degree AV block was reviewed with the receiving nurse. Opportunity for questions and clarification was provided.       Patient transported with:   Patients medications from home

## 2017-11-25 NOTE — PROGRESS NOTES
Bedside and Verbal shift change report given to Dionicio Baker (oncoming nurse) by Cira Wagoner (offgoing nurse). Report included the following information SBAR, Kardex and Recent Results.

## 2017-11-25 NOTE — PROGRESS NOTES
[facility] - Progress Note      His sodium is stable. No new Recs. Will follow labs and see again Monday.

## 2017-11-25 NOTE — DISCHARGE INSTRUCTIONS
ADDITIONAL CARE RECOMMENDATIONS:   1. Take medications as prescribed. 2. Keep appointment(s) as recommended/scheduled/  3. If confusion develops, please check serum sodium and if low, may need to be placed on free water restriction. DIET: dental soft    ACTIVITY: PT/OT Eval and Treat    WOUND CARE: none    EQUIPMENT needed: as per PT/OT       Hyponatremia: Care Instructions  Your Care Instructions    Hyponatremia (say \"cu-hb-bsb-TREE-lopez-uh\") means that you don't have enough sodium in your blood. It can cause nausea, vomiting, and headaches. Or you may not feel hungry. In serious cases, it can cause seizures, a coma, or even death. Hyponatremia is not a disease. It is a problem caused by something else, such as medicines or exercising for a long time in hot weather. You can get hyponatremia if you lose a lot of fluids and then you drink a lot of water or other liquids that don't have much sodium. You can also get it if you have kidney, liver, heart, or other health problems. Treatment is focused on getting your sodium levels back to normal.  Follow-up care is a key part of your treatment and safety. Be sure to make and go to all appointments, and call your doctor if you are having problems. It's also a good idea to know your test results and keep a list of the medicines you take. How can you care for yourself at home? · If your doctor recommends it, drink fluids that have sodium. Sports drinks are a good choice. Or you can eat salty foods. · If your doctor recommends it, limit the amount of water you drink. And limit fluids that are mostly water. These include tea, coffee, and juice. · Take your medicines exactly as prescribed. Call your doctor if you have any problems with your medicine. · Get your sodium levels tested when your doctor tells you to. When should you call for help? Call 911 anytime you think you may need emergency care. For example, call if:  ? · You have a seizure.    ? · You passed out (lost consciousness). ?Call your doctor now or seek immediate medical care if:  ? · You are confused or it is hard to focus. ? · You have little or no appetite. ? · You feel sick to your stomach or you vomit. ? · You have a headache. ? · You have mood changes. ? · You feel more tired than usual.   ? Watch closely for changes in your health, and be sure to contact your doctor if:  ? · You do not get better as expected. Where can you learn more? Go to http://aixa-josette.info/. Enter N106 in the search box to learn more about \"Hyponatremia: Care Instructions. \"  Current as of: October 14, 2016  Content Version: 11.4  © 9592-1135 [employer]. Care instructions adapted under license by [employer] (which disclaims liability or warranty for this information). If you have questions about a medical condition or this instruction, always ask your healthcare professional. [place] [address] any warranty or liability for your use of this information. Rhabdomyolysis: Care Instructions  Your Care Instructions    When you have rhabdomyolysis (say \"rsp-thf-fz-AH-stephen-suss\"), dying muscle cells cause toxins to build up in the blood. If not treated, it can cause life-threatening damage to the body's organs. It can be caused by many things, such as severe muscle injury, some medicines (like statins), the flu, and certain blood infections. Symptoms may include weak muscles, pain, stiffness, fever, and nausea. Your urine may also be dark. You will get treatment in the hospital. If possible, the doctor will stop the cause of muscle cell death. The doctor will take steps to protect your organs. You may have to stop taking certain medicines if they are the cause of the problem. You will also get treatment to help the kidneys remove the toxins from your blood. This includes plenty of fluids. You may get fluids through a vein (by IV).  You may also need dialysis. Follow-up care is a key part of your treatment and safety. Be sure to make and go to all appointments, and call your doctor if you are having problems. It's also a good idea to know your test results and keep a list of the medicines you take. How can you care for yourself at home? · Take pain medicines exactly as directed. ¨ If the doctor gave you a prescription medicine for pain, take it as prescribed. ¨ If you are not taking a prescription pain medicine, ask your doctor if you can take an over-the-counter medicine. · Talk to your doctor about whether you need to stop taking any medicines. Follow your doctor's instructions about stopping medicines. · Drink plenty of fluids, enough so that your urine is light yellow or clear like water. If you have kidney, heart, or liver disease and have to limit fluids, talk with your doctor before you increase the amount of fluids you drink. When should you call for help? Call your doctor now or seek immediate medical care if:  ? · You have new or worse muscle pain. ? · You have less urine than normal or no urine. ? · You have new swelling in your arms or feet. ? · You have blood in your urine. ? Watch closely for changes in your health, and be sure to contact your doctor if you do not get better as expected. Where can you learn more? Go to http://aixa-josette.info/. Enter F129 in the search box to learn more about \"Rhabdomyolysis: Care Instructions. \"  Current as of: May 12, 2017  Content Version: 11.4  © 0676-9168 [employer]. Care instructions adapted under license by [employer] (which disclaims liability or warranty for this information). If you have questions about a medical condition or this instruction, always ask your healthcare professional. [address] any warranty or liability for your use of this information.

## 2017-12-06 ENCOUNTER — HOME HEALTH ADMISSION (OUTPATIENT)
Dept: HOME HEALTH SERVICES | Facility: HOME HEALTH | Age: 82
End: 2017-12-06
Payer: MEDICARE

## 2017-12-07 ENCOUNTER — HOME CARE VISIT (OUTPATIENT)
Dept: SCHEDULING | Facility: HOME HEALTH | Age: 82
End: 2017-12-07

## 2017-12-09 ENCOUNTER — HOME CARE VISIT (OUTPATIENT)
Dept: SCHEDULING | Facility: HOME HEALTH | Age: 82
End: 2017-12-09
Payer: MEDICARE

## 2017-12-09 PROCEDURE — 3331090002 HH PPS REVENUE DEBIT

## 2017-12-09 PROCEDURE — 3331090001 HH PPS REVENUE CREDIT

## 2017-12-09 PROCEDURE — G0299 HHS/HOSPICE OF RN EA 15 MIN: HCPCS

## 2017-12-09 PROCEDURE — 400013 HH SOC

## 2017-12-10 PROCEDURE — 3331090002 HH PPS REVENUE DEBIT

## 2017-12-10 PROCEDURE — 3331090001 HH PPS REVENUE CREDIT

## 2017-12-11 ENCOUNTER — HOME CARE VISIT (OUTPATIENT)
Dept: SCHEDULING | Facility: HOME HEALTH | Age: 82
End: 2017-12-11
Payer: MEDICARE

## 2017-12-11 VITALS
RESPIRATION RATE: 20 BRPM | SYSTOLIC BLOOD PRESSURE: 116 MMHG | DIASTOLIC BLOOD PRESSURE: 62 MMHG | TEMPERATURE: 97.7 F | OXYGEN SATURATION: 98 % | HEART RATE: 78 BPM

## 2017-12-11 VITALS
DIASTOLIC BLOOD PRESSURE: 60 MMHG | HEART RATE: 60 BPM | TEMPERATURE: 98.1 F | SYSTOLIC BLOOD PRESSURE: 122 MMHG | RESPIRATION RATE: 16 BRPM | OXYGEN SATURATION: 99 %

## 2017-12-11 VITALS
SYSTOLIC BLOOD PRESSURE: 116 MMHG | DIASTOLIC BLOOD PRESSURE: 64 MMHG | OXYGEN SATURATION: 95 % | TEMPERATURE: 97.6 F | HEART RATE: 100 BPM

## 2017-12-11 PROCEDURE — 3331090002 HH PPS REVENUE DEBIT

## 2017-12-11 PROCEDURE — G0151 HHCP-SERV OF PT,EA 15 MIN: HCPCS

## 2017-12-11 PROCEDURE — G0152 HHCP-SERV OF OT,EA 15 MIN: HCPCS

## 2017-12-11 PROCEDURE — G0153 HHCP-SVS OF S/L PATH,EA 15MN: HCPCS

## 2017-12-11 PROCEDURE — 3331090001 HH PPS REVENUE CREDIT

## 2017-12-12 ENCOUNTER — HOME CARE VISIT (OUTPATIENT)
Dept: HOME HEALTH SERVICES | Facility: HOME HEALTH | Age: 82
End: 2017-12-12
Payer: MEDICARE

## 2017-12-12 VITALS
DIASTOLIC BLOOD PRESSURE: 70 MMHG | TEMPERATURE: 98.3 F | SYSTOLIC BLOOD PRESSURE: 122 MMHG | RESPIRATION RATE: 16 BRPM | OXYGEN SATURATION: 98 % | HEART RATE: 60 BPM

## 2017-12-12 PROCEDURE — 3331090001 HH PPS REVENUE CREDIT

## 2017-12-12 PROCEDURE — 3331090002 HH PPS REVENUE DEBIT

## 2017-12-13 ENCOUNTER — HOME CARE VISIT (OUTPATIENT)
Dept: HOME HEALTH SERVICES | Facility: HOME HEALTH | Age: 82
End: 2017-12-13
Payer: MEDICARE

## 2017-12-13 PROCEDURE — 3331090002 HH PPS REVENUE DEBIT

## 2017-12-13 PROCEDURE — 3331090001 HH PPS REVENUE CREDIT

## 2017-12-13 PROCEDURE — G0157 HHC PT ASSISTANT EA 15: HCPCS

## 2017-12-14 ENCOUNTER — HOME CARE VISIT (OUTPATIENT)
Dept: HOME HEALTH SERVICES | Facility: HOME HEALTH | Age: 82
End: 2017-12-14
Payer: MEDICARE

## 2017-12-14 ENCOUNTER — HOME CARE VISIT (OUTPATIENT)
Dept: SCHEDULING | Facility: HOME HEALTH | Age: 82
End: 2017-12-14
Payer: MEDICARE

## 2017-12-14 VITALS
TEMPERATURE: 97.9 F | OXYGEN SATURATION: 97 % | HEART RATE: 65 BPM | SYSTOLIC BLOOD PRESSURE: 125 MMHG | DIASTOLIC BLOOD PRESSURE: 78 MMHG

## 2017-12-14 PROCEDURE — G0153 HHCP-SVS OF S/L PATH,EA 15MN: HCPCS

## 2017-12-14 PROCEDURE — 3331090001 HH PPS REVENUE CREDIT

## 2017-12-14 PROCEDURE — 3331090002 HH PPS REVENUE DEBIT

## 2017-12-15 PROCEDURE — 3331090002 HH PPS REVENUE DEBIT

## 2017-12-15 PROCEDURE — 3331090001 HH PPS REVENUE CREDIT

## 2017-12-16 PROCEDURE — 3331090002 HH PPS REVENUE DEBIT

## 2017-12-16 PROCEDURE — 3331090001 HH PPS REVENUE CREDIT

## 2017-12-17 PROCEDURE — 3331090001 HH PPS REVENUE CREDIT

## 2017-12-17 PROCEDURE — 3331090002 HH PPS REVENUE DEBIT

## 2017-12-18 VITALS
OXYGEN SATURATION: 96 % | RESPIRATION RATE: 16 BRPM | HEART RATE: 63 BPM | TEMPERATURE: 98.6 F | SYSTOLIC BLOOD PRESSURE: 114 MMHG | DIASTOLIC BLOOD PRESSURE: 76 MMHG

## 2017-12-18 PROCEDURE — 3331090002 HH PPS REVENUE DEBIT

## 2017-12-18 PROCEDURE — 3331090001 HH PPS REVENUE CREDIT

## 2017-12-19 ENCOUNTER — HOME CARE VISIT (OUTPATIENT)
Dept: SCHEDULING | Facility: HOME HEALTH | Age: 82
End: 2017-12-19
Payer: MEDICARE

## 2017-12-19 VITALS
HEART RATE: 75 BPM | DIASTOLIC BLOOD PRESSURE: 93 MMHG | TEMPERATURE: 97.4 F | SYSTOLIC BLOOD PRESSURE: 143 MMHG | OXYGEN SATURATION: 97 %

## 2017-12-19 PROCEDURE — G0153 HHCP-SVS OF S/L PATH,EA 15MN: HCPCS

## 2017-12-19 PROCEDURE — 3331090002 HH PPS REVENUE DEBIT

## 2017-12-19 PROCEDURE — 3331090001 HH PPS REVENUE CREDIT

## 2017-12-20 ENCOUNTER — HOME CARE VISIT (OUTPATIENT)
Dept: HOME HEALTH SERVICES | Facility: HOME HEALTH | Age: 82
End: 2017-12-20
Payer: MEDICARE

## 2017-12-20 PROCEDURE — 3331090001 HH PPS REVENUE CREDIT

## 2017-12-20 PROCEDURE — G0157 HHC PT ASSISTANT EA 15: HCPCS

## 2017-12-20 PROCEDURE — 3331090002 HH PPS REVENUE DEBIT

## 2017-12-21 ENCOUNTER — HOME CARE VISIT (OUTPATIENT)
Dept: SCHEDULING | Facility: HOME HEALTH | Age: 82
End: 2017-12-21
Payer: MEDICARE

## 2017-12-21 ENCOUNTER — HOME CARE VISIT (OUTPATIENT)
Dept: HOME HEALTH SERVICES | Facility: HOME HEALTH | Age: 82
End: 2017-12-21
Payer: MEDICARE

## 2017-12-21 VITALS
TEMPERATURE: 98.6 F | HEART RATE: 68 BPM | SYSTOLIC BLOOD PRESSURE: 114 MMHG | OXYGEN SATURATION: 97 % | DIASTOLIC BLOOD PRESSURE: 54 MMHG | RESPIRATION RATE: 16 BRPM

## 2017-12-21 PROCEDURE — 3331090001 HH PPS REVENUE CREDIT

## 2017-12-21 PROCEDURE — G0153 HHCP-SVS OF S/L PATH,EA 15MN: HCPCS

## 2017-12-21 PROCEDURE — 3331090002 HH PPS REVENUE DEBIT

## 2017-12-22 ENCOUNTER — HOME CARE VISIT (OUTPATIENT)
Dept: HOME HEALTH SERVICES | Facility: HOME HEALTH | Age: 82
End: 2017-12-22
Payer: MEDICARE

## 2017-12-22 VITALS
DIASTOLIC BLOOD PRESSURE: 86 MMHG | SYSTOLIC BLOOD PRESSURE: 126 MMHG | TEMPERATURE: 97.1 F | OXYGEN SATURATION: 94 % | HEART RATE: 52 BPM

## 2017-12-22 PROCEDURE — G0157 HHC PT ASSISTANT EA 15: HCPCS

## 2017-12-22 PROCEDURE — 3331090002 HH PPS REVENUE DEBIT

## 2017-12-22 PROCEDURE — 3331090001 HH PPS REVENUE CREDIT

## 2017-12-23 PROCEDURE — 3331090002 HH PPS REVENUE DEBIT

## 2017-12-23 PROCEDURE — 3331090001 HH PPS REVENUE CREDIT

## 2017-12-24 PROCEDURE — 3331090001 HH PPS REVENUE CREDIT

## 2017-12-24 PROCEDURE — 3331090002 HH PPS REVENUE DEBIT

## 2017-12-25 PROCEDURE — 3331090002 HH PPS REVENUE DEBIT

## 2017-12-25 PROCEDURE — 3331090001 HH PPS REVENUE CREDIT

## 2017-12-26 ENCOUNTER — HOME CARE VISIT (OUTPATIENT)
Dept: HOME HEALTH SERVICES | Facility: HOME HEALTH | Age: 82
End: 2017-12-26
Payer: MEDICARE

## 2017-12-26 VITALS
HEART RATE: 69 BPM | DIASTOLIC BLOOD PRESSURE: 81 MMHG | TEMPERATURE: 98.6 F | RESPIRATION RATE: 16 BRPM | SYSTOLIC BLOOD PRESSURE: 124 MMHG | OXYGEN SATURATION: 96 %

## 2017-12-26 PROCEDURE — G0157 HHC PT ASSISTANT EA 15: HCPCS

## 2017-12-26 PROCEDURE — 3331090001 HH PPS REVENUE CREDIT

## 2017-12-26 PROCEDURE — 3331090002 HH PPS REVENUE DEBIT

## 2017-12-27 VITALS
TEMPERATURE: 98.6 F | DIASTOLIC BLOOD PRESSURE: 83 MMHG | HEART RATE: 74 BPM | RESPIRATION RATE: 16 BRPM | SYSTOLIC BLOOD PRESSURE: 135 MMHG | OXYGEN SATURATION: 95 %

## 2017-12-27 PROCEDURE — 3331090002 HH PPS REVENUE DEBIT

## 2017-12-27 PROCEDURE — 3331090001 HH PPS REVENUE CREDIT

## 2017-12-28 ENCOUNTER — HOME CARE VISIT (OUTPATIENT)
Dept: SCHEDULING | Facility: HOME HEALTH | Age: 82
End: 2017-12-28
Payer: MEDICARE

## 2017-12-28 PROCEDURE — 3331090001 HH PPS REVENUE CREDIT

## 2017-12-28 PROCEDURE — G0151 HHCP-SERV OF PT,EA 15 MIN: HCPCS

## 2017-12-28 PROCEDURE — 3331090002 HH PPS REVENUE DEBIT

## 2017-12-29 ENCOUNTER — HOME CARE VISIT (OUTPATIENT)
Dept: SCHEDULING | Facility: HOME HEALTH | Age: 82
End: 2017-12-29
Payer: MEDICARE

## 2017-12-29 VITALS
DIASTOLIC BLOOD PRESSURE: 73 MMHG | TEMPERATURE: 97.2 F | HEART RATE: 60 BPM | OXYGEN SATURATION: 97 % | SYSTOLIC BLOOD PRESSURE: 121 MMHG

## 2017-12-29 VITALS
OXYGEN SATURATION: 99 % | SYSTOLIC BLOOD PRESSURE: 128 MMHG | RESPIRATION RATE: 18 BRPM | DIASTOLIC BLOOD PRESSURE: 70 MMHG | TEMPERATURE: 98.1 F

## 2017-12-29 PROCEDURE — 3331090002 HH PPS REVENUE DEBIT

## 2017-12-29 PROCEDURE — G0153 HHCP-SVS OF S/L PATH,EA 15MN: HCPCS

## 2017-12-29 PROCEDURE — 3331090001 HH PPS REVENUE CREDIT

## 2017-12-30 PROCEDURE — 3331090001 HH PPS REVENUE CREDIT

## 2017-12-30 PROCEDURE — 3331090002 HH PPS REVENUE DEBIT

## 2017-12-31 PROCEDURE — 3331090001 HH PPS REVENUE CREDIT

## 2017-12-31 PROCEDURE — 3331090002 HH PPS REVENUE DEBIT

## 2018-01-01 PROCEDURE — 3331090001 HH PPS REVENUE CREDIT

## 2018-01-01 PROCEDURE — 3331090002 HH PPS REVENUE DEBIT

## 2018-01-02 PROCEDURE — 3331090002 HH PPS REVENUE DEBIT

## 2018-01-02 PROCEDURE — 3331090001 HH PPS REVENUE CREDIT

## 2018-01-03 ENCOUNTER — HOME CARE VISIT (OUTPATIENT)
Dept: SCHEDULING | Facility: HOME HEALTH | Age: 83
End: 2018-01-03
Payer: MEDICARE

## 2018-01-03 VITALS
SYSTOLIC BLOOD PRESSURE: 131 MMHG | TEMPERATURE: 97.4 F | OXYGEN SATURATION: 92 % | DIASTOLIC BLOOD PRESSURE: 78 MMHG | HEART RATE: 82 BPM

## 2018-01-03 PROCEDURE — 3331090001 HH PPS REVENUE CREDIT

## 2018-01-03 PROCEDURE — G0153 HHCP-SVS OF S/L PATH,EA 15MN: HCPCS

## 2018-01-03 PROCEDURE — 3331090002 HH PPS REVENUE DEBIT

## 2018-01-04 PROCEDURE — 3331090001 HH PPS REVENUE CREDIT

## 2018-01-04 PROCEDURE — 3331090002 HH PPS REVENUE DEBIT

## 2018-01-05 ENCOUNTER — HOME CARE VISIT (OUTPATIENT)
Dept: SCHEDULING | Facility: HOME HEALTH | Age: 83
End: 2018-01-05
Payer: MEDICARE

## 2018-01-05 PROCEDURE — 3331090002 HH PPS REVENUE DEBIT

## 2018-01-05 PROCEDURE — G0153 HHCP-SVS OF S/L PATH,EA 15MN: HCPCS

## 2018-01-05 PROCEDURE — 3331090001 HH PPS REVENUE CREDIT

## 2018-01-07 VITALS — HEART RATE: 77 BPM | SYSTOLIC BLOOD PRESSURE: 118 MMHG | OXYGEN SATURATION: 96 % | DIASTOLIC BLOOD PRESSURE: 72 MMHG

## 2018-03-05 ENCOUNTER — HOSPITAL ENCOUNTER (OUTPATIENT)
Dept: MRI IMAGING | Age: 83
Discharge: HOME | End: 2018-03-05
Attending: NEUROLOGICAL SURGERY
Payer: MEDICARE

## 2018-03-05 VITALS — WEIGHT: 190 LBS | BODY MASS INDEX: 28.06 KG/M2

## 2018-03-05 DIAGNOSIS — G93.89 BRAIN MASS: ICD-10-CM

## 2018-03-05 LAB — CREAT BLD-MCNC: 0.9 MG/DL (ref 0.6–1.3)

## 2018-03-05 PROCEDURE — 74011250636 HC RX REV CODE- 250/636: Performed by: NEUROLOGICAL SURGERY

## 2018-03-05 PROCEDURE — 82565 ASSAY OF CREATININE: CPT

## 2018-03-05 PROCEDURE — 70553 MRI BRAIN STEM W/O & W/DYE: CPT

## 2018-03-05 PROCEDURE — A9576 INJ PROHANCE MULTIPACK: HCPCS | Performed by: NEUROLOGICAL SURGERY

## 2018-03-05 RX ADMIN — GADOTERIDOL 18 ML: 279.3 INJECTION, SOLUTION INTRAVENOUS at 12:33

## 2018-03-10 ENCOUNTER — APPOINTMENT (OUTPATIENT)
Dept: CT IMAGING | Age: 83
End: 2018-03-10
Attending: EMERGENCY MEDICINE
Payer: MEDICARE

## 2018-03-10 ENCOUNTER — HOSPITAL ENCOUNTER (EMERGENCY)
Age: 83
Discharge: HOME | End: 2018-03-10
Attending: EMERGENCY MEDICINE
Payer: MEDICARE

## 2018-03-10 VITALS
DIASTOLIC BLOOD PRESSURE: 59 MMHG | RESPIRATION RATE: 18 BRPM | OXYGEN SATURATION: 97 % | SYSTOLIC BLOOD PRESSURE: 116 MMHG | HEART RATE: 67 BPM | TEMPERATURE: 99.6 F

## 2018-03-10 DIAGNOSIS — R41.82 ALTERED MENTAL STATUS, UNSPECIFIED ALTERED MENTAL STATUS TYPE: Primary | ICD-10-CM

## 2018-03-10 LAB
ALBUMIN SERPL-MCNC: 2.8 G/DL (ref 3.5–5)
ALBUMIN/GLOB SERPL: 0.8 {RATIO} (ref 1.1–2.2)
ALP SERPL-CCNC: 51 U/L (ref 45–117)
ALT SERPL-CCNC: 23 U/L (ref 12–78)
ANION GAP SERPL CALC-SCNC: 8 MMOL/L (ref 5–15)
APPEARANCE UR: CLEAR
AST SERPL-CCNC: 30 U/L (ref 15–37)
BACTERIA URNS QL MICRO: NEGATIVE /HPF
BASOPHILS # BLD: 0.1 K/UL (ref 0–0.1)
BASOPHILS NFR BLD: 1 % (ref 0–1)
BILIRUB SERPL-MCNC: 0.4 MG/DL (ref 0.2–1)
BILIRUB UR QL: NEGATIVE
BUN SERPL-MCNC: 12 MG/DL (ref 6–20)
BUN/CREAT SERPL: 14 (ref 12–20)
CALCIUM SERPL-MCNC: 8.1 MG/DL (ref 8.5–10.1)
CHLORIDE SERPL-SCNC: 101 MMOL/L (ref 97–108)
CO2 SERPL-SCNC: 27 MMOL/L (ref 21–32)
COLOR UR: NORMAL
CREAT SERPL-MCNC: 0.84 MG/DL (ref 0.7–1.3)
DIFFERENTIAL METHOD BLD: ABNORMAL
EOSINOPHIL # BLD: 0.1 K/UL (ref 0–0.4)
EOSINOPHIL NFR BLD: 1 % (ref 0–7)
EPITH CASTS URNS QL MICRO: NORMAL /LPF
ERYTHROCYTE [DISTWIDTH] IN BLOOD BY AUTOMATED COUNT: 13.8 % (ref 11.5–14.5)
GLOBULIN SER CALC-MCNC: 3.5 G/DL (ref 2–4)
GLUCOSE SERPL-MCNC: 102 MG/DL (ref 65–100)
GLUCOSE UR STRIP.AUTO-MCNC: NEGATIVE MG/DL
HCT VFR BLD AUTO: 36.7 % (ref 36.6–50.3)
HGB BLD-MCNC: 12.7 G/DL (ref 12.1–17)
HGB UR QL STRIP: NEGATIVE
HYALINE CASTS URNS QL MICRO: NORMAL /LPF (ref 0–5)
IMM GRANULOCYTES # BLD: 0 K/UL (ref 0–0.04)
IMM GRANULOCYTES NFR BLD AUTO: 0 % (ref 0–0.5)
KETONES UR QL STRIP.AUTO: NEGATIVE MG/DL
LEUKOCYTE ESTERASE UR QL STRIP.AUTO: NEGATIVE
LYMPHOCYTES # BLD: 1.8 K/UL (ref 0.8–3.5)
LYMPHOCYTES NFR BLD: 30 % (ref 12–49)
MCH RBC QN AUTO: 33.3 PG (ref 26–34)
MCHC RBC AUTO-ENTMCNC: 34.6 G/DL (ref 30–36.5)
MCV RBC AUTO: 96.3 FL (ref 80–99)
MONOCYTES # BLD: 0.8 K/UL (ref 0–1)
MONOCYTES NFR BLD: 13 % (ref 5–13)
NEUTS SEG # BLD: 3.3 K/UL (ref 1.8–8)
NEUTS SEG NFR BLD: 55 % (ref 32–75)
NITRITE UR QL STRIP.AUTO: NEGATIVE
NRBC # BLD: 0 K/UL (ref 0–0.01)
NRBC BLD-RTO: 0 PER 100 WBC
PH UR STRIP: 6 [PH] (ref 5–8)
PLATELET # BLD AUTO: 164 K/UL (ref 150–400)
PMV BLD AUTO: 9.5 FL (ref 8.9–12.9)
POTASSIUM SERPL-SCNC: 3.3 MMOL/L (ref 3.5–5.1)
PROT SERPL-MCNC: 6.3 G/DL (ref 6.4–8.2)
PROT UR STRIP-MCNC: NEGATIVE MG/DL
RBC # BLD AUTO: 3.81 M/UL (ref 4.1–5.7)
RBC #/AREA URNS HPF: NORMAL /HPF (ref 0–5)
SODIUM SERPL-SCNC: 136 MMOL/L (ref 136–145)
SP GR UR REFRACTOMETRY: 1.02 (ref 1–1.03)
TROPONIN I SERPL-MCNC: <0.04 NG/ML
UA: UC IF INDICATED,UAUC: NORMAL
UROBILINOGEN UR QL STRIP.AUTO: 0.2 EU/DL (ref 0.2–1)
WBC # BLD AUTO: 6 K/UL (ref 4.1–11.1)
WBC URNS QL MICRO: NORMAL /HPF (ref 0–4)

## 2018-03-10 PROCEDURE — 85025 COMPLETE CBC W/AUTO DIFF WBC: CPT | Performed by: EMERGENCY MEDICINE

## 2018-03-10 PROCEDURE — 80053 COMPREHEN METABOLIC PANEL: CPT | Performed by: EMERGENCY MEDICINE

## 2018-03-10 PROCEDURE — 36415 COLL VENOUS BLD VENIPUNCTURE: CPT | Performed by: EMERGENCY MEDICINE

## 2018-03-10 PROCEDURE — 70450 CT HEAD/BRAIN W/O DYE: CPT

## 2018-03-10 PROCEDURE — 81001 URINALYSIS AUTO W/SCOPE: CPT | Performed by: EMERGENCY MEDICINE

## 2018-03-10 PROCEDURE — 84484 ASSAY OF TROPONIN QUANT: CPT | Performed by: EMERGENCY MEDICINE

## 2018-03-10 PROCEDURE — 99285 EMERGENCY DEPT VISIT HI MDM: CPT

## 2018-03-10 PROCEDURE — 93005 ELECTROCARDIOGRAM TRACING: CPT

## 2018-03-10 NOTE — ED PROVIDER NOTES
HPI Comments: 80 y.o. male with past medical history significant for hypercholesterolemia, trigeminal neuralgia, RBBB, TIA, and maxillary sinus cancer who presents from home via EMS with chief complaint of AMS. EMS reports Pt is usually A&O x4, but he was difficult to arouse from a 3 hour nap after eating lunch. Per EMS, Pt is now A&O x2. Pt currently has no complaints, feels fine, and wants to go home. Pt denies new medications or medication changes. EMS notes Pt had a similar episode 4-5 months ago and was dx w/ low sodium. Pt denies headache, chest pain, abdominal pain, fever, and vomiting. There are no other acute medical concerns at this time. Old Chart Review: Pt had a MRI of his brain on 3/5/18 which did not show new findings, but showed mild interval decrease in edema of his L temporal lobe. PCP: Aldo Nunez MD    Full history, physical exam, and ROS unable to be obtained due to:  AMS. Note written by Kofi Nicole, as dictated by Janna Choi MD 5:00 PM    The history is provided by the patient.         Past Medical History:   Diagnosis Date    Arthritis     HANDS    Beta-blocker therapy     Cancer ([facility] [address] 2013    MAXILLARY SINUS CANCER, RADIATION AND CHEMO    Cancer ([facility] [address]     SKIN CA ON NOSE    GERD (gastroesophageal reflux disease)     Hypercholesterolemia     Hypertension     pt denies    Nasal sinus tumor     Numbness of LEFT hand & LEFT face 2010    RBBB (right bundle branch block)     TIA (transient ischemic attack)     12 TIA's over 9 YRS.1ST ONE IN 8/03- LAST IN 2/12      Trigeminal neuralgia        Past Surgical History:   Procedure Laterality Date    HX CATARACT REMOVAL Bilateral     HX GI      COLONOSCOPY    HX HEENT      BIOPSY OF SINUS     HX HEENT Left     sew eye closed    HX HERNIA REPAIR Right     INGUINAL    HX KNEE ARTHROSCOPY Right 2007    HX KNEE REPLACEMENT Right 2012    Total Knee replacement- right    HX ROTATOR CUFF REPAIR Right     right rotator cuff repair         Family History:   Problem Relation Age of Onset   [de-identified] Cancer Mother      colon    Cancer Father      throat    No Known Problems Brother     Anesth Problems Neg Hx        Social History     Social History    Marital status:      Spouse name: N/A    Number of children: N/A    Years of education: N/A     Occupational History    Not on file. Social History Main Topics    Smoking status: Former Smoker     Packs/day: 1.00     Years: 40.00     Quit date: 1/1/1982    Smokeless tobacco: Never Used      Comment: OCCAS CIGAR    Alcohol use 10.5 oz/week     21 Glasses of wine per week    Drug use: No    Sexual activity: Not on file     Other Topics Concern    Not on file     Social History Narrative         ALLERGIES: Adhesive tape-silicones; Aggrenox [aspirin-dipyridamole]; Amlodipine; and Hydrochlorothiazide    Review of Systems   Unable to perform ROS: Mental status change       Vitals:    03/10/18 1653   BP: 121/54   Pulse: 70   Resp: 18   Temp: 99.6 °F (37.6 °C)   SpO2: 98%            Physical Exam   Constitutional: He appears well-developed and well-nourished. No distress. HENT:   Head: Normocephalic. 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 R eye and pupil are normal.   Neck: Normal range of motion. Cardiovascular: Normal rate and regular rhythm. No murmur heard. Pulmonary/Chest: Effort normal and breath sounds normal. No respiratory distress. Abdominal: Soft. There is no tenderness. Musculoskeletal: Normal range of motion. He exhibits no edema. Neurological: He is alert. He has normal strength. No cranial nerve deficit. Oriented x1. Non-focal neuro exam.    Skin: Skin is warm and dry. Psychiatric: He has a normal mood and affect. His behavior is normal.   Nursing note and vitals reviewed.   Note written by Kofi Melgar, as dictated by Grace Frausto MD 5:00 PM    [facility]      ED Course       Procedures      ED EKG interpretation:  Rhythm: sinus rhythm with 1st degree AV block; Rate (approx.): 69; ST/T wave: non-specific changes; RBBB. Note written by Kofi Waterman, as dictated by Bill Hassan MD 5:00 PM    PROGRESS NOTE:  6:46 PM  Pt's family arrived noting not only was Pt confused today, he was too weak to walk. After attempting to stand Pt up, the family had to put Pt back into his bed. Per family, Pt has also been having urinary incontinence and they are concerned about possibility of UTI. PROGRESS NOTE:  7:44 PM  Pt is now feeling better, walked w/o difficulty, and his labs are unremarkable. Pt and his family are fine w/ Pt going home.

## 2018-03-10 NOTE — ED TRIAGE NOTES
TRIAGE NOTE:  Patient arrives with c/o altered mental status. Patient normally A&Ox4. Had a similar episode in the fall and was diagnosed with low sodium. Per family patient was normal this morning took a nap and was difficult to waken.

## 2018-03-11 LAB
ATRIAL RATE: 69 BPM
CALCULATED P AXIS, ECG09: 71 DEGREES
CALCULATED R AXIS, ECG10: -4 DEGREES
CALCULATED T AXIS, ECG11: -6 DEGREES
DIAGNOSIS, 93000: NORMAL
P-R INTERVAL, ECG05: 222 MS
Q-T INTERVAL, ECG07: 424 MS
QRS DURATION, ECG06: 132 MS
QTC CALCULATION (BEZET), ECG08: 454 MS
VENTRICULAR RATE, ECG03: 69 BPM

## 2018-03-11 NOTE — DISCHARGE INSTRUCTIONS
Altered Mental Status: Care Instructions  Your Care Instructions    Altered mental status is a change in how well your brain is working. As a result, you may be confused, be less alert than usual, or act in odd ways. This may include seeing or hearing things that aren't really there (hallucinations). A mental status change has many possible causes. For example, it may be the result of an infection, an imbalance of chemicals in the body, or a chronic disease such as diabetes or COPD. It can also be caused by things such as a head injury, taking certain medicines, or using alcohol or drugs. The doctor may do tests to look for the cause. These tests may include urine tests, blood tests, and imaging tests such as a CT scan. Sometimes a clear cause isn't found. But tests can help the doctor rule out a serious cause of your symptoms. A change in mental status can be scary. But mental status will often return to normal when the cause is treated. So it is important to get any follow-up testing or treatment the doctor has suggested. The doctor has checked you carefully, but problems can develop later. If you notice any problems or new symptoms, get medical treatment right away. Follow-up care is a key part of your treatment and safety. Be sure to make and go to all appointments, and call your doctor if you are having problems. It's also a good idea to know your test results and keep a list of the medicines you take. How can you care for yourself at home? · Be safe with medicines. Take your medicines exactly as prescribed. Call your doctor if you think you are having a problem with your medicine. · Have another adult stay with you until you are better. This can help keep you safe. Ask that person to watch for signs that your mental status is getting worse. When should you call for help? Call 911 anytime you think you may need emergency care. For example, call if:  ? · You passed out (lost consciousness). ?Call your doctor now or seek immediate medical care if:  ? · Your mental status is getting worse. ? · You have new symptoms, such as a fever, chills, or shortness of breath. ? · You do not feel safe. ? Watch closely for changes in your health, and be sure to contact your doctor if:  ? · You do not get better as expected. Where can you learn more? Go to http://aixa-josette.info/. Enter A584 in the search box to learn more about \"Altered Mental Status: Care Instructions. \"  Current as of: October 14, 2016  Content Version: 11.4  © 3754-5226 [employer]. Care instructions adapted under license by [employer] (which disclaims liability or warranty for this information). If you have questions about a medical condition or this instruction, always ask your healthcare professional. [address] any warranty or liability for your use of this information.

## 2018-03-11 NOTE — ED NOTES
Discharge instructions given to pt. All questions answered and pt verbalized understanding. V/S stable @ time of discharge. Pt wheeled off unit.

## 2018-03-13 NOTE — CALL BACK NOTE
[facility] Emergency Department Follow Up Call Record    Discharged to : Home/Family Home/Home Health/Skilled Facility/Rehab/Assisted Living/Other__Home_____  1) Did you receive your discharge instructions? Yes        2) Do you understand them? Yes    This writer spoke with Ethel who reports \"doing well at this time. No difficulties walking , or difficulties with normal activities. Speech clear. Mentation reported as normal. Patient very thankful for follow up call. 3) Are you able to follow them? Yes          If NO, what can I clarify for you? 4) Do you understand your diagnosis? Yes         5) Do you know which symptoms should prompt you to call the doctor? Yes     6) Were you able to fill and  any medications that were prescribed? Not applicable     7) You were prescribed __n/a_________for ____________________. Common side effects of this medication are____________________. This is not a complete list so please review the forms given from the pharmacy for a complete list.      8) Are there any questions about your medications? Not applicable            Have you scheduled any recommended doctors appointments (specialty, PCP) NO  If NO, what barriers are you encountering (transportation/lost contact info/cost/  didnt think necessary/no PCP  9) If discharged with Home Health, has the agency contacted you to schedule visit? N/A    10) Is there anyone available to help you at home (meals, errands, transportation    monitoring) (adult children, neighbors, private duty companions) Yes Family   11) Are you on a special diet? No         If YES, do you understand the requirements for this diet? Education provided? 12) If presented with cough, bronchitis, COPD, asthma, is it ok to ask that the   respiratory disease management educator call you? Not applicable      13)  A) If presented with fall, were you issued an assistive device in the ED    Are you using? Not applicable  B) If given RX for device, have you obtained? Not applicable       If NO, barriers? C) Therapist recommended:   Are you able to implement the suggestions? Not applicable        If NO, barriers to implementation? D) Are you having any difficulties with mobility inside your home?     (steps, bed, tub)No   If YES, ask if the [facility] PT can contact patient and good time and number?  14)  At the end of your discharge instructions, there is information about accessing [facility] SERVICES, have you had a chance to review those? Yes         Do you have any questions about signing up for this service? No   We encourage our patients to be active participants in their healthcare and this site is one of the ways to do that. It will allow you to access parts of your medical record, email your doctors office, schedule appointments, and request medications refills . 15) Are there any other questions that I can answer for you regarding    your Emergency department visit?  NO             Estimated Call Time:___1:24 PM  ________________ Date/Time:_______________

## 2018-07-03 ENCOUNTER — HOSPITAL ENCOUNTER (OUTPATIENT)
Dept: MRI IMAGING | Age: 83
Discharge: HOME | End: 2018-07-03
Attending: NEUROLOGICAL SURGERY
Payer: MEDICARE

## 2018-07-03 VITALS — BODY MASS INDEX: 29.53 KG/M2 | WEIGHT: 200 LBS

## 2018-07-03 DIAGNOSIS — G93.89 BRAIN MASS: ICD-10-CM

## 2018-07-03 PROCEDURE — A9575 INJ GADOTERATE MEGLUMI 0.1ML: HCPCS | Performed by: NEUROLOGICAL SURGERY

## 2018-07-03 PROCEDURE — 74011250636 HC RX REV CODE- 250/636: Performed by: NEUROLOGICAL SURGERY

## 2018-07-03 PROCEDURE — 70553 MRI BRAIN STEM W/O & W/DYE: CPT

## 2018-07-03 RX ORDER — GADOTERATE MEGLUMINE 376.9 MG/ML
20 INJECTION INTRAVENOUS
Status: COMPLETED | OUTPATIENT
Start: 2018-07-03 | End: 2018-07-03

## 2018-07-03 RX ADMIN — GADOTERATE MEGLUMINE 20 ML: 376.9 INJECTION INTRAVENOUS at 14:00

## 2018-07-14 ENCOUNTER — APPOINTMENT (OUTPATIENT)
Dept: CT IMAGING | Age: 83
DRG: 689 | End: 2018-07-14
Attending: EMERGENCY MEDICINE
Payer: MEDICARE

## 2018-07-14 ENCOUNTER — APPOINTMENT (OUTPATIENT)
Dept: GENERAL RADIOLOGY | Age: 83
DRG: 689 | End: 2018-07-14
Attending: EMERGENCY MEDICINE
Payer: MEDICARE

## 2018-07-14 ENCOUNTER — HOSPITAL ENCOUNTER (INPATIENT)
Age: 83
LOS: 2 days | Discharge: HOME | DRG: 689 | End: 2018-07-16
Attending: EMERGENCY MEDICINE | Admitting: HOSPITALIST
Payer: MEDICARE

## 2018-07-14 DIAGNOSIS — R53.1 WEAKNESS: Primary | ICD-10-CM

## 2018-07-14 DIAGNOSIS — R26.9 GAIT ABNORMALITY: ICD-10-CM

## 2018-07-14 PROBLEM — R41.82 ALTERED MENTAL STATE: Status: ACTIVE | Noted: 2018-07-14

## 2018-07-14 LAB
ALBUMIN SERPL-MCNC: 2.5 G/DL (ref 3.5–5)
ALBUMIN/GLOB SERPL: 0.7 {RATIO} (ref 1.1–2.2)
ALP SERPL-CCNC: 45 U/L (ref 45–117)
ALT SERPL-CCNC: 50 U/L (ref 12–78)
ANION GAP SERPL CALC-SCNC: 9 MMOL/L (ref 5–15)
APPEARANCE UR: CLEAR
AST SERPL-CCNC: 25 U/L (ref 15–37)
ATRIAL RATE: 96 BPM
BACTERIA URNS QL MICRO: NEGATIVE /HPF
BASOPHILS # BLD: 0 K/UL (ref 0–0.1)
BASOPHILS NFR BLD: 0 % (ref 0–1)
BILIRUB SERPL-MCNC: 0.7 MG/DL (ref 0.2–1)
BILIRUB UR QL: NEGATIVE
BUN SERPL-MCNC: 14 MG/DL (ref 6–20)
BUN/CREAT SERPL: 13 (ref 12–20)
CALCIUM SERPL-MCNC: 8 MG/DL (ref 8.5–10.1)
CALCULATED P AXIS, ECG09: 33 DEGREES
CALCULATED R AXIS, ECG10: 5 DEGREES
CALCULATED T AXIS, ECG11: -12 DEGREES
CHLORIDE SERPL-SCNC: 103 MMOL/L (ref 97–108)
CHOLEST SERPL-MCNC: 163 MG/DL
CO2 SERPL-SCNC: 26 MMOL/L (ref 21–32)
COLOR UR: NORMAL
CREAT SERPL-MCNC: 1.05 MG/DL (ref 0.7–1.3)
DIAGNOSIS, 93000: NORMAL
DIFFERENTIAL METHOD BLD: ABNORMAL
EOSINOPHIL # BLD: 0.1 K/UL (ref 0–0.4)
EOSINOPHIL NFR BLD: 1 % (ref 0–7)
EPITH CASTS URNS QL MICRO: NORMAL /LPF
ERYTHROCYTE [DISTWIDTH] IN BLOOD BY AUTOMATED COUNT: 18 % (ref 11.5–14.5)
GLOBULIN SER CALC-MCNC: 3.5 G/DL (ref 2–4)
GLUCOSE SERPL-MCNC: 93 MG/DL (ref 65–100)
GLUCOSE UR STRIP.AUTO-MCNC: NEGATIVE MG/DL
HCT VFR BLD AUTO: 40.4 % (ref 36.6–50.3)
HDLC SERPL-MCNC: 89 MG/DL
HDLC SERPL: 1.8 {RATIO} (ref 0–5)
HGB BLD-MCNC: 13.2 G/DL (ref 12.1–17)
HGB UR QL STRIP: NEGATIVE
HYALINE CASTS URNS QL MICRO: NORMAL /LPF (ref 0–5)
IMM GRANULOCYTES # BLD: 0 K/UL
IMM GRANULOCYTES NFR BLD AUTO: 0 %
KETONES UR QL STRIP.AUTO: NEGATIVE MG/DL
LACTATE SERPL-SCNC: 1.3 MMOL/L (ref 0.4–2)
LACTATE SERPL-SCNC: 2.2 MMOL/L (ref 0.4–2)
LDLC SERPL CALC-MCNC: 59.4 MG/DL (ref 0–100)
LEUKOCYTE ESTERASE UR QL STRIP.AUTO: NEGATIVE
LIPID PROFILE,FLP: NORMAL
LYMPHOCYTES # BLD: 1.3 K/UL (ref 0.8–3.5)
LYMPHOCYTES NFR BLD: 15 % (ref 12–49)
MCH RBC QN AUTO: 32.6 PG (ref 26–34)
MCHC RBC AUTO-ENTMCNC: 32.7 G/DL (ref 30–36.5)
MCV RBC AUTO: 99.8 FL (ref 80–99)
METAMYELOCYTES NFR BLD MANUAL: 1 %
MONOCYTES # BLD: 0.6 K/UL (ref 0–1)
MONOCYTES NFR BLD: 7 % (ref 5–13)
NEUTS BAND NFR BLD MANUAL: 4 % (ref 0–6)
NEUTS SEG # BLD: 6.4 K/UL (ref 1.8–8)
NEUTS SEG NFR BLD: 72 % (ref 32–75)
NITRITE UR QL STRIP.AUTO: NEGATIVE
NRBC # BLD: 0 K/UL (ref 0–0.01)
NRBC BLD-RTO: 0 PER 100 WBC
P-R INTERVAL, ECG05: 214 MS
PH UR STRIP: 6 [PH] (ref 5–8)
PLATELET # BLD AUTO: 172 K/UL (ref 150–400)
PMV BLD AUTO: 9.7 FL (ref 8.9–12.9)
POTASSIUM SERPL-SCNC: 4 MMOL/L (ref 3.5–5.1)
PROT SERPL-MCNC: 6 G/DL (ref 6.4–8.2)
PROT UR STRIP-MCNC: NEGATIVE MG/DL
Q-T INTERVAL, ECG07: 354 MS
QRS DURATION, ECG06: 122 MS
QTC CALCULATION (BEZET), ECG08: 447 MS
RBC # BLD AUTO: 4.05 M/UL (ref 4.1–5.7)
RBC #/AREA URNS HPF: NORMAL /HPF (ref 0–5)
RBC MORPH BLD: ABNORMAL
SODIUM SERPL-SCNC: 138 MMOL/L (ref 136–145)
SP GR UR REFRACTOMETRY: 1.02 (ref 1–1.03)
TRIGL SERPL-MCNC: 73 MG/DL (ref ?–150)
UA: UC IF INDICATED,UAUC: NORMAL
UROBILINOGEN UR QL STRIP.AUTO: 0.2 EU/DL (ref 0.2–1)
VENTRICULAR RATE, ECG03: 96 BPM
VLDLC SERPL CALC-MCNC: 14.6 MG/DL
WBC # BLD AUTO: 8.4 K/UL (ref 4.1–11.1)
WBC URNS QL MICRO: NORMAL /HPF (ref 0–4)

## 2018-07-14 PROCEDURE — 74011000258 HC RX REV CODE- 258: Performed by: HOSPITALIST

## 2018-07-14 PROCEDURE — 65660000000 HC RM CCU STEPDOWN

## 2018-07-14 PROCEDURE — 71045 X-RAY EXAM CHEST 1 VIEW: CPT

## 2018-07-14 PROCEDURE — 93970 EXTREMITY STUDY: CPT

## 2018-07-14 PROCEDURE — 83605 ASSAY OF LACTIC ACID: CPT | Performed by: EMERGENCY MEDICINE

## 2018-07-14 PROCEDURE — 87040 BLOOD CULTURE FOR BACTERIA: CPT | Performed by: EMERGENCY MEDICINE

## 2018-07-14 PROCEDURE — 85025 COMPLETE CBC W/AUTO DIFF WBC: CPT | Performed by: EMERGENCY MEDICINE

## 2018-07-14 PROCEDURE — 93005 ELECTROCARDIOGRAM TRACING: CPT

## 2018-07-14 PROCEDURE — 99285 EMERGENCY DEPT VISIT HI MDM: CPT

## 2018-07-14 PROCEDURE — 81001 URINALYSIS AUTO W/SCOPE: CPT | Performed by: HOSPITALIST

## 2018-07-14 PROCEDURE — 74011250637 HC RX REV CODE- 250/637: Performed by: HOSPITALIST

## 2018-07-14 PROCEDURE — 74011250636 HC RX REV CODE- 250/636: Performed by: HOSPITALIST

## 2018-07-14 PROCEDURE — 80061 LIPID PANEL: CPT | Performed by: HOSPITALIST

## 2018-07-14 PROCEDURE — 70450 CT HEAD/BRAIN W/O DYE: CPT

## 2018-07-14 PROCEDURE — 80053 COMPREHEN METABOLIC PANEL: CPT | Performed by: EMERGENCY MEDICINE

## 2018-07-14 PROCEDURE — P9047 ALBUMIN (HUMAN), 25%, 50ML: HCPCS | Performed by: HOSPITALIST

## 2018-07-14 PROCEDURE — 36415 COLL VENOUS BLD VENIPUNCTURE: CPT | Performed by: EMERGENCY MEDICINE

## 2018-07-14 PROCEDURE — 74011250636 HC RX REV CODE- 250/636: Performed by: EMERGENCY MEDICINE

## 2018-07-14 RX ORDER — FUROSEMIDE 10 MG/ML
20 INJECTION INTRAMUSCULAR; INTRAVENOUS EVERY 12 HOURS
Status: DISCONTINUED | OUTPATIENT
Start: 2018-07-14 | End: 2018-07-16 | Stop reason: HOSPADM

## 2018-07-14 RX ORDER — ONDANSETRON 4 MG/1
4 TABLET, ORALLY DISINTEGRATING ORAL
Status: DISCONTINUED | OUTPATIENT
Start: 2018-07-14 | End: 2018-07-16 | Stop reason: HOSPADM

## 2018-07-14 RX ORDER — ACETAMINOPHEN 500 MG
1000 TABLET ORAL
Status: DISCONTINUED | OUTPATIENT
Start: 2018-07-14 | End: 2018-07-16 | Stop reason: HOSPADM

## 2018-07-14 RX ORDER — LEVETIRACETAM 500 MG/1
500 TABLET ORAL 2 TIMES DAILY
Status: DISCONTINUED | OUTPATIENT
Start: 2018-07-14 | End: 2018-07-16 | Stop reason: HOSPADM

## 2018-07-14 RX ORDER — DEXAMETHASONE SODIUM PHOSPHATE 4 MG/ML
4 INJECTION, SOLUTION INTRA-ARTICULAR; INTRALESIONAL; INTRAMUSCULAR; INTRAVENOUS; SOFT TISSUE
Status: COMPLETED | OUTPATIENT
Start: 2018-07-14 | End: 2018-07-14

## 2018-07-14 RX ORDER — ENOXAPARIN SODIUM 100 MG/ML
40 INJECTION SUBCUTANEOUS EVERY 24 HOURS
Status: DISCONTINUED | OUTPATIENT
Start: 2018-07-14 | End: 2018-07-16 | Stop reason: HOSPADM

## 2018-07-14 RX ORDER — NALOXONE HYDROCHLORIDE 0.4 MG/ML
0.4 INJECTION, SOLUTION INTRAMUSCULAR; INTRAVENOUS; SUBCUTANEOUS AS NEEDED
Status: DISCONTINUED | OUTPATIENT
Start: 2018-07-14 | End: 2018-07-16 | Stop reason: HOSPADM

## 2018-07-14 RX ORDER — CLOPIDOGREL BISULFATE 75 MG/1
75 TABLET ORAL DAILY
Status: DISCONTINUED | OUTPATIENT
Start: 2018-07-15 | End: 2018-07-16 | Stop reason: HOSPADM

## 2018-07-14 RX ORDER — GABAPENTIN 600 MG/1
300 TABLET ORAL 2 TIMES DAILY
Status: DISCONTINUED | OUTPATIENT
Start: 2018-07-14 | End: 2018-07-16 | Stop reason: HOSPADM

## 2018-07-14 RX ORDER — SODIUM CHLORIDE 0.9 % (FLUSH) 0.9 %
5-10 SYRINGE (ML) INJECTION EVERY 8 HOURS
Status: DISCONTINUED | OUTPATIENT
Start: 2018-07-14 | End: 2018-07-16 | Stop reason: HOSPADM

## 2018-07-14 RX ORDER — DEXAMETHASONE 0.5 MG/1
2 TABLET ORAL DAILY
Status: DISCONTINUED | OUTPATIENT
Start: 2018-07-15 | End: 2018-07-16 | Stop reason: HOSPADM

## 2018-07-14 RX ORDER — SODIUM CHLORIDE 0.9 % (FLUSH) 0.9 %
5-10 SYRINGE (ML) INJECTION AS NEEDED
Status: DISCONTINUED | OUTPATIENT
Start: 2018-07-14 | End: 2018-07-16 | Stop reason: HOSPADM

## 2018-07-14 RX ORDER — LANOLIN ALCOHOL/MO/W.PET/CERES
1000 CREAM (GRAM) TOPICAL DAILY
Status: DISCONTINUED | OUTPATIENT
Start: 2018-07-15 | End: 2018-07-16 | Stop reason: HOSPADM

## 2018-07-14 RX ORDER — ATORVASTATIN CALCIUM 10 MG/1
10 TABLET, FILM COATED ORAL
Status: DISCONTINUED | OUTPATIENT
Start: 2018-07-14 | End: 2018-07-16 | Stop reason: HOSPADM

## 2018-07-14 RX ORDER — AMOXICILLIN 250 MG
2 CAPSULE ORAL DAILY
Status: DISCONTINUED | OUTPATIENT
Start: 2018-07-15 | End: 2018-07-16 | Stop reason: HOSPADM

## 2018-07-14 RX ORDER — ALBUMIN HUMAN 250 G/1000ML
12.5 SOLUTION INTRAVENOUS EVERY 12 HOURS
Status: DISCONTINUED | OUTPATIENT
Start: 2018-07-14 | End: 2018-07-16 | Stop reason: HOSPADM

## 2018-07-14 RX ADMIN — ATORVASTATIN CALCIUM 10 MG: 10 TABLET, FILM COATED ORAL at 22:16

## 2018-07-14 RX ADMIN — DEXAMETHASONE SODIUM PHOSPHATE 4 MG: 4 INJECTION, SOLUTION INTRAMUSCULAR; INTRAVENOUS at 15:59

## 2018-07-14 RX ADMIN — GABAPENTIN 300 MG: 600 TABLET, FILM COATED ORAL at 18:38

## 2018-07-14 RX ADMIN — CEFTRIAXONE SODIUM 2 G: 2 INJECTION, POWDER, FOR SOLUTION INTRAMUSCULAR; INTRAVENOUS at 18:37

## 2018-07-14 RX ADMIN — SODIUM CHLORIDE 10 ML: 9 INJECTION INTRAMUSCULAR; INTRAVENOUS; SUBCUTANEOUS at 22:40

## 2018-07-14 RX ADMIN — FUROSEMIDE 20 MG: 10 INJECTION, SOLUTION INTRAMUSCULAR; INTRAVENOUS at 22:13

## 2018-07-14 RX ADMIN — ENOXAPARIN SODIUM 40 MG: 100 INJECTION SUBCUTANEOUS at 18:37

## 2018-07-14 RX ADMIN — SODIUM CHLORIDE 1000 ML: 900 INJECTION, SOLUTION INTRAVENOUS at 15:59

## 2018-07-14 RX ADMIN — SODIUM CHLORIDE 10 ML: 9 INJECTION INTRAMUSCULAR; INTRAVENOUS; SUBCUTANEOUS at 18:38

## 2018-07-14 RX ADMIN — LEVETIRACETAM 500 MG: 500 TABLET ORAL at 18:38

## 2018-07-14 RX ADMIN — ALBUMIN (HUMAN) 12.5 G: 0.25 INJECTION, SOLUTION INTRAVENOUS at 22:15

## 2018-07-14 NOTE — IP AVS SNAPSHOT
[facility] [facility] [address] 
[phone] Patient: Taj Lara MRN: [id number] AUI:51/06/1967 About your hospitalization You were admitted on:  July 14, 2018 You last received care in the:  [address] [place] [address] You were discharged on:  July 16, 2018 Why you were hospitalized Your primary diagnosis was: Altered Mental State Follow-up Information Follow up With Details Comments Contact Info Noris Blanco MD In 1 week  [address] [place] [address] 57 
[phone] Discharge Orders None A check radha indicates which time of day the medication should be taken. My Medications START taking these medications Instructions Each Dose to Equal  
 Morning Noon Evening Bedtime  
 amoxicillin-clavulanate 875-125 mg per tablet Commonly known as:  AUGMENTIN Your last dose was: Your next dose is: Take 1 Tab by mouth two (2) times a day. 1 Tab  
    
   
   
   
  
 furosemide 40 mg tablet Commonly known as:  LASIX Your last dose was: Your next dose is: Take 1 Tab by mouth every Monday, Thursday, Saturday. 40 mg CONTINUE taking these medications Instructions Each Dose to Equal  
 Morning Noon Evening Bedtime  
 acetaminophen 500 mg tablet Commonly known as:  [address] Ra[address] Arita Jr [facility] Your last dose was: Your next dose is: Take 2 Tabs by mouth as needed for Pain. No more than 3grams in 24hour period  Indications: Pain  
 1000 mg  
    
   
   
   
  
 bacitracin ophthalmic ointment Your last dose was: Your next dose is:    
   
   
 Administer  to left eye daily. dexamethasone 1 mg tablet Commonly known as:  DECADRON Your last dose was: Your next dose is: Take 2 mg by mouth daily. 2 mg docusate sodium 100 mg capsule Commonly known as:  Doc Aminta Your last dose was: Your next dose is: Take 100 mg by mouth nightly. 100 mg  
    
   
   
   
  
 gabapentin 600 mg tablet Commonly known as:  NEURONTIN Your last dose was: Your next dose is: Take 0.5 Tabs by mouth two (2) times a day. Indications: trigeminal neuralgia 300 mg  
    
   
   
   
  
 levETIRAcetam 500 mg tablet Commonly known as:  KEPPRA Your last dose was: Your next dose is: Take 1 Tab by mouth two (2) times a day. 500 mg  
    
   
   
   
  
 LIPITOR 10 mg tablet Generic drug:  atorvastatin Your last dose was: Your next dose is: Take 10 mg by mouth nightly. 10 mg  
    
   
   
   
  
 PLAVIX 75 mg Tab Generic drug:  clopidogrel Your last dose was: Your next dose is: Take 75 mg by mouth daily. TAKES IN AM  
 75 mg SENNA LAXATIVE PO Your last dose was: Your next dose is: Take 8.6 mg by mouth daily. 8.6 mg  
    
   
   
   
  
 SYSTANE (PROPYLENE GLYCOL) 0.4-0.3 % Drop Generic drug:  peg 400-propylene glycol Your last dose was: Your next dose is:    
   
   
 1 Drop four (4) times daily. Indications: Both eyes 1 Drop VITAMIN B-12 1,000 mcg tablet Generic drug:  cyanocobalamin Your last dose was: Your next dose is: Take 1,000 mcg by mouth daily. 1000 mcg VITAMIN D3 1,000 unit Cap Generic drug:  cholecalciferol Your last dose was: Your next dose is: Take 1,000 Units by mouth daily. 1000 Units Where to Get Your Medications Information on where to get these meds will be given to you by the nurse or doctor. ! Ask your nurse or doctor about these medications amoxicillin-clavulanate 875-125 mg per tablet  
 furosemide 40 mg tablet Discharge Instructions DISCHARGE SUMMARY from Nurse PATIENT INSTRUCTIONS: 
 
 
F-face looks uneven A-arms unable to move or move unevenly S-speech slurred or non-existent T-time-call 911 as soon as signs and symptoms begin-DO NOT go Back to bed or wait to see if you get better-TIME IS BRAIN. Warning Signs of HEART ATTACK Call 911 if you have these symptoms: 
? Chest discomfort. Most heart attacks involve discomfort in the center of the chest that lasts more than a few minutes, or that goes away and comes back. It can feel like uncomfortable pressure, squeezing, fullness, or pain. ? Discomfort in other areas of the upper body. Symptoms can include pain or discomfort in one or both arms, the back, neck, jaw, or stomach. ? Shortness of breath with or without chest discomfort. ? Other signs may include breaking out in a cold sweat, nausea, or lightheadedness. Don't wait more than five minutes to call [address]! Fast action can save your life. Calling 911 is almost always the fastest way to get lifesaving treatment. Emergency Medical Services staff can begin treatment when they arrive  up to an hour sooner than if someone gets to the hospital by car. The discharge information has been reviewed with the patient. The patient verbalized understanding. Discharge medications reviewed with the patient and appropriate educational materials and side effects teaching were provided. Orbital Traction Activation Thank you for requesting access to [employer]. Please follow the instructions below to securely access and download your online medical record. Or[employer] Traction allows you to send messages to your doctor, view your test results, renew your prescriptions, schedule appointments, and more. How Do I Sign Up? 1. In your internet browser, go to [email or web address] 
2. Click on the First Time User? Click Here link in the Sign In box. You will be redirect to the New Member Sign Up page. 3. Enter your Wingu Access Code exactly as it appears below. You will not need to use this code after youve completed the sign-up process. If you do not sign up before the expiration date, you must request a new code. Wingu Access Code: [email or web address] Expires: 10/12/2018 12:29 PM (This is the date your Wingu access code will ) 4. Enter the last four digits of your Social Security Number (xxxx) and Date of Birth (mm/dd/yyyy) as indicated and click Submit. You will be taken to the next sign-up page. 5. Create a Wingu ID. This will be your Wingu login ID and cannot be changed, so think of one that is secure and easy to remember. 6. Create a Wingu password. You can change your password at any time. 7. Enter your Password Reset Question and Answer. This can be used at a later time if you forget your password. 8. Enter your e-mail address. You will receive e-mail notification when new information is available in [address] 9. Click Sign Up. You can now view and download portions of your medical record. 10. Click the Download Summary menu link to download a portable copy of your medical information. Additional Information If you have questions, please visit the Frequently Asked Questions section of the Wingu website at [email or web address] Remember, Wingu is NOT to be used for urgent needs. For medical emergencies, dial 911. 
 
 
____________________________________________________________________________________________________________________________________ Discharge Instructions PATIENT ID: Thea Simon MRN: [id number] YOB: 1930 DATE OF ADMISSION: 2018 12:26 PM   
DATE OF DISCHARGE: 2018 PRIMARY CARE PROVIDER: Leelee Osborn MD  
 
ATTENDING PHYSICIAN: Shasta Hamm MD 
DISCHARGING PROVIDER: Shasta Hamm MD   
To contact this individual call [phone] and ask the  to page. If unavailable ask to be transferred the Adult Hospitalist Department. DISCHARGE DIAGNOSES see dc note CONSULTATIONS: None PROCEDURES/SURGERIES: * No surgery found * PENDING TEST RESULTS:  
At the time of discharge the following test results are still pending: fijnal on blood culture which is negative at this time of discharge FOLLOW UP APPOINTMENTS:  
Follow-up Information Follow up With Details Comments Contact Info Leelee Osborn MD In 1 week  [address] [place] [address] 
[phone] ADDITIONAL CARE RECOMMENDATIONS: na 
 
DIET: Resume previous diet ACTIVITY: Activity as tolerated WOUND CARE: na 
 
EQUIPMENT needed: na 
 
 
  
 SNF/Inpatient Rehab/LTAC  
x Independent/assisted living Hospice Other:  
 
  
 
Signed:  
Shasta Hamm MD 
7/16/2018 12:17 PM 
 
  
  
  
[facility] Announcement  We are excited to announce that we are making your provider's discharge notes available to you in [employer]. You will see these notes when they are completed and signed by the physician that discharged you from your recent hospital stay. If you have any questions or concerns about any information you see in [employer], please call the Health Information Department where you were seen or reach out to your Primary Care Provider for more information about your plan of care. Introducing [facility] & HEALTH SERVICES! Silvia Boyer introduces [employer] patient portal. Now you can access parts of your medical record, email your doctor's office, and request medication refills online. 1. In your internet browser, go to [email or web address] 2. Click on the First Time User? Click Here link in the Sign In box. You will see the New Member Sign Up page. 3. Enter your [employer] Access Code exactly as it appears below. You will not need to use this code after youve completed the sign-up process. If you do not sign up before the expiration date, you must request a new code. · [employer] Access Code: [email or web address] Expires: 10/12/2018 12:29 PM 
 
4. Enter the last four digits of your Social Security Number (xxxx) and Date of Birth (mm/dd/yyyy) as indicated and click Submit. You will be taken to the next sign-up page. 5. Create a [employer] ID. This will be your [employer] login ID and cannot be changed, so think of one that is secure and easy to remember. 6. Create a [employer] password. You can change your password at any time. 7. Enter your Password Reset Question and Answer. This can be used at a later time if you forget your password. 8. Enter your e-mail address. You will receive e-mail notification when new information is available in [address] 9. Click Sign Up. You can now view and download portions of your medical record. 10. Click the Download Summary menu link to download a portable copy of your medical information. If you have questions, please visit the Frequently Asked Questions section of the MyChart website. Remember, Eb[employer] is NOT to be used for urgent needs. For medical emergencies, dial 911. Now available from your iPhone and Android! Introducing Fredrick Pepper As a [facility] patient, I wanted to make you aware of our electronic visit tool called Fredrick Pepper. INCHRON allows you to connect within minutes with a medical provider 24 hours a day, seven days a week via a mobile device or tablet or logging into a secure website from your computer. You can access Fredrick Pepper from anywhere in the [place]. A virtual visit might be right for you when you have a simple condition and feel like you just dont want to get out of bed, or cant get away from work for an appointment, when your regular [facility] provider is not available (evenings, weekends or holidays), or when youre out of town and need minor care. Electronic visits cost only $49 and if the [employer] provider determines a prescription is needed to treat your condition, one can be electronically transmitted to a nearby pharmacy*. Please take a moment to enroll today if you have not already done so. The enrollment process is free and takes just a few minutes. To enroll, please download the INCHRON elver to your tablet or phone, or visit [email or web address] to enroll on your computer. And, as an [address] [place] [address] patient with a [facility] account, the results of your visits will be scanned into your electronic medical record and your primary care provider will be able to view the scanned results. We urge you to continue to see your regular [facility] provider for your ongoing medical care.   And while your primary care provider may not be the one available when you seek a Fredrick Pepper virtual visit, the peace of mind you get from getting a real diagnosis real time can be priceless. For more information on Fredrick Pepper, view our Frequently Asked Questions (FAQs) at [email or web address]. Sincerely, 
 
Clementina Tracey MD 
Chief Medical Officer 50Abhay Hector *:  certain medications cannot be prescribed via Fredrick Arminroxie Unresulted Labs-Please follow up with your PCP about these lab tests Order Current Status CULTURE, BLOOD, PAIRED Preliminary result Providers Seen During Your Hospitalization Provider Specialty Primary office phone Louie Schmitt MD Emergency Medicine [phone] Willam Primrose, MD Internal Medicine [phone] Shay Thomas MD Internal Medicine [phone] Your Primary Care Physician (PCP) Primary Care Physician Office Phone Office Fax Woo Monroe [phone] You are allergic to the following Allergen Reactions Adhesive Tape-Silicones Other (comments) REDNESS Aggrenox (Aspirin-Dipyridamole) Other (comments)  
 headache Amlodipine Rash Diuril (Chlorothiazide) Unknown (comments) Hydrochlorothiazide Other (comments) LOW NA  
  
Recent Documentation Height Weight BMI Smoking Status 1.753 m 100 kg 32.55 kg/m2 Former Smoker Emergency Contacts Name Discharge Info Relation Home Work Mobile [facility] DISCHAR[facility] CAREGIVER [3] Girlfriend [18] [phone] CAREGIVER [3] Son [22] [phone] Patient Belongings The following personal items are in your possession at time of discharge: 
     Visual Aid: Glasses   Hearing Aids/Status: With patient Please provide this summary of care documentation to your next provider. Signatures-by signing, you are acknowledging that this After Visit Summary has been reviewed with you and you have received a copy. Patient Signature:  ____________________________________________________________ Date:  ____________________________________________________________  
  
Paulene Greener Provider Signature:  ____________________________________________________________ Date:  ____________________________________________________________

## 2018-07-14 NOTE — ROUTINE PROCESS
TRANSFER - OUT REPORT:    Verbal report given to Anastacia Caceres RN (name) on Yetta Hose  being transferred to  (unit) for routine progression of care       Report consisted of patients Situation, Background, Assessment and   Recommendations(SBAR). Information from the following report(s) SBAR, ED Summary, [facility] and Recent Results was reviewed with the receiving nurse. Lines:   Peripheral IV 07/14/18 Left Antecubital (Active)   Site Assessment Clean, dry, & intact 7/14/2018 12:40 PM   Phlebitis Assessment 0 7/14/2018 12:40 PM   Infiltration Assessment 0 7/14/2018 12:40 PM   Dressing Status Clean, dry, & intact 7/14/2018 12:40 PM   Hub Color/Line Status Pink;Flushed 7/14/2018 12:40 PM   Action Taken Blood drawn 7/14/2018 12:40 PM       Peripheral IV 07/14/18 Right Forearm (Active)   Site Assessment Clean, dry, & intact 7/14/2018 12:58 PM   Phlebitis Assessment 0 7/14/2018 12:58 PM   Infiltration Assessment 0 7/14/2018 12:58 PM   Dressing Status Clean, dry, & intact 7/14/2018 12:58 PM   Dressing Type Transparent 7/14/2018 12:58 PM   Hub Color/Line Status Blue;Patent; Flushed 7/14/2018 12:58 PM   Action Taken Blood drawn 7/14/2018 12:58 PM        Opportunity for questions and clarification was provided. Patient transported with:   O2 @ 2 liters  Tech      UPDATE: Pt assessment unchanged, pt makes significant changed in bed but continues to complains of weakness to legs, VSS, no other complaints at this time, waiting on transport to inpatient unit.      Visit Vitals    /46 (BP 1 Location: Right arm, BP Patient Position: At rest)    Pulse 79    Temp 98.3 °F (36.8 °C)    Resp 19    Ht 5' 9\" (1.753 m)    Wt 100 kg (220 lb 6.4 oz)    SpO2 93%    BMI 32.55 kg/m2

## 2018-07-14 NOTE — PROGRESS NOTES
Problem: Falls - Risk of  Goal: *Absence of Falls  Document Madhu Fall Risk and appropriate interventions in the flowsheet.    Outcome: Progressing Towards Goal  Fall Risk Interventions:            Medication Interventions: Bed/chair exit alarm, Patient to call before getting OOB    Elimination Interventions: Bed/chair exit alarm, Call light in reach, Patient to call for help with toileting needs, Toileting schedule/hourly rounds

## 2018-07-14 NOTE — ED TRIAGE NOTES
Triage Note: Pt arrives via EMS from [facility] for increased generalized weakness. Pt was seen at [facility] this AM and dx with UTI, upon return to nursing facility was unable to get out of car due to increase weakness, so they called EMS. En route vital signs stable, . Daughter reported to EMS that patient was more confused than usual, EMS was unsure of baseline. Currently oriented to self. 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 Lab work from [facility]:  WBC 8.9  BUN- 15  Creat-1.0  Trace bacteria found in urine. Other labs scanned into chart. Prescribed Augmentin- 500 mg BID.

## 2018-07-14 NOTE — PROGRESS NOTES
Primary Nurse Doyle Stack RN and Sisi Martinez RN performed a dual skin assessment on this patient No impairment noted  Davy score is 21    Noted small weeping wound to left shin and scattered bruise.

## 2018-07-14 NOTE — PROCEDURES
[address]  *** FINAL REPORT ***    Name: Tammy Peralta  MRN: [id number]  : 10 Nov 1930  HIS Order #: [id number]  [address] [facility] Visit #: [id number]  Date: 2018    TYPE OF TEST: Peripheral Venous Testing    REASON FOR TEST  Limb swelling    Right Leg:-  Deep venous thrombosis:           No  Superficial venous thrombosis:    No  Deep venous insufficiency:        Not examined  Superficial venous insufficiency: Not examined    Left Leg:-  Deep venous thrombosis:           No  Superficial venous thrombosis:    No  Deep venous insufficiency:        Not examined  Superficial venous insufficiency: Not examined      INTERPRETATION/FINDINGS  PROCEDURE:  Color duplex ultrasound imaging of lower extremity veins. FINDINGS:       Right: The common femoral, deep femoral, femoral, popliteal,  posterior tibial, peroneal, and great saphenous are patent and without   evidence of thrombus;  each is fully compressible and there is no  narrowing of the flow channel on color Doppler imaging. Phasic flow  is observed in the common femoral vein. Left:   The common femoral, deep femoral, femoral, popliteal,  posterior tibial, peroneal, and great saphenous are patent and without   evidence of thrombus;  each is fully compressible and there is no  narrowing of the flow channel on color Doppler imaging. Phasic flow  is observed in the common femoral vein. IMPRESSION:  No evidence of right or left lower extremity vein  thrombosis. There is evidence of valve incompetence (reflux) involving   one of two right popliteal veins. ADDITIONAL COMMENTS    I have personally reviewed the data relevant to the interpretation of  this  study.     TECHNOLOGIST: Nai Lopez RVT  Signed: 2018 04:57 PM    PHYSICIAN: Ashley Powers MD  Signed: 07/15/2018 06:37 AM

## 2018-07-14 NOTE — H&P
[address]   HISTORY AND PHYSICAL      Name:Brenden DORSEY  MR#: [id number]  : 11/10/1930  ACCOUNT #: [de-identified]   ADMIT DATE: 2018    CHIEF COMPLAINT:  Generalized weakness and unable to ambulate. 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 He had a similar presentation in 2017 because of general weakness. Thought  it may be due to taper down of the Decadron too quick and on top of his low sodium. He did have a period of rehabilitation after that discharge and then eventually he was discharged back to his assisted living. Patient at baseline is able to ambulate independently . His daughter said his Decadron was decreased from 1 mg twice a day to 1 mg once a day 5 days ago and since then did notice he has increasing weakness and since yesterday he was extremely weak, very sleepy, not taking oral adequately. So, he was taken to [facility] today and a family member was told that he has a UTI, prescribed him antibiotics, but on the way to home, the patient's family said that he is unable to get into the car, not able to walk, so ambulance was called and sent to the ED. The blood sugar was 114 when he arrived in the ED. He feels overall weak and he has chronic left face numb and paralyzed, but otherwise denied specific focal weakness. The patient also family member noticed for the last 1-2 weeks that his both legs are with increasing edema. He also gained a lot of weight recently. REVIEW OF SYSTEMS:  Limited due to patient's condition. HEENT:  He did mention has some mild right face tenderness.   No acute vision changes. Mild nose discharge from the right side. No hearing changes. RESPIRATORY:  Denied wheezing. Denied cough, denied shortness of breath. CARDIOVASCULAR:  Denied chest pain, denied palpitation. MUSCULOSKELETAL:  Per H and P. SKIN:  Has very frail skin, easy to bruise, some several skin tears. GASTROINTESTINAL:  No nausea, vomiting, no diarrhea. GENITOURINARY:  Denied dysuria this morning, but had difficult to void in the ED now. HEMATOLOGIC/ONCOLOGIC:  No gum bleeding, no petechia. NEUROLOGIC:  Per H and P.  ENDOCRINE:  Denied polydipsia. PSYCHIATRIC:  Denies depression. ALLERGIES:  PATIENT IS ALLERGIC TO AGGRENOX, AMLODIPINE, CHLORTHIAZIDE,. THE PATIENT IS A DNR/DNI, WHICH IS CONFIRMED FROM THE DAUGHTER AND SIGNIFICANT OTHER. 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 FAMILY HISTORY:  Mother had colon cancer. Father had throat cancer. SOCIAL HISTORY:  , former smoker, quit 40 years ago. Social alcohol drinker. Currently living in assisted living. Has a significant other, frequent would visit him. MEDICATIONS AT HOME:  Tylenol, Lipitor 10 mg daily, vitamin D3, Plavix 75 mg daily, vitamin B12, Decadron currently 1 mg daily, Colace 100 mg at night, Pepcid 20mg daily, Neurontin 600 mg or 300 mg twice a day, Keppra 500 mg twice a day, and Senokot. LABORATORY DATA:  On admission, WBC 8, H and H 13/40, platelets 469. Sodium 138, potassium 4.0, BUN 14, creatinine 1.08, albumin level 2.5. Total bilirubin and other liver enzymes normal.  Lactic acid 2.5. CT of the head shows a slight improvement in edema and sulcal effacement in the left anterior temporal lobe where radiation necrosis has been recently described compared to the CT in 03/2018.   There is worsening of the right maxillary sinus with a small air-fluid level and increased mucosal thickening. PHYSICAL EXAMINATION:  GENERAL:  The patient looks old, general weak, but opens eyes to voice, cooperative, answers questions appropriately, follow commands. VITAL SIGNS:  Temperature 98.7, heart rate 86, blood pressure 130/75, sats is 94% on 2 liters. HEENT:  Noticed no icterus. Pupils equal, reactive to light. EOM intact. Mild tenderness on the right maxillary sinus area. No nose discharge significantly. Mucosa looks dry. No erythema in the throat. No ear discharge. NECK:  Supple, no JVD, no carotid bruits, no goiter. No lymph node palpable. CHEST:  Decreased breath sounds bilateral lower lungs. Clear to percussion. No wheezing. Expansion symmetric bilateral.  CARDIOVASCULAR:  Regular rate. No murmur, normal sounds. PMI nondisplaced. ABDOMEN:  Mildly distended, soft, nontender. Bowel sounds positive. No hepatomegaly. EXTREMITIES:  Bilateral 2-3+ pitting edema up to the knees and with several skin tears noticed. Joint is normal.  Range of motion so far intact. NEUROLOGIC:  No pain or touch sensation involving the left face, which is chronic. Otherwise, strength is bilateral symmetric and normal.  No other sensation changes. PSYCHIATRIC:  Good mood, but poor insight. ASSESSMENT AND PLAN:  1. Acute metabolic encephalopathy, increasing sleepiness and overall weakness, could be due to urinary tract infection per [facility] in [facility]. Not sure his decreasing Decadron also contributes to his overall weakness. He did receive a dose of Decadron 4 mg IV in the ED. At this moment, I will keep the same 1 mg daily. We will treat the UTI first.  If still no improvement, then will consider increasing the Decadron back to the b.i.d. dose. We will have PT, OT evaluation  2. Bilateral leg swelling. Patient noted has hypoalbuminemia,  albumin level of 2.5. We will give gentle diuresis paired with IV albumin.   Overall, intravascularlly patient actually is in the dry side, but we will use the iv albumin plus Lasix to see if that helps the leg swelling. Also, will order echocardiogram to evaluate his EF  3. Right maxillary sinusitis versus recurrent sinus cancer. Per history, he has sinus cancer, which required radiation on that area, I am not sure this is an infection or recurrent cancer. We will empirically treat as sinusitis with antibiotics for now, but he should follow with his ENT as an outpatient. 4.  Moderate severe protein deficiency malnutrition. We will consult a dietitian. Currently, will try to use IV albumin and Lasix to help with the leg swelling. 5.  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 6.  Trigeminal neuralgia, supportive care.       MD DYLAN Espinoza/AKIRA  D: 07/14/2018 16:15     T: 07/14/2018 17:22  JOB #: [id number]

## 2018-07-14 NOTE — IP AVS SNAPSHOT
[address] 
[phone] Patient: Terrence Crespo MRN: [id number] SPX:[id number] A check radha indicates which time of day the medication should be taken. My Medications START taking these medications Instructions Each Dose to Equal  
 Morning Noon Evening Bedtime  
 amoxicillin-clavulanate 875-125 mg per tablet Commonly known as:  AUGMENTIN Your last dose was: Your next dose is: Take 1 Tab by mouth two (2) times a day. 1 Tab  
    
   
   
   
  
 furosemide 40 mg tablet Commonly known as:  LASIX Your last dose was: Your next dose is: Take 1 Tab by mouth every Monday, Thursday, Saturday. 40 mg CONTINUE taking these medications Instructions Each Dose to Equal  
 Morning Noon Evening Bedtime  
 acetaminophen 500 mg tablet Commonly known as:  Jr Maegan Martinez  Your last dose was: Your next dose is: Take 2 Tabs by mouth as needed for Pain. No more than 3grams in 24hour period  Indications: Pain  
 1000 mg  
    
   
   
   
  
 bacitracin ophthalmic ointment Your last dose was: Your next dose is:    
   
   
 Administer  to left eye daily. dexamethasone 1 mg tablet Commonly known as:  DECADRON Your last dose was: Your next dose is: Take 2 mg by mouth daily. 2 mg  
    
   
   
   
  
 docusate sodium 100 mg capsule Commonly known as:  Maretta Concepcion Your last dose was: Your next dose is: Take 100 mg by mouth nightly. 100 mg  
    
   
   
   
  
 gabapentin 600 mg tablet Commonly known as:  NEURONTIN Your last dose was: Your next dose is: Take 0.5 Tabs by mouth two (2) times a day. Indications: trigeminal neuralgia  300 mg  
    
   
   
   
  
 levETIRAcetam 500 mg tablet Commonly known as:  KEPPRA Your last dose was: Your next dose is: Take 1 Tab by mouth two (2) times a day. 500 mg  
    
   
   
   
  
 LIPITOR 10 mg tablet Generic drug:  atorvastatin Your last dose was: Your next dose is: Take 10 mg by mouth nightly. 10 mg  
    
   
   
   
  
 PLAVIX 75 mg Tab Generic drug:  clopidogrel Your last dose was: Your next dose is: Take 75 mg by mouth daily. TAKES IN AM  
 75 mg SENNA LAXATIVE PO Your last dose was: Your next dose is: Take 8.6 mg by mouth daily. 8.6 mg  
    
   
   
   
  
 SYSTANE (PROPYLENE GLYCOL) 0.4-0.3 % Drop Generic drug:  peg 400-propylene glycol Your last dose was: Your next dose is:    
   
   
 1 Drop four (4) times daily. Indications: Both eyes 1 Drop VITAMIN B-12 1,000 mcg tablet Generic drug:  cyanocobalamin Your last dose was: Your next dose is: Take 1,000 mcg by mouth daily. 1000 mcg VITAMIN D3 1,000 unit Cap Generic drug:  cholecalciferol Your last dose was: Your next dose is: Take 1,000 Units by mouth daily. 1000 Units Where to Get Your Medications Information on where to get these meds will be given to you by the nurse or doctor. ! Ask your nurse or doctor about these medications  
  amoxicillin-clavulanate 875-125 mg per tablet  
 furosemide 40 mg tablet

## 2018-07-14 NOTE — PROGRESS NOTES
TRANSFER - IN REPORT:    Verbal report received from EderRN(name) on Shruthi Garrett  being received from ED(unit) for routine progression of care      Report consisted of patients Situation, Background, Assessment and   Recommendations(SBAR). Information from the following report(s) SBAR, Kardex, ED Summary, Intake/Output, MAR and Recent Results was reviewed with the receiving nurse. Opportunity for questions and clarification was provided.       Assessment completed upon patients arrival to unit and care assumed.         '

## 2018-07-14 NOTE — ED PROVIDER NOTES
HPI Comments: 80 y.o. male with past medical history significant for GERD, hypercholesterolemia, beta blocker therapy, numbness of left hand & left face, trigeminal neuralgia, nasal sinus tumor, HTN, RBBB, TIA, arthritis, and cancer who presents via EMS from [facility] for increased generalized weakness with chief complaint of lethargy. Pt has been lethargic since yesterday. When patient was awake he was eating well, however he was sleeping a lot. Per relative, pt was not eating well this morning at breakfast. Pt was sent to [facility] this AM and diagnosed with a UTI. Pt was able to walk in and out of [facility], but was unable to walk back into the nursing facility. Pt's family called EMS and his BGL was 114 upon arrival. 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 Pt's family states that he has gained a lot of weight recently. There are no other acute medical concerns at this time. Social hx: Former smoker. PCP: Yola Packer MD    Note written by leroy Wagoner, as dictated by Connor Santillan MD 1:30 PM      The history is provided by the patient. The history is limited by the condition of the patient. No  was used.         Past Medical History:   Diagnosis Date    Arthritis     HANDS    Beta-blocker therapy     Cancer ([facility] 2013    MAXILLARY SINUS CANCER, RADIATION AND CHEMO    Cancer ([facility]     SKIN CA ON NOSE    GERD (gastroesophageal reflux disease)     Hypercholesterolemia     Hypertension     pt denies    Nasal sinus tumor     Numbness of LEFT hand & LEFT face 2010    RBBB (right bundle branch block)     TIA (transient ischemic attack)     12 TIA's over 9 YRS.1ST ONE IN 8/03- LAST IN 2/12      Trigeminal neuralgia        Past Surgical History:   Procedure Laterality Date    HX CATARACT REMOVAL Bilateral     HX GI      COLONOSCOPY    HX HEENT      BIOPSY OF SINUS     HX HEENT Left     sew eye closed    HX HERNIA REPAIR Right     INGUINAL  HX KNEE ARTHROSCOPY Right 2007    HX KNEE REPLACEMENT Right 2012    Total Knee replacement- right    HX ROTATOR CUFF REPAIR Right     right rotator cuff repair         Family History:   Problem Relation Age of Onset    Cancer Mother      colon    Cancer Father      throat    No Known Problems Brother     Anesth Problems Neg Hx        Social History     Social History    Marital status:      Spouse name: N/A    Number of children: N/A    Years of education: N/A     Occupational History    Not on file. Social History Main Topics    Smoking status: Former Smoker     Packs/day: 1.00     Years: 40.00     Quit date: 1/1/1982    Smokeless tobacco: Never Used      Comment: OCC[employer] CIGAR    Alcohol use 10.5 oz/week     21 Glasses of wine per week    Drug use: No    Sexual activity: Not on file     Other Topics Concern    Not on file     Social History Narrative         ALLERGIES: Adhesive tape-silicones; Aggrenox [aspirin-dipyridamole]; Amlodipine; Diuril [chlorothiazide]; and Hydrochlorothiazide    Review of Systems   Constitutional: Positive for activity change (decresed activity), appetite change and fatigue. Negative for chills and fever. Not ambulatory  Sleeping a lot   lethargic     HENT: Negative for rhinorrhea, sore throat and trouble swallowing. Eyes: Negative for photophobia. Respiratory: Negative for cough and shortness of breath. Cardiovascular: Negative for chest pain and palpitations. Gastrointestinal: Negative for abdominal pain, nausea and vomiting. Genitourinary: Negative for dysuria, frequency and hematuria. Musculoskeletal: Negative for arthralgias. Neurological: Positive for weakness (unable to walk back into nursing facility after visiting [facility]. ). Negative for dizziness and syncope. Psychiatric/Behavioral: Negative for behavioral problems. The patient is not nervous/anxious. All other systems reviewed and are negative.       Vitals: 07/14/18 1234   BP: 130/75   Pulse: 99   Resp: 12   Temp: 98.7 °F (37.1 °C)   SpO2: 93%            Physical Exam   Constitutional: He appears well-developed and well-nourished. He appears lethargic. Pt lethargic. HENT:   Head: Normocephalic and atraumatic. Mouth/Throat: Oropharynx is clear and moist.   Eyes: EOM are normal. Pupils are equal, round, and reactive to light. Left eye is sown shut. Neck: Normal range of motion. Neck supple. Cardiovascular: Normal rate, regular rhythm, normal heart sounds and intact distal pulses. Exam reveals no gallop and no friction rub. No murmur heard. Pulmonary/Chest: Effort normal. No respiratory distress. He has no wheezes. He has no rales. Abdominal: Soft. There is no tenderness. There is no rebound. Genitourinary:   Genitourinary Comments: Bacteriuria in urine. Leukocytes and nitrates are all negative in the urine. Minimal white cells (pyuria)   Musculoskeletal: Normal range of motion. He exhibits no tenderness. Pitting Edema in lower extremities (3/4)   Neurological: He appears lethargic. No cranial nerve deficit. Motor; symmetric  Makes eye contact. Tends to sleep   Wakes easily   Understands questions. Gives brief answers. Skin: No erythema. Psychiatric: He has a normal mood and affect. His behavior is normal.   Nursing note and vitals reviewed. Note written by leroy Nguyen, as dictated by Alex Arauz MD 1:30 PM    [facility]      ED Course       Procedures      ED EKG interpretation:  Rhythm: normal sinus rhythm; and regular . Rate (approx.): 95; Axis: normal; P wave: prolonged; QRS interval: prolonged; ST/T wave: non-specific changes; in  Lead: III q and aVF q; Other findings: RBBB. This EKG was interpreted by Alex Arauz MD,ED Provider. 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 He underwent radiation. He had eye injury with atrophy.  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 Patient lives in assisted living. He usually has a good appetite. He was noted to be sleeping all day yesterday but still would wake for meals and eat. Today patient stopped eating and is sleeping all the time. He still can be woken up. [facility] When he got back to assisted living he could no longer walk. Patient was diagnosed with UTI/bacteriuria. He did not have significant pyuria, and leukoesterase was negative as well as nitrite. Sabrina Reyes MD  2:17 PM      Note: CT of the head is pending; patient recently came off steroids; I will order a small dose of Decadron to cover for possible increased intracranial pressure , edema around his radiation necrosis, and any possibility of addisonian crisis. Etiology of his acute weakness and lethargy and inability to walk is not obvious so far. Patient will be admitted. If he does not have significant brain edema he may benefit from IV saline.   Sabrina Reyes MD  3:26 PM

## 2018-07-14 NOTE — PROGRESS NOTES
Admission Medication Reconciliation:    Information obtained from: [facility] transfer paperwork (scanned into system)    Significant PMH/Disease States:   Past Medical History:   Diagnosis Date    Arthritis     HANDS    Beta-blocker therapy     Cancer ([facility] 2013    MAXILLARY SINUS CANCER, RADIATION AND CHEMO    Cancer ([facility]     SKIN CA ON NOSE    GERD (gastroesophageal reflux disease)     Hypercholesterolemia     Hypertension     pt denies    Nasal sinus tumor     Numbness of LEFT hand & LEFT face 2010    RBBB (right bundle branch block)     TIA (transient ischemic attack)     12 TIA's over 9 YRS.1ST ONE IN 8/03- LAST IN 2/12      Trigeminal neuralgia        Chief Complaint for this Admission:  fatigue    Allergies:  Adhesive tape-silicones; Aggrenox [aspirin-dipyridamole]; Amlodipine; Diuril [chlorothiazide]; and Hydrochlorothiazide    Prior to Admission Medications:   Prior to Admission Medications   Prescriptions Last Dose Informant Patient Reported? Taking? SENNOSIDES (SENNA LAXATIVE PO) 7/14/2018 at Unknown time  Yes Yes   Sig: Take 8.6 mg by mouth daily. acetaminophen (TYLENOL EXTRA STRENGTH) 500 mg tablet Not Taking at Unknown time  No No   Sig: Take 2 Tabs by mouth as needed for Pain. No more than 3grams in 24hour period  Indications: Pain   atorvastatin (LIPITOR) 10 mg tablet 7/13/2018 at Unknown time Self Yes Yes   Sig: Take 10 mg by mouth nightly. bacitracin ophthalmic ointment 7/13/2018 at Unknown time Self Yes Yes   Sig: Administer  to left eye daily. cholecalciferol (VITAMIN D3) 1,000 unit cap 7/14/2018 at Unknown time Self Yes Yes   Sig: Take 1,000 Units by mouth daily. clopidogrel (PLAVIX) 75 mg tablet 7/14/2018 at Unknown time  Yes Yes   Sig: Take 75 mg by mouth daily. TAKES IN AM   cyanocobalamin (VITAMIN B-12) 1,000 mcg tablet 7/14/2018 at Unknown time Self Yes Yes   Sig: Take 1,000 mcg by mouth daily.    dexamethasone (DECADRON) 1 mg tablet 7/14/2018 at Unknown time  Yes Yes   Sig: Take 2 mg by mouth daily. docusate sodium (COLACE) 100 mg capsule 2018 at Unknown time Self Yes Yes   Sig: Take 100 mg by mouth nightly.   gabapentin (NEURONTIN) 600 mg tablet 2018 at Unknown time  No Yes   Sig: Take 0.5 Tabs by mouth two (2) times a day. Indications: trigeminal neuralgia   levETIRAcetam (KEPPRA) 500 mg tablet 2018 at Unknown time  No Yes   Sig: Take 1 Tab by mouth two (2) times a day. peg 400-propylene glycol (SYSTANE, PROPYLENE GLYCOL,) 0.4-0.3 % drop 2018 at Unknown time  Yes Yes   Si Drop four (4) times daily. Indications: Both eyes      Facility-Administered Medications: None         Comments/Recommendations: Transfer paperwork used to update list. Administration times confirmed by RN. Allergies reviewed, no changes. Revised:  1. Dexamethasone    Deleted:  1. Scopolamine  2. Famotidine    Thank you for allowing me to participate in the care of your patient.     Manuel Hawthorne PharmD, RN #[id number]

## 2018-07-15 LAB
ALBUMIN SERPL-MCNC: 2.6 G/DL (ref 3.5–5)
ALBUMIN/GLOB SERPL: 0.8 {RATIO} (ref 1.1–2.2)
ALP SERPL-CCNC: 43 U/L (ref 45–117)
ALT SERPL-CCNC: 41 U/L (ref 12–78)
ANION GAP SERPL CALC-SCNC: 7 MMOL/L (ref 5–15)
AST SERPL-CCNC: 17 U/L (ref 15–37)
BASOPHILS # BLD: 0 K/UL (ref 0–0.1)
BASOPHILS NFR BLD: 0 % (ref 0–1)
BILIRUB SERPL-MCNC: 0.5 MG/DL (ref 0.2–1)
BUN SERPL-MCNC: 13 MG/DL (ref 6–20)
BUN/CREAT SERPL: 16 (ref 12–20)
CALCIUM SERPL-MCNC: 7.9 MG/DL (ref 8.5–10.1)
CHLORIDE SERPL-SCNC: 101 MMOL/L (ref 97–108)
CO2 SERPL-SCNC: 28 MMOL/L (ref 21–32)
CREAT SERPL-MCNC: 0.79 MG/DL (ref 0.7–1.3)
DIFFERENTIAL METHOD BLD: ABNORMAL
EOSINOPHIL # BLD: 0 K/UL (ref 0–0.4)
EOSINOPHIL NFR BLD: 0 % (ref 0–7)
ERYTHROCYTE [DISTWIDTH] IN BLOOD BY AUTOMATED COUNT: 17.8 % (ref 11.5–14.5)
GLOBULIN SER CALC-MCNC: 3.2 G/DL (ref 2–4)
GLUCOSE SERPL-MCNC: 126 MG/DL (ref 65–100)
HCT VFR BLD AUTO: 35.5 % (ref 36.6–50.3)
HGB BLD-MCNC: 11.8 G/DL (ref 12.1–17)
IMM GRANULOCYTES # BLD: 0 K/UL
IMM GRANULOCYTES NFR BLD AUTO: 0 %
LYMPHOCYTES # BLD: 1.1 K/UL (ref 0.8–3.5)
LYMPHOCYTES NFR BLD: 13 % (ref 12–49)
MAGNESIUM SERPL-MCNC: 2.1 MG/DL (ref 1.6–2.4)
MCH RBC QN AUTO: 32.5 PG (ref 26–34)
MCHC RBC AUTO-ENTMCNC: 33.2 G/DL (ref 30–36.5)
MCV RBC AUTO: 97.8 FL (ref 80–99)
MONOCYTES # BLD: 0.4 K/UL (ref 0–1)
MONOCYTES NFR BLD: 5 % (ref 5–13)
NEUTS BAND NFR BLD MANUAL: 5 % (ref 0–6)
NEUTS SEG # BLD: 6.7 K/UL (ref 1.8–8)
NEUTS SEG NFR BLD: 77 % (ref 32–75)
NRBC # BLD: 0 K/UL (ref 0–0.01)
NRBC BLD-RTO: 0 PER 100 WBC
PHOSPHATE SERPL-MCNC: 3.5 MG/DL (ref 2.6–4.7)
PLATELET # BLD AUTO: 165 K/UL (ref 150–400)
PMV BLD AUTO: 9.5 FL (ref 8.9–12.9)
POTASSIUM SERPL-SCNC: 3.7 MMOL/L (ref 3.5–5.1)
PROT SERPL-MCNC: 5.8 G/DL (ref 6.4–8.2)
RBC # BLD AUTO: 3.63 M/UL (ref 4.1–5.7)
RBC MORPH BLD: ABNORMAL
RBC MORPH BLD: ABNORMAL
SODIUM SERPL-SCNC: 136 MMOL/L (ref 136–145)
WBC # BLD AUTO: 8.2 K/UL (ref 4.1–11.1)

## 2018-07-15 PROCEDURE — G8979 MOBILITY GOAL STATUS: HCPCS

## 2018-07-15 PROCEDURE — 74011000258 HC RX REV CODE- 258: Performed by: HOSPITALIST

## 2018-07-15 PROCEDURE — 85025 COMPLETE CBC W/AUTO DIFF WBC: CPT | Performed by: HOSPITALIST

## 2018-07-15 PROCEDURE — 93306 TTE W/DOPPLER COMPLETE: CPT

## 2018-07-15 PROCEDURE — 65660000000 HC RM CCU STEPDOWN

## 2018-07-15 PROCEDURE — 77010033678 HC OXYGEN DAILY

## 2018-07-15 PROCEDURE — 83735 ASSAY OF MAGNESIUM: CPT | Performed by: HOSPITALIST

## 2018-07-15 PROCEDURE — 74011000250 HC RX REV CODE- 250: Performed by: HOSPITALIST

## 2018-07-15 PROCEDURE — 36415 COLL VENOUS BLD VENIPUNCTURE: CPT | Performed by: HOSPITALIST

## 2018-07-15 PROCEDURE — 97530 THERAPEUTIC ACTIVITIES: CPT

## 2018-07-15 PROCEDURE — 80053 COMPREHEN METABOLIC PANEL: CPT | Performed by: HOSPITALIST

## 2018-07-15 PROCEDURE — G8978 MOBILITY CURRENT STATUS: HCPCS

## 2018-07-15 PROCEDURE — 74011250637 HC RX REV CODE- 250/637: Performed by: INTERNAL MEDICINE

## 2018-07-15 PROCEDURE — 97161 PT EVAL LOW COMPLEX 20 MIN: CPT

## 2018-07-15 PROCEDURE — 74011250637 HC RX REV CODE- 250/637: Performed by: HOSPITALIST

## 2018-07-15 PROCEDURE — 84100 ASSAY OF PHOSPHORUS: CPT | Performed by: HOSPITALIST

## 2018-07-15 PROCEDURE — P9047 ALBUMIN (HUMAN), 25%, 50ML: HCPCS | Performed by: HOSPITALIST

## 2018-07-15 PROCEDURE — 74011250636 HC RX REV CODE- 250/636: Performed by: HOSPITALIST

## 2018-07-15 RX ORDER — POTASSIUM CHLORIDE 750 MG/1
40 TABLET, FILM COATED, EXTENDED RELEASE ORAL
Status: COMPLETED | OUTPATIENT
Start: 2018-07-15 | End: 2018-07-15

## 2018-07-15 RX ADMIN — SODIUM CHLORIDE 10 ML: 9 INJECTION INTRAMUSCULAR; INTRAVENOUS; SUBCUTANEOUS at 20:26

## 2018-07-15 RX ADMIN — ALBUMIN (HUMAN) 12.5 G: 0.25 INJECTION, SOLUTION INTRAVENOUS at 20:25

## 2018-07-15 RX ADMIN — GABAPENTIN 300 MG: 600 TABLET, FILM COATED ORAL at 17:52

## 2018-07-15 RX ADMIN — LEVETIRACETAM 500 MG: 500 TABLET ORAL at 17:52

## 2018-07-15 RX ADMIN — FUROSEMIDE 20 MG: 10 INJECTION, SOLUTION INTRAMUSCULAR; INTRAVENOUS at 20:28

## 2018-07-15 RX ADMIN — FUROSEMIDE 20 MG: 10 INJECTION, SOLUTION INTRAMUSCULAR; INTRAVENOUS at 09:12

## 2018-07-15 RX ADMIN — DEXAMETHASONE 2 MG: 0.5 TABLET ORAL at 09:13

## 2018-07-15 RX ADMIN — ENOXAPARIN SODIUM 40 MG: 100 INJECTION SUBCUTANEOUS at 17:54

## 2018-07-15 RX ADMIN — CLOPIDOGREL BISULFATE 75 MG: 75 TABLET ORAL at 09:13

## 2018-07-15 RX ADMIN — CEFTRIAXONE SODIUM 2 G: 2 INJECTION, POWDER, FOR SOLUTION INTRAMUSCULAR; INTRAVENOUS at 17:52

## 2018-07-15 RX ADMIN — GABAPENTIN 300 MG: 600 TABLET, FILM COATED ORAL at 09:13

## 2018-07-15 RX ADMIN — LEVETIRACETAM 500 MG: 500 TABLET ORAL at 09:13

## 2018-07-15 RX ADMIN — ALBUMIN (HUMAN) 12.5 G: 0.25 INJECTION, SOLUTION INTRAVENOUS at 09:12

## 2018-07-15 RX ADMIN — POTASSIUM CHLORIDE 40 MEQ: 750 TABLET, EXTENDED RELEASE ORAL at 11:58

## 2018-07-15 RX ADMIN — SENNOSIDES AND DOCUSATE SODIUM 2 TABLET: 8.6; 5 TABLET ORAL at 09:12

## 2018-07-15 RX ADMIN — Medication 1000 MCG: at 09:12

## 2018-07-15 RX ADMIN — SODIUM CHLORIDE 10 ML: 9 INJECTION INTRAMUSCULAR; INTRAVENOUS; SUBCUTANEOUS at 06:00

## 2018-07-15 RX ADMIN — ATORVASTATIN CALCIUM 10 MG: 10 TABLET, FILM COATED ORAL at 20:27

## 2018-07-15 NOTE — PROGRESS NOTES
Problem: Mobility Impaired (Adult and Pediatric)  Goal: *Acute Goals and Plan of Care (Insert Text)  Physical Therapy Goals  Initiated 7/15/2018  1. Patient will move from supine to sit and sit to supine  in bed with modified independence within 5 day(s). 2.  Patient will transfer from bed to chair and chair to bed with modified independence using the least restrictive device within 5 day(s). 3.  Patient will perform sit to stand with modified independence within 5 day(s). 4.  Patient will ambulate with modified independence for 150 feet with the least restrictive device within 5 day(s). physical Therapy EVALUATION  Patient: Christo Castillo (27 y.o. male)  Date: 7/15/2018  Primary Diagnosis: Altered mental state        Precautions: fall risk      ASSESSMENT :  Based on the objective data described below, the patient presents with impaired balance, gait and mobility dysfunction. Pt's significant other, Oziel and dtr, Fowler Sherry present in room. Pt reporting he need to urinate. Ambulated to bathroom using IV pole and min A. Pt stood to urinate, but was unable to control stream and subsequently had to change gown and slipper socks. He was able to return to his bed, where his garments were changed. Pt then ambulated to chair and was encouraged to sit up and to call his nurse when he is ready to return to bed. Patient will benefit from skilled intervention to address the above impairments.   Patients rehabilitation potential is considered to be Good  Factors which may influence rehabilitation potential include:   []         None noted  [x]         Mental ability/status  [x]         Medical condition  []         Home/family situation and support systems  []         Safety awareness  []         Pain tolerance/management  []         Other:      PLAN :  Recommendations and Planned Interventions:  [x]           Bed Mobility Training             [x]    Neuromuscular Re-Education  [x]           Transfer Training []    Orthotic/Prosthetic Training  [x]           Gait Training                         []    Modalities  [x]           Therapeutic Exercises           []    Edema Management/Control  [x]           Therapeutic Activities            []    Patient and Family Training/Education  []           Other (comment):    Frequency/Duration: Patient will be followed by physical therapy  5 times a week to address goals. Discharge Recommendations: Home Health  Further Equipment Recommendations for Discharge: TBD     SUBJECTIVE:   Patient stated I need to pee.     OBJECTIVE DATA SUMMARY:   HISTORY:    Past Medical History:   Diagnosis Date    Arthritis     HANDS    Beta-blocker therapy     Cancer ([facility] [address] 2013    MAXILLARY SINUS CANCER, RADIATION AND CHEMO    Cancer ([facility] [address]     SKIN CA ON NOSE    GERD (gastroesophageal reflux disease)     Hypercholesterolemia     Hypertension     pt denies    Nasal sinus tumor     Numbness of LEFT hand & LEFT face 2010    RBBB (right bundle branch block)     TIA (transient ischemic attack)     12 TIA's over 9 YRS.1ST ONE IN 8/03- LAST IN 2/12      Trigeminal neuralgia      Past Surgical History:   Procedure Laterality Date    HX CATARACT REMOVAL Bilateral     HX GI      COLONOSCOPY    HX HEENT      BIOPSY OF SINUS     HX HEENT Left     sew eye closed    HX HERNIA REPAIR Right     INGUINAL    HX KNEE ARTHROSCOPY Right 2007    HX KNEE REPLACEMENT Right 2012    Total Knee replacement- right    HX ROTATOR CUFF REPAIR Right     right rotator cuff repair     Prior Level of Function/Home Situation: independent at [facility]  Personal factors and/or comorbidities impacting plan of care: congition    Home Situation  Home Environment: Assisted living [facility])  [address] Name: [facility]  One/Two Story Residence: One story  Living Alone: Yes  Support Systems: Child(bonifacio), Spouse/Significant Other/Partner  Patient Expects to be Discharged to[de-identified] Assisted living  Current DME Used/Available at Home: None    EXAMINATION/PRESENTATION/DECISION MAKING:   Critical Behavior:  Neurologic State: Alert  Orientation Level: Oriented to situation, Oriented to person  Cognition: Follows commands  Safety/Judgement: Awareness of environment  Hearing: Auditory  Auditory Impairment: Hard of hearing, bilateral, Hearing aid(s)  Hearing Aids/Status: With patient  Skin:  intact  Edema: n/a  Range Of Motion:      WFL                    Strength:        grossly 4/5                 Functional Mobility:  Bed Mobility:  Rolling: Supervision  Supine to Sit: Supervision     Scooting: Supervision  Transfers:  Sit to Stand: Contact guard assistance           Bed to Chair: Contact guard assistance              Balance:   Sitting: Intact  Standing: Impaired  Ambulation/Gait Training:  Distance (ft): 25 Feet (ft)     Ambulation - Level of Assistance: Minimal assistance     Gait Description (WDL): Exceptions to WDL                                     Functional Measure: Horacio Ritchie Five times sit to stand:    Five Times Sit to Stand: 27 Seconds         Normative averages:  Clients younger than 61years old  £  10 seconds = Normal   Clients older than 61years old £ 14.2 seconds = Normal   Change of ³ 2.3 seconds shows a significant clinical improvement    Sharri HILTON. Reference values for the five repetition sit to stand test: a descriptive metaanalysis of data from elders. Percept Mot Skills 2006; 103(1):215-222. G codes: In compliance with CMSs Claims Based Outcome Reporting, the following G-code set was chosen for this patient based on their primary functional limitation being treated: The outcome measure chosen to determine the severity of the functional limitation was the 5x sit to stand with a score of 27s which was correlated with the impairment scale.     ? Mobility - Walking and Moving Around:     - CURRENT STATUS: CM - 80%-99% impaired, limited or restricted    - GOAL STATUS: CL - 60%-79% impaired, limited or restricted    - D/C STATUS:  ---------------To be determined---------------      Physical Therapy Evaluation Charge Determination   History Examination Presentation Decision-Making   MEDIUM  Complexity : 1-2 comorbidities / personal factors will impact the outcome/ POC  LOW Complexity : 1-2 Standardized tests and measures addressing body structure, function, activity limitation and / or participation in recreation  LOW Complexity : Stable, uncomplicated  LOW Complexity : FOTO score of       Based on the above components, the patient evaluation is determined to be of the following complexity level: LOW     Pain:  Pain Scale 1: Numeric (0 - 10)  Pain Intensity 1: 0              Activity Tolerance:   fair  Please refer to the flowsheet for vital signs taken during this treatment. After treatment:   [x]         Patient left in no apparent distress sitting up in chair  []         Patient left in no apparent distress in bed  []         Call bell left within reach  [x]         Nursing notified  [x]         Caregiver present  []         Bed alarm activated    COMMUNICATION/EDUCATION:   The patients plan of care was discussed with: Registered Nurse.  []         Fall prevention education was provided and the patient/caregiver indicated understanding. []         Patient/family have participated as able in goal setting and plan of care. [x]         Patient/family agree to work toward stated goals and plan of care. []         Patient understands intent and goals of therapy, but is neutral about his/her participation. []         Patient is unable to participate in goal setting and plan of care.     Thank you for this referral.  Sybil Contreras, PT   Time Calculation: 26 mins

## 2018-07-15 NOTE — PROGRESS NOTES
Hospitalist Progress Note Nohemi Dodson MD 
Answering service: [phone] OR [phone] from in house phone Cell: [phone] Date of Service:  7/15/2018 NAME:  Lady Ornelas :  11/10/1930 MRN:  [id number] Admission Summary: CHIEF COMPLAINT:  Generalized weakness and unable to ambulate. 
  
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 He had a similar presentation in 2017 because of general weakness. Thought  it may be due to taper down of the Decadron too quick and on top of his low sodium. He did have a period of rehabilitation after that discharge and then eventually he was discharged back to his assisted living. Patient at baseline is able to ambulate independently . His daughter said his Decadron was decreased from 1 mg twice a day to 1 mg once a day 5 days ago and since then did notice he has increasing weakness and since yesterday he was extremely weak, very sleepy, not taking oral adequately. So, he was taken to [facility] today and a family member was told that he has a UTI, prescribed him antibiotics, but on the way to home, the patient's family said that he is unable to get into the car, not able to walk, so ambulance was called and sent to the ED. The blood sugar was 114 when he arrived in the ED. He feels overall weak and he has chronic left face numb and paralyzed, but otherwise denied specific focal weakness. The patient also family member noticed for the last 1-2 weeks that his both legs are with increasing edema.   He also gained a lot of weight recently. Interval history / Subjective:  
7/15/2018 : 
Back to baselil[facility] mental 
No new issues, no fever, no chills,  
Cont on low dose decadron For anticipated discharge in am on empiric abx for suspect sinusitis. Assessment & Plan:  
 
Acute metabolic encephalopathy: redolved. Rashaad Leg swelling  on lasix for discharge on lower dose Right maxillary sinusitis vs recurrent sinus canceer for abx Mod to  protein deficiency  
 
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  
 
trigeminal neuralgia , supportive care. Code status: dnr  
DVT prophylaxis: Heparin Care Plan discussed with: Patient/Family and Nurse Disposition: [facility] Hospital Problems  Date Reviewed: 11/25/2017 Codes Class Noted POA * (Principal)Altered mental state ICD-10-CM: R41.82 
ICD-9-CM: 780.97  7/14/2018 Yes Review of Systems:  
feels good no pain no n/v/d/f/, no alertness issues Vital Signs:  
 Last 24hrs VS reviewed since prior progress note. Most recent are: 
Visit Vitals  /77 (BP 1 Location: Right arm, BP Patient Position: At rest)  Pulse 68  Temp 97.8 °F (36.6 °C)  Resp 18  Ht 5' 9\" (1.753 m)  Wt 100 kg (220 lb 6.4 oz)  SpO2 95%  BMI 32.55 kg/m2 Intake/Output Summary (Last 24 hours) at 07/15/18 1100 Last data filed at 07/14/18 2339 Gross per 24 hour Intake                0 ml Output              575 ml Net             -575 ml Physical Examination:  
 
 
     
Constitutional:  No acute distress, cooperative, pleasant   
ENT:  Oral mucous moist, oropharynx benign. Neck supple, Resp:  CTA bilaterally. No wheezing/rhonchi/rales. No accessory muscle use CV:  Regular rhythm, normal rate, no murmurs, gallops, rubs GI:  Soft, non distended, non tender. normoactive bowel sounds, no hepatosplenomegaly Musculoskeletal:  No edema, warm, 2+ pulses throughout Neurologic:  Moves all extremities.   AAOx3, CN II-XII reviewed Psych:  Good insight, Not anxious nor agitated. Skin:  Good turgor, no rashes or ulcers Data Review:  
 Review and/or order of clinical lab test 
 
 
Labs:  
 
Recent Labs  
   07/15/18 
 0408  07/14/18 
 1242 WBC  8.2  8.4 HGB  11.8*  13.2 HCT  35.5*  40.4 PLT  165  172 Recent Labs  
   07/15/18 
 0408  07/14/18 
 1242 NA  136  138  
K  3.7  4.0  
CL  101  103 CO2  28  26 BUN  13  14 CREA  0.79  1.05  
GLU  126*  93  
CA  7.9*  8.0*  
MG  2.1   --   
PHOS  3.5   --   
 
Recent Labs  
   07/15/18 
 0408  07/14/18 
 1242 SGOT  17  25 ALT  41  50 AP  43*  45 TBILI  0.5  0.7 TP  5.8*  6.0* ALB  2.6*  2.5*  
GLOB  3.2  3.5 No results for input(s): INR, PTP, APTT in the last 72 hours. No lab exists for component: INREXT No results for input(s): FE, TIBC, PSAT, FERR in the last 72 hours. Lab Results Component Value Date/Time Folate 8.6 11/20/2017 03:03 PM  
  
No results for input(s): PH, PCO2, PO2 in the last 72 hours. No results for input(s): CPK, CKNDX, TROIQ in the last 72 hours. No lab exists for component: CPKMB Lab Results Component Value Date/Time Cholesterol, total 163 07/14/2018 12:42 PM  
 HDL Cholesterol 89 07/14/2018 12:42 PM  
 LDL, calculated 59.4 07/14/2018 12:42 PM  
 Triglyceride 73 07/14/2018 12:42 PM  
 CHOL/HDL Ratio 1.8 07/14/2018 12:42 PM  
 
Lab Results Component Value Date/Time Glucose (POC) 197 (H) 11/25/2017 11:27 AM  
 Glucose (POC) 128 (H) 11/25/2017 05:36 AM  
 Glucose (POC) 117 (H) 11/24/2017 11:22 PM  
 Glucose (POC) 113 (H) 11/24/2017 05:47 PM  
 Glucose (POC) 198 (H) 11/24/2017 11:57 AM  
 
Lab Results Component Value Date/Time  Color YELLOW/STRAW 07/14/2018 04:09 PM  
 Appearance CLEAR 07/14/2018 04:09 PM  
 Specific gravity 1.020 07/14/2018 04:09 PM  
 pH (UA) 6.0 07/14/2018 04:09 PM  
 Protein NEGATIVE  07/14/2018 04:09 PM  
 Glucose NEGATIVE  07/14/2018 04:09 PM  
 Ketone NEGATIVE  07/14/2018 04:09 PM  
 Bilirubin NEGATIVE  07/14/2018 04:09 PM  
 Urobilinogen 0.2 07/14/2018 04:09 PM  
 Nitrites NEGATIVE  07/14/2018 04:09 PM  
 Leukocyte Esterase NEGATIVE  07/14/2018 04:09 PM  
 Epithelial cells FEW 07/14/2018 04:09 PM  
 Bacteria NEGATIVE  07/14/2018 04:09 PM  
 WBC 0-4 07/14/2018 04:09 PM  
 RBC 0-5 07/14/2018 04:09 PM  
 
 
 
Medications Reviewed:  
 
Current Facility-Administered Medications Medication Dose Route Frequency  acetaminophen (TYLENOL) tablet 1,000 mg  1,000 mg Oral Q6H PRN  
 atorvastatin (LIPITOR) tablet 10 mg  10 mg Oral QHS  clopidogrel (PLAVIX) tablet 75 mg  75 mg Oral DAILY  cyanocobalamin (VITAMIN B12) tablet 1,000 mcg  1,000 mcg Oral DAILY  dexamethasone (DECADRON) tablet 2 mg  2 mg Oral DAILY  gabapentin (NEURONTIN) tablet 300 mg  300 mg Oral BID  levETIRAcetam (KEPPRA) tablet 500 mg  500 mg Oral BID  senna-docusate (PERICOLACE) 8.6-50 mg per tablet 2 Tab  2 Tab Oral DAILY  albumin human 25% (BUMINATE) solution 12.5 g  12.5 g IntraVENous Q12H  furosemide (LASIX) injection 20 mg  20 mg IntraVENous Q12H  cefTRIAXone (ROCEPHIN) 2 g in 0.9% sodium chloride (MBP/ADV) 50 mL  2 g IntraVENous Q24H  
 sodium chloride (NS) flush 5-10 mL  5-10 mL IntraVENous Q8H  
 sodium chloride (NS) flush 5-10 mL  5-10 mL IntraVENous PRN  
 naloxone (NARCAN) injection 0.4 mg  0.4 mg IntraVENous PRN  
 enoxaparin (LOVENOX) injection 40 mg  40 mg SubCUTAneous Q24H  
 ondansetron (ZOFRAN ODT) tablet 4 mg  4 mg Oral Q6H PRN  
 
______________________________________________________________________ EXPECTED LENGTH OF STAY: - - - 
ACTUAL LENGTH OF STAY:          1 Denita Patel MD

## 2018-07-15 NOTE — PROGRESS NOTES
Problem: Pressure Injury - Risk of  Goal: *Prevention of pressure injury  Document Davy Scale and appropriate interventions in the flowsheet. Outcome: Progressing Towards Goal  Pressure Injury Interventions:  Sensory Interventions: Assess changes in LOC, Check visual cues for pain, Keep linens dry and wrinkle-free, Minimize linen layers, Turn and reposition approx. every two hours (pillows and wedges if needed)    Moisture Interventions: Absorbent underpads, Check for incontinence Q2 hours and as needed    Activity Interventions: PT/OT evaluation    Mobility Interventions: Turn and reposition approx.  every two hours(pillow and wedges)    Nutrition Interventions: Offer support with meals,snacks and hydration, Document food/fluid/supplement intake

## 2018-07-15 NOTE — PROGRESS NOTES
Problem: Falls - Risk of  Goal: *Absence of Falls  Document Madhu Fall Risk and appropriate interventions in the flowsheet. Outcome: Progressing Towards Goal  Fall Risk Interventions:  Mobility Interventions: OT consult for ADLs, Patient to call before getting OOB, PT Consult for mobility concerns, PT Consult for assist device competence, Utilize walker, cane, or other assistive device    Mentation Interventions: Adequate sleep, hydration, pain control, Door open when patient unattended, Room close to nurse's station, Reorient patient, More frequent rounding, Update white board, Toileting rounds    Medication Interventions: Patient to call before getting OOB, Teach patient to arise slowly    Elimination Interventions: Call light in reach, Patient to call for help with toileting needs, Toileting schedule/hourly rounds             Problem: Pressure Injury - Risk of  Goal: *Prevention of pressure injury  Document Davy Scale and appropriate interventions in the flowsheet. Pressure Injury Interventions:  Sensory Interventions: Assess changes in LOC, Check visual cues for pain, Keep linens dry and wrinkle-free, Minimize linen layers, Turn and reposition approx. every two hours (pillows and wedges if needed)    Moisture Interventions: Absorbent underpads, Check for incontinence Q2 hours and as needed    Activity Interventions: PT/OT evaluation    Mobility Interventions: Turn and reposition approx.  every two hours(pillow and wedges)    Nutrition Interventions: Offer support with meals,snacks and hydration, Document food/fluid/supplement intake

## 2018-07-16 VITALS
DIASTOLIC BLOOD PRESSURE: 80 MMHG | SYSTOLIC BLOOD PRESSURE: 137 MMHG | RESPIRATION RATE: 18 BRPM | WEIGHT: 220.4 LBS | BODY MASS INDEX: 32.64 KG/M2 | HEIGHT: 69 IN | HEART RATE: 58 BPM | TEMPERATURE: 97.6 F | OXYGEN SATURATION: 95 %

## 2018-07-16 PROBLEM — R41.82 ALTERED MENTAL STATE: Status: RESOLVED | Noted: 2018-07-14 | Resolved: 2018-07-16

## 2018-07-16 LAB
ALBUMIN SERPL-MCNC: 2.9 G/DL (ref 3.5–5)
ALBUMIN/GLOB SERPL: 0.9 {RATIO} (ref 1.1–2.2)
ALP SERPL-CCNC: 41 U/L (ref 45–117)
ALT SERPL-CCNC: 36 U/L (ref 12–78)
ANION GAP SERPL CALC-SCNC: 8 MMOL/L (ref 5–15)
AST SERPL-CCNC: 15 U/L (ref 15–37)
BASOPHILS # BLD: 0 K/UL (ref 0–0.1)
BASOPHILS NFR BLD: 0 % (ref 0–1)
BILIRUB SERPL-MCNC: 0.4 MG/DL (ref 0.2–1)
BUN SERPL-MCNC: 16 MG/DL (ref 6–20)
BUN/CREAT SERPL: 19 (ref 12–20)
CALCIUM SERPL-MCNC: 8.3 MG/DL (ref 8.5–10.1)
CHLORIDE SERPL-SCNC: 101 MMOL/L (ref 97–108)
CO2 SERPL-SCNC: 28 MMOL/L (ref 21–32)
CREAT SERPL-MCNC: 0.86 MG/DL (ref 0.7–1.3)
DIFFERENTIAL METHOD BLD: ABNORMAL
EOSINOPHIL # BLD: 0.1 K/UL (ref 0–0.4)
EOSINOPHIL NFR BLD: 1 % (ref 0–7)
ERYTHROCYTE [DISTWIDTH] IN BLOOD BY AUTOMATED COUNT: 17.5 % (ref 11.5–14.5)
GLOBULIN SER CALC-MCNC: 3.3 G/DL (ref 2–4)
GLUCOSE SERPL-MCNC: 102 MG/DL (ref 65–100)
HCT VFR BLD AUTO: 36.2 % (ref 36.6–50.3)
HGB BLD-MCNC: 11.8 G/DL (ref 12.1–17)
IMM GRANULOCYTES # BLD: 0.1 K/UL (ref 0–0.04)
IMM GRANULOCYTES NFR BLD AUTO: 2 % (ref 0–0.5)
LYMPHOCYTES # BLD: 2 K/UL (ref 0.8–3.5)
LYMPHOCYTES NFR BLD: 27 % (ref 12–49)
MCH RBC QN AUTO: 31.9 PG (ref 26–34)
MCHC RBC AUTO-ENTMCNC: 32.6 G/DL (ref 30–36.5)
MCV RBC AUTO: 97.8 FL (ref 80–99)
MONOCYTES # BLD: 0.4 K/UL (ref 0–1)
MONOCYTES NFR BLD: 6 % (ref 5–13)
NEUTS SEG # BLD: 4.9 K/UL (ref 1.8–8)
NEUTS SEG NFR BLD: 65 % (ref 32–75)
NRBC # BLD: 0 K/UL (ref 0–0.01)
NRBC BLD-RTO: 0 PER 100 WBC
PLATELET # BLD AUTO: 150 K/UL (ref 150–400)
PMV BLD AUTO: 9.5 FL (ref 8.9–12.9)
POTASSIUM SERPL-SCNC: 3.9 MMOL/L (ref 3.5–5.1)
PROT SERPL-MCNC: 6.2 G/DL (ref 6.4–8.2)
RBC # BLD AUTO: 3.7 M/UL (ref 4.1–5.7)
SODIUM SERPL-SCNC: 137 MMOL/L (ref 136–145)
WBC # BLD AUTO: 7.6 K/UL (ref 4.1–11.1)

## 2018-07-16 PROCEDURE — 97116 GAIT TRAINING THERAPY: CPT

## 2018-07-16 PROCEDURE — 97535 SELF CARE MNGMENT TRAINING: CPT

## 2018-07-16 PROCEDURE — 74011000258 HC RX REV CODE- 258: Performed by: INTERNAL MEDICINE

## 2018-07-16 PROCEDURE — 80053 COMPREHEN METABOLIC PANEL: CPT | Performed by: INTERNAL MEDICINE

## 2018-07-16 PROCEDURE — 74011250637 HC RX REV CODE- 250/637: Performed by: HOSPITALIST

## 2018-07-16 PROCEDURE — P9047 ALBUMIN (HUMAN), 25%, 50ML: HCPCS | Performed by: HOSPITALIST

## 2018-07-16 PROCEDURE — 74011250636 HC RX REV CODE- 250/636: Performed by: HOSPITALIST

## 2018-07-16 PROCEDURE — 85025 COMPLETE CBC W/AUTO DIFF WBC: CPT | Performed by: INTERNAL MEDICINE

## 2018-07-16 PROCEDURE — 97165 OT EVAL LOW COMPLEX 30 MIN: CPT

## 2018-07-16 PROCEDURE — 74011250636 HC RX REV CODE- 250/636: Performed by: INTERNAL MEDICINE

## 2018-07-16 PROCEDURE — 36415 COLL VENOUS BLD VENIPUNCTURE: CPT | Performed by: INTERNAL MEDICINE

## 2018-07-16 RX ORDER — FUROSEMIDE 40 MG/1
40 TABLET ORAL
Qty: 20 TAB | Refills: 0 | Status: SHIPPED | OUTPATIENT
Start: 2018-07-16 | End: 2019-09-10

## 2018-07-16 RX ORDER — FUROSEMIDE 10 MG/ML
40 INJECTION INTRAMUSCULAR; INTRAVENOUS
Qty: 20 VIAL | Refills: 0 | Status: SHIPPED | OUTPATIENT
Start: 2018-07-16 | End: 2018-07-16

## 2018-07-16 RX ORDER — AMOXICILLIN AND CLAVULANATE POTASSIUM 875; 125 MG/1; MG/1
1 TABLET, FILM COATED ORAL 2 TIMES DAILY
Qty: 14 TAB | Refills: 0 | Status: SHIPPED | OUTPATIENT
Start: 2018-07-16 | End: 2018-08-25

## 2018-07-16 RX ADMIN — SENNOSIDES AND DOCUSATE SODIUM 2 TABLET: 8.6; 5 TABLET ORAL at 09:16

## 2018-07-16 RX ADMIN — CLOPIDOGREL BISULFATE 75 MG: 75 TABLET ORAL at 09:15

## 2018-07-16 RX ADMIN — SODIUM CHLORIDE 10 ML: 9 INJECTION INTRAMUSCULAR; INTRAVENOUS; SUBCUTANEOUS at 05:14

## 2018-07-16 RX ADMIN — FUROSEMIDE 20 MG: 10 INJECTION, SOLUTION INTRAMUSCULAR; INTRAVENOUS at 09:15

## 2018-07-16 RX ADMIN — ALBUMIN (HUMAN) 12.5 G: 0.25 INJECTION, SOLUTION INTRAVENOUS at 09:15

## 2018-07-16 RX ADMIN — LEVETIRACETAM 500 MG: 500 TABLET ORAL at 09:16

## 2018-07-16 RX ADMIN — CEFTRIAXONE SODIUM 2 G: 2 INJECTION, POWDER, FOR SOLUTION INTRAMUSCULAR; INTRAVENOUS at 13:08

## 2018-07-16 RX ADMIN — DEXAMETHASONE 2 MG: 0.5 TABLET ORAL at 09:15

## 2018-07-16 RX ADMIN — Medication 1000 MCG: at 09:15

## 2018-07-16 RX ADMIN — GABAPENTIN 300 MG: 600 TABLET, FILM COATED ORAL at 09:16

## 2018-07-16 NOTE — PROGRESS NOTES
Problem: Mobility Impaired (Adult and Pediatric)  Goal: *Acute Goals and Plan of Care (Insert Text)  Physical Therapy Goals  Initiated 7/15/2018  1. Patient will move from supine to sit and sit to supine  in bed with modified independence within 5 day(s). 2.  Patient will transfer from bed to chair and chair to bed with modified independence using the least restrictive device within 5 day(s). 3.  Patient will perform sit to stand with modified independence within 5 day(s). 4.  Patient will ambulate with modified independence for 150 feet with the least restrictive device within 5 day(s). physical Therapy TREATMENT  Patient: Pat Cordero (23 y.o. male)  Date: 7/16/2018  Diagnosis: Altered mental state Altered mental state       Precautions:  fall  Chart, physical therapy assessment, plan of care and goals were reviewed. ASSESSMENT:  Pt is making steady progress, noted mild cognitive/memory deficit, pt has been using his call bell this morning however when asked how to call out he pointed at his IV and stated \"I guess I use this\". Pt also was unable to provide the name of where he lives in assisted living facility. Pt was agreeable to participate with encouragement as he stated \"I am going to leave today\". Pt demonstrated supine to sit and sit to stand with supervision. He ambulated with CGA x 160 feet without support of IV pole or assistive device. He gait is steady and had no episodes of overt loss of balance. Pt requested to use the restroom following ambulation. He was able to urinate in toilet without any episodes as previously(soiled gown/socks and peed on floor during last session). Anticipate pt will be able to return to assisted living. Recommend home health PT.    Progression toward goals:  []    Improving appropriately and progressing toward goals  [x]    Improving slowly and progressing toward goals  []    Not making progress toward goals and plan of care will be adjusted PLAN:  Patient continues to benefit from skilled intervention to address the above impairments. Continue treatment per established plan of care. Discharge Recommendations:  Home Health  Further Equipment Recommendations for Discharge:  none     SUBJECTIVE:   Patient stated I guess I use this(pointing at IV when answering question of how to call RN).   Pt is [place] but was responding to questions accurately most of the time. Pt used his phone to answer orientation question for month/date. He was aware of place and year but unable to name his assisted living facility.      OBJECTIVE DATA SUMMARY:   Critical Behavior:  Neurologic State: Alert, mild cognitive/memory deficit  Orientation Level: Oriented to situation, Oriented to person, oriented to year, oriented to place  Cognition: Follows commands  Safety/Judgement: Awareness of environment  Functional Mobility Training:  Bed Mobility:     Supine to Sit: Supervision     Scooting: Supervision        Transfers:  Sit to Stand: Supervision  Stand to Sit: Supervision                             Balance:  Sitting: Intact  Standing: Intact  Ambulation/Gait Training:  Distance (ft): 160 Feet (ft)  Assistive Device: Gait belt  Ambulation - Level of Assistance: Contact guard assistance;Assist x1        Gait Abnormalities: Decreased step clearance, no overt loss of balance episodes                                   Pain:   denies pain                 Activity Tolerance:   Good, /75 at rest, supine     After treatment:   [x]    Patient left in no apparent distress sitting up in chair  []    Patient left in no apparent distress in bed  [x]    Call bell left within reach  [x]    Nursing notified  []    Caregiver present  []    Bed alarm activated    COMMUNICATION/COLLABORATION:   The patients plan of care was discussed with: Registered Nurse    Beth Jolly   Time Calculation: 17 mins

## 2018-07-16 NOTE — DISCHARGE INSTRUCTIONS
DISCHARGE SUMMARY from Nurse    PATIENT INSTRUCTIONS:    After general anesthesia or intravenous sedation, for 24 hours or while taking prescription Narcotics:  · Limit your activities  · Do not drive and operate hazardous machinery  · Do not make important personal or business decisions  · Do  not drink alcoholic beverages  · If you have not urinated within 8 hours after discharge, please contact your surgeon on call. Report the following to your surgeon:  · Excessive pain, swelling, redness or odor of or around the surgical area  · Temperature over 100.5  · Nausea and vomiting lasting longer than 4 hours or if unable to take medications  · Any signs of decreased circulation or nerve impairment to extremity: change in color, persistent  numbness, tingling, coldness or increase pain  · Any questions    What to do at Home:  Recommended activity:    *  Please give a list of your current medications to your Primary Care Provider. *  Please update this list whenever your medications are discontinued, doses are      changed, or new medications (including over-the-counter products) are added. *  Please carry medication information at all times in case of emergency situations. These are general instructions for a healthy lifestyle:    No smoking/ No tobacco products/ Avoid exposure to second hand smoke  Surgeon General's Warning:  Quitting smoking now greatly reduces serious risk to your health. Obesity, smoking, and sedentary lifestyle greatly increases your risk for illness    A healthy diet, regular physical exercise & weight monitoring are important for maintaining a healthy lifestyle    You may be retaining fluid if you have a history of heart failure or if you experience any of the following symptoms:  Weight gain of 3 pounds or more overnight or 5 pounds in a week, increased swelling in our hands or feet or shortness of breath while lying flat in bed.   Please call your doctor as soon as you notice any of these symptoms; do not wait until your next office visit. Recognize signs and symptoms of STROKE:    F-face looks uneven    A-arms unable to move or move unevenly    S-speech slurred or non-existent    T-time-call 911 as soon as signs and symptoms begin-DO NOT go       Back to bed or wait to see if you get better-TIME IS BRAIN. Warning Signs of HEART ATTACK     Call 911 if you have these symptoms:   Chest discomfort. Most heart attacks involve discomfort in the center of the chest that lasts more than a few minutes, or that goes away and comes back. It can feel like uncomfortable pressure, squeezing, fullness, or pain.  Discomfort in other areas of the upper body. Symptoms can include pain or discomfort in one or both arms, the back, neck, jaw, or stomach.  Shortness of breath with or without chest discomfort.  Other signs may include breaking out in a cold sweat, nausea, or lightheadedness. Don't wait more than five minutes to call 911 - MINUTES MATTER! Fast action can save your life. Calling 911 is almost always the fastest way to get lifesaving treatment. Emergency Medical Services staff can begin treatment when they arrive -- up to an hour sooner than if someone gets to the hospital by car. The discharge information has been reviewed with the patient. The patient verbalized understanding. Discharge medications reviewed with the patient and appropriate educational materials and side effects teaching were provided. [employer] Activation    Thank you for requesting access to [employer]. Please follow the instructions below to securely access and download your online medical record. [employer] allows you to send messages to your doctor, view your test results, renew your prescriptions, schedule appointments, and more. How Do I Sign Up? 1. In your internet browser, go to [email or web address]  2. Click on the First Time User? Click Here link in the Sign In box.  You will be redirect to the New Member Sign Up page. 3. Enter your [employer] Access Code exactly as it appears below. You will not need to use this code after youve completed the sign-up process. If you do not sign up before the expiration date, you must request a new code. Kraken Access Code: [email or web address]  Expires: 10/12/2018 12:29 PM (This is the date your Kraken access code will )    4. Enter the last four digits of your Social Security Number (xxxx) and Date of Birth (mm/dd/yyyy) as indicated and click Submit. You will be taken to the next sign-up page. 5. Create a Kraken ID. This will be your Kraken login ID and cannot be changed, so think of one that is secure and easy to remember. 6. Create a Kraken password. You can change your password at any time. 7. Enter your Password Reset Question and Answer. This can be used at a later time if you forget your password. 8. Enter your e-mail address. You will receive e-mail notification when new information is available in [address]. 9. Click Sign Up. You can now view and download portions of your medical record. 10. Click the Download Summary menu link to download a portable copy of your medical information. Additional Information    If you have questions, please visit the Frequently Asked Questions section of the [employer]ken website at [email or web address] Remember, [employer] is NOT to be used for urgent needs.  For medical emergencies, dial 911.      ____________________________________________________________________________________________________________________________________   Discharge Instructions       PATIENT ID: Francesco Ospina  MRN: [id number]   YOB: 1930    DATE OF ADMISSION: 2018 12:26 PM    DATE OF DISCHARGE: 2018    PRIMARY CARE PROVIDER: Ludmila Estrella MD     ATTENDING PHYSICIAN: Christen Perdue MD  DISCHARGING PROVIDER: Christen Perdue MD    To contact this individual call [phone] and ask the  to page.   If unavailable ask to be transferred the Adult Hospitalist Department. DISCHARGE DIAGNOSES see dc note    CONSULTATIONS: None    PROCEDURES/SURGERIES: * No surgery found *    PENDING TEST RESULTS:   At the time of discharge the following test results are still pending: fijnal on blood culture which is negative at this time of discharge    FOLLOW UP APPOINTMENTS:   Follow-up Information     Follow up With Details Comments [address], [place] In 1 week  [address]  [phone]             ADDITIONAL CARE RECOMMENDATIONS: na    DIET: Resume previous diet     ACTIVITY: Activity as tolerated    WOUND CARE: na    EQUIPMENT needed: na      DISCHARGE MEDICATIONS:   See Medication Reconciliation Form    · It is important that you take the medication exactly as they are prescribed. · Keep your medication in the bottles provided by the pharmacist and keep a list of the medication names, dosages, and times to be taken in your wallet. · Do not take other medications without consulting your doctor. NOTIFY YOUR PHYSICIAN FOR ANY OF THE FOLLOWING:   Fever over 101 degrees for 24 hours. Chest pain, shortness of breath, fever, chills, nausea, vomiting, diarrhea, change in mentation, falling, weakness, bleeding. Severe pain or pain not relieved by medications. Or, any other signs or symptoms that you may have questions about.       DISPOSITION:    Home With:   OT  PT  HH  RN       SNF/Inpatient Rehab/LTAC   x Independent/assisted living    Hospice    Other:          Signed:   Mattie Moran MD  7/16/2018  12:17 PM

## 2018-07-16 NOTE — PROGRESS NOTES
Problem: Self Care Deficits Care Plan (Adult)  Goal: *Acute Goals and Plan of Care (Insert Text)  Occupational Therapy Goals  Initiated 7/16/2018  1. Patient will perform grooming at the sink with independence within 7 day(s). 3.  Patient will perform upper body dressing and lower body dressing with independence within 7 day(s). 4.  Patient will perform toilet transfers with independence within 7 day(s). 5.  Patient will perform all aspects of toileting with independence within 7 day(s). 6.  Patient will participate in upper extremity therapeutic exercise/activities with independence for 10 minutes within 7 day(s). 7.  Patient will utilize energy conservation techniques during functional activities with verbal cues within 7 day(s). Occupational Therapy EVALUATION  Patient: Leslie Salazar (8 y.o. male)  Date: 7/16/2018  Primary Diagnosis: Altered mental state        Precautions: Fall    ASSESSMENT :  Based on the objective data described below, the patient presents with overall Mod I-supervision for ADLs and functional mobility s/p admission for UTI and debility. PTA, patient living at [facility] with wife, independent-mod I with ADLs & mobility, baseline L eye blindness. Now presents with impulsivity, decreased safety awareness, and generalized weakness. Patient received up in the chair after PT session, agreeable to therapy, A&O x3 (no memory of reason for hospitalization). With BUE assessment, patient with decreased environmental awareness/safety with PIVs, line catching on chair 7 pulling out (RN aware). Patient able to doff/don socks utilizing tailor sit up in chair, standing 7 marching in place with supervision. Cues required for safety with return to sitting (feeling chair on the back of BLEs). Patient's wife and son reporting patient appears to be close to his functional baseline, much improved from admission. Recommend patient return to [facility] with family & staff support when medically stable. Patient will benefit from skilled intervention to address the above impairments. Patients rehabilitation potential is considered to be Good  Factors which may influence rehabilitation potential include:   []             None noted  []             Mental ability/status  []             Medical condition  []             Home/family situation and support systems  []             Safety awareness  []             Pain tolerance/management  []             Other:      PLAN :  Recommendations and Planned Interventions:  [x]               Self Care Training                  [x]        Therapeutic Activities  [x]               Functional Mobility Training    [x]        Cognitive Retraining  [x]               Therapeutic Exercises           [x]        Endurance Activities  [x]               Balance Training                   []        Neuromuscular Re-Education  []               Visual/Perceptual Training     [x]   Home Safety Training  [x]               Patient Education                 [x]        Family Training/Education  []               Other (comment):    Frequency/Duration: Patient will be followed by occupational therapy 3 times a week to address goals. Discharge Recommendations: Return to [facility]  Further Equipment Recommendations for Discharge: Anticipate no needs     SUBJECTIVE:   Patient stated You could get me some more bourbon! Em Chopra    OBJECTIVE DATA SUMMARY:   HISTORY:   Past Medical History:   Diagnosis Date    Arthritis     HANDS    Beta-blocker therapy     Cancer ([facility] [address] 2013    MAXILLARY SINUS CANCER, RADIATION AND CHEMO    Cancer ([facility] [address]     SKIN CA ON NOSE    GERD (gastroesophageal reflux disease)     Hypercholesterolemia     Hypertension     pt denies    Nasal sinus tumor     Numbness of LEFT hand & LEFT face 2010    RBBB (right bundle branch block)     TIA (transient ischemic attack)     12 TIA's over 9 YRS.1ST ONE IN 8/03- LAST IN 2/12      Trigeminal neuralgia      Past Surgical History: Procedure Laterality Date    HX CATARACT REMOVAL Bilateral     HX GI      COLONOSCOPY    HX HEENT      BIOPSY OF SINUS     HX HEENT Left     sew eye closed    HX HERNIA REPAIR Right     INGUINAL    HX KNEE ARTHROSCOPY Right 2007    HX KNEE REPLACEMENT Right 2012    Total Knee replacement- right    HX ROTATOR CUFF REPAIR Right     right rotator cuff repair       Prior Level of Function/Environment/Context: PTA, patient living at [facility] residential, IND-Mod I with ADLs, no DME/AE (has built-in shower chair & grab bars, no other DME per patient report). [facility] Utilities for meals. Baseline L eye blindness (sewn shut), wears glasses  Home Situation  Home Environment: Assisted living  [address] [facility]: [facility] [place]  One/Two Story Residence: One story  Living Alone: No  Support Systems: Spouse/Significant Other/Partner, Assisted living  Patient Expects to be Discharged to[de-identified] Assisted living  Current DME Used/Available at Home: Shower chair, Grab bars (fold-down in shower, doesn't use)  Tub or Shower Type: Shower    Hand dominance: Right    EXAMINATION OF PERFORMANCE DEFICITS:  Cognitive/Behavioral Status:  Neurologic State: Alert  Orientation Level: Oriented to person;Oriented to place;Oriented to time;Disoriented to situation  Cognition: Appropriate for age attention/concentration; Impulsive;Poor safety awareness  Perception: Appears intact  Perseveration: No perseveration noted  Safety/Judgement: Decreased awareness of need for assistance;Decreased awareness of need for safety    Skin: Appears intact 9slight bleeding from pulled PIV, pressure applied    Edema: none noted in BUEs    Hearing:   Auditory  Auditory Impairment: Hard of hearing, bilateral, Hearing aid(s)  Hearing Aids/Status: With patient    Vision/Perceptual:    Tracking: Able to track stimulus in all quadrants w/o difficulty (R eye only (L eye sewn shut))                 Diplopia: No    Acuity: Within Defined Limits;Able to read employee name carlos without difficulty (reading items close to face with glasses on (~8 in))    Corrective Lenses: Glasses    Range of Motion:  In BUEs  AROM: Generally decreased, functional     Strength: In BUEs  Strength: Generally decreased, functional     Coordination:  Coordination: Generally decreased, functional  Fine Motor Skills-Upper: Left Intact; Right Intact    Gross Motor Skills-Upper: Left Intact; Right Intact    Tone & Sensation:  In BUEs  Tone: Normal  Sensation: Intact    Balance:  Sitting: Intact  Standing: Intact    Functional Mobility and Transfers for ADLs:  Bed Mobility:  Supine to Sit:  (NT--received in bed (see PT report of mobility))  Scooting: Supervision    Transfers:  Sit to Stand: Supervision  Stand to Sit: Supervision  Toilet Transfer : Supervision (inferred per functional mobility)    ADL Assessment:  Feeding: Independent*    Oral Facial Hygiene/Grooming: Supervision (seated )*    Bathing: Supervision*    Upper Body Dressing: Independent*    Lower Body Dressing: Supervision    Toileting: Supervision*   *inferred per functional mobility, cognition, safety awareness, functional reach, BUE ROM/strength, and coordination             ADL Intervention and task modifications:       Grooming  Grooming Assistance:  (declined, completed earlier this morning)    Lower Body Dressing Assistance  Dressing Assistance: Modified independent  Socks: Modified independent  Leg Crossed Method Used: Yes  Position Performed: Supine    Cognitive Retraining  Safety/Judgement: Decreased awareness of need for assistance;Decreased awareness of need for safety    Therapeutic Exercise:  - Functional transfers & marching in place completed with supervision     Functional Measure:  Barthel Index:    Bathin  Bladder: 5  Bowels: 10  Groomin  Dressing: 10  Feeding: 10  Mobility: 10  Stairs: 0  Toilet Use: 10  Transfer (Bed to Chair and Back): 10  Total: 75       Barthel and G-code impairment scale:  Percentage of impairment CH  0% CI  1-19% CJ  20-39% CK  40-59% CL  60-79% CM  80-99% CN  100%   Barthel Score 0-100 100 99-80 79-60 59-40 20-39 1-19   0   Barthel Score 0-20 20 17-19 13-16 9-12 5-8 1-4 0      The Barthel ADL Index: Guidelines  1. The index should be used as a record of what a patient does, not as a record of what a patient could do. 2. The main aim is to establish degree of independence from any help, physical or verbal, however minor and for whatever reason. 3. The need for supervision renders the patient not independent. 4. A patient's performance should be established using the best available evidence. Asking the patient, friends/relatives and nurses are the usual sources, but direct observation and common sense are also important. However direct testing is not needed. 5. Usually the patient's performance over the preceding 24-48 hours is important, but occasionally longer periods will be relevant. 6. Middle categories imply that the patient supplies over 50 per cent of the effort. 7. Use of aids to be independent is allowed. Deborah Campos., Barthel, D.W. (8519). Functional evaluation: the Barthel Index. [facility] [address] [facility] (14)2. CHARLI Berrios, Binh Mitchlel., [facility]., [place], [address] [place] [address] (1999). Measuring the change indisability after inpatient rehabilitation; comparison of the responsiveness of the Barthel Index and Functional Gilliam Measure. Journal of Neurology, Neurosurgery, and Psychiatry, 66(4), 222-247. Man Kuhn, N.J.A, CHRISTINA Gregory, & Chon Gibson MJacquelynA. (2004.) Assessment of post-stroke quality of life in cost-effectiveness studies: The usefulness of the Barthel Index and the EuroQoL-5D. Quality of Life Research, 13, 381-57         G codes: In compliance with CMSs Claims Based Outcome Reporting, the following G-code set was chosen for this patient based on their primary functional limitation being treated:     The outcome measure chosen to determine the severity of the functional limitation was the Barthel Index with a score of 75/100 which was correlated with the impairment scale. ? Self Care:     - CURRENT STATUS: CJ - 20%-39% impaired, limited or restricted    - GOAL STATUS: CI - 1%-19% impaired, limited or restricted    - D/C STATUS:  ---------------To be determined---------------     Occupational Therapy Evaluation Charge Determination   History Examination Decision-Making   LOW Complexity : Brief history review  MEDIUM Complexity : 3-5 performance deficits relating to physical, cognitive , or psychosocial skils that result in activity limitations and / or participation restrictions LOW Complexity : No comorbidities that affect functional and no verbal or physical assistance needed to complete eval tasks       Based on the above components, the patient evaluation is determined to be of the following complexity level: LOW   Pain:  Pain Scale 1: Numeric (0 - 10)  Pain Intensity 1: 0              Activity Tolerance:   Good    Please refer to the flowsheet for vital signs taken during this treatment. After treatment:   [x] Patient left in no apparent distress sitting up in chair  [] Patient left in no apparent distress in bed  [x] Call bell left within reach  [x] Nursing notified  [x] Caregiver present  [] Bed alarm activated    COMMUNICATION/EDUCATION:   The patients plan of care was discussed with: Physical Therapist and Registered Nurse. [x] Home safety education was provided and the patient/caregiver indicated understanding. [x] Patient/family have participated as able in goal setting and plan of care. [x] Patient/family agree to work toward stated goals and plan of care. [] Patient understands intent and goals of therapy, but is neutral about his/her participation. [] Patient is unable to participate in goal setting and plan of care. This patients plan of care is appropriate for delegation to [facility].     Thank you for this referral.  Jody Holder OT  Time Calculation: 20 mins

## 2018-07-16 NOTE — DISCHARGE SUMMARY
Discharge Summary       PATIENT ID: Carla Freeman  MRN: [id number]   YOB: 1930    DATE OF ADMISSION: 7/14/2018 12:26 PM    DATE OF DISCHARGE: 7/16/2018    PRIMARY CARE PROVIDER: Maeola Paget, MD     ATTENDING PHYSICIAN: Mattie Moran MD   DISCHARGING PROVIDER: Mattie Moran MD    To contact this individual call [phone] and ask the  to page. If unavailable ask to be transferred the Adult Hospitalist Department. CONSULTATIONS: None    PROCEDURES/SURGERIES: * No surgery found *    ADMITTING DIAGNOSES & HOSPITAL COURSE:     Admission Summary:      CHIEF COMPLAINT:  Generalized weakness and unable to ambulate.      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 Traci Theodore had a similar presentation in 11/2017 because of general weakness.  Thought  it may be due to taper down of the Decadron too quick and on top of his low sodium.  He did have a period of rehabilitation after that discharge and then eventually he was discharged back to his assisted living.  Patient at baseline is able to ambulate independently .  His daughter said his Decadron was decreased from 1 mg twice a day to 1 mg once a day 5 days ago and since then did notice he has increasing weakness and since yesterday he was extremely weak, very sleepy, not taking oral adequately.  So, he was taken to [facility] today and a family member was told that he has a UTI, prescribed him antibiotics, but on the way to home, the patient's family said that he is unable to get into the car, not able to walk, so ambulance was called and sent to the ED.  The blood sugar was 114 when he arrived in the ED. Bernadette Rossi feels overall weak and he has chronic left face numb and paralyzed, but otherwise denied specific focal weakness.  The patient also family member noticed for the last 1-2 weeks that his both legs are with increasing edema. Bernadette Rossi also gained a lot of weight recently.     Interval history / Subjective:   7/15/2018 :  Back to [facility] mental  No new issues, no fever, no chills,   Cont on low dose decadron     For anticipated discharge in am on empiric abx for suspect sinusitis.        Assessment & Plan:      Acute metabolic encephalopathy: redolved.      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 Code status: dnr   DVT prophylaxis: Heparin      Care Plan discussed with: Patient/Family and Nurse  Disposition: TBD      Hospital Problems  Date Reviewed: 11/25/2017             Codes Class Noted POA     * (Principal)Altered mental state ICD-10-CM: R41.82  ICD-9-CM: 780.97   7/14/2018 Yes                     Review of Systems:   feels good no pain no n/v/d/f/, no alertness issues          Vital Signs:    Last 24hrs VS reviewed since prior progress note. Most recent are:       Visit Vitals    /77 (BP 1 Location: Right arm, BP Patient Position: At rest)    Pulse 68    Temp 97.8 °F (36.6 °C)    Resp 18    Ht 5' 9\" (1.753 m)    Wt 100 kg (220 lb 6.4 oz)    SpO2 95%    BMI 32.55 kg/m2            Intake/Output Summary (Last 24 hours) at 07/15/18 1100  Last data filed at 07/14/18 2339    Gross per 24 hour   Intake                0 ml   Output              575 ml   Net             -575 ml         Physical Examination:             Constitutional:  No acute distress, cooperative, pleasant    ENT:  Oral mucous moist, oropharynx benign. Neck supple,    Resp:  CTA bilaterally.  No wheezing/rhonchi/rales. No accessory muscle use   CV:  Regular rhythm, normal rate, no murmurs, gallops, rubs    GI:  Soft, non distended, non tender. normoactive bowel sounds, no hepatosplenomegaly     Musculoskeletal:  No edema, warm, 2+ pulses throughout    Neurologic:  Moves all extremities. AAOx3, CN II-XII reviewed                                             Psych:  Good insight, Not anxious nor agitated. Skin:  Good turgor, no rashes or ulcers         Data Review:    Review and/or order of clinical lab test        Labs:           Recent Labs       07/15/18   0408  07/14/18   1242   WBC  8.2  8.4   HGB  11.8*  13.2   HCT  35.5*  40.4   PLT  165  172           Recent Labs       07/15/18   0408  07/14/18   1242   NA  136  138   K  3.7  4.0   CL  101  103   CO2  28  26   BUN  13  14   CREA  0.79  1.05   GLU  126*  93   CA  7.9*  8.0*   MG  2.1   --    PHOS  3.5   --            Recent Labs       07/15/18   0408  07/14/18   1242   SGOT  17  25   ALT  41  50   AP  43*  45   TBILI  0.5  0.7   TP  5.8*  6.0*   ALB  2.6*  2.5*   GLOB  3.2  3.5      No results for input(s): INR, PTP, APTT in the last 72 hours.     No lab exists for component: INREXT   No results for input(s): FE, TIBC, PSAT, FERR in the last 72 hours. Lab Results   Component Value Date/Time     Folate 8.6 11/20/2017 03:03 PM      No results for input(s): PH, PCO2, PO2 in the last 72 hours.   No results for input(s): CPK, CKNDX, TROIQ in the last 72 hours.     No lab exists for component: CPKMB        Lab Results   Component Value Date/Time     Cholesterol, total 163 07/14/2018 12:42 PM     HDL Cholesterol 89 07/14/2018 12:42 PM     LDL, calculated 59.4 07/14/2018 12:42 PM     Triglyceride 73 07/14/2018 12:42 PM     CHOL/HDL Ratio 1.8 07/14/2018 12:42 PM            Lab Results   Component Value Date/Time     Glucose (POC) 197 (H) 11/25/2017 11:27 AM     Glucose (POC) 128 (H) 11/25/2017 05:36 AM     Glucose (POC) 117 (H) 11/24/2017 11:22 PM     Glucose (POC) 113 (H) 11/24/2017 05:47 PM     Glucose (POC) 198 (H) 11/24/2017 11:57 AM            Lab Results   Component Value Date/Time     Color YELLOW/STRAW 07/14/2018 04:09 PM     Appearance CLEAR 07/14/2018 04:09 PM     Specific gravity 1.020 07/14/2018 04:09 PM     pH (UA) 6.0 07/14/2018 04:09 PM     Protein NEGATIVE  07/14/2018 04:09 PM     Glucose NEGATIVE  07/14/2018 04:09 PM     Ketone NEGATIVE  07/14/2018 04:09 PM     Bilirubin NEGATIVE  07/14/2018 04:09 PM     Urobilinogen 0.2 07/14/2018 04:09 PM     Nitrites NEGATIVE  07/14/2018 04:09 PM     Leukocyte Esterase NEGATIVE  07/14/2018 04:09 PM     Epithelial cells FEW 07/14/2018 04:09 PM     Bacteria NEGATIVE  07/14/2018 04:09 PM     WBC 0-4 07/14/2018 04:09 PM     RBC 0-5 07/14/2018 04:09 PM            Medications Reviewed:             Current Facility-Administered Medications   Medication Dose Route Frequency    acetaminophen (TYLENOL) tablet 1,000 mg  1,000 mg Oral Q6H PRN    atorvastatin (LIPITOR) tablet 10 mg  10 mg Oral QHS    clopidogrel (PLAVIX) tablet 75 mg  75 mg Oral DAILY    cyanocobalamin (VITAMIN B12) tablet 1,000 mcg  1,000 mcg Oral DAILY    dexamethasone (DECADRON) tablet 2 mg  2 mg Oral DAILY    gabapentin (NEURONTIN) tablet 300 mg  300 mg Oral BID    levETIRAcetam (KEPPRA) tablet 500 mg  500 mg Oral BID    senna-docusate (PERICOLACE) 8.6-50 mg per tablet 2 Tab  2 Tab Oral DAILY    albumin human 25% (BUMINATE) solution 12.5 g  12.5 g IntraVENous Q12H    furosemide (LASIX) injection 20 mg  20 mg IntraVENous Q12H    cefTRIAXone (ROCEPHIN) 2 g in 0.9% sodium chloride (MBP/ADV) 50 mL  2 g IntraVENous Q24H    sodium chloride (NS) flush 5-10 mL  5-10 mL IntraVENous Q8H    sodium chloride (NS) flush 5-10 mL  5-10 mL IntraVENous PRN    naloxone (NARCAN) injection 0.4 mg  0.4 mg IntraVENous PRN    enoxaparin (LOVENOX) injection 40 mg  40 mg SubCUTAneous Q24H    ondansetron (ZOFRAN ODT) tablet 4 mg  4 mg Oral Q6H PRN            DISCHARGE DIAGNOSES / PLAN:      1.  as above        PENDING TEST RESULTS:   At the time of discharge the following test results are still pending: na    FOLLOW UP APPOINTMENTS:    Follow-up Information     Follow up With Details Comments [facility] [place] In 1 week  [address]  [phone]             ADDITIONAL CARE RECOMMENDATIONS: na    DIET: Resume previous diet     ACTIVITY: Activity as tolerated    WOUND CARE: na    EQUIPMENT needed: na      DISCHARGE MEDICATIONS:  Current Discharge Medication List      START taking these medications    Details   furosemide (LASIX) 10 mg/mL injection 4 mL by IntraVENous route every Monday, Thursday, Saturday. Qty: 20 Vial, Refills: 0      amoxicillin-clavulanate (AUGMENTIN) 875-125 mg per tablet Take 1 Tab by mouth two (2) times a day. Qty: 14 Tab, Refills: 0         CONTINUE these medications which have NOT CHANGED    Details   peg 400-propylene glycol (SYSTANE, PROPYLENE GLYCOL,) 0.4-0.3 % drop 1 Drop four (4) times daily. Indications: Both eyes      dexamethasone (DECADRON) 1 mg tablet Take 2 mg by mouth daily. SENNOSIDES (SENNA LAXATIVE PO) Take 8.6 mg by mouth daily. gabapentin (NEURONTIN) 600 mg tablet Take 0.5 Tabs by mouth two (2) times a day. Indications: trigeminal neuralgia  Qty: 30 Tab, Refills: 0      levETIRAcetam (KEPPRA) 500 mg tablet Take 1 Tab by mouth two (2) times a day. Qty: 60 Tab, Refills: 0      cyanocobalamin (VITAMIN B-12) 1,000 mcg tablet Take 1,000 mcg by mouth daily. cholecalciferol (VITAMIN D3) 1,000 unit cap Take 1,000 Units by mouth daily. docusate sodium (COLACE) 100 mg capsule Take 100 mg by mouth nightly. bacitracin ophthalmic ointment Administer  to left eye daily. Refills: 2      clopidogrel (PLAVIX) 75 mg tablet Take 75 mg by mouth daily. TAKES IN AM      atorvastatin (LIPITOR) 10 mg tablet Take 10 mg by mouth nightly.       acetaminophen (TYLENOL EXTRA STRENGTH) 500 mg tablet Take 2 Tabs by mouth as needed for Pain. No more than 3grams in 24hour period  Indications: Pain  Qty: 10 Tab, Refills: 0               NOTIFY YOUR PHYSICIAN FOR ANY OF THE FOLLOWING:   Fever over 101 degrees for 24 hours. Chest pain, shortness of breath, fever, chills, nausea, vomiting, diarrhea, change in mentation, falling, weakness, bleeding. Severe pain or pain not relieved by medications. Or, any other signs or symptoms that you may have questions about.     DISPOSITION:    Home With:   OT  PT  HH  RN       Long term SNF/Inpatient Rehab   x Independent/assisted living    Hospice    Other:       PATIENT CONDITION AT DISCHARGE:     Functional status    Poor    x Deconditioned     Independent      Cognition    x Lucid     Forgetful     Dementia      Catheters/lines (plus indication)    Diaz     PICC     PEG    x None      Code status     Full code    x DNR      PHYSICAL EXAMINATION AT DISCHARGE:   Refer to Progress Note  Pul: clear  CV: rr no gallop  Visit Vitals    /80 (BP 1 Location: Left arm, BP Patient Position: At rest)    Pulse (!) 58    Temp 97.6 °F (36.4 °C)    Resp 18    Ht 5' 9\" (1.753 m)    Wt 100 kg (220 lb 6.4 oz)    SpO2 95%    BMI 32.55 kg/m2          CHRONIC MEDICAL DIAGNOSES:  Problem List as of 7/16/2018  Date Reviewed: 7/16/2018          Codes Class Noted - Resolved    Leucocytosis ICD-10-CM: D72.829  ICD-9-CM: 288.60  11/25/2017 - Present        Hyponatremia ICD-10-CM: E87.1  ICD-9-CM: 276.1  11/25/2017 - Present    Overview Signed 11/25/2017 10:31 AM by Toby Stout MD     Acute on chronic             Rhabdomyolysis ICD-10-CM: H25.98  ICD-9-CM: 728.88  11/25/2017 - Present        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 [address] ICD-10-CM: A41.9  ICD-9-CM: 038.9, 995.91 11/25/2017 - Present        Trigeminal neuralgia (Chronic) ICD-10-CM: G50.0  ICD-9-CM: 350.1  11/25/2017 - Present        Brain tumor ([facility] ICD-10-CM: D49.6  ICD-9-CM: 239.6  10/19/2017 - Present        Pneumonia ICD-10-CM: J18.9  ICD-9-CM: 486  10/23/2015 - Present        Head and neck cancer ([facility] ICD-10-CM: C76.0  ICD-9-CM: 195.0  10/13/2014 - Present        Cancer of sinus (HCC) ICD-10-CM: C31.9  ICD-9-CM: 160.9  10/13/2014 - Present        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  ICD-9-CM: 715.96  3/13/2012 - Present    Overview Signed 3/13/2012  2:30 PM by Kita Vallejo PA-C     Scheduled for RIGHT UNI vs TKR on 03-14-12             History of TIAs ICD-10-CM: Z86.73  ICD-9-CM: V12.54  3/13/2012 - Present    Overview Addendum 3/13/2012  2:31 PM by Kita Vallejo PA-C     11 in last 7 yrs             Stroke [facility]) ICD-10-CM: I63.9  ICD-9-CM: 434.91  3/13/2012 - Present    Overview Signed 3/13/2012  2:32 PM by Kita Vallejo PA-C     Residual left-sided facial numbness             Hypercholesteremia ICD-10-CM: E78.00  ICD-9-CM: 272.0  3/13/2012 - Present        GERD (gastroesophageal reflux disease) ICD-10-CM: K21.9  ICD-9-CM: 530.81  3/13/2012 - Present        Numbness and tingling in left hand ICD-10-CM: R20.0, R20.2  ICD-9-CM: 782.0  2/25/2012 - Present        TIA (transient ischemic attack) ICD-10-CM: G45.9  ICD-9-CM: 435.9  12/23/2010 - Present        HTN (hypertension) ICD-10-CM: I10  ICD-9-CM: 401.9  12/23/2010 - Present        * (Principal)RESOLVED: Altered mental state ICD-10-CM: R41.82  ICD-9-CM: 780.97  7/14/2018 - 7/16/2018        RESOLVED: Acute encephalopathy ICD-10-CM: G93.40  ICD-9-CM: 348.30  11/20/2017 - 11/24/2017              Greater than  30   minutes were spent with the patient on counseling and coordination of care    Signed:   Jairon Maradiaga MD  7/16/2018  12:19 PM

## 2018-07-19 LAB
BACTERIA SPEC CULT: NORMAL
SERVICE CMNT-IMP: NORMAL

## 2018-08-23 ENCOUNTER — HOSPITAL ENCOUNTER (OUTPATIENT)
Dept: CT IMAGING | Age: 83
Discharge: HOME | DRG: 252 | End: 2018-08-23
Attending: FAMILY MEDICINE
Payer: MEDICARE

## 2018-08-23 ENCOUNTER — HOSPITAL ENCOUNTER (INPATIENT)
Age: 83
LOS: 1 days | Discharge: HOME | DRG: 252 | End: 2018-08-25
Attending: EMERGENCY MEDICINE | Admitting: HOSPITALIST
Payer: MEDICARE

## 2018-08-23 DIAGNOSIS — R60.0 EDEMA OF LEG: ICD-10-CM

## 2018-08-23 DIAGNOSIS — R06.02 SHORTNESS OF BREATH: ICD-10-CM

## 2018-08-23 DIAGNOSIS — I26.99 OTHER ACUTE PULMONARY EMBOLISM WITHOUT ACUTE COR PULMONALE (HCC): Primary | ICD-10-CM

## 2018-08-23 DIAGNOSIS — R79.89 ELEVATED D-DIMER: ICD-10-CM

## 2018-08-23 LAB
ALBUMIN SERPL-MCNC: 3.3 G/DL (ref 3.5–5)
ALBUMIN/GLOB SERPL: 0.9 {RATIO} (ref 1.1–2.2)
ALP SERPL-CCNC: 51 U/L (ref 45–117)
ALT SERPL-CCNC: 48 U/L (ref 12–78)
ANION GAP SERPL CALC-SCNC: 11 MMOL/L (ref 5–15)
APPEARANCE UR: CLEAR
APTT PPP: 26.6 SEC (ref 22.1–32)
AST SERPL-CCNC: 22 U/L (ref 15–37)
BASOPHILS # BLD: 0 K/UL (ref 0–0.1)
BASOPHILS NFR BLD: 0 % (ref 0–1)
BILIRUB SERPL-MCNC: 0.5 MG/DL (ref 0.2–1)
BILIRUB UR QL: NEGATIVE
BUN SERPL-MCNC: 26 MG/DL (ref 6–20)
BUN/CREAT SERPL: 20 (ref 12–20)
CALCIUM SERPL-MCNC: 8.6 MG/DL (ref 8.5–10.1)
CHLORIDE SERPL-SCNC: 94 MMOL/L (ref 97–108)
CO2 SERPL-SCNC: 28 MMOL/L (ref 21–32)
COLOR UR: NORMAL
CREAT SERPL-MCNC: 1.3 MG/DL (ref 0.7–1.3)
DIFFERENTIAL METHOD BLD: ABNORMAL
EOSINOPHIL # BLD: 0 K/UL (ref 0–0.4)
EOSINOPHIL NFR BLD: 0 % (ref 0–7)
ERYTHROCYTE [DISTWIDTH] IN BLOOD BY AUTOMATED COUNT: 17 % (ref 11.5–14.5)
GLOBULIN SER CALC-MCNC: 3.6 G/DL (ref 2–4)
GLUCOSE SERPL-MCNC: 126 MG/DL (ref 65–100)
GLUCOSE UR STRIP.AUTO-MCNC: NEGATIVE MG/DL
HCT VFR BLD AUTO: 39.4 % (ref 36.6–50.3)
HGB BLD-MCNC: 13 G/DL (ref 12.1–17)
HGB UR QL STRIP: NEGATIVE
IMM GRANULOCYTES # BLD: 0.3 K/UL
IMM GRANULOCYTES NFR BLD AUTO: 3 %
KETONES UR QL STRIP.AUTO: NEGATIVE MG/DL
LEUKOCYTE ESTERASE UR QL STRIP.AUTO: NEGATIVE
LYMPHOCYTES # BLD: 1.9 K/UL (ref 0.8–3.5)
LYMPHOCYTES NFR BLD: 19 % (ref 12–49)
MCH RBC QN AUTO: 33.5 PG (ref 26–34)
MCHC RBC AUTO-ENTMCNC: 33 G/DL (ref 30–36.5)
MCV RBC AUTO: 101.5 FL (ref 80–99)
MONOCYTES # BLD: 0.8 K/UL (ref 0–1)
MONOCYTES NFR BLD: 8 % (ref 5–13)
MYELOCYTES NFR BLD MANUAL: 1 %
NEUTS BAND NFR BLD MANUAL: 1 % (ref 0–6)
NEUTS SEG # BLD: 6.8 K/UL (ref 1.8–8)
NEUTS SEG NFR BLD: 68 % (ref 32–75)
NITRITE UR QL STRIP.AUTO: NEGATIVE
NRBC # BLD: 0 K/UL (ref 0–0.01)
NRBC BLD-RTO: 0 PER 100 WBC
PH UR STRIP: 7 [PH] (ref 5–8)
PLATELET # BLD AUTO: 199 K/UL (ref 150–400)
PMV BLD AUTO: 9.4 FL (ref 8.9–12.9)
POTASSIUM SERPL-SCNC: 4.2 MMOL/L (ref 3.5–5.1)
PROT SERPL-MCNC: 6.9 G/DL (ref 6.4–8.2)
PROT UR STRIP-MCNC: NEGATIVE MG/DL
RBC # BLD AUTO: 3.88 M/UL (ref 4.1–5.7)
RBC MORPH BLD: ABNORMAL
RBC MORPH BLD: ABNORMAL
SODIUM SERPL-SCNC: 133 MMOL/L (ref 136–145)
SP GR UR REFRACTOMETRY: 1 (ref 1–1.03)
THERAPEUTIC RANGE,PTTT: NORMAL SECS (ref 58–77)
TROPONIN I SERPL-MCNC: <0.05 NG/ML
UROBILINOGEN UR QL STRIP.AUTO: 0.2 EU/DL (ref 0.2–1)
WBC # BLD AUTO: 9.9 K/UL (ref 4.1–11.1)

## 2018-08-23 PROCEDURE — 96365 THER/PROPH/DIAG IV INF INIT: CPT

## 2018-08-23 PROCEDURE — 74011250636 HC RX REV CODE- 250/636: Performed by: EMERGENCY MEDICINE

## 2018-08-23 PROCEDURE — 81003 URINALYSIS AUTO W/O SCOPE: CPT | Performed by: EMERGENCY MEDICINE

## 2018-08-23 PROCEDURE — 85730 THROMBOPLASTIN TIME PARTIAL: CPT | Performed by: FAMILY MEDICINE

## 2018-08-23 PROCEDURE — 85025 COMPLETE CBC W/AUTO DIFF WBC: CPT | Performed by: EMERGENCY MEDICINE

## 2018-08-23 PROCEDURE — 99218 HC RM OBSERVATION: CPT

## 2018-08-23 PROCEDURE — 84484 ASSAY OF TROPONIN QUANT: CPT | Performed by: EMERGENCY MEDICINE

## 2018-08-23 PROCEDURE — 99284 EMERGENCY DEPT VISIT MOD MDM: CPT

## 2018-08-23 PROCEDURE — 93005 ELECTROCARDIOGRAM TRACING: CPT

## 2018-08-23 PROCEDURE — 36415 COLL VENOUS BLD VENIPUNCTURE: CPT | Performed by: FAMILY MEDICINE

## 2018-08-23 PROCEDURE — 74011250637 HC RX REV CODE- 250/637: Performed by: FAMILY MEDICINE

## 2018-08-23 PROCEDURE — 74011000258 HC RX REV CODE- 258: Performed by: FAMILY MEDICINE

## 2018-08-23 PROCEDURE — 74011250636 HC RX REV CODE- 250/636: Performed by: FAMILY MEDICINE

## 2018-08-23 PROCEDURE — 74011636320 HC RX REV CODE- 636/320: Performed by: FAMILY MEDICINE

## 2018-08-23 PROCEDURE — 96372 THER/PROPH/DIAG INJ SC/IM: CPT

## 2018-08-23 PROCEDURE — P9047 ALBUMIN (HUMAN), 25%, 50ML: HCPCS | Performed by: FAMILY MEDICINE

## 2018-08-23 PROCEDURE — 71275 CT ANGIOGRAPHY CHEST: CPT

## 2018-08-23 PROCEDURE — 93970 EXTREMITY STUDY: CPT

## 2018-08-23 PROCEDURE — 80053 COMPREHEN METABOLIC PANEL: CPT | Performed by: EMERGENCY MEDICINE

## 2018-08-23 RX ORDER — DEXAMETHASONE 1 MG/1
2 TABLET ORAL DAILY
Status: DISCONTINUED | OUTPATIENT
Start: 2018-08-24 | End: 2018-08-25 | Stop reason: HOSPADM

## 2018-08-23 RX ORDER — SODIUM CHLORIDE 0.9 % (FLUSH) 0.9 %
5-10 SYRINGE (ML) INJECTION EVERY 8 HOURS
Status: DISCONTINUED | OUTPATIENT
Start: 2018-08-23 | End: 2018-08-25 | Stop reason: HOSPADM

## 2018-08-23 RX ORDER — FUROSEMIDE 10 MG/ML
40 INJECTION INTRAMUSCULAR; INTRAVENOUS ONCE
Status: COMPLETED | OUTPATIENT
Start: 2018-08-23 | End: 2018-08-23

## 2018-08-23 RX ORDER — SODIUM CHLORIDE 0.9 % (FLUSH) 0.9 %
5-10 SYRINGE (ML) INJECTION AS NEEDED
Status: DISCONTINUED | OUTPATIENT
Start: 2018-08-23 | End: 2018-08-25 | Stop reason: HOSPADM

## 2018-08-23 RX ORDER — SODIUM CHLORIDE 0.9 % (FLUSH) 0.9 %
10 SYRINGE (ML) INJECTION
Status: COMPLETED | OUTPATIENT
Start: 2018-08-23 | End: 2018-08-23

## 2018-08-23 RX ORDER — HEPARIN SODIUM 10000 [USP'U]/100ML
14-36 INJECTION, SOLUTION INTRAVENOUS
Status: DISCONTINUED | OUTPATIENT
Start: 2018-08-23 | End: 2018-08-25

## 2018-08-23 RX ORDER — GABAPENTIN 600 MG/1
300 TABLET ORAL 2 TIMES DAILY
Status: DISCONTINUED | OUTPATIENT
Start: 2018-08-23 | End: 2018-08-25 | Stop reason: HOSPADM

## 2018-08-23 RX ORDER — ENOXAPARIN SODIUM 100 MG/ML
1 INJECTION SUBCUTANEOUS
Status: COMPLETED | OUTPATIENT
Start: 2018-08-23 | End: 2018-08-23

## 2018-08-23 RX ORDER — ACETAMINOPHEN 325 MG/1
650 TABLET ORAL
Status: DISCONTINUED | OUTPATIENT
Start: 2018-08-23 | End: 2018-08-25 | Stop reason: HOSPADM

## 2018-08-23 RX ORDER — LEVETIRACETAM 500 MG/1
500 TABLET ORAL 2 TIMES DAILY
Status: DISCONTINUED | OUTPATIENT
Start: 2018-08-23 | End: 2018-08-25 | Stop reason: HOSPADM

## 2018-08-23 RX ORDER — HEPARIN SODIUM 5000 [USP'U]/ML
80 INJECTION, SOLUTION INTRAVENOUS; SUBCUTANEOUS ONCE
Status: COMPLETED | OUTPATIENT
Start: 2018-08-23 | End: 2018-08-23

## 2018-08-23 RX ORDER — ATORVASTATIN CALCIUM 10 MG/1
10 TABLET, FILM COATED ORAL
Status: DISCONTINUED | OUTPATIENT
Start: 2018-08-23 | End: 2018-08-25 | Stop reason: HOSPADM

## 2018-08-23 RX ORDER — ALBUMIN HUMAN 250 G/1000ML
12.5 SOLUTION INTRAVENOUS EVERY 6 HOURS
Status: COMPLETED | OUTPATIENT
Start: 2018-08-23 | End: 2018-08-23

## 2018-08-23 RX ADMIN — ENOXAPARIN SODIUM 100 MG: 100 INJECTION SUBCUTANEOUS at 18:14

## 2018-08-23 RX ADMIN — Medication 10 ML: at 22:00

## 2018-08-23 RX ADMIN — FUROSEMIDE 40 MG: 10 INJECTION, SOLUTION INTRAMUSCULAR; INTRAVENOUS at 22:00

## 2018-08-23 RX ADMIN — LEVETIRACETAM 500 MG: 500 TABLET ORAL at 21:59

## 2018-08-23 RX ADMIN — Medication 10 ML: at 16:47

## 2018-08-23 RX ADMIN — HEPARIN SODIUM AND DEXTROSE 14 UNITS/KG/HR: 10000; 5 INJECTION INTRAVENOUS at 19:44

## 2018-08-23 RX ADMIN — SODIUM CHLORIDE 100 ML: 900 INJECTION, SOLUTION INTRAVENOUS at 16:47

## 2018-08-23 RX ADMIN — IOPAMIDOL 100 ML: 755 INJECTION, SOLUTION INTRAVENOUS at 16:47

## 2018-08-23 RX ADMIN — ALBUMIN (HUMAN) 12.5 G: 0.25 INJECTION, SOLUTION INTRAVENOUS at 20:54

## 2018-08-23 RX ADMIN — ATORVASTATIN CALCIUM 10 MG: 10 TABLET, FILM COATED ORAL at 21:59

## 2018-08-23 RX ADMIN — HEPARIN SODIUM 8050 UNITS: 5000 INJECTION, SOLUTION INTRAVENOUS; SUBCUTANEOUS at 19:43

## 2018-08-23 RX ADMIN — GABAPENTIN 300 MG: 600 TABLET, FILM COATED ORAL at 21:59

## 2018-08-23 RX ADMIN — ALBUMIN (HUMAN) 12.5 G: 0.25 INJECTION, SOLUTION INTRAVENOUS at 23:27

## 2018-08-23 NOTE — ED NOTES
5:08 PM  I have evaluated the patient as the Provider in Triage. I have reviewed His vital signs and the triage nurse assessment. I have talked with the patient and any available family and advised that I am the provider in triage and have ordered the appropriate study to initiate their work up based on the clinical presentation during my assessment. I have advised that the patient will be accommodated in the Main ED as soon as possible. I have also requested to contact the triage nurse or myself immediately if the patient experiences any changes in their condition during this brief waiting period.   Kraig Hidalgo NP    Pt  had a CTA chest just prior to arrival which revealed pulmonary embolisms

## 2018-08-23 NOTE — ED TRIAGE NOTES
Patient comes to the ER c/o SOB and CP. Sent by PCP for D Dimer >2. Patient has no complaints at this time.  CTA complete and shows +PE

## 2018-08-23 NOTE — ED PROVIDER NOTES
HPI Comments: 80 y.o. male with past medical history significant for GERD, hypercholesterolemia, trigeminal neuralgia, HTN, RBBB, TIA, sinus CA who presents via private vehicle with chief complaint of shortness of breath. Pt c/o worsening shortness of breath acute onset 2-3 days ago. Pt also c/o worsening leg swelling and weight gain. Pt was seen in the ED 5 weeks ago for the swelling and was starting on Lasix. Pt takes plavix. Pt denies hx of PE. Denies CP, cough, fever, vomiting, syncope. There are no other acute medical concerns at this time. Social hx: former tobacco smoker (quit 1982), +EtOH consumption     PCP: Cecilia Polanco MD    Note written by Kofi Mooney, as dictated by Henry Alvarado MD 5:41 PM      The history is provided by the patient. No  was used.         Past Medical History:   Diagnosis Date    Arthritis     HANDS    Beta-blocker therapy     Cancer ([facility] [address] 2013    MAXILLARY SINUS CANCER, RADIATION AND CHEMO    Cancer ([facility] [address]     SKIN CA ON NOSE    GERD (gastroesophageal reflux disease)     Hypercholesterolemia     Hypertension     pt denies    Nasal sinus tumor     Numbness of LEFT hand & LEFT face 2010    RBBB (right bundle branch block)     TIA (transient ischemic attack)     12 TIA's over 9 YRS.1ST ONE IN 8/03- LAST IN 2/12      Trigeminal neuralgia        Past Surgical History:   Procedure Laterality Date    HX CATARACT REMOVAL Bilateral     HX GI      COLONOSCOPY    HX HEENT      BIOPSY OF SINUS     HX HEENT Left     sew eye closed    HX HERNIA REPAIR Right     INGUINAL    HX KNEE ARTHROSCOPY Right 2007    HX KNEE REPLACEMENT Right 2012    Total Knee replacement- right    HX ROTATOR CUFF REPAIR Right     right rotator cuff repair         Family History:   Problem Relation Age of Onset    Cancer Mother      colon    Cancer Father      throat    No Known Problems Brother     Anesth Problems Neg Hx        Social History     Social History    Marital status:      Spouse name: N/A    Number of children: N/A    Years of education: N/A     Occupational History    Not on file. Social History Main Topics    Smoking status: Former Smoker     Packs/day: 1.00     Years: 40.00     Quit date: 1/1/1982    Smokeless tobacco: Never Used      Comment: [employer]    Alcohol use 10.5 oz/week     21 Glasses of wine per week    Drug use: No    Sexual activity: Not on file     Other Topics Concern    Not on file     Social History Narrative         ALLERGIES: Adhesive tape-silicones; Aggrenox [aspirin-dipyridamole]; Amlodipine; Diuril [chlorothiazide]; and Hydrochlorothiazide    Review of Systems   Constitutional: Positive for unexpected weight change. Negative for chills and fever. HENT: Negative for ear pain and sore throat. Eyes: Negative for pain. Respiratory: Positive for shortness of breath. Negative for chest tightness. Cardiovascular: Positive for leg swelling. Negative for chest pain. Gastrointestinal: Negative for abdominal pain, nausea and vomiting. Genitourinary: Negative for dysuria and flank pain. Musculoskeletal: Negative for back pain. Skin: Negative for rash. Neurological: Negative for headaches. All other systems reviewed and are negative. Vitals:    08/23/18 1710   BP: 108/68   Pulse: 66   Resp: 18   Temp: 97.5 °F (36.4 °C)   SpO2: 94%   Height: 5' 9\" (1.753 m)            Physical Exam   Constitutional: He appears well-developed and well-nourished. No distress. HENT:   Head: Normocephalic. Eyes: Pupils are equal, round, and reactive to light. Neck: Normal range of motion. Cardiovascular: Normal rate and regular rhythm. No murmur heard. Pulmonary/Chest: Effort normal and breath sounds normal. No respiratory distress. Abdominal: Soft. There is no tenderness. Musculoskeletal: Normal range of motion. He exhibits edema (2+ edema of lower extremities bilaterally).    Neurological: He is alert. He has normal strength. No cranial nerve deficit. Skin: Skin is warm and dry. Psychiatric: He has a normal mood and affect. His behavior is normal.   Nursing note and vitals reviewed. Note written by Kofi Johnston, as dictated by Kasey Robbins MD 5:45 PM        [facility]      ED Course       Procedures    ED EKG interpretation:  Rhythm: normal sinus rhythm; and regular . Rate (approx.): 79; Axis: normal; ST/T wave: non-specific changes; RBBB.    Note written by Kofi Johnston, as dictated by Kasey Robbins MD 5:45 PM      texted dr. Vicki Chang - he will adm

## 2018-08-23 NOTE — IP AVS SNAPSHOT
[address] [facility] [address] [facility] 
[phone] Patient: Maulik Martinez MRN: [id number] R:[id number]/12/[id number] About your hospitalization You were admitted on:  August 23, 2018 You last received care in the:  [address] [place] [address] You were discharged on:  August 25, 2018 Why you were hospitalized Your primary diagnosis was:  Pulmonary Embolism (Hcc) Your diagnoses also included:  Pulmonary Emboli (Hcc) Follow-up Information Follow up With Details Comments Contact Info Vince Zuluaga MD In 1 week s/p IVC filter [address] [place] [address] [address] [facility] 
[phone] Discharge Orders None A check radha indicates which time of day the medication should be taken. My Medications CONTINUE taking these medications Instructions Each Dose to Equal  
 Morning Noon Evening Bedtime  
 acetaminophen 500 mg tablet Commonly known as:  80 Jr Maegan Batista  Your last dose was: Your next dose is: Take 2 Tabs by mouth as needed for Pain. No more than 3grams in 24hour period  Indications: Pain  
 1000 mg  
    
   
   
   
  
 bacitracin ophthalmic ointment Your last dose was: Your next dose is:    
   
   
 Administer  to left eye daily. dexamethasone 1 mg tablet Commonly known as:  DECADRON Your last dose was: Your next dose is: Take 2 mg by mouth daily. 2 mg  
    
   
   
   
  
 docusate sodium 100 mg capsule Commonly known as:  Renzo Mitchellfoot Your last dose was: Your next dose is: Take 100 mg by mouth nightly. 100 mg  
    
   
   
   
  
 furosemide 40 mg tablet Commonly known as:  LASIX Your last dose was: Your next dose is: Take 1 Tab by mouth every Monday, Thursday, Saturday. 40 mg  
    
   
   
   
  
 gabapentin 600 mg tablet Commonly known as:  NEURONTIN Your last dose was: Your next dose is: Take 0.5 Tabs by mouth two (2) times a day. Indications: trigeminal neuralgia 300 mg  
    
   
   
   
  
 levETIRAcetam 500 mg tablet Commonly known as:  KEPPRA Your last dose was: Your next dose is: Take 1 Tab by mouth two (2) times a day. 500 mg  
    
   
   
   
  
 LIPITOR 10 mg tablet Generic drug:  atorvastatin Your last dose was: Your next dose is: Take 10 mg by mouth nightly. 10 mg  
    
   
   
   
  
 PLAVIX 75 mg Tab Generic drug:  clopidogrel Your last dose was: Your next dose is: Take 75 mg by mouth daily. TAKES IN AM  
 75 mg SENNA LAXATIVE PO Your last dose was: Your next dose is: Take 8.6 mg by mouth daily. 8.6 mg  
    
   
   
   
  
 SYSTANE (PROPYLENE GLYCOL) 0.4-0.3 % Drop Generic drug:  peg 400-propylene glycol Your last dose was: Your next dose is:    
   
   
 1 Drop four (4) times daily. Indications: Both eyes 1 Drop VITAMIN B-12 1,000 mcg tablet Generic drug:  cyanocobalamin Your last dose was: Your next dose is: Take 1,000 mcg by mouth daily. 1000 mcg VITAMIN D3 1,000 unit Cap Generic drug:  cholecalciferol Your last dose was: Your next dose is: Take 1,000 Units by mouth daily. 1000 Units STOP taking these medications   
 amoxicillin-clavulanate 875-125 mg per tablet Commonly known as:  AUGMENTIN Discharge Instructions Discharge Instructions PATIENT ID: Pat Cordero MRN: [id number] YOB: 1930 DATE OF ADMISSION: 8/23/2018  5:23 PM   
DATE OF DISCHARGE: 8/25/2018 PRIMARY CARE PROVIDER: Kenney Connolly MD  
 
 ATTENDING PHYSICIAN: Nazia Saucedo MD 
DISCHARGING PROVIDER: Nazia Saucedo MD   
To contact this individual call [phone] and ask the  to page. If unavailable ask to be transferred the Adult Hospitalist Department. DISCHARGE DIAGNOSES DVT with PE 
 
CONSULTATIONS: IP CONSULT TO HOSPITALIST 
IP CONSULT TO PALLIATIVE CARE - PROVIDER PROCEDURES/SURGERIES: * No surgery found * PENDING TEST RESULTS:  
At the time of discharge the following test results are still pending: FOLLOW UP APPOINTMENTS:  
Follow-up Information Follow up With Details Comments Contact Dain Osborn MD In 1 week s/p IVC filter [address] [place], [place] [address] 
[phone] ADDITIONAL CARE RECOMMENDATIONS:  
 
DIET: Regular Diet and Cardiac Diet ACTIVITY: Activity as tolerated WOUND CARE:  
 
EQUIPMENT needed:  
 
 
  
 SNF/Inpatient Rehab/LTAC  
x Independent/assisted living Hospice Other: CDMP Checked:  
Yes x PROBLEM LIST Updated: 
Yes x Signed:  
Nazia Saucedo MD 
8/25/2018 11:58 AM 
 
 
DISCHARGE SUMMARY from Nurse The discharge information has been reviewed with the patient.   The patient verbalized understanding. Discharge medications reviewed with the patient and appropriate educational materials and side effects teaching were provided. [employer] Announcement We are excited to announce that we are making your provider's discharge notes available to you in Scripted. You will see these notes when they are completed and signed by the physician that discharged you from your recent hospital stay. If you have any questions or concerns about any information you see in [employer], please call the Health Information Department where you were seen or reach out to your Primary Care Provider for more information about your plan of care. Introducing [facility]! [employer] introduces Scripted patient portal. Now you can access parts of your medical record, email your doctor's office, and request medication refills online. 1. In your internet browser, go to [email or web address] 2. Click on the First Time User? Click Here link in the Sign In box. You will see the New Member Sign Up page. 3. Enter your Scripted Access Code exactly as it appears below. You will not need to use this code after youve completed the sign-up process. If you do not sign up before the expiration date, you must request a new code. · Scripted Access Code: [email or web address] Expires: 10/12/2018 12:29 PM 
 
4. Enter the last four digits of your Social Security Number (xxxx) and Date of Birth (mm/dd/yyyy) as indicated and click Submit. You will be taken to the next sign-up page. 5. Create a EggCartelt ID. This will be your Scripted login ID and cannot be changed, so think of one that is secure and easy to remember. 6. Create a Scripted password. You can change your password at any time. 7. Enter your Password Reset Question and Answer. This can be used at a later time if you forget your password. 8. Enter your e-mail address.  You will receive e-mail notification when new information is available in [employer]. 9. Click Sign Up. You can now view and download portions of your medical record. 10. Click the Download Summary menu link to download a portable copy of your medical information. If you have questions, please visit the Frequently Asked Questions section of the [employer] website. Remember, [employer] is NOT to be used for urgent needs. For medical emergencies, dial 911. Now available from your iPhone and Android! Introducing Fredrick Pepper As a [facility] patient, I wanted to make you aware of our electronic visit tool called Fredrick Pepper. ClearGist 24/7 allows you to connect within minutes with a medical provider 24 hours a day, seven days a week via a mobile device or tablet or logging into a secure website from your computer. You can access Fredrick Pepper from anywhere in the [place]. A virtual visit might be right for you when you have a simple condition and feel like you just dont want to get out of bed, or cant get away from work for an appointment, when your regular [facility] [facility] provider is not available (evenings, weekends or holidays), or when youre out of town and need minor care. Electronic visits cost only $49 and if the [employer] 24/7 provider determines a prescription is needed to treat your condition, one can be electronically transmitted to a nearby pharmacy*. Please take a moment to enroll today if you have not already done so. The enrollment process is free and takes just a few minutes. To enroll, please download the ClearGist 24/7 elver to your tablet or phone, or visit [email or web address] to enroll on your computer. And, as an [address] [place] [address] patient with a [employer] account, the results of your visits will be scanned into your electronic medical record and your primary care provider will be able to view the scanned results. We urge you to continue to see your regular [facility] provider for your ongoing medical care. And while your primary care provider may not be the one available when you seek a Fredrick Funezdiliafin virtual visit, the peace of mind you get from getting a real diagnosis real time can be priceless. For more information on Fredrick Wagnerfin, view our Frequently Asked Questions (FAQs) at [email or web address]. Sincerely, 
 
Boyd Mendoza MD 
Chief Medical Officer Atif Hector *:  certain medications cannot be prescribed via [employer] Providers Seen During Your Hospitalization Provider Specialty Primary office phone Aj Whitney MD Emergency Medicine [phone] Alexandra Paredes MD Hospitalist [phone] Azeem Starks MD Internal Medicine [phone] Your Primary Care Physician (PCP) Primary Care Physician Office Phone Office Fax Ingris Peres [phone] You are allergic to the following Allergen Reactions Adhesive Tape-Silicones Other (comments) REDNESS Aggrenox (Aspirin-Dipyridamole) Other (comments)  
 headache Amlodipine Rash Diuril (Chlorothiazide) Unknown (comments) Hydrochlorothiazide Other (comments) LOW NA  
  
Recent Documentation Height Weight BMI Smoking Status 1.753 m 100.4 kg 32.68 kg/m2 Former Smoker Emergency Contacts Name Discharge Info Relation Home Work [facility] DIS[facility] CAREGIVER [3] Girlfriend [18] [phone]  [address] CAREGIVER [3] Son [22] [phone] Patient Belongings The following personal items are in your possession at time of discharge: 
  Dental Appliances: None  Visual Aid: Glasses, With patient   Hearing Aids/Status: Right, Left  Home Medications: None   Jewelry: None  Clothing: At bedside    Other Valuables: None Please provide this summary of care documentation to your next provider. Signatures-by signing, you are acknowledging that this After Visit Summary has been reviewed with you and you have received a copy. Patient Signature:  ____________________________________________________________ Date:  ____________________________________________________________  
  
[place] Provider Signature:  ____________________________________________________________ Date:  ____________________________________________________________

## 2018-08-23 NOTE — ROUTINE PROCESS
TRANSFER - OUT REPORT:    Verbal report given to Tracy Underwood RN(name) on Author Héctor  being transferred to [facility](unit) for routine progression of care       Report consisted of patients Situation, Background, Assessment and   Recommendations(SBAR). Information from the following report(s) SBAR, ED Summary, MAR, Recent Results and Cardiac Rhythm SR was reviewed with the receiving nurse. Lines:   Peripheral IV 08/23/18 Left Antecubital (Active)   Site Assessment Clean, dry, & intact 8/23/2018  6:30 PM   Phlebitis Assessment 0 8/23/2018  6:30 PM   Infiltration Assessment 0 8/23/2018  6:30 PM   Dressing Status Clean, dry, & intact 8/23/2018  6:30 PM   Dressing Type Transparent 8/23/2018  6:30 PM        Opportunity for questions and clarification was provided.       Patient transported with:   Monitor  Registered Nurse     Bard Shailesh RN

## 2018-08-24 ENCOUNTER — APPOINTMENT (OUTPATIENT)
Dept: INTERVENTIONAL RADIOLOGY/VASCULAR | Age: 83
DRG: 252 | End: 2018-08-24
Attending: HOSPITALIST
Payer: MEDICARE

## 2018-08-24 PROBLEM — I26.99 PULMONARY EMBOLI (HCC): Status: ACTIVE | Noted: 2018-08-24

## 2018-08-24 LAB
APTT PPP: 32.2 SEC (ref 22.1–32)
APTT PPP: 41.4 SEC (ref 22.1–32)
APTT PPP: 99.8 SEC (ref 22.1–32)
APTT PPP: >130 SEC (ref 22.1–32)
BASOPHILS # BLD: 0 K/UL (ref 0–0.1)
BASOPHILS NFR BLD: 0 % (ref 0–1)
DIFFERENTIAL METHOD BLD: ABNORMAL
EOSINOPHIL # BLD: 0 K/UL (ref 0–0.4)
EOSINOPHIL NFR BLD: 0 % (ref 0–7)
ERYTHROCYTE [DISTWIDTH] IN BLOOD BY AUTOMATED COUNT: 17 % (ref 11.5–14.5)
HCT VFR BLD AUTO: 38.5 % (ref 36.6–50.3)
HGB BLD-MCNC: 12.8 G/DL (ref 12.1–17)
IMM GRANULOCYTES # BLD: 0 K/UL
IMM GRANULOCYTES NFR BLD AUTO: 0 %
LYMPHOCYTES # BLD: 3.7 K/UL (ref 0.8–3.5)
LYMPHOCYTES NFR BLD: 34 % (ref 12–49)
MCH RBC QN AUTO: 33.6 PG (ref 26–34)
MCHC RBC AUTO-ENTMCNC: 33.2 G/DL (ref 30–36.5)
MCV RBC AUTO: 101 FL (ref 80–99)
METAMYELOCYTES NFR BLD MANUAL: 1 %
MONOCYTES # BLD: 0.1 K/UL (ref 0–1)
MONOCYTES NFR BLD: 1 % (ref 5–13)
NEUTS SEG # BLD: 7 K/UL (ref 1.8–8)
NEUTS SEG NFR BLD: 64 % (ref 32–75)
NRBC # BLD: 0 K/UL (ref 0–0.01)
NRBC BLD-RTO: 0 PER 100 WBC
PLATELET # BLD AUTO: 178 K/UL (ref 150–400)
PLATELET COMMENTS,PCOM: ABNORMAL
PMV BLD AUTO: 9.4 FL (ref 8.9–12.9)
RBC # BLD AUTO: 3.81 M/UL (ref 4.1–5.7)
RBC MORPH BLD: ABNORMAL
THERAPEUTIC RANGE,PTTT: ABNORMAL SECS (ref 58–77)
TROPONIN I SERPL-MCNC: <0.05 NG/ML
WBC # BLD AUTO: 11 K/UL (ref 4.1–11.1)

## 2018-08-24 PROCEDURE — 85730 THROMBOPLASTIN TIME PARTIAL: CPT | Performed by: FAMILY MEDICINE

## 2018-08-24 PROCEDURE — 85025 COMPLETE CBC W/AUTO DIFF WBC: CPT | Performed by: FAMILY MEDICINE

## 2018-08-24 PROCEDURE — 93306 TTE W/DOPPLER COMPLETE: CPT

## 2018-08-24 PROCEDURE — 06H03DZ INSERTION OF INTRALUMINAL DEVICE INTO INFERIOR VENA CAVA, PERCUTANEOUS APPROACH: ICD-10-PCS | Performed by: STUDENT IN AN ORGANIZED HEALTH CARE EDUCATION/TRAINING PROGRAM

## 2018-08-24 PROCEDURE — 65660000000 HC RM CCU STEPDOWN

## 2018-08-24 PROCEDURE — 36415 COLL VENOUS BLD VENIPUNCTURE: CPT | Performed by: FAMILY MEDICINE

## 2018-08-24 PROCEDURE — 99218 HC RM OBSERVATION: CPT

## 2018-08-24 PROCEDURE — B519ZZZ FLUOROSCOPY OF INFERIOR VENA CAVA: ICD-10-PCS | Performed by: STUDENT IN AN ORGANIZED HEALTH CARE EDUCATION/TRAINING PROGRAM

## 2018-08-24 PROCEDURE — 3E033GC INTRODUCTION OF OTHER THERAPEUTIC SUBSTANCE INTO PERIPHERAL VEIN, PERCUTANEOUS APPROACH: ICD-10-PCS | Performed by: HOSPITALIST

## 2018-08-24 PROCEDURE — C1880 VENA CAVA FILTER: HCPCS

## 2018-08-24 PROCEDURE — C1892 INTRO/SHEATH,FIXED,PEEL-AWAY: HCPCS

## 2018-08-24 PROCEDURE — 76937 US GUIDE VASCULAR ACCESS: CPT

## 2018-08-24 PROCEDURE — 74011250637 HC RX REV CODE- 250/637: Performed by: FAMILY MEDICINE

## 2018-08-24 PROCEDURE — C1769 GUIDE WIRE: HCPCS

## 2018-08-24 PROCEDURE — 84484 ASSAY OF TROPONIN QUANT: CPT | Performed by: FAMILY MEDICINE

## 2018-08-24 PROCEDURE — 74011250636 HC RX REV CODE- 250/636: Performed by: FAMILY MEDICINE

## 2018-08-24 RX ADMIN — LEVETIRACETAM 500 MG: 500 TABLET ORAL at 19:28

## 2018-08-24 RX ADMIN — GABAPENTIN 300 MG: 600 TABLET, FILM COATED ORAL at 10:14

## 2018-08-24 RX ADMIN — DEXAMETHASONE 2 MG: 1 TABLET ORAL at 10:19

## 2018-08-24 RX ADMIN — HEPARIN SODIUM AND DEXTROSE 11 UNITS/KG/HR: 10000; 5 INJECTION INTRAVENOUS at 15:02

## 2018-08-24 RX ADMIN — ATORVASTATIN CALCIUM 10 MG: 10 TABLET, FILM COATED ORAL at 23:03

## 2018-08-24 RX ADMIN — Medication 5 ML: at 22:00

## 2018-08-24 RX ADMIN — GABAPENTIN 300 MG: 600 TABLET, FILM COATED ORAL at 19:28

## 2018-08-24 RX ADMIN — LEVETIRACETAM 500 MG: 500 TABLET ORAL at 10:14

## 2018-08-24 NOTE — PROGRESS NOTES
Problem: Pulmonary Embolism Care Plan (Adult)  Goal: *Improvement of existing pulmonary embolism  Outcome: Progressing Towards Goal  Continue to assess respiratory status.

## 2018-08-24 NOTE — H&P
[address]   HISTORY AND PHYSICAL      Name:Gissel DORSEY  MR#: [id number]  : 11/10/1930  ACCOUNT #: [de-identified]   ADMIT DATE: 2018    CHIEF COMPLAINT:  Shortness of breath. 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 History of chronic anti-seizure medication. History of status post temporal craniotomy, history of left eye sutured shut. History of acute metabolic encephalopathy, presents to the hospital with the above-mentioned symptoms. Patient also has history of TIA, right bundle branch block. History obtained from the patient, as well as wife. His wife reports that the patient has been having some shortness of breath for the past 2-3 weeks. The wife reports that she was out of town, patient was with his children, and they noticed that the patient was more short of breath, and has been having some increased worse leg edema associated with weight gain. Patient was seen in ED 5 weeks ago, and was started on Lasix and albumin, and patient takes Plavix. Patient reports that he has no chest pain associated with his symptoms. Denies any headache, blurry vision, sore throat, trouble swallowing, trouble with speech. Denies any abdominal pain, constipation, diarrhea, urinary symptoms, focal or generalized neurological weakness, recent travel, sick contacts, any falls, injuries, or any other concerns or problems. Patient denies any hematemesis, melena, hemoptysis, hematuria. Patient had a CT done which showed a PE, and was requested to be admitted under the Hospitalist Service. Patient denies any other complaints or problems. PAST MEDICAL HISTORY:  See above.      HOME MEDICATIONS: Currently, the patient is on Lipitor 10 mg daily, Augmentin, Lasix 40 mg every Monday, Thursday, Saturday. Dexamethasone 2 mg daily, acetaminophen, gabapentin, levetiracetam 500 mg b.i.d., cyanocobalamin, cholecalciferol, bacitracin, Lipitor 20 mg daily. SOCIAL HISTORY:  Former smoker. Used to smoke 1 pack per day for 40 years. Used to drink 21 glasses of wine per week, but is currently not drinking. Denies IV drug use. Lives at home. ALLERGIES:  TO ADHESIVE TAPE, AGGRENOX, AMLODIPINE, DIURIL, AND HYDROCHLOROTHIAZIDE. REVIEW OF SYSTEMS:  All systems were reviewed and found to be essentially negative, except for the symptoms mentioned above. FAMILY HISTORY:  Mother had history of colon cancer. Father had history of throat cancer. PHYSICAL EXAMINATION:  VITAL SIGNS:  Temperature 97.6, pulse 68, respiratory rate 13, blood pressure 110/52, pulse ox 92% on room air. GENERAL:  Alert x3, awake, no acute distress, resting in bed. Pleasant male who appears stated age. HEENT:  Pupil on the right side equal and reactive. Left eye is clinically sutured and shut secondary to previous malignancy of the sinuses. Tympanic membranes clear. NECK:  Supple. LUNGS:  Clear to auscultation bilaterally. CARDIOVASCULAR:  S1, S2 were heard. ABDOMEN:  Soft, nontender, nondistended. Bowel sounds were physiologic. EXTREMITIES:  No clubbing, no cyanosis. 2+ pitting edema. NEUROPSYCHIATRIC:  Pleasant mood and affect. No new focal neurological deficits were noted. SKIN:  Warm. LABORATORY DATA:  White count 9.9, hemoglobin 13, hematocrit 39.4, platelets 435. Urine shows no signs of infection. Sodium 136, potassium 4.6, chloride 93, bicarb 28, anion gap 11, glucose 126, BUN 26, creatinine 1.30, calcium 8.6, bili total 0.5. ALT 48, AST 22, alk., phos. 51.  Troponin level 0.05. CT of the chest shows moderate burden of bilateral pulmonary embolism. No acute infarct. Unchanged old granulomatous disease. Unchanged medial right upper lobe pleural-based atelectasis versus scar. EKG shows sinus rhythm with PVCs, right bundle branch block. ASSESSMENT AND PLAN:  1.  Pulmonary embolism. Patient will be observed on a telemetry bed. Will start patient on heparin drip. Will get a venous duplex of the lower extremities. I will provide oxygen support, continuous pulse ox monitoring. Will also get a 2-D echo and continue to closely monitor. May consider getting further consultations if symptoms persist in the morning. Reassess as needed. 2.  Volume overload. Patient appears to have significant swelling in bilateral lower extremities. Will provide albumin, as patient is hypoalbuminemic, and will provide some Lasix, and hopefully we can diurese, and will continue to closely monitor. Further intervention will be per hospital course. Reassess as needed. Echocardiogram is pending. Strict I's and O's, and daily weights. 3.  History of radiation necrosis of the brain. Continue Decadron and Keppra. Continue to monitor. Patient on seizure precautions. 4.  History of trigeminal neuralgia. Continue home medications, continue to monitor. 5.  Gastrointestinal and deep vein thrombosis prophylaxis. Patient will be on SCDs.       Himanshu Saul MD MM/ALEXANDRA  D: 08/23/2018 20:24     T: 08/23/2018 22:21  JOB #: [id number]

## 2018-08-24 NOTE — PROGRESS NOTES
Care Management Interventions  PCP Verified by CM: Yes  Palliative Care Criteria Met (RRAT>21 & CHF Dx)?: No  Mode of Transport at Discharge: Other (see comment) (Family )  Transition of Care Consult (CM Consult): Discharge Planning  Discharge Durable Medical Equipment: No  Physical Therapy Consult: No  Occupational Therapy Consult: No  Speech Therapy Consult: No  Current Support Network: Assisted Living ([address])  Confirm Follow Up Transport: Self  Plan discussed with Pt/Family/Caregiver: Yes   Resource Information Provided?: No  Discharge Location  Discharge Placement: Unable to determine at this time    Reason for Admission:  Patient was admitted on 8/24 due to shortness of breath. Patient was last admitted to [facility] on 7/16 due to a fall and discharge on back to [facility]. RRAT Score:     16             Do you (patient/family) have any concerns for transition/discharge? No concerns noted               Plan for utilizing home health:     [place] not indicated at this time     Likelihood of readmission? Low            Transition of Care Plan:      TBD pending medical stability and recommendations. Patient has been upgraded to inpatient status. Palliative care was consulted due to fall risk. Lives at [address] assisted living with spouse and requires some assistance. CM will continue to follow.     BRITTNY Lopes, CRM

## 2018-08-24 NOTE — PALLIATIVE CARE
Mr. Vu Bella is an 80year old gentleman with a history significant for HTN, GERD, RBBB, TIA, nasal sinus tumor (2014), s/p XRT, skin cancer, trigeminal neuralgia with residual left face numbness, and brain mass, considered a radiation   necrosis. He was admitted on 8/23 with shortness of breath. CT revealed PE. Patient was admitted here in July with AMS and fall; treated for Right maxillary sinusitis vs recurrent sinus cancer. Patient has an AMD on file which designates his girlfriend, Malini Prather ([phone]) as primary mPOA; and son, Amie Resendez ([phone]) as secondary. He has also designated a decision for DNR. No DDNR on file. He lives at [address] [facility]. Palliative Medicine was consulted to assist with care decisions. Patient at high risk with A/C with fall risk and history. Palliative team will follow up.

## 2018-08-24 NOTE — PHYSICIAN ADVISORY
Letter of Status Determination:   Recommend hospitalization status upgraded from   OBSERVATION  to INPATIENT  Status     Pt Name:  Melanie Leung   MR#   72 [facility] # [id number] /  [id number]  Payor: Cristiano Reyes / Plan: [address] [employer] Mediay / Product Type: Medicare /    PETER#  [id number]   Room and Hospital  351/01  @ [place] [address] hospital   Hospitalization date  8/23/2018  5:23 PM   Current Attending Physician  Con Rivera MD   Principal diagnosis  Pulmonary embolism [facility])      Clinicals  80 y.o. y.o  male hospitalized with above diagnosis   This elderly patient suffers from multiple complex oncological/medical issues as delineated in H&P. He remains on Heparin drip and complex plan of care is being designed. [place] ([facility]) criteria   Does  NOT apply    STATUS DETERMINATION  This patient is at high risk of adverse events and deterioration based on documented clinical data, comorbid conditions and current acute care course. Mr. Melanie Leung is expected to meet Inpatient Admission status criteria in accordance with CMS regulation Section 43 .3. Specifically, due to medical necessity the patient's stay is expected to exceed Two Midnights. It is our recommendation that this patient's hospitalization status should be upgraded from  OBSERVATION to INPATIENT status. The final decision of the patient's hospitalization status depends on the attending physician's judgment. Additional comments     Payor: Cristiano Reyes / Plan: [address] / Product Type: Medicare /         Jimena Ragsdale MD MPH FACP     Physician Advisor    [facility]    [employer]   President Medical Staff, [employer]    Cell  [phone]        [phone]    .

## 2018-08-24 NOTE — PROGRESS NOTES
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 History of chronic anti-seizure medication. History of status post temporal craniotomy, history of left eye sutured shut    Interval history / Subjective:   Vitals stable  Found in deep sleep d/w RN     Assessment & Plan:     1. Pulmonary embolism. - CT reviewed, left DVT on heparin drip get ECHO  - Given multiple co- morbidity he may not be a good candidate for A/C pall care consulted to d/w family GOC    2.  Volume overload. - Albumin support and Lasix  - I and O   - ECHO   3. History of radiation necrosis of the brain. Continue Decadron and Keppra. Continue to monitor. Patient on seizure precautions. 4.  History of trigeminal neuralgia. Continue home medications, continue to monitor.        Code status: DNR   DVT prophylaxis: on heparin drip    Care Plan discussed with: Patient/Family and Nurse  Disposition: TBD     Hospital Problems  Date Reviewed: 2018          Codes Class Noted POA    * (Principal)[facility] ICD-10-CM: I26.99  ICD-9-CM: 415.19  2018 Unknown                Review of Systems:   Review of systems not obtained due to patient factors. Vital Signs:    Last 24hrs VS reviewed since prior progress note.  Most recent are:  Visit Vitals    /83 (BP 1 Location: Left arm, BP Patient Position: At rest;Supine)    Pulse 61    Temp 97.2 °F (36.2 °C)    Resp 16    Ht 5' 9\" (1.753 m)    Wt 99.4 kg (219 lb 1.6 oz)    SpO2 94%    BMI 32.36 kg/m2         Intake/Output Summary (Last 24 hours) at 08/24/18 8192  Last data filed at 08/24/18 0559   Gross per 24 hour   Intake                0 ml   Output             1650 ml   Net            -1650 ml        Physical Examination:             Constitutional:  No acute distress, cooperative, pleasant    ENT:  Oral mucous moist, oropharynx benign. Neck supple,    Resp:  CTA bilaterally. No wheezing/rhonchi/rales. No accessory muscle use   CV:  Regular rhythm, normal rate, no murmurs, gallops, rubs    GI:  Soft, non distended, non tender. normoactive bowel sounds, no hepatosplenomegaly     Musculoskeletal:  No edema, warm, 2+ pulses throughout    Neurologic:  Moves all extremities. Psych:  Good insight, Not anxious nor agitated. Data Review:    I personally reviewed  Image and labs    Cta Chest W Or W Wo Cont    Result Date: 8/23/2018  IMPRESSION: 1. Moderate burden of bilateral pulmonary embolism. No acute infarct at this time. 2. Unchanged old granulomatous disease. 3. Unchanged medial right upper lobe pleural-based atelectasis versus scar. CT Tech accompanied the patient to the emergency department for evaluation and treatment. DVT scan - There is evidence of vein thrombosis of the left lowerextremity, as described above. The ultrasound appearance is more  consistent with an acute than a chronic process.  No evidence of right lower extremity vein thrombosis  Labs:     Recent Labs      08/24/18   0158  08/23/18   1723   WBC  11.0  9.9   HGB  12.8  13.0   HCT  38.5  39.4   PLT  178  199     Recent Labs      08/23/18   1723   NA  133*   K  4.2   CL  94*   CO2  28   BUN  26*   CREA  1.30   GLU  126*   CA  8.6     Recent Labs      08/23/18   1723   SGOT  22   ALT  48   AP  51   TBILI  0.5   TP  6.9   ALB  3.3*   GLOB  3.6     Recent Labs      08/24/18   0158  08/23/18   1913   APTT  >130.0*  26.6      No results for input(s): FE, TIBC, PSAT, FERR in the last 72 hours. Lab Results   Component Value Date/Time    Folate 8.6 11/20/2017 03:03 PM      No results for input(s): PH, PCO2, PO2 in the last 72 hours.   Recent Labs      08/24/18   0158  08/23/18   1723   TROIQ  <0.05  <0.05     Lab Results   Component Value Date/Time    Cholesterol, total 163 07/14/2018 12:42 PM    HDL Cholesterol 89 07/14/2018 12:42 PM    LDL, calculated 59.4 07/14/2018 12:42 PM    Triglyceride 73 07/14/2018 12:42 PM    CHOL/HDL Ratio 1.8 07/14/2018 12:42 PM     Lab Results   Component Value Date/Time    Glucose (POC) 197 (H) 11/25/2017 11:27 AM    Glucose (POC) 128 (H) 11/25/2017 05:36 AM    Glucose (POC) 117 (H) 11/24/2017 11:22 PM    Glucose (POC) 113 (H) 11/24/2017 05:47 PM    Glucose (POC) 198 (H) 11/24/2017 11:57 AM     Lab Results   Component Value Date/Time    Color YELLOW/STRAW 08/23/2018 05:34 PM    Appearance CLEAR 08/23/2018 05:34 PM    Specific gravity 1.005 08/23/2018 05:34 PM    Specific gravity 1.020 07/14/2018 04:09 PM    pH (UA) 7.0 08/23/2018 05:34 PM    Protein NEGATIVE  08/23/2018 05:34 PM    Glucose NEGATIVE  08/23/2018 05:34 PM    Ketone NEGATIVE  08/23/2018 05:34 PM    Bilirubin NEGATIVE  08/23/2018 05:34 PM    Urobilinogen 0.2 08/23/2018 05:34 PM    Nitrites NEGATIVE  08/23/2018 05:34 PM    Leukocyte Esterase NEGATIVE  08/23/2018 05:34 PM    Epithelial cells FEW 07/14/2018 04:09 PM    Bacteria NEGATIVE  07/14/2018 04:09 PM    WBC 0-4 07/14/2018 04:09 PM    RBC 0-5 07/14/2018 04:09 PM         Medications Reviewed:     Current Facility-Administered Medications   Medication Dose Route Frequency    atorvastatin (LIPITOR) tablet 10 mg  10 mg Oral QHS    dexamethasone (DECADRON) tablet 2 mg  2 mg Oral DAILY    gabapentin (NEURONTIN) tablet 300 mg  300 mg Oral BID    levETIRAcetam (KEPPRA) tablet 500 mg  500 mg Oral BID    sodium chloride (NS) flush 5-10 mL  5-10 mL IntraVENous Q8H    sodium chloride (NS) flush 5-10 mL  5-10 mL IntraVENous PRN  acetaminophen (TYLENOL) tablet 650 mg  650 mg Oral Q6H PRN    heparin 25,000 units in D5W 250 ml infusion  14-36 Units/kg/hr IntraVENous TITRATE     ______________________________________________________________________  EXPECTED LENGTH OF STAY: - - -  ACTUAL LENGTH OF STAY:          0                 Iesha Marinelli MD

## 2018-08-24 NOTE — PROGRESS NOTES
Problem: Discharge Planning  Goal: *Discharge to safe environment  Outcome: Progressing Towards Goal  See CM notes   BRITTNY Villegas, CRM

## 2018-08-24 NOTE — PROGRESS NOTES
Problem: Falls - Risk of  Goal: *Absence of Falls  Document Madhu Fall Risk and appropriate interventions in the flowsheet. Outcome: Progressing Towards Goal  Fall Risk Interventions:  Mobility Interventions: Communicate number of staff needed for ambulation/transfer, Patient to call before getting OOB   Pt in room near nursing station. Elimination Interventions:  Toileting schedule/hourly rounds, Call light in reach

## 2018-08-24 NOTE — CONSULTS
PALLIATIVE MEDICINE      Consult noted and appreciated. We will be able to see the patient Monday 8/27/18 if still admitted. Thank you,      Ania Jerez.  [address] MSN, FNP-BC, [facility]

## 2018-08-24 NOTE — PROGRESS NOTES
TRANSFER - OUT REPORT:    Verbal report given to Kelli Bell RN  on [facility]  being transferred to   for routine progression of care       Report consisted of patients Situation, Background, Assessment and   Recommendations(SBAR). Information from the following report(s) SBAR, MAR and Recent Results was reviewed with the receiving nurse. Lines:   Peripheral IV 08/23/18 Left Antecubital (Active)   Site Assessment Clean, dry, & intact 8/24/2018  8:00 AM   Phlebitis Assessment 0 8/24/2018  8:00 AM   Infiltration Assessment 0 8/24/2018  8:00 AM   Dressing Status Clean, dry, & intact 8/24/2018  8:00 AM   Dressing Type Transparent 8/24/2018  8:00 AM   Hub Color/Line Status Pink; Infusing 8/24/2018  8:00 AM   Action Taken Open ports on tubing capped 8/24/2018  8:00 AM   Alcohol Cap Used Yes 8/24/2018  8:00 AM        Opportunity for questions and clarification was provided.       Patient transported with:   Tern

## 2018-08-24 NOTE — PROGRESS NOTES
TRANSFER - IN REPORT:    Verbal report received from Jennifer(name) on Shelton Garcia  being received from ER(unit) for routine progression of care      Report consisted of patients Situation, Background, Assessment and   Recommendations(SBAR). Information from the following report(s) SBAR and Recent Results was reviewed with the receiving nurse. Opportunity for questions and clarification was provided. Assessment completed upon patients arrival to unit and care assumed.

## 2018-08-24 NOTE — PROGRESS NOTES
Spiritual Care Assessment/Progress Note  [address]      NAME: Ariel Crawford      MRN: [id number]  AGE: 80 y.o. SEX: male  Yazidism Affiliation: Sikhism   Language: English     8/24/2018     Total Time (in minutes): 10     Spiritual Assessment begun in [address] through conversation with:         []Patient        [] Family    [x] Friend(s)        Reason for Consult: Palliative Care, Initial/Spiritual Assessment     Spiritual beliefs: (Please include comment if needed)     [x] Identifies with a charissa tradition: Sikhism        [] Supported by a charissa community:            [] Claims no spiritual orientation:           [] Seeking spiritual identity:                [] Adheres to an individual form of spirituality:           [] Not able to assess:                           Identified resources for coping:      [] Prayer                               [] Music                  [] Guided Imagery     [] Family/friends                 [] Pet visits     [] Devotional reading                         [] Unknown     [x] Other: communion                                          Interventions offered during this visit: (See comments for more details)          Family/Friend(s): Initial Assessment, Prayer (assurance of), Yazidism beliefs/image of God discussed     Plan of Care:     [x] Support spiritual and/or cultural needs    [] Support AMD and/or advance care planning process      [] Support grieving process   [] Coordinate Rites and/or Rituals    [] Coordination with community clergy   [] No spiritual needs identified at this time   [] Detailed Plan of Care below (See Comments)  [] Make referral to Music Therapy  [] Make referral to Pet Therapy     [] Make referral to Addiction services  [] Make referral to [facility]  [] Make referral to Spiritual Care Partner  [] No future visits requested        [] Follow up visits as needed     Comments: Visited Mr. Matthew Riddle for initial spiritual assessment.  Pt appeared to be resting; pt's girlfriend and her daughter were present at bedside. Pt's girlfriend shared that pt is Temple and she feels that he would appreciate communion when he is awake (over the next few days). Assured her that pt is on list to receive communion and that Eucharistic ministers will be stopping by regularly to offer communion. No additional needs expressed by family at this time. Assurance of prayers offered.     Beth Gill, Palliative

## 2018-08-24 NOTE — PROCEDURES
[address]  *** FINAL REPORT ***    Name: Johanna Jay  MRN: [id number]    Outpatient  : 10 Nov 1930  HIS Order #: [id number]  [address] [facility] Visit #: [id number]  Date: 23 Aug 2018    TYPE OF TEST: Peripheral Venous Testing    REASON FOR TEST  Limb swelling, Pulmonary Embolism. Positive D-Dimer. Right Leg:-  Deep venous thrombosis:           No  Superficial venous thrombosis:    No  Deep venous insufficiency:        Not examined  Superficial venous insufficiency: Not examined    Left Leg:-  Deep venous thrombosis:           Yes  Proximal extent of thrombus:      Popliteal At The Knee  Superficial venous thrombosis:    No  Deep venous insufficiency:        Not examined  Superficial venous insufficiency: Not examined      INTERPRETATION/FINDINGS  PROCEDURE:  Color duplex ultrasound imaging of lower extremity veins. FINDINGS:       Right:  Suboptimal images due to patient movement. The common  femoral, deep femoral, femoral, popliteal, posterior tibial, peroneal,   and great saphenous are patent and without evidence of thrombus;  each is fully compressible and there is no narrowing of the flow  channel on color Doppler imaging. Phasic flow is observed in the  common femoral vein. Left:  Suboptimal images due to patient movement. Consistent  with thrombosis involving the popliteal veins as demonstrated by vein  non-compressibility, and by a narrowing or occlusion of the flow  channel on color Doppler imaging. The common femoral, deep femoral,  femoral, posterior tibial, peroneal, and great saphenous are patent  and without evidence of thrombus;  each is fully compressible and  there is no narrowing of the flow channel on color Doppler imaging. Phasic flow is observed in the common femoral vein. IMPRESSION:  There is evidence of vein thrombosis of the left lower  extremity, as described above. The ultrasound appearance is more  consistent with an acute than a chronic process.  No evidence of right  lower extremity vein thrombosis. ADDITIONAL COMMENTS   Verbal report of preliminary findings to nurse Kearns by [facility] on date   08/23/18 at time 9:25pm.    I have personally reviewed the data relevant to the interpretation of  this  study.     TECHNOLOGIST: Gabi Arvizu  Signed: 08/23/2018 10:01 PM    PHYSICIAN: Yamile Gomez MD  Signed: 08/24/2018 12:59 PM

## 2018-08-24 NOTE — PROGRESS NOTES
Physical Therapy Screening:    [facility] based on results obtained during the nursing admission assessment. The patients chart was reviewed and the patient is appropriate for a skilled therapy evaluation if there is a decline in functional mobility from baseline. Please order a consult for physical therapy if you are in agreement and would like an evaluation to be completed. Thank you.     Nolan Osler, PT

## 2018-08-25 VITALS
WEIGHT: 221.3 LBS | OXYGEN SATURATION: 94 % | SYSTOLIC BLOOD PRESSURE: 121 MMHG | RESPIRATION RATE: 16 BRPM | TEMPERATURE: 97.6 F | DIASTOLIC BLOOD PRESSURE: 65 MMHG | HEART RATE: 67 BPM | BODY MASS INDEX: 32.78 KG/M2 | HEIGHT: 69 IN

## 2018-08-25 PROBLEM — I26.99 PULMONARY EMBOLISM (HCC): Status: RESOLVED | Noted: 2018-08-23 | Resolved: 2018-08-25

## 2018-08-25 LAB
APTT PPP: 102.8 SEC (ref 22.1–32)
APTT PPP: 34.9 SEC (ref 22.1–32)
APTT PPP: 97.8 SEC (ref 22.1–32)
ATRIAL RATE: 79 BPM
CALCULATED P AXIS, ECG09: 53 DEGREES
CALCULATED R AXIS, ECG10: -8 DEGREES
CALCULATED T AXIS, ECG11: 13 DEGREES
DIAGNOSIS, 93000: NORMAL
P-R INTERVAL, ECG05: 196 MS
Q-T INTERVAL, ECG07: 424 MS
QRS DURATION, ECG06: 122 MS
QTC CALCULATION (BEZET), ECG08: 486 MS
THERAPEUTIC RANGE,PTTT: ABNORMAL SECS (ref 58–77)
VENTRICULAR RATE, ECG03: 79 BPM

## 2018-08-25 PROCEDURE — 85730 THROMBOPLASTIN TIME PARTIAL: CPT | Performed by: FAMILY MEDICINE

## 2018-08-25 PROCEDURE — 74011250637 HC RX REV CODE- 250/637: Performed by: FAMILY MEDICINE

## 2018-08-25 PROCEDURE — 74011250636 HC RX REV CODE- 250/636: Performed by: FAMILY MEDICINE

## 2018-08-25 PROCEDURE — 36415 COLL VENOUS BLD VENIPUNCTURE: CPT | Performed by: FAMILY MEDICINE

## 2018-08-25 RX ADMIN — GABAPENTIN 300 MG: 600 TABLET, FILM COATED ORAL at 09:32

## 2018-08-25 RX ADMIN — DEXAMETHASONE 2 MG: 1 TABLET ORAL at 09:32

## 2018-08-25 RX ADMIN — LEVETIRACETAM 500 MG: 500 TABLET ORAL at 09:32

## 2018-08-25 NOTE — PROGRESS NOTES
Physical Therapy Note     Chart reviewed and discussed with RN. Patient currently reporting he is at his functional baseline with all mobility and was already receiving HHPT at his [facility]. Upon further discussion with patient, significant other, and RN all are in agreement that patient is at his baseline and acute therapy services are not appropriate at this time. PT will complete orders and recommend patient resume HHPT following discharge. Please only reconsult if patient's status declines and patient is no longer at his functional baseline compared to prior level of function.     Rama Matter PT, DPT

## 2018-08-25 NOTE — PROGRESS NOTES
Bedside and Verbal shift change report given to CRUZ Pascal (oncoming nurse) by Casandra Olivas (offgoing nurse). Report included the following information SBAR, Procedure Summary, Intake/Output and MAR.        Heparin rate verified

## 2018-08-25 NOTE — PROGRESS NOTES
2.16pm Jeffrey Gomez advised that they cannot accept patient back until Monday as they do not take admissions on the weekend. There is not a nurse available on weekends. Writer spoke with patients bedside nurse to advise her of the above. Jeffrey Gomez has discussed this her supervisor. Cm will have to follow this patient Monday. India Lazaro, [address]    Spoke with Jeffrey Gomez @ [facility] AT [place] @ [phone]. She states she needed recent notes and MAR faxed to her @ [phone] before saying patient can return today, Discharge summary and [facility] faxed. Writer also inquired about possible need for PT/OT. Ms. Jeffrey Gomez advised that they offer therapies in house. Writer will await a call back .  Grace Regalado, [address]

## 2018-08-25 NOTE — PROGRESS NOTES
Spoke with Dr. Kilo Pedro regarding heparin gtt. Hospitalist will make decision regarding heparin gtt once he rounds on patient. Will restart heparin gtt at 12u/kg/hr per pharmacy.

## 2018-08-25 NOTE — PROGRESS NOTES
Spoke with Marylin Peñaloza at [facility] AT [place]. Advised that they will not accept pt back due to not having an admitting nurse. Marylin Peñaloza stated that the patient could not be accepted back until Monday. Patient updated on situation.

## 2018-08-25 NOTE — PROGRESS NOTES
Multiple attempts made to call back to [facility]. No answer when transfer from . New discharge summary faxed to [phone]. Transmission listed as \"ok\".

## 2018-08-25 NOTE — DISCHARGE SUMMARY
Discharge Summary       PATIENT ID: Thea Simon  MRN: [id number]   YOB: 1930    DATE OF ADMISSION: 8/23/2018  5:23 PM    DATE OF DISCHARGE: 8/25/18   PRIMARY CARE PROVIDER: Lady Daniel MD     ATTENDING PHYSICIAN: Puneet Funes  DISCHARGING PROVIDER: Mohan Silveira MD    To contact this individual call [phone] and ask the  to page. If unavailable ask to be transferred the Adult Hospitalist Department. 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  History of chronic anti-seizure medication.  History of status post temporal craniotomy, history of left eye sutured shut      Assessment & Plan:      1.  Pulmonary embolism.    - CT reviewed, left DVT s/p IVC filter 8/24/18  - Given multiple co- morbidity he may not be a good candidate for A/C   - pall care consulted to d/w family GOC     2.  Volume overload.    - resolved   - ECHO - no rt ventricular starin    3.  History of radiation necrosis of the brain.  Continue Decadron and Keppra.  Continue to monitor.  Patient on seizure precautions. On decadron     4.  History of trigeminal neuralgia.  Continue home medications, continue to monitor.     5. He is not going back under pall care to his assisted living               PENDING TEST RESULTS:   At the time of discharge the following test results are still pending:     FOLLOW UP APPOINTMENTS:    Follow-up Information     Follow up With Details Comments [facility], [place] In 1 week s/p IVC filter Rupesh [id number]  [phone]             ADDITIONAL CARE RECOMMENDATIONS:     DIET: Regular Diet and Cardiac Diet    ACTIVITY: Activity as tolerated    WOUND CARE:     EQUIPMENT needed:       DISCHARGE MEDICATIONS:  Current Discharge Medication List      CONTINUE these medications which have NOT CHANGED    Details   furosemide (LASIX) 40 mg tablet Take 1 Tab by mouth every Monday, Thursday, Saturday. Qty: 20 Tab, Refills: 0      peg 400-propylene glycol (SYSTANE, PROPYLENE GLYCOL,) 0.4-0.3 % drop 1 Drop four (4) times daily. Indications: Both eyes      dexamethasone (DECADRON) 1 mg tablet Take 2 mg by mouth daily. SENNOSIDES (SENNA LAXATIVE PO) Take 8.6 mg by mouth daily. acetaminophen (TYLENOL EXTRA STRENGTH) 500 mg tablet Take 2 Tabs by mouth as needed for Pain. No more than 3grams in 24hour period  Indications: Pain  Qty: 10 Tab, Refills: 0      gabapentin (NEURONTIN) 600 mg tablet Take 0.5 Tabs by mouth two (2) times a day. Indications: trigeminal neuralgia  Qty: 30 Tab, Refills: 0      levETIRAcetam (KEPPRA) 500 mg tablet Take 1 Tab by mouth two (2) times a day. Qty: 60 Tab, Refills: 0      cyanocobalamin (VITAMIN B-12) 1,000 mcg tablet Take 1,000 mcg by mouth daily. cholecalciferol (VITAMIN D3) 1,000 unit cap Take 1,000 Units by mouth daily. docusate sodium (COLACE) 100 mg capsule Take 100 mg by mouth nightly. bacitracin ophthalmic ointment Administer  to left eye daily. Refills: 2      clopidogrel (PLAVIX) 75 mg tablet Take 75 mg by mouth daily. TAKES IN AM      atorvastatin (LIPITOR) 10 mg tablet Take 10 mg by mouth nightly. STOP taking these medications       amoxicillin-clavulanate (AUGMENTIN) 875-125 mg per tablet Comments:   Reason for Stopping:                 NOTIFY YOUR PHYSICIAN FOR ANY OF THE FOLLOWING:   Fever over 101 degrees for 24 hours. Chest pain, shortness of breath, fever, chills, nausea, vomiting, diarrhea, change in mentation, falling, weakness, bleeding. Severe pain or pain not relieved by medications. Or, any other signs or symptoms that you may have questions about. DISPOSITION:    Home With:   OT  PT  HH  RN      x Long term SNF/Inpatient Rehab    Independent/assisted living    Hospice    Other:       PATIENT CONDITION AT DISCHARGE:     Functional status    Poor    x Deconditioned     Independent      Cognition    x Lucid     Forgetful     Dementia      Catheters/lines (plus indication)    Diaz     PICC     PEG    x None      Code status     Full code    xx DNR      PHYSICAL EXAMINATION AT DISCHARGE:     Constitutional:  No acute distress,   ENT:  Oral mucous moist,    Resp:  CTA bilaterally. CV:  Regular rhythm, normal rate,    GI:  Soft, non distended, non tender. bs+     Musculoskeletal:  No edema, warm, 2+ pulses throughout    Neurologic:  Moves all extremities.                                                Psych:  Good insight, Not anxious nor agitated.              CHRONIC MEDICAL DIAGNOSES:  Problem List as of 8/25/2018  Date Reviewed: 8/25/2018          Codes Class Noted - Resolved    Pulmonary emboli ([facility] ICD-10-CM: I26.99  ICD-9-CM: 415.19  8/24/2018 - Present        Leucocytosis ICD-10-CM: D72.829  ICD-9-CM: 288.60  11/25/2017 - Present        Hyponatremia ICD-10-CM: E87.1  ICD-9-CM: 276.1  11/25/2017 - Present    Overview Signed 11/25/2017 10:31 AM by Paul Anthony MD     Acute on chronic             Rhabdomyolysis ICD-10-CM: M93.99  ICD-9-CM: 728.88  11/25/2017 - Present        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 ICD-10-CM: I67.89, Y84.2  ICD-9-CM: 437.8, E879.2  11/25/2017 - Present        Aspiration into airway ICD-10-CM: M68.549K  ICD-9-CM: 934.9  11/25/2017 - Present        Sepsis ([facility] ICD-10-CM: A41.9  ICD-9-CM: 038.9, 995.91  11/25/2017 - Present        Trigeminal neuralgia (Chronic) ICD-10-CM: G50.0  ICD-9-CM: 350.1  11/25/2017 - Present        Brain tumor ([facility] ICD-10-CM: D49.6  ICD-9-CM: 239.6  10/19/2017 - Present Pneumonia ICD-10-CM: J18.9  ICD-9-CM: 364  10/23/2015 - Present        Head and neck cancer ([facility] ICD-10-CM: C76.0  ICD-9-CM: 195.0  10/13/2014 - Present        Cancer of sinus (HCC) ICD-10-CM: C31.9  ICD-9-CM: 160.9  10/13/2014 - Present        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  ICD-9-CM: 715.96  3/13/2012 - Present    Overview Signed 3/13/2012  2:30 PM by Leida Edmonds PA-C     Scheduled for RIGHT UNI vs TKR on 03-14-12             History of TIAs ICD-10-CM: Z86.73  ICD-9-CM: V12.54  3/13/2012 - Present    Overview Addendum 3/13/2012  2:31 PM by Leida Edmonds PA-C     11 in last 7 yrs             Stroke [facility]) ICD-10-CM: I63.9  ICD-9-CM: 434.91  3/13/2012 - Present    Overview Signed 3/13/2012  2:32 PM by Leida Edmonds PA-C     Residual left-sided facial numbness             Hypercholesteremia ICD-10-CM: E78.00  ICD-9-CM: 272.0  3/13/2012 - Present        GERD (gastroesophageal reflux disease) ICD-10-CM: K21.9  ICD-9-CM: 530.81  3/13/2012 - Present        Numbness and tingling in left hand ICD-10-CM: R20.0, R20.2  ICD-9-CM: 782.0  2/25/2012 - Present        TIA (transient ischemic attack) ICD-10-CM: G45.9  ICD-9-CM: 435.9  12/23/2010 - Present        HTN (hypertension) ICD-10-CM: I10  ICD-9-CM: 401.9  12/23/2010 - Present        * (Principal)RESOLVED: Pulmonary embolism ([facility] ICD-10-CM: I26.99  ICD-9-CM: 415.19  8/23/2018 - 8/25/2018        RESOLVED: Altered mental state ICD-10-CM: R41.82  ICD-9-CM: 780.97  7/14/2018 - 7/16/2018        RESOLVED: Acute encephalopathy ICD-10-CM: G93.40  ICD-9-CM: 348.30  11/20/2017 - 11/24/2017              Greater than 30  minutes were spent with the patient on counseling and coordination of care    Signed:   Austin Be MD  8/25/2018  11:59 AM

## 2018-08-25 NOTE — PROGRESS NOTES
Problem: Falls - Risk of  Goal: *Absence of Falls  Document Madhu Fall Risk and appropriate interventions in the flowsheet.    Outcome: Progressing Towards Goal  Fall Risk Interventions:  Mobility Interventions: Communicate number of staff needed for ambulation/transfer, Patient to call before getting OOB, PT Consult for mobility concerns         Medication Interventions: Patient to call before getting OOB, Teach patient to arise slowly    Elimination Interventions: Call light in reach, Patient to call for help with toileting needs, Urinal in reach

## 2018-08-25 NOTE — DISCHARGE INSTRUCTIONS
Discharge Instructions       PATIENT ID: Lidia Velázquez  MRN: [id number]   YOB: 1930    DATE OF ADMISSION: 8/23/2018  5:23 PM    DATE OF DISCHARGE: 8/25/2018    PRIMARY CARE PROVIDER: Dominique Dela Cruz MD     ATTENDING PHYSICIAN: Ritchie Gottlieb MD  DISCHARGING PROVIDER: Ritchie Gottlieb MD    To contact this individual call [phone] and ask the  to page. If unavailable ask to be transferred the Adult Hospitalist Department. DISCHARGE DIAGNOSES DVT with PE    CONSULTATIONS: IP CONSULT TO HOSPITALIST  IP CONSULT TO PALLIATIVE CARE - PROVIDER    PROCEDURES/SURGERIES: * No surgery found *    PENDING TEST RESULTS:   At the time of discharge the following test results are still pending:     FOLLOW UP APPOINTMENTS:   Follow-up Information     Follow up With Details Comments Contact Info    Dominique Dela Cruz MD In 1 week s/p IVC filter [address]  [phone]             ADDITIONAL CARE RECOMMENDATIONS:     DIET: Regular Diet and Cardiac Diet    ACTIVITY: Activity as tolerated    WOUND CARE:     EQUIPMENT needed:       DISCHARGE MEDICATIONS:   See Medication Reconciliation Form    · It is important that you take the medication exactly as they are prescribed. · Keep your medication in the bottles provided by the pharmacist and keep a list of the medication names, dosages, and times to be taken in your wallet. · Do not take other medications without consulting your doctor. NOTIFY YOUR PHYSICIAN FOR ANY OF THE FOLLOWING:   Fever over 101 degrees for 24 hours. Chest pain, shortness of breath, fever, chills, nausea, vomiting, diarrhea, change in mentation, falling, weakness, bleeding. Severe pain or pain not relieved by medications. Or, any other signs or symptoms that you may have questions about.       DISPOSITION:    Home With:   OT  PT  HH  RN       SNF/Inpatient Rehab/LTAC   x Independent/assisted living    Hospice    Other:     CDMP Checked: Yes x     PROBLEM LIST Updated:  Yes x       Signed:   Montez Kaur MD  8/25/2018  11:58 AM      DISCHARGE SUMMARY from Nurse    The discharge information has been reviewed with the patient. The patient verbalized understanding. Discharge medications reviewed with the patient and appropriate educational materials and side effects teaching were provided.

## 2018-08-25 NOTE — PROGRESS NOTES
Problem: Pressure Injury - Risk of  Goal: *Prevention of pressure injury  Document Davy Scale and appropriate interventions in the flowsheet. Outcome: Progressing Towards Goal  Pressure Injury Interventions:             Activity Interventions: Increase time out of bed, Pressure redistribution bed/mattress(bed type)    Mobility Interventions: Pressure redistribution bed/mattress (bed type)    Nutrition Interventions: Document food/fluid/supplement intake, Offer support with meals,snacks and hydration

## 2018-08-25 NOTE — PROGRESS NOTES
Pt returned to floor to a newly assigned RN. Samm Leavitt RN was assigned to take over patient care from [phone]. Patient was off floor in radiology having a procedure to place an IVC filter. Samm Leavitt RN was not provided report by the RN who cared for patient prior to him leaving the floor. Samm Leavitt RN was notified at [phone] that Radiology was calling to give report on the patient. Samm Leavitt RN informed a colleague that report will be received as soon as Samm Leavitt RN ensured a post-operative patient that was received moments before was in stable condition. Samm Leavitt RN was informed that radiology said patient was already on the way back to the unit and report could be received if he wanted by calling Radiology. Concerns were expressed to the charge nurse, who was caring for 5 of her own patients at this time, and also to a nurse leader who was on the floor at the time. Samm Leavitt RN expressed that the patient was at a high-risk of deterioration and the appropriate hand-off of report should have been completed before a transfer of care as there was heparin IV drip running and the patient has multiple DVTs and PEs. Radiology RN arrived on the floor and spoke with Samm Leavitt RN to give report and ensure the patient arrived safely.

## 2018-08-25 NOTE — PROGRESS NOTES
Bedside shift change report given to Kade Sevilla, RN (oncoming nurse) by Odessa Goodpasture, RN (offgoing nurse). Report included the following information SBAR, Kardex, Intake/Output and Recent Results. 0045: Haored Thu stated that per pharmacy and prior RN, current resulting PTT from 2300 will not require any action due to the pt being off heparin gtt for IVC filter placement. Will call to verify with pharmacy once PTT results. Per report, next drawn PTT will require action. 0302: Received critical value for PTT of 97.8. Spoke with Pharmacy, asked to draw another PTT now to confirm value and then act accordingly to adjust drip. Will draw labs at this time. 5: Spoke with pharmacist to address previous rate change from 11unit/kg/hr to 15 unit/kg/hr at 1600. This value should have been the value in which no action was taken. Will hold heparin drip at this time for onee hour and will restart at 12 unit/kg/hr. Will redraw PTT at this time and will change according to results of PTT.    0447: Heparin gtt restarted at 12 units/kg/hr. 0500: Notified by lab of critical .8. Heparin gtt stopped at 0508. Will redraw PTT in 2 hours until < 86 seconds. No signs/symptoms of bleeding at this time. 0825Lisa Blind with pharmacy regarding repeat PTT of 34.9 at 0719. Pt has not been in therapeutic window on heparin gtt. Will call MD to address keeping pt on heparin gtt or switching to Lovenox. If we are to restart heparin gtt, restart at 12 units/kg/hr.

## 2018-12-14 ENCOUNTER — HOSPITAL ENCOUNTER (OUTPATIENT)
Dept: MRI IMAGING | Age: 83
Discharge: HOME | End: 2018-12-14
Attending: NEUROLOGICAL SURGERY
Payer: MEDICARE

## 2018-12-14 VITALS — BODY MASS INDEX: 32.58 KG/M2 | HEIGHT: 69 IN | WEIGHT: 220 LBS

## 2018-12-14 DIAGNOSIS — D43.0 NEOPLASM OF UNCERTAIN BEHAVIOR OF SUPRATENTORIAL REGION OF BRAIN (HCC): ICD-10-CM

## 2018-12-14 PROCEDURE — 70553 MRI BRAIN STEM W/O & W/DYE: CPT

## 2018-12-14 PROCEDURE — 74011250636 HC RX REV CODE- 250/636: Performed by: NEUROLOGICAL SURGERY

## 2018-12-14 PROCEDURE — A9575 INJ GADOTERATE MEGLUMI 0.1ML: HCPCS | Performed by: NEUROLOGICAL SURGERY

## 2018-12-14 RX ORDER — GADOTERATE MEGLUMINE 376.9 MG/ML
20 INJECTION INTRAVENOUS
Status: COMPLETED | OUTPATIENT
Start: 2018-12-14 | End: 2018-12-14

## 2018-12-14 RX ADMIN — GADOTERATE MEGLUMINE 20 ML: 376.9 INJECTION INTRAVENOUS at 14:14

## 2019-02-07 ENCOUNTER — HOSPITAL ENCOUNTER (OUTPATIENT)
Dept: WOUND CARE | Age: 84
Discharge: HOME | End: 2019-02-07
Payer: MEDICARE

## 2019-02-07 VITALS
RESPIRATION RATE: 16 BRPM | TEMPERATURE: 97.4 F | HEART RATE: 79 BPM | SYSTOLIC BLOOD PRESSURE: 119 MMHG | DIASTOLIC BLOOD PRESSURE: 79 MMHG

## 2019-02-07 PROCEDURE — 99215 OFFICE O/P EST HI 40 MIN: CPT

## 2019-02-07 RX ORDER — SPIRONOLACTONE 25 MG/1
25 TABLET ORAL DAILY
COMMUNITY
End: 2019-09-10

## 2019-02-07 NOTE — WOUND CARE
02/07/19 1349 Wound Leg Lower Right; Anterior Date First Assessed/Time First Assessed: 02/07/19 1349   POA: Yes  Wound Type: Venous  Location: Leg Lower  Orientation: Right; Anterior Dressing Type  Open to air Wound Length (cm) 2 cm Wound Width (cm) 1.5 cm Wound Depth (cm) 0.1 Wound Surface area (cm^2) 3 cm^2 Condition of [facility] Condition of [place] Open Drainage Amount  Large Drainage Color Serous Wound Odor None Periwound Skin Condition Erythema, blanchable Cleansing and Cleansing Agents  Normal saline Wound Leg Lower Medial;Right Date First Assessed/Time First Assessed: 02/07/19 1351   POA: Yes  Wound Type: Venous  Location: Leg Lower  Orientation: Medial;Right Dressing Type  Open to air Wound Length (cm) 0.8 cm Wound Width (cm) 0.7 cm Wound Depth (cm) 0.1 Wound Surface area (cm^2) 0.56 cm^2 Condition of [facility] Condition [facility] [place] Open Drainage Amount  Large Drainage Color Serous Wound Odor None Periwound Skin Condition Erythema, blanchable Cleansing and Cleansing Agents  Normal saline Wound Leg Lower Posterior;Right Date First Assessed/Time First Assessed: 02/07/19 1357   POA: Yes  Wound Type: Venous  Location: Leg Lower  Orientation: Posterior;Right Dressing Type  Open to air Wound Length (cm) 1 cm Wound Width (cm) 1 cm Wound Depth (cm) 0.1 Wound Surface area (cm^2) 1 cm^2 Condition of [place]  
Condition of [place] Closed Drainage Amount  Small Drainage Color Serous Wound Odor None Periwound Skin Condition Erythema, blanchable Cleansing and Cleansing Agents  Normal saline Wound Leg Lower Left;Posterior Date First Assessed/Time First Assessed: 02/07/19 1358   POA: Yes  Wound Type: Venous  Location: Leg Lower  Orientation: Left;Posterior Dressing Type  Open to air Wound Length (cm) 1 cm Wound Width (cm) 1 cm Wound Depth (cm) 0.1 Wound Surface area (cm^2) 1 cm^2 Condition of [place] Condition of [place] Closed Drainage Amount  Large Drainage Color Serous Wound Odor None Periwound Skin Condition Erythema, blanchable Cleansing and Cleansing Agents  Normal saline Wound Leg Lower Anterior; Left Date First Assessed/Time First Assessed: 02/07/19 1400   POA: Yes  Wound Type: Venous  Location: Leg Lower  Orientation: Anterior; Left Dressing Type  Open to air Wound Length (cm) 1 cm Wound Width (cm) 1 cm Wound Depth (cm) 0.1 Wound Surface area (cm^2) 1 cm^2 Condition of [facility] Condition of [place] Open Drainage Amount  Large Drainage Color Serous Wound Odor None Periwound Skin Condition Erythema, blanchable Cleansing and Cleansing Agents  Normal saline Wound Leg Lower Lateral;Left Date First Assessed/Time First Assessed: 02/07/19 1401   POA: Yes  Wound Type: Venous  Location: Leg Lower  Orientation: Lateral;Left Dressing Type  Open to air Wound Length (cm) 1.4 cm Wound Width (cm) 1.4 cm Wound Depth (cm) 0.1 Wound Surface area (cm^2) 1.96 cm^2 Condition of [facility] Condition of [place] Open Drainage Amount  Large Drainage Color Serous Wound Odor None Periwound Skin Condition Erythema, blanchable Cleansing and Cleansing Agents  Normal saline Visit Vitals /79 Pulse 79 Temp 97.4 °F (36.3 °C) Resp 16 LLE Peripheral Vascular Capillary Refill: Greater than 3 seconds (02/07/19 1346) Color: Red (02/07/19 1346) Temperature: Warm (02/07/19 1346) Sensation: Present (02/07/19 1346) Pedal Pulse: Palpable (02/07/19 1346) Circumference of Calf (cm): 42 cm (02/07/19 1346) Location of Measurement (Calf): Mid  (02/07/19 1346) Circumference of Ankle (cm): 29.5 cm (02/07/19 1346) Location of Measurement (Ankle): Upper  (02/07/19 1346) RLE Peripheral Vascular Capillary Refill: Greater than 3 seconds (02/07/19 1346) Color: Red (02/07/19 1346) Temperature: Warm (02/07/19 1346) Sensation: Present (02/07/19 1346) Pedal Pulse: Palpable (02/07/19 1346) Circumference of Calf (cm): 40 cm (02/07/19 1346) Location of Measurement (Calf): Mid  (02/07/19 1346) Circumference of Ankle (cm): 28 cm (02/07/19 1346) Location of Measurement (Ankle): Upper  (02/07/19 1346)

## 2019-02-07 NOTE — WOUND CARE
Matt Avalos, CATHY - Lito [facility]ane[facility] 3 - H&P Assessment/Plan: 
 
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 - Patient sent for SONIDO/ PVR and venous duplex - Dressing consisting of aquacell with betadine wet 2 dry DSD applied. 
- Compression achieved with single layer tubigrips. - Patient has a hx of non compliance with compression. Discussed with patient that at some point he will need compression to heel these ulcerations. - F/U 1 week. Subjective: 
Pt complains of wound to left and right leg. States they have been present for 6 months. Wound have not improved and gotten worse. Has tried antibiotic ointment with minimal success. Patient was referred here by PCP for wound care. He comes in with a caregiver who states he lives at [facility] AT [place]. Caregiver states that he is not compliant with compression. He had a DVT 6 months ago and an IVC filter was placed. Negative for fever, chills, nausea, vomiting, chest pain, shortness of breath. History: 
Non Healing Wound Allergies Allergen Reactions  Adhesive Tape-Silicones Other (comments) REDNESS  Aggrenox [Aspirin-Dipyridamole] Other (comments)  
  headache  Amlodipine Rash  Diuril [Chlorothiazide] Unknown (comments)  Hydrochlorothiazide Other (comments) LOW NA Family History Problem Relation Age of Onset  Cancer Mother   
     colon  Cancer Father   
     throat  No Known Problems Brother  Anesth Problems Neg Hx Past Medical History:  
Diagnosis Date  Arthritis  HANDS  
 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   
 pt denies  Nasal sinus tumor  Numbness of LEFT hand & LEFT face   
 RBBB (right bundle branch block)  TIA (transient ischemic attack) 12 TIA's over 9 YRS.1ST ONE IN - LAST IN   Trigeminal neuralgia Past Surgical History:  
Procedure Laterality Date  HX CATARACT REMOVAL Bilateral   
 HX GI    
 COLONOSCOPY  
 HX HEENT    
 BIOPSY OF SINUS   
 HX HEENT Left   
 sew eye closed  HX HERNIA REPAIR Right INGUINAL  
 HX KNEE ARTHROSCOPY Right 2007  HX KNEE REPLACEMENT Right 2012 Total Knee replacement- right  HX ROTATOR CUFF REPAIR Right   
 right rotator cuff repair Social History Tobacco Use  Smoking status: Former Smoker Packs/day: 1.00 Years: 40.00 Pack years: 40.00 Last attempt to quit: 1982 Years since quittin.1  Smokeless tobacco: Never Used  Tobacco comment: Quentin Knapp Substance Use Topics  Alcohol use: No  
  Alcohol/week: 10.5 oz Types: 21 Glasses of wine per week Frequency: Never Social History Substance and Sexual Activity Alcohol Use No  
 Alcohol/week: 10.5 oz  Types: 21 Glasses of wine per week  Frequency: Never Social History Substance and Sexual Activity Drug Use No  
  
Social History Tobacco Use Smoking Status Former Smoker  Packs/day: 1.00  Years: 40.00  Pack years: 40.00  Last attempt to quit: 1982  Years since quittin.1 Smokeless Tobacco Never Used Tobacco Comment Quentin Knapp Current Outpatient Medications Medication Sig  
 spironolactone (ALDACTONE) 25 mg tablet Take 25 mg by mouth daily.  furosemide (LASIX) 40 mg tablet Take 1 Tab by mouth every Monday, Thursday, Saturday.  peg 400-propylene glycol (SYSTANE, PROPYLENE GLYCOL,) 0.4-0.3 % drop 1 Drop four (4) times daily. Indications: Both eyes  dexamethasone (DECADRON) 1 mg tablet Take 2 mg by mouth daily.  SENNOSIDES (SENNA LAXATIVE PO) Take 8.6 mg by mouth daily.  acetaminophen (TYLENOL EXTRA STRENGTH) 500 mg tablet Take 2 Tabs by mouth as needed for Pain. No more than 3grams in 24hour period  Indications: Pain  
 gabapentin (NEURONTIN) 600 mg tablet Take 0.5 Tabs by mouth two (2) times a day. Indications: trigeminal neuralgia  levETIRAcetam (KEPPRA) 500 mg tablet Take 1 Tab by mouth two (2) times a day.  cyanocobalamin (VITAMIN B-12) 1,000 mcg tablet Take 1,000 mcg by mouth daily.  cholecalciferol (VITAMIN D3) 1,000 unit cap Take 1,000 Units by mouth daily.  docusate sodium (COLACE) 100 mg capsule Take 100 mg by mouth nightly.  bacitracin ophthalmic ointment Administer  to left eye daily.  clopidogrel (PLAVIX) 75 mg tablet Take 75 mg by mouth daily. TAKES IN AM  
 atorvastatin (LIPITOR) 10 mg tablet Take 10 mg by mouth nightly. No current facility-administered medications for this encounter. Objective: 
Visit Vitals /79 Pulse 79 Temp 97.4 °F (36.3 °C) Resp 16 Vascular: B/L LE 
DP 1/4; PT 1/4 
capillary fill time brisk, pitting edema is present, skin temperature is cool, varicosities are present. Dermatological: 
 
Wound: 1 Location: left leg x3 Measurements: per RN note Margins: indurated Drainage: serous Odor: none Wound base: 100% fibrotinecrotic Lymphangitic streaking? No. 
Undermining? No. 
Sinus tracts? No. 
Exposed bone? No. 
Subcutaneous crepitation on palpation? No. 
 
ound: 1 Location: right leg x3 Measurements: per RN note Margins: indurated Drainage: serous Odor: none Wound base: 100% fibronecrotic Lymphangitic streaking? No. 
Undermining? No. 
Sinus tracts? No. 
Exposed bone? No. 
Subcutaneous crepitation on palpation?  No. 
 
 Nails are thickened, elongated, discolored, painful to palpation, 3mm thick, with subungual debris. There are extensive skin cracks at both heels. Skin is dry and scaly, exhibits hemosiderin deposition. There is no maceration of the interspaces of the feet b/l. Lesions are present consisting of hyperkeratosis at left 4th interspace Neurological: DTR are present, protective sensation per 5.07 [place] Tamir monofilament is diminished, patient is AAOx3, mood is normal. Epicritic sensation is intact. Orthopedic: B/L LE are symmetric, ROM of ankle, STJ, 1st MTPJ is limited, MMT 5 out of 5 for B/L LE. There has been no amputations Constitutional: Pt is a well developed, elderly average male Lymphatics: negative tenderness to palpation of neck/axillary/inguinal nodes.

## 2019-02-07 NOTE — WOUND CARE
02/07/19 1416 Wound Leg Lower Right; Anterior Date First Assessed/Time First Assessed: 02/07/19 1349   POA: Yes  Wound Type: Venous  Location: Leg Lower  Orientation: Right; Anterior Dressing Type Applied Silver products; Alginate;ABD pad;Gauze;Gauze wrap (jenna); Compression dressing (Single tubi F) Wound Leg Lower Medial;Right Date First Assessed/Time First Assessed: 02/07/19 1351   POA: Yes  Wound Type: Venous  Location: Leg Lower  Orientation: Medial;Right Dressing Type Applied Silver products; Alginate;ABD pad;Gauze wrap (jenna); Compression dressing Wound Leg Lower Posterior;Right Date First Assessed/Time First Assessed: 02/07/19 1357   POA: Yes  Wound Type: Venous  Location: Leg Lower  Orientation: Posterior;Right Dressing Type Applied Silver products; Alginate;ABD pad;Gauze wrap (jenna); Compression dressing Wound Leg Lower Left;Posterior Date First Assessed/Time First Assessed: 02/07/19 1358   POA: Yes  Wound Type: Venous  Location: Leg Lower  Orientation: Left;Posterior Dressing Type Applied Silver products; Alginate;ABD pad;Gauze wrap (jenna); Compression dressing Wound Leg Lower Anterior; Left Date First Assessed/Time First Assessed: 02/07/19 1400   POA: Yes  Wound Type: Venous  Location: Leg Lower  Orientation: Anterior; Left Dressing Type Applied Silver products; Alginate;ABD pad;Gauze wrap (jenna) Wound Leg Lower Lateral;Left Date First Assessed/Time First Assessed: 02/07/19 1401   POA: Yes  Wound Type: Venous  Location: Leg Lower  Orientation: Lateral;Left Dressing Type Applied Silver products; Alginate;ABD pad Patient was discharged with family to care facility and was with walker. In stable condition with c/o pain:_0_/10_

## 2019-02-14 ENCOUNTER — HOSPITAL ENCOUNTER (OUTPATIENT)
Dept: WOUND CARE | Age: 84
Discharge: HOME | End: 2019-02-14
Payer: MEDICARE

## 2019-02-14 VITALS
RESPIRATION RATE: 16 BRPM | HEART RATE: 76 BPM | TEMPERATURE: 98 F | SYSTOLIC BLOOD PRESSURE: 133 MMHG | DIASTOLIC BLOOD PRESSURE: 84 MMHG

## 2019-02-14 PROCEDURE — 74011000250 HC RX REV CODE- 250: Performed by: PODIATRIST

## 2019-02-14 PROCEDURE — 99215 OFFICE O/P EST HI 40 MIN: CPT

## 2019-02-14 RX ORDER — LIDOCAINE HYDROCHLORIDE 40 MG/ML
SOLUTION TOPICAL AS NEEDED
Status: DISCONTINUED | OUTPATIENT
Start: 2019-02-14 | End: 2019-02-18 | Stop reason: HOSPADM

## 2019-02-14 RX ADMIN — LIDOCAINE HYDROCHLORIDE: 40 SOLUTION TOPICAL at 14:09

## 2019-02-14 NOTE — WOUND CARE
Evelyn Marie DPM - Lito Pagan 3 - Follow up Assessment/Plan: 
 
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 - SONIDO/ PVR - scheduled for tomorrow - Dressing consisting of aquacell with betadine wet 2 dry DSD applied. 
- Compression achieved with single layer tubigrips. - F/U 1 week. Subjective: 
Patient comes in for follow up to B/L LE ulcerations. States vascular appointment scheduled for tomorrow. Negative for fever, chills, nausea, vomiting, chest pain, shortness of breath. HPI: 
Pt complains of wound to left and right leg. States they have been present for 6 months. Wound have not improved and gotten worse. Has tried antibiotic ointment with minimal success. Patient was referred here by PCP for wound care. He comes in with a caregiver who states he lives at [facility] AT [place]. Caregiver states that he is not compliant with compression. He had a DVT 6 months ago and an IVC filter was placed. History: 
Non Healing Wound Allergies Allergen Reactions  Adhesive Tape-Silicones Other (comments) REDNESS  Aggrenox [Aspirin-Dipyridamole] Other (comments)  
  headache  Amlodipine Rash  Diuril [Chlorothiazide] Unknown (comments)  Hydrochlorothiazide Other (comments) LOW NA Family History Problem Relation Age of Onset  Cancer Mother   
     colon  Cancer Father   
     throat  No Known Problems Brother  Anesth Problems Neg Hx Past Medical History:  
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   
 pt denies  Nasal sinus tumor  Numbness of LEFT hand & LEFT face   
 RBBB (right bundle branch block)  TIA (transient ischemic attack) 12 TIA's over 9 YRS.1ST ONE IN - LAST IN   Trigeminal neuralgia Past Surgical History:  
Procedure Laterality Date  HX CATARACT REMOVAL Bilateral   
 HX GI    
 COLONOSCOPY  
 HX HEENT    
 BIOPSY OF SINUS   
 HX HEENT Left   
 sew eye closed  HX HERNIA REPAIR Right INGUINAL  
 HX KNEE ARTHROSCOPY Right 2007  HX KNEE REPLACEMENT Right 2012 Total Knee replacement- right  HX ROTATOR CUFF REPAIR Right   
 right rotator cuff repair Social History Tobacco Use  Smoking status: Former Smoker Packs/day: 1.00 Years: 40.00 Pack years: 40.00 Last attempt to quit: 1982 Years since quittin.1  Smokeless tobacco: Never Used  Tobacco comment: Dorian Walker Substance Use Topics  Alcohol use: No  
  Alcohol/week: 10.5 oz Types: 21 Glasses of wine per week Frequency: Never Social History Substance and Sexual Activity Alcohol Use No  
 Alcohol/week: 10.5 oz  Types: 21 Glasses of wine per week  Frequency: Never Social History Substance and Sexual Activity Drug Use No  
  
Social History Tobacco Use Smoking Status Former Smoker  Packs/day: 1.00  Years: 40.00  Pack years: 40.00  Last attempt to quit: 1982  Years since quittin.1 Smokeless Tobacco Never Used Tobacco Comment Dorian Walker Current Outpatient Medications Medication Sig  
 spironolactone (ALDACTONE) 25 mg tablet Take 25 mg by mouth daily.  furosemide (LASIX) 40 mg tablet Take 1 Tab by mouth every Monday, Thursday, Saturday.  peg 400-propylene glycol (SYSTANE, PROPYLENE GLYCOL,) 0.4-0.3 % drop 1 Drop four (4) times daily. Indications: Both eyes  dexamethasone (DECADRON) 1 mg tablet Take 2 mg by mouth daily.  SENNOSIDES (SENNA LAXATIVE PO) Take 8.6 mg by mouth daily.  acetaminophen (TYLENOL EXTRA STRENGTH) 500 mg tablet Take 2 Tabs by mouth as needed for Pain. No more than 3grams in 24hour period  Indications: Pain  
 gabapentin (NEURONTIN) 600 mg tablet Take 0.5 Tabs by mouth two (2) times a day. Indications: trigeminal neuralgia  levETIRAcetam (KEPPRA) 500 mg tablet Take 1 Tab by mouth two (2) times a day.  cyanocobalamin (VITAMIN B-12) 1,000 mcg tablet Take 1,000 mcg by mouth daily.  cholecalciferol (VITAMIN D3) 1,000 unit cap Take 1,000 Units by mouth daily.  docusate sodium (COLACE) 100 mg capsule Take 100 mg by mouth nightly.  bacitracin ophthalmic ointment Administer  to left eye daily.  clopidogrel (PLAVIX) 75 mg tablet Take 75 mg by mouth daily. TAKES IN AM  
 atorvastatin (LIPITOR) 10 mg tablet Take 10 mg by mouth nightly. Current Facility-Administered Medications Medication Dose Route Frequency  lidocaine (XYLOCAINE) 4 % (40 mg/mL) topical solution   Topical PRN Objective: 
Visit Vitals /84 Pulse 76 Temp 98 °F (36.7 °C) Resp 16 Vascular: B/L LE 
DP 1/4; PT 1/4 
capillary fill time brisk, pitting edema is present, skin temperature is cool, varicosities are present. Dermatological: 
 
Wound: 1 Location: left leg x3 Measurements: per RN note Margins: indurated Drainage: serous Odor: none Wound base: 100% fibrotinecrotic Lymphangitic streaking? No. 
Undermining? No. 
Sinus tracts? No. 
Exposed bone? No. 
Subcutaneous crepitation on palpation? No. 
 
ound: 1 Location: right leg x3 Measurements: per RN note Margins: indurated Drainage: serous Odor: none Wound base: 100% fibronecrotic Lymphangitic streaking? No. 
Undermining? No. 
Sinus tracts?  No. 
 Exposed bone? No. 
Subcutaneous crepitation on palpation? No. 
 
Nails are thickened, elongated, discolored, painful to palpation, 3mm thick, with subungual debris. There are extensive skin cracks at both heels. Skin is dry and scaly, exhibits hemosiderin deposition. There is no maceration of the interspaces of the feet b/l. Lesions are present consisting of hyperkeratosis at left 4th interspace Neurological: DTR are present, protective sensation per 5.07 [place] Tamir monofilament is diminished, patient is AAOx3, mood is normal. Epicritic sensation is intact. Orthopedic: B/L LE are symmetric, ROM of ankle, STJ, 1st MTPJ is limited, MMT 5 out of 5 for B/L LE. There has been no amputations Constitutional: Pt is a well developed, elderly average male Lymphatics: negative tenderness to palpation of neck/axillary/inguinal nodes.

## 2019-02-14 NOTE — WOUND CARE
02/14/19 1400 Wound Leg Lower Right; Anterior Date First Assessed/Time First Assessed: 02/07/19 1349   POA: Yes  Wound Type: Venous  Location: Leg Lower  Orientation: Right; Anterior Dressing Type  Aquacel;ABD pad;Gauze;Gauze wrap (jenna) 
(to all wound bed,TUBI F BLE) Wound Length (cm) 1.7 cm Wound Width (cm) 1.5 cm Wound Depth (cm) 0.2 Wound Surface area (cm^2) 2.55 cm^2 Change in Wound Size % 15 Condition of [facility] Condition of [place] Open Drainage Amount  Large Drainage Color Serous Wound Odor None Periwound Skin Condition Erythema, blanchable Cleansing and Cleansing Agents  Normal saline Wound Leg Lower Medial;Right Date First Assessed/Time First Assessed: 02/07/19 1351   POA: Yes  Wound Type: Venous  Location: Leg Lower  Orientation: Medial;Right Dressing Type  Aquacel Wound Length (cm) 0.7 cm Wound Width (cm) 0.6 cm Wound Depth (cm) 0.1 Wound Surface area (cm^2) 0.42 cm^2 Change in Wound Size % 25 Condition of [facility] Condition of [place] Open Drainage Amount  Large Drainage Color Serous Wound Odor None Periwound Skin Condition Erythema, blanchable Cleansing and Cleansing Agents  Normal saline Wound Leg Lower Posterior;Right Date First Assessed/Time First Assessed: 02/07/19 1357   POA: Yes  Wound Type: Venous  Location: Leg Lower  Orientation: Posterior;Right Wound Length (cm) 1.2 cm Wound Width (cm) 1.2 cm Wound Depth (cm) 0.2 Wound Surface area (cm^2) 1.44 cm^2 Change in Wound Size % -44 Condition of [facility] Condition of [place] Open Drainage Amount  Large Drainage Color Serous Wound Odor None Periwound Skin Condition Erythema, blanchable Cleansing and Cleansing Agents  Normal saline Wound Leg Lower Left;Posterior Date First Assessed/Time First Assessed: 02/07/19 1358   POA: Yes  Wound Type: Venous  Location: Leg Lower  Orientation: Left;Posterior Dressing Type  Aquacel;Gauze;Gauze wrap (jenna);ABD pad;Special tape (comment) Wound Length (cm) 1.5 cm Wound Width (cm) 1.1 cm Wound Depth (cm) 0.2 Wound Surface area (cm^2) 1.65 cm^2 Change in Wound Size % -65 Condition of [facility] Condition of Edges Open Drainage Amount  Large Drainage Color Serous Wound Odor None Periwound Skin Condition Erythema, blanchable Cleansing and Cleansing Agents  Normal saline Wound Leg Lower Anterior; Left Date First Assessed/Time First Assessed: 02/07/19 1400   POA: Yes  Wound Type: Venous  Location: Leg Lower  Orientation: Anterior; Left Dressing Type  Aquacel Wound Length (cm) 0.6 cm Wound Width (cm) 0.6 cm Wound Depth (cm) 0.1 Wound Surface area (cm^2) 0.36 cm^2 Change in Wound Size % 64 Condition of [facility] Condition of [place] Open Drainage Amount  Large Drainage Color Serous Wound Odor None Periwound Skin Condition Erythema, blanchable Cleansing and Cleansing Agents  Normal saline Wound Leg Lower Lateral;Left Date First Assessed/Time First Assessed: 02/07/19 1401   POA: Yes  Wound Type: Venous  Location: Leg Lower  Orientation: Lateral;Left Dressing Type  Aquacel Wound Length (cm) 1.2 cm Wound Width (cm) 1.2 cm Wound Depth (cm) 0.2 Wound Surface area (cm^2) 1.44 cm^2 Change in Wound Size % 26.53 Condition of [facility] Condition of [place] Open Drainage Amount  Large Drainage Color Serous Wound Odor None Periwound Skin Condition Erythema, blanchable Cleansing and Cleansing Agents  Normal saline Visit Vitals /84 Pulse 76 Temp 98 °F (36.7 °C) Resp 16 LLE Peripheral Vascular Capillary Refill: Greater than 3 seconds (02/14/19 1345) Color: Red (02/14/19 1345) Temperature: Warm (02/14/19 1345) Sensation: Present (02/14/19 1345) Pedal Pulse: Palpable (02/14/19 1345) Circumference of Calf (cm): 41.5 cm (02/14/19 1345) Location of Measurement (Calf): Mid  (02/14/19 1345) Circumference of Ankle (cm): 30 cm (02/14/19 1345) Location of Measurement (Ankle): Upper  (02/14/19 1345) RLE Peripheral Vascular Capillary Refill: Greater than 3 seconds (02/14/19 1345) Color: Red (02/14/19 1345) Temperature: Warm (02/14/19 1345) Sensation: Present (02/14/19 1345) Pedal Pulse: Palpable (02/14/19 1345) Circumference of Calf (cm): 41 cm (02/14/19 1345) Location of Measurement (Calf): Mid  (02/14/19 1345) Circumference of Ankle (cm): 28.5 cm (02/14/19 1345) Location of Measurement (Ankle): Upper  (02/14/19 1345)

## 2019-02-21 ENCOUNTER — HOSPITAL ENCOUNTER (OUTPATIENT)
Dept: WOUND CARE | Age: 84
Discharge: HOME | End: 2019-02-21
Payer: MEDICARE

## 2019-02-21 VITALS
DIASTOLIC BLOOD PRESSURE: 74 MMHG | HEART RATE: 86 BPM | RESPIRATION RATE: 16 BRPM | TEMPERATURE: 97.7 F | SYSTOLIC BLOOD PRESSURE: 115 MMHG

## 2019-02-21 PROCEDURE — 99215 OFFICE O/P EST HI 40 MIN: CPT

## 2019-02-21 NOTE — WOUND CARE
Tulio Hickey, CATHY - April Lito Swanson 3 - Follow up Assessment/Plan: 
 
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 - SONIDO/ PVR - revewied - no significant vascular disease - Venous duplex- No DVT noted - Rx for Clinda 300mg  TID and Cipro 500mg BID x2 week written for patient. Discussed with care giver if no improvement seen with PO abx recommend patient to be taken to the hospital for IV abx. 
- Wounds painted with gentian violet - Dressing consisting of aquacell with betadine wet 2 dry DSD applied. 
- Compression achieved with single layer tubigrips. - F/U 1 week. Subjective: 
Patient comes in for follow up to B/L LE ulcerations. Caretaker states patient took a shower with bandages and wet bandages were on his leg for 24hours. Negative for fever, chills, nausea, vomiting, chest pain, shortness of breath. HPI: 
Pt complains of wound to left and right leg. States they have been present for 6 months. Wound have not improved and gotten worse. Has tried antibiotic ointment with minimal success. Patient was referred here by PCP for wound care. He comes in with a caregiver who states he lives at [facility] AT [place]. Caregiver states that he is not compliant with compression. He had a DVT 6 months ago and an IVC filter was placed. History: 
Non Healing Wound Allergies Allergen Reactions  Adhesive Tape-Silicones Other (comments) REDNESS  Aggrenox [Aspirin-Dipyridamole] Other (comments)  
  headache  Amlodipine Rash  Diuril [Chlorothiazide] Unknown (comments)  Hydrochlorothiazide Other (comments) LOW NA Family History Problem Relation Age of Onset  Cancer Mother   
     colon  Cancer Father   
     throat  No Known Problems Brother  Anesth Problems Neg Hx Past Medical History:  
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   
 pt denies  Nasal sinus tumor  Numbness of LEFT hand & LEFT face   
 RBBB (right bundle branch block)  TIA (transient ischemic attack) 12 TIA's over 9 YRS.1ST ONE IN - LAST IN   Trigeminal neuralgia Past Surgical History:  
Procedure Laterality Date  HX CATARACT REMOVAL Bilateral   
 HX GI    
 COLONOSCOPY  
 HX HEENT    
 BIOPSY OF SINUS   
 HX HEENT Left   
 sew eye closed  HX HERNIA REPAIR Right INGUINAL  
 HX KNEE ARTHROSCOPY Right 2007  HX KNEE REPLACEMENT Right 2012 Total Knee replacement- right  HX ROTATOR CUFF REPAIR Right   
 right rotator cuff repair Social History Tobacco Use  Smoking status: Former Smoker Packs/day: 1.00 Years: 40.00 Pack years: 40.00 Last attempt to quit: 1982 Years since quittin.1  Smokeless tobacco: Never Used  Tobacco comment: Selena Sandoval Substance Use Topics  Alcohol use: No  
  Alcohol/week: 10.5 oz Types: 21 Glasses of wine per week Frequency: Never Social History Substance and Sexual Activity Alcohol Use No  
 Alcohol/week: 10.5 oz  Types: 21 Glasses of wine per week  Frequency: Never Social History Substance and Sexual Activity Drug Use No  
  
Social History Tobacco Use Smoking Status Former Smoker  Packs/day: 1.00  Years: 40.00  Pack years: 40.00  Last attempt to quit: 1982  Years since quittin.1 Smokeless Tobacco Never Used Tobacco Comment [place] Current Outpatient Medications Medication Sig  
 spironolactone (ALDACTONE) 25 mg tablet Take 25 mg by mouth daily.  furosemide (LASIX) 40 mg tablet Take 1 Tab by mouth every Monday, Thursday, Saturday.  peg 400-propylene glycol (SYSTANE, PROPYLENE GLYCOL,) 0.4-0.3 % drop 1 Drop four (4) times daily. Indications: Both eyes  dexamethasone (DECADRON) 1 mg tablet Take 2 mg by mouth daily.  SENNOSIDES (SENNA LAXATIVE PO) Take 8.6 mg by mouth daily.  acetaminophen (TYLENOL EXTRA STRENGTH) 500 mg tablet Take 2 Tabs by mouth as needed for Pain. No more than 3grams in 24hour period  Indications: Pain  
 gabapentin (NEURONTIN) 600 mg tablet Take 0.5 Tabs by mouth two (2) times a day. Indications: trigeminal neuralgia  levETIRAcetam (KEPPRA) 500 mg tablet Take 1 Tab by mouth two (2) times a day.  cyanocobalamin (VITAMIN B-12) 1,000 mcg tablet Take 1,000 mcg by mouth daily.  cholecalciferol (VITAMIN D3) 1,000 unit cap Take 1,000 Units by mouth daily.  docusate sodium (COLACE) 100 mg capsule Take 100 mg by mouth nightly.  bacitracin ophthalmic ointment Administer  to left eye daily.  clopidogrel (PLAVIX) 75 mg tablet Take 75 mg by mouth daily. TAKES IN AM  
 atorvastatin (LIPITOR) 10 mg tablet Take 10 mg by mouth nightly. No current facility-administered medications for this encounter. Objective: 
Visit Vitals /74 (BP 1 Location: Right arm, BP Patient Position: Sitting) Pulse 86 Temp 97.7 °F (36.5 °C) Resp 16 Vascular: B/L LE 
DP 1/4; PT 1/4 
capillary fill time brisk, pitting edema is present, skin temperature is cool, varicosities are present. Dermatological: 
 
Left limb erythematous and edematous with possible dorsal blister. Wound: 1 Location: left leg x3 Measurements: per RN note Margins: indurated Drainage: serous Odor: none Wound base: 100% fibrotic Lymphangitic streaking? No. 
Undermining? No. 
Sinus tracts? No. 
Exposed bone? No. 
Subcutaneous crepitation on palpation? No. 
 
ound: 1 Location: right leg x3 Measurements: per RN note Margins: indurated Drainage: serous Odor: none Wound base: 100% fibrotic Lymphangitic streaking? No. 
Undermining? No. 
Sinus tracts? No. 
Exposed bone? No. 
Subcutaneous crepitation on palpation? No. 
 
Nails are thickened, elongated, discolored, painful to palpation, 3mm thick, with subungual debris. There are extensive skin cracks at both heels. Skin is dry and scaly, exhibits hemosiderin deposition. There is no maceration of the interspaces of the feet b/l. Lesions are present consisting of hyperkeratosis at left 4th interspace Neurological: DTR are present, protective sensation per 5.07 [place] Tamir monofilament is diminished, patient is AAOx3, mood is normal. Epicritic sensation is intact. Orthopedic: B/L LE are symmetric, ROM of ankle, STJ, 1st MTPJ is limited, MMT 5 out of 5 for B/L LE. There has been no amputations Constitutional: Pt is a well developed, elderly average male Lymphatics: negative tenderness to palpation of neck/axillary/inguinal nodes.

## 2019-02-21 NOTE — WOUND CARE
02/21/19 1313 Wound Leg Lower Right; Anterior Date First Assessed/Time First Assessed: 02/07/19 1349   POA: Yes  Wound Type: Venous  Location: Leg Lower  Orientation: Right; Anterior Dressing Status  Saturated;Removed Dressing Type  4 x 4;Gauze;Gauze wrap (jenna) Wound Length (cm) 2 cm Wound Width (cm) 2 cm Wound Depth (cm) 0.4 Wound Surface area (cm^2) 4 cm^2 Change in Wound Size % -33.33 Condition of [facility] Condition of Edges Open Drainage Amount  Large Drainage Color Serous Wound Odor None Periwound Skin Condition Erythema, blanchable Cleansing and Cleansing Agents  Normal saline Wound Leg Lower Medial;Right Date First Assessed/Time First Assessed: 02/07/19 1351   POA: Yes  Wound Type: Venous  Location: Leg Lower  Orientation: Medial;Right Dressing Status  Saturated;Removed Dressing Type  Gauze;Gauze wrap (jenna) Wound Length (cm) 1 cm Wound Width (cm) 0.6 cm Wound Depth (cm) 0.5 Wound Surface area (cm^2) 0.6 cm^2 Change in Wound Size % -7.14 Condition of [facility] Condition of [place] Open Drainage Amount  Large Drainage Color Serous Wound Odor None Periwound Skin Condition Erythema, blanchable Cleansing and Cleansing Agents  Normal saline Wound Leg Lower Posterior;Right Date First Assessed/Time First Assessed: 02/07/19 1357   POA: Yes  Wound Type: Venous  Location: Leg Lower  Orientation: Posterior;Right Dressing Status  Saturated;Removed Dressing Type  Gauze;Gauze wrap (jenna) Wound Length (cm) 1.6 cm Wound Width (cm) 0.5 cm Wound Depth (cm) 0.2 Wound Surface area (cm^2) 0.8 cm^2 Change in Wound Size % 20 Condition of [facility] Condition of [place] Open Drainage Amount  Large Drainage Color Serous Wound Odor None Periwound Skin Condition Erythema, blanchable Cleansing and Cleansing Agents  Normal saline Wound Leg Lower Left;Posterior Date First Assessed/Time First Assessed: 02/07/19 1358   POA: Yes  Wound Type: Venous  Location: Leg Lower  Orientation: Left;Posterior Dressing Status  Saturated;Removed Dressing Type  Gauze;Gauze wrap (jenna) Wound Length (cm) 1.4 cm Wound Width (cm) 1 cm Wound Depth (cm) 0.2 Wound Surface area (cm^2) 1.4 cm^2 Change in Wound Size % -40 Condition of [facility] Condition of Edges Open Drainage Amount  Large Drainage Color Serous Wound Odor None Periwound Skin Condition Erythema, blanchable Cleansing and Cleansing Agents  Normal saline Wound Leg Lower Anterior; Left Date First Assessed/Time First Assessed: 02/07/19 1400   POA: Yes  Wound Type: Venous  Location: Leg Lower  Orientation: Anterior; Left Dressing Status  Saturated;Removed Dressing Type  Gauze;Gauze wrap (jenna) Wound Length (cm) 0.5 cm Wound Width (cm) 0.5 cm Wound Depth (cm) 0.1 Wound Surface area (cm^2) 0.25 cm^2 Change in Wound Size % 75 Condition of [facility] Condition of [place] Open Wound Leg Lower Lateral;Left Date First Assessed/Time First Assessed: 02/07/19 1401   POA: Yes  Wound Type: Venous  Location: Leg Lower  Orientation: Lateral;Left Dressing Status  Saturated;Removed Dressing Type  Gauze;Gauze wrap (jenna) Wound Length (cm) 1.4 cm Wound Width (cm) 1.2 cm Wound Depth (cm) 0.2 Wound Surface area (cm^2) 1.68 cm^2 Change in Wound Size % 14.29 Condition of [facility] Condition of [place] Open Drainage Amount  Large Drainage Color Serous Wound Odor None Periwound Skin Condition Erythema, blanchable Cleansing and Cleansing Agents  Normal saline Visit Vitals /74 (BP 1 Location: Right arm, BP Patient Position: Sitting) Pulse 86 Temp 97.7 °F (36.5 °C) Resp 16

## 2019-02-28 ENCOUNTER — HOSPITAL ENCOUNTER (OUTPATIENT)
Dept: WOUND CARE | Age: 84
Discharge: HOME | End: 2019-02-28
Payer: MEDICARE

## 2019-02-28 VITALS
SYSTOLIC BLOOD PRESSURE: 143 MMHG | RESPIRATION RATE: 16 BRPM | TEMPERATURE: 97.3 F | DIASTOLIC BLOOD PRESSURE: 84 MMHG | HEART RATE: 76 BPM

## 2019-02-28 PROCEDURE — 74011000250 HC RX REV CODE- 250: Performed by: PODIATRIST

## 2019-02-28 PROCEDURE — 29581 APPL MULTLAYER CMPRN SYS LEG: CPT

## 2019-02-28 RX ADMIN — Medication 5 ML: at 14:00

## 2019-02-28 NOTE — WOUND CARE
Liz Sal DPM - Lito Herrera 3 - Follow up Assessment/Plan: 
 
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 - SONIDO/ PVR - revewied - no significant vascular disease - Venous duplex- No DVT noted 
- continue abx as prescribed-  Rx for Clinda 300mg  TID and Cipro 500mg BID x2 week written for patient. - B/L wounds painted with gentian violet - Dressing consisting of aquacell to right leg and 3 layer compression - Compression to LLE achieved with single layer tubigrips. - F/U 1 week. Subjective: 
Patient comes in for follow up to B/L LE ulcerations. Caretaker legs improving. HPI: 
Pt complains of wound to left and right leg. States they have been present for 6 months. Wound have not improved and gotten worse. Has tried antibiotic ointment with minimal success. Patient was referred here by PCP for wound care. He comes in with a caregiver who states he lives at [facility] AT [place]. Caregiver states that he is not compliant with compression. He had a DVT 6 months ago and an IVC filter was placed. History: 
Non Healing Wound Allergies Allergen Reactions  Adhesive Tape-Silicones Other (comments) REDNESS  Aggrenox [Aspirin-Dipyridamole] Other (comments)  
  headache  Amlodipine Rash  Diuril [Chlorothiazide] Unknown (comments)  Hydrochlorothiazide Other (comments) LOW NA Family History Problem Relation Age of Onset  Cancer Mother   
     colon  Cancer Father   
     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   
 pt denies  Nasal sinus tumor  Numbness of LEFT hand & LEFT face   
 RBBB (right bundle branch block)  TIA (transient ischemic attack) 12 TIA's over 9 YRS.1ST ONE IN - LAST IN   Trigeminal neuralgia Past Surgical History:  
Procedure Laterality Date  HX CATARACT REMOVAL Bilateral   
 HX GI    
 COLONOSCOPY  
 HX HEENT    
 BIOPSY OF SINUS   
 HX HEENT Left   
 sew eye closed  HX HERNIA REPAIR Right INGUINAL  
 HX KNEE ARTHROSCOPY Right 2007  HX KNEE REPLACEMENT Right 2012 Total Knee replacement- right  HX ROTATOR CUFF REPAIR Right   
 right rotator cuff repair Social History Tobacco Use  Smoking status: Former Smoker Packs/day: 1.00 Years: 40.00 Pack years: 40.00 Last attempt to quit: 1982 Years since quittin.1  Smokeless tobacco: Never Used  Tobacco comment: Kayli Chu Substance Use Topics  Alcohol use: No  
  Alcohol/week: 10.5 oz Types: 21 Glasses of wine per week Frequency: Never Social History Substance and Sexual Activity Alcohol Use No  
 Alcohol/week: 10.5 oz  Types: 21 Glasses of wine per week  Frequency: Never Social History Substance and Sexual Activity Drug Use No  
  
Social History Tobacco Use Smoking Status Former Smoker  Packs/day: 1.00  Years: 40.00  Pack years: 40.00  Last attempt to quit: 1982  Years since quittin.1 Smokeless Tobacco Never Used Tobacco Comment Kayli Chu Current Outpatient Medications Medication Sig  
 spironolactone (ALDACTONE) 25 mg tablet Take 25 mg by mouth daily.  furosemide (LASIX) 40 mg tablet Take 1 Tab by mouth every Monday, Thursday, Saturday.  peg 400-propylene glycol (SYSTANE, PROPYLENE GLYCOL,) 0.4-0.3 % drop 1 Drop four (4) times daily. Indications: Both eyes  dexamethasone (DECADRON) 1 mg tablet Take 2 mg by mouth daily.  SENNOSIDES (SENNA LAXATIVE PO) Take 8.6 mg by mouth daily.  acetaminophen (TYLENOL EXTRA STRENGTH) 500 mg tablet Take 2 Tabs by mouth as needed for Pain. No more than 3grams in 24hour period  Indications: Pain  
 gabapentin (NEURONTIN) 600 mg tablet Take 0.5 Tabs by mouth two (2) times a day. Indications: trigeminal neuralgia  levETIRAcetam (KEPPRA) 500 mg tablet Take 1 Tab by mouth two (2) times a day.  cyanocobalamin (VITAMIN B-12) 1,000 mcg tablet Take 1,000 mcg by mouth daily.  cholecalciferol (VITAMIN D3) 1,000 unit cap Take 1,000 Units by mouth daily.  docusate sodium (COLACE) 100 mg capsule Take 100 mg by mouth nightly.  bacitracin ophthalmic ointment Administer  to left eye daily.  clopidogrel (PLAVIX) 75 mg tablet Take 75 mg by mouth daily. TAKES IN AM  
 atorvastatin (LIPITOR) 10 mg tablet Take 10 mg by mouth nightly. Current Facility-Administered Medications Medication Dose Route Frequency  [COMPLETED] lidocaine (ALOCANE) 4 % topical gel   Topical NOW Objective: 
Visit Vitals /84 (BP 1 Location: Right arm, BP Patient Position: Sitting) Pulse 76 Temp 97.3 °F (36.3 °C) Resp 16 Vascular: B/L LE 
DP 1/4; PT 1/4 
capillary fill time brisk, pitting edema is present, skin temperature is cool, varicosities are present. Dermatological: 
 
Left limb erythematous and edematous Wound: 1 Location: left leg x3 Measurements: per RN note Margins: indurated Drainage: serous Odor: none Wound base: 100% fibrotic Lymphangitic streaking? No. 
Undermining? No. 
Sinus tracts? No. 
Exposed bone? No. 
Subcutaneous crepitation on palpation? No. 
 
ound: 1 Location: right leg x3 
 Measurements: per RN note Margins: indurated Drainage: serous Odor: none Wound base: 100% fibrotic Lymphangitic streaking? No. 
Undermining? No. 
Sinus tracts? No. 
Exposed bone? No. 
Subcutaneous crepitation on palpation? No. 
 
Nails are thickened, elongated, discolored, painful to palpation, 3mm thick, with subungual debris. There are extensive skin cracks at both heels. Skin is dry and scaly, exhibits hemosiderin deposition. There is no maceration of the interspaces of the feet b/l. Lesions are present consisting of hyperkeratosis at left 4th interspace Neurological: DTR are present, protective sensation per 5.07 [place] Tamir monofilament is diminished, patient is AAOx3, mood is normal. Epicritic sensation is intact. Orthopedic: B/L LE are symmetric, ROM of ankle, STJ, 1st MTPJ is limited, MMT 5 out of 5 for B/L LE. There has been no amputations Constitutional: Pt is a well developed, elderly average male Lymphatics: negative tenderness to palpation of neck/axillary/inguinal nodes.

## 2019-02-28 NOTE — WOUND CARE
02/28/19 1339 Wound Leg Lower Right; Anterior Date First Assessed/Time First Assessed: 02/07/19 1349   POA: Yes  Wound Type: Venous  Location: Leg Lower  Orientation: Right; Anterior Dressing Status  Saturated Wound Length (cm) 1.9 cm Wound Width (cm) 1.6 cm Wound Depth (cm) 0.2 Wound Surface area (cm^2) 3.04 cm^2 Change in Wound Size % -1.33 Condition of [facility] Wound Leg Lower Medial;Right Date First Assessed/Time First Assessed: 02/07/19 1351   POA: Yes  Wound Type: Venous  Location: Leg Lower  Orientation: Medial;Right Dressing Status  Saturated Wound Length (cm) 0.7 cm Wound Width (cm) 0.5 cm Wound Depth (cm) 0.4 Wound Surface area (cm^2) 0.35 cm^2 Change in Wound Size % 37.5 Condition of [facility] Wound Leg Lower Posterior;Right Date First Assessed/Time First Assessed: 02/07/19 1357   POA: Yes  Wound Type: Venous  Location: Leg Lower  Orientation: Posterior;Right Dressing Status  Saturated Wound Length (cm) 10.5 cm Wound Width (cm) 1.2 cm Wound Depth (cm) 0.3 Wound Surface area (cm^2) 12.6 cm^2 Change in Wound Size % -1160 Condition of [facility] Wound Leg Lower Left;Posterior Date First Assessed/Time First Assessed: 02/07/19 1358   POA: Yes  Wound Type: Venous  Location: Leg Lower  Orientation: Left;Posterior Dressing Status  Saturated Wound Length (cm) 1.2 cm Wound Width (cm) 1 cm Wound Depth (cm) 0.2 Wound Surface area (cm^2) 1.2 cm^2 Change in Wound Size % -20 Condition of [facility] Wound Leg Lower Anterior; Left Date First Assessed/Time First Assessed: 02/07/19 1400   POA: Yes  Wound Type: Venous  Location: Leg Lower  Orientation: Anterior; Left Dressing Status  Saturated Wound Length (cm) 7.5 cm Wound Width (cm) 0.7 cm Wound Depth (cm) 0.1 Wound Surface area (cm^2) 5.25 cm^2 Change in Wound Size % -425 Condition of [facility] Wound Leg Lower Lateral;Left  
 Date First Assessed/Time First Assessed: 02/07/19 1401   POA: Yes  Wound Type: Venous  Location: Leg Lower  Orientation: Lateral;Left Dressing Status  Saturated Wound Length (cm) 1.6 cm Wound Width (cm) 1.8 cm Wound Depth (cm) 0.3 Wound Surface area (cm^2) 2.88 cm^2 Change in Wound Size % -46.94 Condition of [facility] H[facility] Visit Vitals /84 (BP 1 Location: Right arm, BP Patient Position: Sitting) Pulse 76 Temp 97.3 °F (36.3 °C) Resp 16

## 2019-02-28 NOTE — WOUND CARE
Discharge Condition:Stable Ambulatory Status:[place] Discharge Destination:Home Transportation: Private Auto Accompanied by: Family

## 2019-03-07 ENCOUNTER — HOSPITAL ENCOUNTER (OUTPATIENT)
Dept: WOUND CARE | Age: 84
Discharge: HOME | End: 2019-03-07
Payer: MEDICARE

## 2019-03-07 VITALS — DIASTOLIC BLOOD PRESSURE: 72 MMHG | TEMPERATURE: 97.2 F | SYSTOLIC BLOOD PRESSURE: 131 MMHG | RESPIRATION RATE: 14 BRPM

## 2019-03-07 PROCEDURE — 11042 DBRDMT SUBQ TIS 1ST 20SQCM/<: CPT

## 2019-03-07 NOTE — WOUND CARE
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 - Wounds improving  - Cellulitis improving  - SONIDO/ PVR - revewied - no significant vascular disease  - Venous duplex- No DVT noted  - B/L lower legs painted with gentian violet  - Dressing consisting of aquacell to B/L LE applied with B/L 3 layer compression   - F/U 1 week. Subjective:  Patient comes in for follow up to B/L LE ulcerations. Caretaker states legs are improving. Negative for fevers chills, nausea, vomitting, SOB    HPI:  Pt complains of wound to left and right leg. States they have been present for 6 months. Wound have not improved and gotten worse. Has tried antibiotic ointment with minimal success. Patient was referred here by PCP for wound care. He comes in with a caregiver who states he lives at [facility] AT [place]. Caregiver states that he is not compliant with compression. He had a DVT 6 months ago and an IVC filter was placed.      History:  Wound Care  Allergies   Allergen Reactions    Adhesive Tape-Silicones Other (comments)     REDNESS    Aggrenox [Aspirin-Dipyridamole] Other (comments)     headache    Amlodipine Rash    Diuril [Chlorothiazide] Unknown (comments)    Hydrochlorothiazide Other (comments)     LOW NA     Family History   Problem Relation Age of Onset    Cancer Mother         colon    Cancer Father         throat    No Known Problems Brother     Anesth Problems Neg Hx       Past Medical History:   Diagnosis Date    Arthritis     HANDS    Beta-blocker therapy     Cancer ([facility] [address] 2013    MAXILLARY SINUS CANCER, RADIATION AND CHEMO    [facility]     SKIN CA ON NOSE    GERD (gastroesophageal reflux disease)     Hypercholesterolemia     Hypertension     pt denies    Nasal sinus tumor     Numbness of LEFT hand & LEFT face     RBBB (right bundle branch block)     TIA (transient ischemic attack)     12 TIA's over 9 YRS.1ST ONE IN - LAST IN       Trigeminal neuralgia      Past Surgical History:   Procedure Laterality Date    HX CATARACT REMOVAL Bilateral     HX GI      COLONOSCOPY    HX HEENT      BIOPSY OF SINUS     HX HEENT Left     sew eye closed    HX HERNIA REPAIR Right     INGUINAL    HX KNEE ARTHROSCOPY Right 2007    HX KNEE REPLACEMENT Right 2012    Total Knee replacement- right    HX ROTATOR CUFF REPAIR Right     right rotator cuff repair     Social History     Tobacco Use    Smoking status: Former Smoker     Packs/day: 1.00     Years: 40.00     Pack years: 40.00     Last attempt to quit: 1982     Years since quittin.2    Smokeless tobacco: Never Used    Tobacco comment: OCCAS CIGAR   Substance Use Topics    Alcohol use: No     Alcohol/week: 10.5 oz     Types: 21 Glasses of wine per week     Frequency: Never       Social History     Substance and Sexual Activity   Alcohol Use No    Alcohol/week: 10.5 oz    Types: 21 Glasses of wine per week    Frequency: Never     Social History     Substance and Sexual Activity   Drug Use No      Social History     Tobacco Use   Smoking Status Former Smoker    Packs/day: 1.00    Years: 40.00    Pack years: 40.00    Last attempt to quit: 1982    Years since quittin.2   Smokeless Tobacco Never Used   Tobacco Comment    OCCAS CIGAR     Current Outpatient Medications   Medication Sig    spironolactone (ALDACTONE) 25 mg tablet Take 25 mg by mouth daily.     furosemide (LASIX) 40 mg tablet Take 1 Tab by mouth every Monday, Thursday, Saturday.  peg 400-propylene glycol (SYSTANE, PROPYLENE GLYCOL,) 0.4-0.3 % drop 1 Drop four (4) times daily. Indications: Both eyes    dexamethasone (DECADRON) 1 mg tablet Take 2 mg by mouth daily.  SENNOSIDES (SENNA LAXATIVE PO) Take 8.6 mg by mouth daily.  acetaminophen (TYLENOL EXTRA STRENGTH) 500 mg tablet Take 2 Tabs by mouth as needed for Pain. No more than 3grams in 24hour period  Indications: Pain    gabapentin (NEURONTIN) 600 mg tablet Take 0.5 Tabs by mouth two (2) times a day. Indications: trigeminal neuralgia    levETIRAcetam (KEPPRA) 500 mg tablet Take 1 Tab by mouth two (2) times a day.  cyanocobalamin (VITAMIN B-12) 1,000 mcg tablet Take 1,000 mcg by mouth daily.  cholecalciferol (VITAMIN D3) 1,000 unit cap Take 1,000 Units by mouth daily.  docusate sodium (COLACE) 100 mg capsule Take 100 mg by mouth nightly.  bacitracin ophthalmic ointment Administer  to left eye daily.  clopidogrel (PLAVIX) 75 mg tablet Take 75 mg by mouth daily. TAKES IN AM    atorvastatin (LIPITOR) 10 mg tablet Take 10 mg by mouth nightly. Current Facility-Administered Medications   Medication Dose Route Frequency    lidocaine (ALOCANE) 4 % topical gel   Topical NOW        Objective:  Visit Vitals  /72 (BP 1 Location: Right arm, BP Patient Position: Sitting)   Temp 97.2 °F (36.2 °C)   Resp 14       Vascular:  B/L LE  DP 1/4; PT 1/4  capillary fill time brisk, pitting edema is present, skin temperature is cool, varicosities are present. Dermatological:    Left limb erythematous and edematous     Wound: 1  Location: left leg x3  Measurements: per RN note  Margins: indurated  Drainage: serous   Odor: none  Wound base: 100% fibrotic  Lymphangitic streaking? No.  Undermining? No.  Sinus tracts? No.  Exposed bone? No.  Subcutaneous crepitation on palpation?  No.    ound: 1  Location: right leg x3  Measurements: per RN note  Margins: indurated   Drainage: serous  Odor: none  Wound base: 100% fibrotic  Lymphangitic streaking? No.  Undermining? No.  Sinus tracts? No.  Exposed bone? No.  Subcutaneous crepitation on palpation? No.    Nails are thickened, elongated, discolored, painful to palpation, 3mm thick, with subungual debris. There are extensive skin cracks at both heels. Skin is dry and scaly, exhibits hemosiderin deposition. There is no maceration of the interspaces of the feet b/l. Lesions are present consisting of hyperkeratosis at left 4th interspace      Neurological:  DTR are present, protective sensation per 5.07 [place] Tamir monofilament is diminished, patient is AAOx3, mood is normal. Epicritic sensation is intact. Orthopedic:  B/L LE are symmetric, ROM of ankle, STJ, 1st MTPJ is limited, MMT 5 out of 5 for B/L LE. There has been no amputations    Constitutional: Pt is a well developed, elderly average male    Lymphatics: negative tenderness to palpation of neck/axillary/inguinal nodes. Procedure Note:  Excisional debridement through level of fat. Location / Ulcer:  B/L Lower leg  Indication: to remove non-viable tissue from wound bed. Consent in chart. Anesthesia:  2% lidocaine gel  Instrument: sneha  Residual necrosis: none  Bleeding: minimal  Hemostasis: pressure   Pre-Procedure Pain: 0  Post-Procedure Pain: 0  Area debrided = 22 cm sq. Pre-Debridement measurements: see nursing notes  Post-Debridement measurements: see nursing notes  This is part of a series of staged procedures in an attempt at limb salvage.

## 2019-03-07 NOTE — WOUND CARE
03/07/19 1359   Wound Leg Lower Lateral;Left   Date First Assessed/Time First Assessed: 02/07/19 1401   POA: Yes  Wound Type: Venous  Location: Leg Lower  Orientation: Lateral;Left   Dressing Status  Removed   Dressing Type  Gauze;Gauze wrap (jenna)  (ACE wrap)   Wound Length (cm) 1.5 cm   Wound Width (cm) 1 cm   Wound Depth (cm) 0.4   Wound Surface area (cm^2) 1.5 cm^2   Change in Wound Size % 23.47   Condition of Base Slough   Drainage Amount  Small    Drainage Color Serosanguinous   Wound Odor None   Periwound Skin Condition Intact   Wound Leg Lower Anterior; Left   Date First Assessed/Time First Assessed: 02/07/19 1400   POA: Yes  Wound Type: Venous  Location: Leg Lower  Orientation: Anterior; Left   Dressing Status  Removed   Dressing Type  ABD pad;Gauze;Gauze wrap (jenna)   Wound Length (cm) 7 cm   Wound Width (cm) 1 cm   Wound Depth (cm) 0.2   Wound Surface area (cm^2) 7 cm^2   Change in Wound Size % -600   Condition of Base Slough   Depth of Undermining (cm) `   Drainage Amount  Moderate   Drainage Color Serosanguinous   Wound Odor None   Periwound Skin Condition Intact   Cleansing and Cleansing Agents  Soap and water   Wound Leg Lower Left;Posterior   Date First Assessed/Time First Assessed: 02/07/19 1358   POA: Yes  Wound Type: Venous  Location: Leg Lower  Orientation: Left;Posterior   Dressing Status  Removed   Dressing Type  ABD pad;Gauze;Gauze wrap (jenna)   Wound Length (cm) 1.1 cm   Wound Width (cm) 0.9 cm   Wound Depth (cm) 0.1   Wound Surface area (cm^2) 0.99 cm^2   Change in Wound Size % 1   Condition of Base Slough   Drainage Amount  Small    Drainage Color Serosanguinous   Wound Odor None   Periwound Skin Condition Intact   Cleansing and Cleansing Agents  Soap and water   Wound Leg Lower Right; Anterior   Date First Assessed/Time First Assessed: 02/07/19 1349   POA: Yes  Wound Type: Venous  Location: Leg Lower  Orientation: Right; Anterior   Dressing Status  Removed   Dressing Type  ABD pad Wound Length (cm) 1.8 cm   Wound Width (cm) 1.2 cm   Wound Depth (cm) 0.2   Wound Surface area (cm^2) 2.16 cm^2   Change in Wound Size % 28   Condition of Base Slough   Drainage Amount  Small    Drainage Color Serosanguinous   Wound Odor None   Periwound Skin Condition Intact   Cleansing and Cleansing Agents  Soap and water   Wound Leg Lower Posterior;Right   Date First Assessed/Time First Assessed: 02/07/19 1357   POA: Yes  Wound Type: Venous  Location: Leg Lower  Orientation: Posterior;Right   Dressing Status  Removed   Dressing Type  ABD pad;Gauze;Gauze wrap (jenna)   Wound Length (cm) 10 cm   Wound Width (cm) 1 cm   Wound Depth (cm) 0.2   Wound Surface area (cm^2) 10 cm^2   Change in Wound Size % -900   Condition of Base Slough   Wound Leg Lower Medial;Right   Date First Assessed/Time First Assessed: 02/07/19 1351   POA: Yes  Wound Type: Venous  Location: Leg Lower  Orientation: Medial;Right   Wound Length (cm) 0.7 cm   Wound Width (cm) 0.4 cm   Wound Depth (cm) 0.2   Wound Surface area (cm^2) 0.28 cm^2   Change in Wound Size % 50   Condition of Base Slough     Visit Vitals  /72 (BP 1 Location: Right arm, BP Patient Position: Sitting)   Temp 97.2 °F (36.2 °C)   Resp 14

## 2019-03-07 NOTE — WOUND CARE
Discharge Condition:stable   Ambulatory Status: with walker  Discharge Destination:nursing home  Transportation: personal car  Accompanied by: daughter and friend

## 2019-03-14 ENCOUNTER — HOSPITAL ENCOUNTER (OUTPATIENT)
Dept: WOUND CARE | Age: 84
Discharge: HOME | End: 2019-03-14
Payer: MEDICARE

## 2019-03-14 VITALS — SYSTOLIC BLOOD PRESSURE: 123 MMHG | DIASTOLIC BLOOD PRESSURE: 80 MMHG | RESPIRATION RATE: 18 BRPM | TEMPERATURE: 97.7 F

## 2019-03-14 PROCEDURE — 11042 DBRDMT SUBQ TIS 1ST 20SQCM/<: CPT

## 2019-03-14 PROCEDURE — 74011000250 HC RX REV CODE- 250: Performed by: PODIATRIST

## 2019-03-14 RX ADMIN — Medication: at 14:44

## 2019-03-14 NOTE — WOUND CARE
Discharge Condition:Stable  Ambulatory Status:Walker   Discharge Destination:Facility   Transportation: Private Auto   Accompanied by: Family

## 2019-03-14 NOTE — WOUND CARE
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 - Wounds improving  - SONIDO/ PVR - revewied - no significant vascular disease  - Venous duplex- No DVT noted  - B/L lower legs painted with gentian violet  - Dressing consisting of aquacell to B/L LE applied with B/L 3 layer compression   - F/U 1 week. Subjective:  Patient comes in for follow up to B/L LE ulcerations. Caretaker states legs are improving. Negative for fevers chills, nausea, vomitting, SOB    HPI:  Pt complains of wound to left and right leg. States they have been present for 6 months. Wound have not improved and gotten worse. Has tried antibiotic ointment with minimal success. Patient was referred here by PCP for wound care. He comes in with a caregiver who states he lives at [facility] AT [place]. Caregiver states that he is not compliant with compression. He had a DVT 6 months ago and an IVC filter was placed.      History:  wound care  Allergies   Allergen Reactions    Adhesive Tape-Silicones Other (comments)     REDNESS    Aggrenox [Aspirin-Dipyridamole] Other (comments)     headache    Amlodipine Rash    Diuril [Chlorothiazide] Unknown (comments)    Hydrochlorothiazide Other (comments)     LOW NA     Family History   Problem Relation Age of Onset    Cancer Mother         colon    Cancer Father         throat    No Known Problems Brother     Anesth Problems Neg Hx       Past Medical History:   Diagnosis Date    Arthritis     HANDS    Beta-blocker therapy     Cancer ([facility] 2013    MAXILLARY SINUS CANCER, RADIATION AND CHEMO    Cancer ([facility]     SKIN CA ON NOSE    GERD (gastroesophageal reflux disease)     Hypercholesterolemia     Hypertension     pt denies    Nasal sinus tumor     Numbness of LEFT hand & LEFT face     RBBB (right bundle branch block)     TIA (transient ischemic attack)     12 TIA's over 9 YRS.1ST ONE IN - LAST IN       Trigeminal neuralgia      Past Surgical History:   Procedure Laterality Date    HX CATARACT REMOVAL Bilateral     HX GI      COLONOSCOPY    HX HEENT      BIOPSY OF SINUS     HX HEENT Left     sew eye closed    HX HERNIA REPAIR Right     INGUINAL    HX KNEE ARTHROSCOPY Right 2007    HX KNEE REPLACEMENT Right 2012    Total Knee replacement- right    HX ROTATOR CUFF REPAIR Right     right rotator cuff repair     Social History     Tobacco Use    Smoking status: Former Smoker     Packs/day: 1.00     Years: 40.00     Pack years: 40.00     Last attempt to quit: 1982     Years since quittin.2    Smokeless tobacco: Never Used    Tobacco comment: OCCAS CIGAR   Substance Use Topics    Alcohol use: No     Alcohol/week: 10.5 oz     Types: 21 Glasses of wine per week     Frequency: Never       Social History     Substance and Sexual Activity   Alcohol Use No    Alcohol/week: 10.5 oz    Types: 21 Glasses of wine per week    Frequency: Never     Social History     Substance and Sexual Activity   Drug Use No      Social History     Tobacco Use   Smoking Status Former Smoker    Packs/day: 1.00    Years: 40.00    Pack years: 40.00    Last attempt to quit: 1982    Years since quittin.2   Smokeless Tobacco Never Used   Tobacco Comment    OCCAS CIGAR     Current Outpatient Medications   Medication Sig    spironolactone (ALDACTONE) 25 mg tablet Take 25 mg by mouth daily.  furosemide (LASIX) 40 mg tablet Take 1 Tab by mouth every Monday, Thursday, Saturday.     peg 400-propylene glycol (SYSTANE, PROPYLENE GLYCOL,) 0.4-0.3 % drop 1 Drop four (4) times daily. Indications: Both eyes    dexamethasone (DECADRON) 1 mg tablet Take 2 mg by mouth daily.  SENNOSIDES (SENNA LAXATIVE PO) Take 8.6 mg by mouth daily.  acetaminophen (TYLENOL EXTRA STRENGTH) 500 mg tablet Take 2 Tabs by mouth as needed for Pain. No more than 3grams in 24hour period  Indications: Pain    gabapentin (NEURONTIN) 600 mg tablet Take 0.5 Tabs by mouth two (2) times a day. Indications: trigeminal neuralgia    levETIRAcetam (KEPPRA) 500 mg tablet Take 1 Tab by mouth two (2) times a day.  cyanocobalamin (VITAMIN B-12) 1,000 mcg tablet Take 1,000 mcg by mouth daily.  cholecalciferol (VITAMIN D3) 1,000 unit cap Take 1,000 Units by mouth daily.  docusate sodium (COLACE) 100 mg capsule Take 100 mg by mouth nightly.  bacitracin ophthalmic ointment Administer  to left eye daily.  clopidogrel (PLAVIX) 75 mg tablet Take 75 mg by mouth daily. TAKES IN AM    atorvastatin (LIPITOR) 10 mg tablet Take 10 mg by mouth nightly. Current Facility-Administered Medications   Medication Dose Route Frequency    lidocaine (ALOCANE) 4 % topical gel   Topical PRN        Objective:  Visit Vitals  /80 (BP 1 Location: Left arm, BP Patient Position: Supine)   Temp 97.7 °F (36.5 °C)   Resp 18       Vascular:  B/L LE  DP 1/4; PT 1/4  capillary fill time brisk, pitting edema is present, skin temperature is cool, varicosities are present. Dermatological:    Left limb erythematous and edematous     Wound: 1  Location: left leg x3  Measurements: per RN note  Margins: indurated  Drainage: serous   Odor: none  Wound base: 100% fibrotic  Lymphangitic streaking? No.  Undermining? No.  Sinus tracts? No.  Exposed bone? No.  Subcutaneous crepitation on palpation? No.    ound: 1  Location: right leg x4  Measurements: per RN note  Margins: indurated   Drainage: serous  Odor: none  Wound base: 100% fibrotic  Lymphangitic streaking? No.  Undermining? No.  Sinus tracts? No.  Exposed bone? No.  Subcutaneous crepitation on palpation? No.    Nails are thickened, elongated, discolored, painful to palpation, 3mm thick, with subungual debris. There are extensive skin cracks at both heels. Skin is dry and scaly, exhibits hemosiderin deposition. There is no maceration of the interspaces of the feet b/l. Lesions are present consisting of hyperkeratosis at left 4th interspace      Neurological:  DTR are present, protective sensation per 5.07 [place] Tamir monofilament is diminished, patient is AAOx3, mood is normal. Epicritic sensation is intact. Orthopedic:  B/L LE are symmetric, ROM of ankle, STJ, 1st MTPJ is limited, MMT 5 out of 5 for B/L LE. There has been no amputations    Constitutional: Pt is a well developed, elderly average male    Lymphatics: negative tenderness to palpation of neck/axillary/inguinal nodes. Procedure Note:  Excisional debridement through level of fat. Location / Ulcer:  B/L Lower leg  Indication: to remove non-viable tissue from wound bed. Consent in chart. Anesthesia:  2% lidocaine gel  Instrument: sneha  Residual necrosis: none  Bleeding: minimal  Hemostasis: pressure   Pre-Procedure Pain: 0  Post-Procedure Pain: 0  Area debrided = 12.46 cm sq. Pre-Debridement measurements: see nursing notes  Post-Debridement measurements: see nursing notes  This is part of a series of staged procedures in an attempt at limb salvage.

## 2019-03-14 NOTE — WOUND CARE
Visit Vitals  /80 (BP 1 Location: Left arm, BP Patient Position: Supine)   Temp 97.7 °F (36.5 °C)   Resp 18      03/14/19 1428   Wound Leg Lower Right; Anterior   Date First Assessed/Time First Assessed: 02/07/19 1349   POA: Yes  Wound Type: Venous  Location: Leg Lower  Orientation: Right; Anterior   Dressing Status  Removed   Dressing Type  Compression Wrap/Venous Stasis   Wound Length (cm) 0.8 cm   Wound Width (cm) 0.7 cm   Wound Depth (cm) 0.2   Wound Surface area (cm^2) 0.56 cm^2   Change in Wound Size % 81.33   Condition of Base Slough   Drainage Amount  Moderate   Drainage Color Serosanguinous   Wound Odor None   Periwound Skin Condition Intact   Cleansing and Cleansing Agents  Soap and water   Wound Leg Lower Left;Posterior   Date First Assessed/Time First Assessed: 02/07/19 1358   POA: Yes  Wound Type: Venous  Location: Leg Lower  Orientation: Left;Posterior   Dressing Status  Removed   Dressing Type  Compression Wrap/Venous Stasis   Wound Length (cm) 1 cm   Wound Width (cm) 0.8 cm   Wound Depth (cm) 0.3   Wound Surface area (cm^2) 0.8 cm^2   Change in Wound Size % 20   Condition of Base Slough   Drainage Amount  Moderate   Drainage Color Serosanguinous   Wound Odor None   Periwound Skin Condition Indurated   Cleansing and Cleansing Agents  Soap and water   Wound Leg Lower Lateral;Left   Date First Assessed/Time First Assessed: 02/07/19 1401   POA: Yes  Wound Type: Venous  Location: Leg Lower  Orientation: Lateral;Left   Dressing Status  Removed   Dressing Type  Compression Wrap/Venous Stasis   Wound Length (cm) 1.5 cm   Wound Width (cm) 1.2 cm   Wound Depth (cm) 0.4   Wound Surface area (cm^2) 1.8 cm^2   Change in Wound Size % 8.16   Condition of Base Slough   Drainage Amount  Moderate   Drainage Color Serosanguinous   Wound Odor None   Periwound Skin Condition Intact   Wound Leg Lower Anterior; Left   Date First Assessed/Time First Assessed: 02/07/19 1400   POA: Yes  Wound Type: Venous  Location: Leg Lower  Orientation: Anterior; Left   Dressing Status  Removed   Dressing Type  Compression Wrap/Venous Stasis   Wound Length (cm) 7 cm   Wound Width (cm) 1 cm   Wound Depth (cm) 0.2   Wound Surface area (cm^2) 7 cm^2   Change in Wound Size % -600   Condition of Base Slough   Drainage Amount  Moderate   Drainage Color Serosanguinous   Wound Odor None   Periwound Skin Condition Intact   Cleansing and Cleansing Agents  Soap and water   Wound Leg Lower Posterior;Right   Date First Assessed/Time First Assessed: 02/07/19 1357   POA: Yes  Wound Type: Venous  Location: Leg Lower  Orientation: Posterior;Right   Dressing Status  Removed   Dressing Type  Compression Wrap/Venous Stasis   Wound Length (cm) 1 cm   Wound Width (cm) 0.8 cm   Wound Depth (cm) 0.3   Wound Surface area (cm^2) 0.8 cm^2   Change in Wound Size % 20   Condition of Base Slough   Drainage Amount  Large   Drainage Color Serosanguinous   Wound Odor None   Periwound Skin Condition Intact   Cleansing and Cleansing Agents  Soap and water   Wound Leg Lower Medial;Right   Date First Assessed/Time First Assessed: 02/07/19 1351   POA: Yes  Wound Type: Venous  Location: Leg Lower  Orientation: Medial;Right   Dressing Status  Removed   Dressing Type  Compression Wrap/Venous Stasis   Wound Length (cm) 1.5 cm   Wound Width (cm) 1 cm   Wound Depth (cm) 0.2   Wound Surface area (cm^2) 1.5 cm^2   Change in Wound Size % -167.86   Drainage Amount  Large   Drainage Color Serosanguinous   Wound Odor None   Periwound Skin Condition Intact   Cleansing and Cleansing Agents  Soap and water

## 2019-03-21 ENCOUNTER — HOSPITAL ENCOUNTER (OUTPATIENT)
Dept: WOUND CARE | Age: 84
Discharge: HOME | End: 2019-03-21
Payer: MEDICARE

## 2019-03-21 VITALS
DIASTOLIC BLOOD PRESSURE: 94 MMHG | TEMPERATURE: 97.9 F | SYSTOLIC BLOOD PRESSURE: 158 MMHG | HEART RATE: 98 BPM | RESPIRATION RATE: 18 BRPM

## 2019-03-21 PROCEDURE — 29581 APPL MULTLAYER CMPRN SYS LEG: CPT

## 2019-03-21 NOTE — WOUND CARE
Discharge Condition:Stable Ambulatory Status:Ambulatory Discharge Destination:Home Transportation: Private Accompanied by: Family

## 2019-03-21 NOTE — WOUND CARE
03/21/19 1528 Wound Leg Lower Lateral;Left Date First Assessed/Time First Assessed: 02/07/19 1401   POA: Yes  Wound Type: Venous  Location: Leg Lower  Orientation: Lateral;Left Dressing Status  Removed Dressing Type  Compression dressing;Aquacel Wound Length (cm) 1.5 cm Wound Width (cm) 1 cm Wound Depth (cm) 0.4 Wound Surface area (cm^2) 1.5 cm^2 Change in Wound Size % 23.47 Condition of Base [place];Slough Drainage Amount  Scant Drainage Color Serous Wound Odor None Periwound Skin Condition Indurated Cleansing and Cleansing Agents  Soap and water Wound Leg Lower Left;Posterior Date First Assessed/Time First Assessed: 02/07/19 1358   POA: Yes  Wound Type: Venous  Location: Leg Lower  Orientation: Left;Posterior Dressing Status  Clean, dry, and intact; Removed Dressing Type  Aquacel; Compression Wrap/Venous Stasis Wound Length (cm) 0.6 cm Wound Width (cm) 0.4 cm Wound Depth (cm) 0.2 Wound Surface area (cm^2) 0.24 cm^2 Change in Wound Size % 76 Condition of [place];Slough Drainage Amount  Small Drainage Color Serous Wound Odor None Periwound Skin Condition Intact Cleansing and Cleansing Agents  Soap and water Wound Leg Lower Anterior; Left Date First Assessed/Time First Assessed: 02/07/19 1400   POA: Yes  Wound Type: Venous  Location: Leg Lower  Orientation: Anterior; Left Dressing Status  Clean, dry, and intact; Removed Dressing Type  Aquacel; Compression Wrap/Venous Stasis Wound Length (cm) 0.4 cm Wound Width (cm) 0.3 cm Wound Depth (cm) 0.2 Wound Surface area (cm^2) 0.12 cm^2 Change in Wound Size % 88 Condition of [place];Pink Drainage Amount  Small Drainage Color Serous Wound Odor None Periwound Skin Condition Intact Cleansing and Cleansing Agents  Soap and water Wound Leg Lower Right; Anterior Date First Assessed/Time First Assessed: 02/07/19 1349   POA: Yes  Wound Type: Venous  Location: Leg Lower  Orientation: Right; Anterior Dressing Status  Clean, dry, and intact; Removed Dressing Type  Aquacel; Compression Wrap/Venous Stasis Wound Length (cm) 1.4 cm Wound Width (cm) 0.7 cm Wound Depth (cm) 0.2 Wound Surface area (cm^2) 0.98 cm^2 Change in Wound Size % 67.33 Condition of [place];Slough Drainage Amount  Small Drainage Color Serous Wound Odor None Periwound Skin Condition Intact Cleansing and Cleansing Agents  Soap and water Wound Leg Lower Posterior;Right Date First Assessed/Time First Assessed: 02/07/19 1357   POA: Yes  Wound Type: Venous  Location: Leg Lower  Orientation: Posterior;Right Dressing Status  Clean, dry, and intact; Removed Dressing Type  Aquacel; Compression Wrap/Venous Stasis Wound Length (cm) 0.6 cm Wound Width (cm) 0.4 cm Wound Depth (cm) 2 Wound Surface area (cm^2) 0.24 cm^2 Change in Wound Size % 76 Condition of [place];Slough Drainage Amount  Small Drainage Color Serous Wound Odor None Periwound Skin Condition Intact Cleansing and Cleansing Agents  Soap and water Wound Leg Lower Medial;Right Date First Assessed/Time First Assessed: 02/07/19 1351   POA: Yes  Wound Type: Venous  Location: Leg Lower  Orientation: Medial;Right Dressing Status  Clean, dry, and intact; Removed Dressing Type  Aquacel; Compression Wrap/Venous Stasis Wound Length (cm) 0.3 cm Wound Width (cm) 0.2 cm Wound Depth (cm) 0.2 Wound Surface area (cm^2) 0.06 cm^2 Change in Wound Size % 89.29 Condition of [place];Slough Drainage Amount  Small Drainage Color Serous Wound Odor None Periwound Skin Condition Intact Cleansing and Cleansing Agents  Soap and water Visit Vitals BP (!) 158/94 (BP 1 Location: Left arm, BP Patient Position: Sitting) Pulse 98 Temp 97.9 °F (36.6 °C) Resp 18

## 2019-03-21 NOTE — WOUND CARE
Mena Garcia DPM - [facility] [address] - Follow up Assessment/Plan: 
 
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 - Wounds improving - SONIDO/ PVR - revewied - no significant vascular disease - Venous duplex- No DVT noted - B/L lower legs painted with gentian violet - Dressing consisting of aquacell to B/L LE applied with B/L 3 layer compression - F/U 1 week. Subjective: 
Patient comes in for follow up to B/L LE ulcerations. Caretaker states legs are improving. Negative for fevers chills, nausea, vomitting, SOB 
 
HPI: 
Pt complains of wound to left and right leg. States they have been present for 6 months. Wound have not improved and gotten worse. Has tried antibiotic ointment with minimal success. Patient was referred here by PCP for wound care. He comes in with a caregiver who states he lives at [facility] AT [place]. Caregiver states that he is not compliant with compression. He had a DVT 6 months ago and an IVC filter was placed. History: 
wound care Allergies Allergen Reactions  Adhesive Tape-Silicones Other (comments) REDNESS  Aggrenox [Aspirin-Dipyridamole] Other (comments)  
  headache  Amlodipine Rash  Diuril [Chlorothiazide] Unknown (comments)  Hydrochlorothiazide Other (comments) LOW NA Family History Problem Relation Age of Onset  Cancer Mother   
     colon  Cancer Father   
     throat  No Known Problems Brother  Anesth Problems Neg Hx Past Medical History:  
Diagnosis Date  Arthritis HANDS  Beta-blocker therapy  Cancer [facility]) 2013 MAXILLARY SINUS CANCER, RADIATION AND CHEMO  Cancer ([facility] [address] SKIN CA ON NOSE  GERD (gastroesophageal reflux disease)  Hypercholesterolemia  Hypertension   
 pt denies  Nasal sinus tumor  Numbness of LEFT hand & LEFT face   
 RBBB (right bundle branch block)  TIA (transient ischemic attack) 12 TIA's over 9 YRS.1ST ONE IN - LAST IN   Trigeminal neuralgia Past Surgical History:  
Procedure Laterality Date  HX CATARACT REMOVAL Bilateral   
 HX GI    
 COLONOSCOPY  
 HX HEENT    
 BIOPSY OF SINUS   
 HX HEENT Left   
 sew eye closed  HX HERNIA REPAIR Right INGUINAL  
 HX KNEE ARTHROSCOPY Right 2007  HX KNEE REPLACEMENT Right 2012 Total Knee replacement- right  HX ROTATOR CUFF REPAIR Right   
 right rotator cuff repair Social History Tobacco Use  Smoking status: Former Smoker Packs/day: 1.00 Years: 40.00 Pack years: 40.00 Last attempt to quit: 1982 Years since quittin.2  Smokeless tobacco: Never Used  Tobacco comment: Amber Loco Substance Use Topics  Alcohol use: No  
  Alcohol/week: 10.5 oz Types: 21 Glasses of wine per week Frequency: Never Social History Substance and Sexual Activity Alcohol Use No  
 Alcohol/week: 10.5 oz  Types: 21 Glasses of wine per week  Frequency: Never Social History Substance and Sexual Activity Drug Use No  
  
Social History Tobacco Use Smoking Status Former Smoker  Packs/day: 1.00  Years: 40.00  Pack years: 40.00  Last attempt to quit: 1982  Years since quittin.2 Smokeless Tobacco Never Used Tobacco Comment Amber Loco Current Outpatient Medications Medication Sig  
 spironolactone (ALDACTONE) 25 mg tablet Take 25 mg by mouth daily.  furosemide (LASIX) 40 mg tablet Take 1 Tab by mouth every Monday, Thursday, Saturday.  peg 400-propylene glycol (SYSTANE, PROPYLENE GLYCOL,) 0.4-0.3 % drop 1 Drop four (4) times daily. Indications: Both eyes  dexamethasone (DECADRON) 1 mg tablet Take 2 mg by mouth daily.  SENNOSIDES (SENNA LAXATIVE PO) Take 8.6 mg by mouth daily.  acetaminophen (TYLENOL EXTRA STRENGTH) 500 mg tablet Take 2 Tabs by mouth as needed for Pain. No more than 3grams in 24hour period  Indications: Pain  
 gabapentin (NEURONTIN) 600 mg tablet Take 0.5 Tabs by mouth two (2) times a day. Indications: trigeminal neuralgia  levETIRAcetam (KEPPRA) 500 mg tablet Take 1 Tab by mouth two (2) times a day.  cyanocobalamin (VITAMIN B-12) 1,000 mcg tablet Take 1,000 mcg by mouth daily.  cholecalciferol (VITAMIN D3) 1,000 unit cap Take 1,000 Units by mouth daily.  docusate sodium (COLACE) 100 mg capsule Take 100 mg by mouth nightly.  bacitracin ophthalmic ointment Administer  to left eye daily.  clopidogrel (PLAVIX) 75 mg tablet Take 75 mg by mouth daily. TAKES IN AM  
 atorvastatin (LIPITOR) 10 mg tablet Take 10 mg by mouth nightly. No current facility-administered medications for this encounter. Objective: 
Visit Vitals BP (!) 158/94 (BP 1 Location: Left arm, BP Patient Position: Sitting) Pulse 98 Temp 97.9 °F (36.6 °C) Resp 18 Vascular: B/L LE 
DP 1/4; PT 1/4 
capillary fill time brisk, pitting edema is present, skin temperature is cool, varicosities are present. Dermatological: 
 
Left limb erythematous and edematous Wound: 1 Location: left leg x3 Measurements: per RN note Margins: indurated Drainage: serous Odor: none Wound base: 100% fibrotic Lymphangitic streaking? No. 
Undermining? No. 
Sinus tracts? No. 
Exposed bone? No. 
Subcutaneous crepitation on palpation? No. 
 
ound: 1 Location: right leg x4 Measurements: per RN note Margins: indurated Drainage: serous Odor: none Wound base: 100% fibrotic Lymphangitic streaking? No. 
Undermining?  No. 
 Sinus tracts? No. 
Exposed bone? No. 
Subcutaneous crepitation on palpation? No. 
 
Nails are thickened, elongated, discolored, painful to palpation, 3mm thick, with subungual debris. There are extensive skin cracks at both heels. Skin is dry and scaly, exhibits hemosiderin deposition. There is no maceration of the interspaces of the feet b/l. Lesions are present consisting of hyperkeratosis at left 4th interspace Neurological: DTR are present, protective sensation per 5.07 [place] Tamir monofilament is diminished, patient is AAOx3, mood is normal. Epicritic sensation is intact. Orthopedic: B/L LE are symmetric, ROM of ankle, STJ, 1st MTPJ is limited, MMT 5 out of 5 for B/L LE. There has been no amputations Constitutional: Pt is a well developed, elderly average male Lymphatics: negative tenderness to palpation of neck/axillary/inguinal nodes.

## 2019-03-28 ENCOUNTER — HOSPITAL ENCOUNTER (OUTPATIENT)
Dept: WOUND CARE | Age: 84
Discharge: HOME | End: 2019-03-28
Payer: MEDICARE

## 2019-03-28 VITALS
SYSTOLIC BLOOD PRESSURE: 112 MMHG | RESPIRATION RATE: 18 BRPM | HEART RATE: 86 BPM | TEMPERATURE: 97.6 F | DIASTOLIC BLOOD PRESSURE: 72 MMHG

## 2019-03-28 PROCEDURE — 11042 DBRDMT SUBQ TIS 1ST 20SQCM/<: CPT

## 2019-03-28 NOTE — WOUND CARE
Lorie Bauman DPM - Rani Garcia Ann, [address] - Follow up Assessment/Plan: 
 
 
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 - Wounds improving - SONIDO/ PVR - revewied - no significant vascular disease - Venous duplex- No DVT noted - Dressing consisting of aquacell DSD applied to both leg - Compression with B/L 3 layer compression wraps - F/U 1 week. Subjective: 
Patient comes in for follow up to B/L LE ulcerations. Caretaker/ Wife states legs are improving. Negative for fevers chills, nausea, vomitting, SOB 
 
HPI: 
Pt complains of wound to left and right leg. States they have been present for 6 months. Wound have not improved and gotten worse. Has tried antibiotic ointment with minimal success. Patient was referred here by PCP for wound care. He comes in with a caregiver who states he lives at [facility] AT [place]. Caregiver states that he is not compliant with compression. He had a DVT 6 months ago and an IVC filter was placed. History: 
wound care Allergies Allergen Reactions  Adhesive Tape-Silicones Other (comments) REDNESS  Aggrenox [Aspirin-Dipyridamole] Other (comments)  
  headache  Amlodipine Rash  Diuril [Chlorothiazide] Unknown (comments)  Hydrochlorothiazide Other (comments) LOW NA Family History Problem Relation Age of Onset  Cancer Mother   
     colon  Cancer Father   
     throat  No Known Problems Brother  Anesth Problems Neg Hx Past Medical History:  
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   
 pt denies  Nasal sinus tumor  Numbness of LEFT hand & LEFT face   
 RBBB (right bundle branch block)  TIA (transient ischemic attack) 12 TIA's over 9 YRS.1ST ONE IN - LAST IN   Trigeminal neuralgia Past Surgical History:  
Procedure Laterality Date  HX CATARACT REMOVAL Bilateral   
 HX GI    
 COLONOSCOPY  
 HX HEENT    
 BIOPSY OF SINUS   
 HX HEENT Left   
 sew eye closed  HX HERNIA REPAIR Right INGUINAL  
 HX KNEE ARTHROSCOPY Right 2007  HX KNEE REPLACEMENT Right 2012 Total Knee replacement- right  HX ROTATOR CUFF REPAIR Right   
 right rotator cuff repair Social History Tobacco Use  Smoking status: Former Smoker Packs/day: 1.00 Years: 40.00 Pack years: 40.00 Last attempt to quit: 1982 Years since quittin.2  Smokeless tobacco: Never Used  Tobacco comment: Randee Bean Substance Use Topics  Alcohol use: No  
  Alcohol/week: 10.5 oz Types: 21 Glasses of wine per week Frequency: Never Social History Substance and Sexual Activity Alcohol Use No  
 Alcohol/week: 10.5 oz  Types: 21 Glasses of wine per week  Frequency: Never Social History Substance and Sexual Activity Drug Use No  
  
Social History Tobacco Use Smoking Status Former Smoker  Packs/day: 1.00  Years: 40.00  Pack years: 40.00  Last attempt to quit: 1982  Years since quittin.2 Smokeless Tobacco Never Used Tobacco Comment Randee Bean Current Outpatient Medications Medication Sig  
 spironolactone (ALDACTONE) 25 mg tablet Take 25 mg by mouth daily.  furosemide (LASIX) 40 mg tablet Take 1 Tab by mouth every Monday, Thursday, Saturday.   
 peg 400-propylene glycol (SYSTANE, PROPYLENE GLYCOL,) 0.4-0.3 % drop 1 Drop four (4) times daily. Indications: Both eyes  dexamethasone (DECADRON) 1 mg tablet Take 2 mg by mouth daily.  SENNOSIDES (SENNA LAXATIVE PO) Take 8.6 mg by mouth daily.  acetaminophen (TYLENOL EXTRA STRENGTH) 500 mg tablet Take 2 Tabs by mouth as needed for Pain. No more than 3grams in 24hour period  Indications: Pain  
 gabapentin (NEURONTIN) 600 mg tablet Take 0.5 Tabs by mouth two (2) times a day. Indications: trigeminal neuralgia  levETIRAcetam (KEPPRA) 500 mg tablet Take 1 Tab by mouth two (2) times a day.  cyanocobalamin (VITAMIN B-12) 1,000 mcg tablet Take 1,000 mcg by mouth daily.  cholecalciferol (VITAMIN D3) 1,000 unit cap Take 1,000 Units by mouth daily.  docusate sodium (COLACE) 100 mg capsule Take 100 mg by mouth nightly.  bacitracin ophthalmic ointment Administer  to left eye daily.  clopidogrel (PLAVIX) 75 mg tablet Take 75 mg by mouth daily. TAKES IN AM  
 atorvastatin (LIPITOR) 10 mg tablet Take 10 mg by mouth nightly. No current facility-administered medications for this encounter. Objective: 
Visit Vitals /72 Pulse 86 Temp 97.6 °F (36.4 °C) Resp 18 Vascular: B/L LE 
DP 1/4; PT 1/4 
capillary fill time brisk, pitting edema is present, skin temperature is cool, varicosities are present. Dermatological: 
 
Left limb erythematous and edematous Wound: 1 Location: left leg x3 Measurements: per RN note Margins: indurated Drainage: serous Odor: none Wound base: 50% fibrotic 50% granular Lymphangitic streaking? No. 
Undermining? No. 
Sinus tracts? No. 
Exposed bone? No. 
Subcutaneous crepitation on palpation? No. 
 
ound: 1 Location: right leg x3 Measurements: per RN note Margins: indurated Drainage: serous Odor: none Wound base: 50% fibrotic, 50% granular Lymphangitic streaking? No. 
Undermining? No. 
Sinus tracts? No. 
Exposed bone? No. 
Subcutaneous crepitation on palpation?  No. 
 
 Nails are thickened, elongated, discolored, painful to palpation, 3mm thick, with subungual debris. There are extensive skin cracks at both heels. Skin is dry and scaly, exhibits hemosiderin deposition. There is no maceration of the interspaces of the feet b/l. Lesions are present consisting of hyperkeratosis at left 4th interspace Neurological: DTR are present, protective sensation per 5.07 [place] Tamir monofilament is diminished, patient is AAOx3, mood is normal. Epicritic sensation is intact. Orthopedic: B/L LE are symmetric, ROM of ankle, STJ, 1st MTPJ is limited, MMT 5 out of 5 for B/L LE. There has been no amputations Constitutional: Pt is a well developed, elderly average male Lymphatics: negative tenderness to palpation of neck/axillary/inguinal nodes. Procedure Note: 
Excisional debridement through level of fat. Location / Ulcer:  B/L leg ulcerations Indication: to remove non-viable tissue from wound bed. Consent in chart. Anesthesia: 2% lidocaine gel Instrument: sneha Residual necrosis: none Bleeding: minimal 
Hemostasis: pressure Pre-Procedure Pain: 0 Post-Procedure Pain: 0 Area debrided < 20 cm sq. Pre-Debridement measurements: see nursing notes Post-Debridement measurements: see nursing notes This is part of a series of staged procedures in an attempt at limb salvage.

## 2019-03-28 NOTE — PROGRESS NOTES
03/28/19 1552 Wound Leg Lower Right; Anterior Date First Assessed/Time First Assessed: 02/07/19 1349   POA: Yes  Wound Type: Venous  Location: Leg Lower  Orientation: Right; Anterior Wound Length (cm) 1.3 cm Wound Width (cm) 0.7 cm Wound Depth (cm) 0.2 Wound Surface area (cm^2) 0.91 cm^2 Change in Wound Size % 69.67 Condition of [facility] Condition of [place] Open Drainage Amount  Scant Drainage Color Yellow Wound Odor None Periwound Skin Condition Intact Cleansing and Cleansing Agents  Soap and water;Normal saline Wound Leg Lower Medial;Right Date First Assessed/Time First Assessed: 02/07/19 1351   POA: Yes  Wound Type: Venous  Location: Leg Lower  Orientation: Medial;Right Wound Length (cm) 1 cm Wound Width (cm) 0.4 cm Wound Depth (cm) 0.2 Wound Surface area (cm^2) 0.4 cm^2 Change in Wound Size % 28.57 Condition of [facility] Drainage Amount  Small Drainage Color Serous Wound Odor None Periwound Skin Condition Intact Cleansing and Cleansing Agents  Soap and water;Normal saline Wound Leg Lower Posterior;Right Date First Assessed/Time First Assessed: 02/07/19 1357   POA: Yes  Wound Type: Venous  Location: Leg Lower  Orientation: Posterior;Right Wound Length (cm) 0.4 cm Wound Width (cm) 0.3 cm Wound Depth (cm) 0.2 Wound Surface area (cm^2) 0.12 cm^2 Change in Wound Size % 88 Condition of [facility] Condition of [place] Open Drainage Amount  Scant Drainage Color Serous Wound Odor None Periwound Skin Condition Intact Cleansing and Cleansing Agents  Soap and water Wound Leg Lower Left;Posterior Date First Assessed/Time First Assessed: 02/07/19 1358   POA: Yes  Wound Type: Venous  Location: Leg Lower  Orientation: Left;Posterior Wound Length (cm) 0.4 cm Wound Width (cm) 0.3 cm Wound Depth (cm) 0.1 Wound Surface area (cm^2) 0.12 cm^2 Change in Wound Size % 88 Condition of [facility] Drainage Amount  Scant Drainage Color Serous Wound Odor None Periwound Skin Condition Intact Cleansing and Cleansing Agents  Soap and water;Normal saline Wound Leg Lower Anterior; Left Date First Assessed/Time First Assessed: 02/07/19 1400   POA: Yes  Wound Type: Venous  Location: Leg Lower  Orientation: Anterior; Left Wound Length (cm) 0.4 cm Wound Width (cm) 0.2 cm Wound Depth (cm) 0.1 Wound Surface area (cm^2) 0.08 cm^2 Change in Wound Size % 92 Condition of [facility] Drainage Amount  Scant Drainage Color Serous Wound Odor None Periwound Skin Condition Intact Cleansing and Cleansing Agents  Soap and water;Normal saline Wound Leg Lower Lateral;Left Date First Assessed/Time First Assessed: 02/07/19 1401   POA: Yes  Wound Type: Venous  Location: Leg Lower  Orientation: Lateral;Left Wound Length (cm) 1.3 cm Wound Width (cm) 0.9 cm Wound Depth (cm) 0.2 Wound Surface area (cm^2) 1.17 cm^2 Change in Wound Size % 40.31 Condition of [facility] Drainage Amount  Scant Drainage Color Serous Wound Odor None Periwound Skin Condition Intact Visit Vitals /72 Pulse 86 Temp 97.6 °F (36.4 °C) Resp 18

## 2019-04-11 ENCOUNTER — HOSPITAL ENCOUNTER (OUTPATIENT)
Dept: WOUND CARE | Age: 84
Discharge: HOME | End: 2019-04-11
Payer: MEDICARE

## 2019-04-11 VITALS
SYSTOLIC BLOOD PRESSURE: 154 MMHG | RESPIRATION RATE: 18 BRPM | DIASTOLIC BLOOD PRESSURE: 95 MMHG | HEART RATE: 80 BPM | TEMPERATURE: 97.9 F

## 2019-04-11 PROCEDURE — 29581 APPL MULTLAYER CMPRN SYS LEG: CPT

## 2019-04-11 NOTE — WOUND CARE
Marquise Caldera DPM - Regine Hubbard, Lito Davis 3 - Follow up Assessment/Plan: 
 
 
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 - Wounds stable. Legs are leaking fluid again after inadequately wrapped - SONIDO/ PVR - revewied - no significant vascular disease - Venous duplex- No DVT noted - Gentian violet painted to both limbs - Compression with B/L 3 layer compression wraps 
-F/U 1 week. Subjective: 
Patient comes in for follow up to B/L LE ulcerations. Caretaker/ Wife states legs were not leaking for the past 2 weeks until today. . Negative for fevers chills, nausea, vomitting, SOB. HPI: 
Pt complains of wound to left and right leg. States they have been present for 6 months. Wound have not improved and gotten worse. Has tried antibiotic ointment with minimal success. Patient was referred here by PCP for wound care. He comes in with a caregiver who states he lives at [facility] AT [place]. Caregiver states that he is not compliant with compression. He had a DVT 6 months ago and an IVC filter was placed. History: 
Wound Care Allergies Allergen Reactions  Adhesive Tape-Silicones Other (comments) REDNESS  Aggrenox [Aspirin-Dipyridamole] Other (comments)  
  headache  Amlodipine Rash  Diuril [Chlorothiazide] Unknown (comments)  Hydrochlorothiazide Other (comments) LOW NA Family History Problem Relation Age of Onset  Cancer Mother   
     colon  Cancer Father   
     throat  No Known Problems Brother  Anesth Problems Neg Hx Past Medical History:  
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   
 pt denies  Nasal sinus tumor  Numbness of LEFT hand & LEFT face   
 RBBB (right bundle branch block)  TIA (transient ischemic attack) 12 TIA's over 9 YRS.1ST ONE IN - LAST IN   Trigeminal neuralgia Past Surgical History:  
Procedure Laterality Date  HX CATARACT REMOVAL Bilateral   
 HX GI    
 COLONOSCOPY  
 HX HEENT    
 BIOPSY OF SINUS   
 HX HEENT Left   
 sew eye closed  HX HERNIA REPAIR Right INGUINAL  
 HX KNEE ARTHROSCOPY Right 2007  HX KNEE REPLACEMENT Right 2012 Total Knee replacement- right  HX ROTATOR CUFF REPAIR Right   
 right rotator cuff repair Social History Tobacco Use  Smoking status: Former Smoker Packs/day: 1.00 Years: 40.00 Pack years: 40.00 Last attempt to quit: 1982 Years since quittin.2  Smokeless tobacco: Never Used  Tobacco comment: Miguel Centeno Substance Use Topics  Alcohol use: No  
  Alcohol/week: 10.5 oz Types: 21 Glasses of wine per week Frequency: Never Social History Substance and Sexual Activity Alcohol Use No  
 Alcohol/week: 10.5 oz  Types: 21 Glasses of wine per week  Frequency: Never Social History Substance and Sexual Activity Drug Use No  
  
Social History Tobacco Use Smoking Status Former Smoker  Packs/day: 1.00  Years: 40.00  Pack years: 40.00  Last attempt to quit: 1982  Years since quittin.2 Smokeless Tobacco Never Used Tobacco Comment Miguel Centeno Current Outpatient Medications Medication Sig  
 spironolactone (ALDACTONE) 25 mg tablet Take 25 mg by mouth daily.  furosemide (LASIX) 40 mg tablet Take 1 Tab by mouth every Monday, Thursday, Saturday.  peg 400-propylene glycol (SYSTANE, PROPYLENE GLYCOL,) 0.4-0.3 % drop 1 Drop four (4) times daily. Indications: Both eyes  dexamethasone (DECADRON) 1 mg tablet Take 2 mg by mouth daily.  SENNOSIDES (SENNA LAXATIVE PO) Take 8.6 mg by mouth daily.  acetaminophen (TYLENOL EXTRA STRENGTH) 500 mg tablet Take 2 Tabs by mouth as needed for Pain. No more than 3grams in 24hour period  Indications: Pain  
 gabapentin (NEURONTIN) 600 mg tablet Take 0.5 Tabs by mouth two (2) times a day. Indications: trigeminal neuralgia  levETIRAcetam (KEPPRA) 500 mg tablet Take 1 Tab by mouth two (2) times a day.  cyanocobalamin (VITAMIN B-12) 1,000 mcg tablet Take 1,000 mcg by mouth daily.  cholecalciferol (VITAMIN D3) 1,000 unit cap Take 1,000 Units by mouth daily.  docusate sodium (COLACE) 100 mg capsule Take 100 mg by mouth nightly.  bacitracin ophthalmic ointment Administer  to left eye daily.  clopidogrel (PLAVIX) 75 mg tablet Take 75 mg by mouth daily. TAKES IN AM  
 atorvastatin (LIPITOR) 10 mg tablet Take 10 mg by mouth nightly. Current Facility-Administered Medications Medication Dose Route Frequency  lidocaine (ALOCANE) 4 % topical gel   Topical NOW Objective: 
Visit Vitals BP (!) 154/95 (BP 1 Location: Right arm, BP Patient Position: Sitting) Pulse 80 Temp 97.9 °F (36.6 °C) Resp 18 Vascular: B/L LE 
DP 1/4; PT 1/4 
capillary fill time brisk, pitting edema is present, skin temperature is cool, varicosities are present. Dermatological: 
 
Left limb erythematous and edematous Wound: 1 Location: left leg x3 Measurements: per RN note Margins: indurated Drainage: serous Odor: none Wound base: 50% fibrotic 50% granular Lymphangitic streaking? No. 
Undermining? No. 
Sinus tracts? No. 
Exposed bone? No. 
Subcutaneous crepitation on palpation? No. 
 
ound: 1 Location: right leg x3 Measurements: per RN note Margins: indurated Drainage: serous Odor: none Wound base: 50% fibrotic, 50% granular Lymphangitic streaking? No. 
Undermining? No. 
Sinus tracts? No. 
Exposed bone? No. 
Subcutaneous crepitation on palpation? No. 
 
Nails are thickened, elongated, discolored, painful to palpation, 3mm thick, with subungual debris. There are extensive skin cracks at both heels. Skin is dry and scaly, exhibits hemosiderin deposition. There is no maceration of the interspaces of the feet b/l. Lesions are present consisting of hyperkeratosis at left 4th interspace Neurological: DTR are present, protective sensation per 5.07 [place] Tamir monofilament is diminished, patient is AAOx3, mood is normal. Epicritic sensation is intact. Orthopedic: B/L LE are symmetric, ROM of ankle, STJ, 1st MTPJ is limited, MMT 5 out of 5 for B/L LE. There has been no amputations Constitutional: Pt is a well developed, elderly average male Lymphatics: negative tenderness to palpation of neck/axillary/inguinal nodes.

## 2019-04-11 NOTE — WOUND CARE
04/11/19 1426 Wound Leg Lower Right; Anterior Date First Assessed/Time First Assessed: 02/07/19 1349   POA: Yes  Wound Type: Venous  Location: Leg Lower  Orientation: Right; Anterior Wound Length (cm) 1.5 cm Wound Width (cm) 0.9 cm Wound Depth (cm) 0.2 Wound Surface area (cm^2) 1.35 cm^2 Change in Wound Size % 55 Condition of Base Pink;Slough Condition of Edges Open Drainage Amount  Small Drainage Color Serous Wound Odor None Periwound Skin Condition Intact Cleansing and Cleansing Agents  Soap and water Wound Leg Lower Medial;Right Date First Assessed/Time First Assessed: 02/07/19 1351   POA: Yes  Wound Type: Venous  Location: Leg Lower  Orientation: Medial;Right Wound Length (cm) 0.4 cm Wound Width (cm) 0.5 cm Wound Depth (cm) 0.1 Wound Surface area (cm^2) 0.2 cm^2 Change in Wound Size % 64.29 Condition of Base [place];Slough Drainage Amount  Scant Drainage Color Tan Wound Odor None Periwound Skin Condition Intact Cleansing and Cleansing Agents  Soap and water Wound Leg Lower Posterior;Right Date First Assessed/Time First Assessed: 02/07/19 1357   POA: Yes  Wound Type: Venous  Location: Leg Lower  Orientation: Posterior;Right Dressing Status  Clean, dry, and intact; Removed Dressing Type  Compression Wrap/Venous Stasis; Aquacel Wound Length (cm) 1 cm Wound Width (cm) 0.4 cm Wound Depth (cm) 0.3 Wound Surface area (cm^2) 0.4 cm^2 Change in Wound Size % 60 Condition of Base [place];Slough Drainage Amount  Scant Drainage Color Serous Wound Odor None Periwound Skin Condition Intact Cleansing and Cleansing Agents  Soap and water Wound Leg Lower Left;Posterior Date First Assessed/Time First Assessed: 02/07/19 1358   POA: Yes  Wound Type: Venous  Location: Leg Lower  Orientation: Left;Posterior Dressing Status  Clean, dry, and intact; Removed Dressing Type  Aquacel; Compression Wrap/Venous Stasis Wound Length (cm) 2.5 cm Wound Width (cm) 1.5 cm Wound Depth (cm) 0.2 Wound Surface area (cm^2) 3.75 cm^2 Change in Wound Size % -275 Condition of Base [place];Slough Drainage Amount  Small Drainage Color Serous Wound Odor None Periwound Skin Condition Intact Cleansing and Cleansing Agents  Soap and water Wound Leg Lower Anterior; Left Date First Assessed/Time First Assessed: 02/07/19 1400   POA: Yes  Wound Type: Venous  Location: Leg Lower  Orientation: Anterior; Left Wound Length (cm) 0.1 cm Wound Width (cm) 0.1 cm Wound Depth (cm) 0.1 Wound Surface area (cm^2) 0.01 cm^2 Change in Wound Size % 99 Wound Leg Lower Lateral;Left Date First Assessed/Time First Assessed: 02/07/19 1401   POA: Yes  Wound Type: Venous  Location: Leg Lower  Orientation: Lateral;Left Wound Length (cm) 2 cm Wound Width (cm) 2.5 cm Wound Depth (cm) 0.6 Wound Surface area (cm^2) 5 cm^2 Change in Wound Size % -155.1 Condition of Base [place];Slough Drainage Amount  Small Drainage Color Serous Wound Odor None Periwound Skin Condition Intact Cleansing and Cleansing Agents  Soap and water Visit Vitals BP (!) 154/95 (BP 1 Location: Right arm, BP Patient Position: Sitting) Pulse 80 Temp 97.9 °F (36.6 °C) Resp 18

## 2019-04-18 ENCOUNTER — HOSPITAL ENCOUNTER (OUTPATIENT)
Dept: WOUND CARE | Age: 84
Discharge: HOME | End: 2019-04-18
Payer: MEDICARE

## 2019-04-18 VITALS
TEMPERATURE: 98 F | DIASTOLIC BLOOD PRESSURE: 75 MMHG | SYSTOLIC BLOOD PRESSURE: 116 MMHG | HEART RATE: 76 BPM | RESPIRATION RATE: 14 BRPM

## 2019-04-18 PROCEDURE — 11042 DBRDMT SUBQ TIS 1ST 20SQCM/<: CPT

## 2019-04-18 NOTE — WOUND CARE
Discharge Condition:stable Ambulatory Status: with walker Discharge Destination: home Transportation:  Personal car Accompanied by: friend and daughter

## 2019-04-18 NOTE — WOUND CARE
04/18/19 1346 Wound Leg Lower Posterior;Right Date First Assessed/Time First Assessed: 02/07/19 1357   POA: Yes  Wound Type: Venous  Location: Leg Lower  Orientation: Posterior;Right Dressing Type  Open to air Wound Length (cm) 1 cm Wound Width (cm) 0.5 cm Wound Depth (cm) 0.4 Wound Surface area (cm^2) 0.5 cm^2 Change in Wound Size % 50 Drainage Amount  None Wound Leg Lower Left;Posterior Date First Assessed/Time First Assessed: 02/07/19 1358   POA: Yes  Wound Type: Venous  Location: Leg Lower  Orientation: Left;Posterior Dressing Type  Open to air (Pt removed wrap for bath the AM, no compression applied ) Wound Length (cm) 2.5 cm Wound Width (cm) 1 cm Wound Depth (cm) 0.3 Wound Surface area (cm^2) 2.5 cm^2 Change in Wound Size % -150 Condition of Base [place];Slough Drainage Amount  Large Drainage Color Serous Wound Odor None Periwound Skin Condition Intact Wound Leg Lower Lateral;Left Date First Assessed/Time First Assessed: 02/07/19 1401   POA: Yes  Wound Type: Venous  Location: Leg Lower  Orientation: Lateral;Left Wound Length (cm) 1.4 cm Wound Width (cm) 1.2 cm Wound Depth (cm) 0.7 Wound Surface area (cm^2) 1.68 cm^2 Change in Wound Size % 14.29 Condition of Base [place];Slough Drainage Amount  Large Drainage Color Serous Wound Odor None Wound Leg Lower Anterior; Left Date First Assessed/Time First Assessed: 02/07/19 1400   POA: Yes  Wound Type: Venous  Location: Leg Lower  Orientation: Anterior; Left Dressing Type  Open to air (Pt removed wrap for bath the AM, no compression applied ) Wound Length (cm) 0.2 cm Wound Width (cm) 0.2 cm Wound Depth (cm) 0.2 Wound Surface area (cm^2) 0.04 cm^2 Change in Wound Size % 96 Condition of Base [place];Slough Drainage Amount  Large Drainage Color Serous Wound Odor None Visit Vitals /75 (BP 1 Location: Right arm, BP Patient Position: Sitting) Pulse 76 Temp 98 °F (36.7 °C) Resp 14

## 2019-04-18 NOTE — WOUND CARE
Jose David Bruno DPM - Lito Miramontes 3 - Follow up Assessment/Plan: 
 
 
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 - New ulceration right lower leg. Legs leaking less fluid this week - SONIDO/ PVR - revewied - no significant vascular disease - Venous duplex- No DVT noted - Aquacell applied to both leg ulceration - Compression with B/L 3 layer compression wraps 
-F/U 1 week. Subjective: 
Patient comes in for follow up to B/L LE ulcerations. Caretaker/ Wife states legs are less weepy this week. Negative for fevers chills, nausea, vomitting, SOB. HPI: 
Pt complains of wound to left and right leg. States they have been present for 6 months. Wound have not improved and gotten worse. Has tried antibiotic ointment with minimal success. Patient was referred here by PCP for wound care. He comes in with a caregiver who states he lives at [facility] AT [place]. Caregiver states that he is not compliant with compression. He had a DVT 6 months ago and an IVC filter was placed. History: 
non healing wound Allergies Allergen Reactions  Adhesive Tape-Silicones Other (comments) REDNESS  Aggrenox [Aspirin-Dipyridamole] Other (comments)  
  headache  Amlodipine Rash  Diuril [Chlorothiazide] Unknown (comments)  Hydrochlorothiazide Other (comments) LOW NA Family History Problem Relation Age of Onset  Cancer Mother   
     colon  Cancer Father   
     throat  No Known Problems Brother  Anesth Problems Neg Hx Past Medical History:  
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   
 pt denies  Nasal sinus tumor  Numbness of LEFT hand & LEFT face   
 RBBB (right bundle branch block)  TIA (transient ischemic attack) 12 TIA's over 9 YRS.1ST ONE IN - LAST IN   Trigeminal neuralgia Past Surgical History:  
Procedure Laterality Date  HX CATARACT REMOVAL Bilateral   
 HX GI    
 COLONOSCOPY  
 HX HEENT    
 BIOPSY OF SINUS   
 HX HEENT Left   
 sew eye closed  HX HERNIA REPAIR Right INGUINAL  
 HX KNEE ARTHROSCOPY Right 2007  HX KNEE REPLACEMENT Right 2012 Total Knee replacement- right  HX ROTATOR CUFF REPAIR Right   
 right rotator cuff repair Social History Tobacco Use  Smoking status: Former Smoker Packs/day: 1.00 Years: 40.00 Pack years: 40.00 Last attempt to quit: 1982 Years since quittin.3  Smokeless tobacco: Never Used  Tobacco comment: Gerald Cash Substance Use Topics  Alcohol use: No  
  Alcohol/week: 10.5 oz Types: 21 Glasses of wine per week Frequency: Never Social History Substance and Sexual Activity Alcohol Use No  
 Alcohol/week: 10.5 oz  Types: 21 Glasses of wine per week  Frequency: Never Social History Substance and Sexual Activity Drug Use No  
  
Social History Tobacco Use Smoking Status Former Smoker  Packs/day: 1.00  Years: 40.00  Pack years: 40.00  Last attempt to quit: 1982  Years since quittin.3 Smokeless Tobacco Never Used Tobacco Comment Gerald Cash Current Outpatient Medications Medication Sig  
 spironolactone (ALDACTONE) 25 mg tablet Take 25 mg by mouth daily.  furosemide (LASIX) 40 mg tablet Take 1 Tab by mouth every Monday, Thursday, Saturday.  peg 400-propylene glycol (SYSTANE, PROPYLENE GLYCOL,) 0.4-0.3 % drop 1 Drop four (4) times daily. Indications: Both eyes  dexamethasone (DECADRON) 1 mg tablet Take 2 mg by mouth daily.  SENNOSIDES (SENNA LAXATIVE PO) Take 8.6 mg by mouth daily.  acetaminophen (TYLENOL EXTRA STRENGTH) 500 mg tablet Take 2 Tabs by mouth as needed for Pain. No more than 3grams in 24hour period  Indications: Pain  
 gabapentin (NEURONTIN) 600 mg tablet Take 0.5 Tabs by mouth two (2) times a day. Indications: trigeminal neuralgia  levETIRAcetam (KEPPRA) 500 mg tablet Take 1 Tab by mouth two (2) times a day.  cyanocobalamin (VITAMIN B-12) 1,000 mcg tablet Take 1,000 mcg by mouth daily.  cholecalciferol (VITAMIN D3) 1,000 unit cap Take 1,000 Units by mouth daily.  docusate sodium (COLACE) 100 mg capsule Take 100 mg by mouth nightly.  bacitracin ophthalmic ointment Administer  to left eye daily.  clopidogrel (PLAVIX) 75 mg tablet Take 75 mg by mouth daily. TAKES IN AM  
 atorvastatin (LIPITOR) 10 mg tablet Take 10 mg by mouth nightly. No current facility-administered medications for this encounter. Objective: 
Visit Vitals /75 (BP 1 Location: Right arm, BP Patient Position: Sitting) Pulse 76 Temp 98 °F (36.7 °C) Resp 14 Vascular: B/L LE 
DP 1/4; PT 1/4 
capillary fill time brisk, pitting edema is present, skin temperature is cool, varicosities are present. Dermatological: 
 
Left limb erythematous and edematous Wound: 1 Location: left leg x3 Measurements: per RN note Margins: indurated Drainage: serous Odor: none Wound base: 50% fibrotic 50% granular Lymphangitic streaking? No. 
Undermining? No. 
Sinus tracts? No. 
Exposed bone? No. 
Subcutaneous crepitation on palpation? No. 
 
ound: 1 Location: right leg x4 Measurements: per RN note Margins: indurated Drainage: serous Odor: none Wound base: 50% fibrotic, 50% granular Lymphangitic streaking?  No. 
 Undermining? No. 
Sinus tracts? No. 
Exposed bone? No. 
Subcutaneous crepitation on palpation? No. 
 
Nails are thickened, elongated, discolored, painful to palpation, 3mm thick, with subungual debris. There are extensive skin cracks at both heels. Skin is dry and scaly, exhibits hemosiderin deposition. There is no maceration of the interspaces of the feet b/l. Lesions are present consisting of hyperkeratosis at left 4th interspace Neurological: DTR are present, protective sensation per 5.07 [place] Tamir monofilament is diminished, patient is AAOx3, mood is normal. Epicritic sensation is intact. Orthopedic: B/L LE are symmetric, ROM of ankle, STJ, 1st MTPJ is limited, MMT 5 out of 5 for B/L LE. There has been no amputations Constitutional: Pt is a well developed, elderly average male Lymphatics: negative tenderness to palpation of neck/axillary/inguinal nodes. Procedure Note: 
Excisional debridement through level of left and right leg. Location / Ulcer: left and right leg Indication: to remove non-viable tissue from wound bed. Consent in chart. Anesthesia: 2% lidocaine Instrument: sneha Residual necrosis: none Bleeding: minimal 
Hemostasis: pressure Pre-Procedure Pain: 0 Post-Procedure Pain: 2 Area debrided < 20 cm sq. Pre-Debridement measurements: see nursing notes Post-Debridement measurements: see nursing notes This is part of a series of staged procedures in an attempt at limb salvage.

## 2019-04-25 ENCOUNTER — HOSPITAL ENCOUNTER (OUTPATIENT)
Dept: WOUND CARE | Age: 84
Discharge: HOME | End: 2019-04-25
Payer: MEDICARE

## 2019-04-25 VITALS
HEART RATE: 83 BPM | TEMPERATURE: 98.9 F | DIASTOLIC BLOOD PRESSURE: 89 MMHG | RESPIRATION RATE: 16 BRPM | SYSTOLIC BLOOD PRESSURE: 155 MMHG

## 2019-04-25 PROCEDURE — 29581 APPL MULTLAYER CMPRN SYS LEG: CPT

## 2019-04-25 PROCEDURE — 74011000250 HC RX REV CODE- 250: Performed by: PODIATRIST

## 2019-04-25 RX ADMIN — Medication: at 13:53

## 2019-04-25 NOTE — WOUND CARE
Rigo Reddy, GERMAINM - Marcheta Banner K. Lorie Gilford, [address] - Follow up Assessment/Plan: 
 
 
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 - New ulceration left lower leg. Legs leaking less fluid this week - SONIDO/ PVR - revewied - no significant vascular disease - Venous duplex- No DVT noted - Aquacell applied to both leg ulceration - Compression with B/L 3 layer compression wraps. - Wraps only to be changed once a week at office. -F/U 1 week. Subjective: 
Patient comes in for follow up to B/L LE ulcerations. Compression wrap inappropriately applied to B/L LE by home care. Caretaker/ Wife states legs are less weepy this week. Negative for fevers chills, nausea, vomitting, SOB. HPI: 
Pt complains of wound to left and right leg. States they have been present for 6 months. Wound have not improved and gotten worse. Has tried antibiotic ointment with minimal success. Patient was referred here by PCP for wound care. He comes in with a caregiver who states he lives at [facility] [place]. Caregiver states that he is not compliant with compression. He had a DVT 6 months ago and an IVC filter was placed. History: 
non healing wound Allergies Allergen Reactions  Adhesive Tape-Silicones Other (comments) REDNESS  Aggrenox [Aspirin-Dipyridamole] Other (comments)  
  headache  Amlodipine Rash  Diuril [Chlorothiazide] Unknown (comments)  Hydrochlorothiazide Other (comments) LOW NA Family History Problem Relation Age of Onset  Cancer Mother   
     colon  Cancer Father   
     throat  No Known Problems Brother  Anesth Problems Neg Hx Past Medical History:  
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   
 pt denies  Nasal sinus tumor  Numbness of LEFT hand & LEFT face   
 RBBB (right bundle branch block)  TIA (transient ischemic attack) 12 TIA's over 9 YRS.1ST ONE IN - LAST IN   Trigeminal neuralgia Past Surgical History:  
Procedure Laterality Date  HX CATARACT REMOVAL Bilateral   
 HX GI    
 COLONOSCOPY  
 HX HEENT    
 BIOPSY OF SINUS   
 HX HEENT Left   
 sew eye closed  HX HERNIA REPAIR Right INGUINAL  
 HX KNEE ARTHROSCOPY Right 2007  HX KNEE REPLACEMENT Right 2012 Total Knee replacement- right  HX ROTATOR CUFF REPAIR Right   
 right rotator cuff repair Social History Tobacco Use  Smoking status: Former Smoker Packs/day: 1.00 Years: 40.00 Pack years: 40.00 Last attempt to quit: 1982 Years since quittin.3  Smokeless tobacco: Never Used  Tobacco comment: [employer] Substance Use Topics  Alcohol use: No  
  Alcohol/week: 10.5 oz Types: 21 Glasses of wine per week Frequency: Never Social History Substance and Sexual Activity Alcohol Use No  
 Alcohol/week: 10.5 oz  Types: 21 Glasses of wine per week  Frequency: Never Social History Substance and Sexual Activity Drug Use No  
  
Social History Tobacco Use Smoking Status Former Smoker  Packs/day: 1.00  Years: 40.00  Pack years: 40.00  Last attempt to quit: 1982  Years since quittin.3 Smokeless Tobacco Never Used Tobacco Comment [employer] Current Outpatient Medications Medication Sig  
 spironolactone (ALDACTONE) 25 mg tablet Take 25 mg by mouth daily.  furosemide (LASIX) 40 mg tablet Take 1 Tab by mouth every Monday, Thursday, Saturday.  peg 400-propylene glycol (SYSTANE, PROPYLENE GLYCOL,) 0.4-0.3 % drop 1 Drop four (4) times daily. Indications: Both eyes  dexamethasone (DECADRON) 1 mg tablet Take 2 mg by mouth daily.  SENNOSIDES (SENNA LAXATIVE PO) Take 8.6 mg by mouth daily.  acetaminophen (TYLENOL EXTRA STRENGTH) 500 mg tablet Take 2 Tabs by mouth as needed for Pain. No more than 3grams in 24hour period  Indications: Pain  
 gabapentin (NEURONTIN) 600 mg tablet Take 0.5 Tabs by mouth two (2) times a day. Indications: trigeminal neuralgia  levETIRAcetam (KEPPRA) 500 mg tablet Take 1 Tab by mouth two (2) times a day.  cyanocobalamin (VITAMIN B-12) 1,000 mcg tablet Take 1,000 mcg by mouth daily.  cholecalciferol (VITAMIN D3) 1,000 unit cap Take 1,000 Units by mouth daily.  docusate sodium (COLACE) 100 mg capsule Take 100 mg by mouth nightly.  bacitracin ophthalmic ointment Administer  to left eye daily.  clopidogrel (PLAVIX) 75 mg tablet Take 75 mg by mouth daily. TAKES IN AM  
 atorvastatin (LIPITOR) 10 mg tablet Take 10 mg by mouth nightly. No current facility-administered medications for this encounter. Objective: 
Visit Vitals /89 (BP 1 Location: Right arm, BP Patient Position: Sitting) Pulse 83 Temp 98.9 °F (37.2 °C) Resp 16 Vascular: B/L LE 
DP 1/4; PT 1/4 
capillary fill time brisk, pitting edema is present, skin temperature is cool, varicosities are present. Dermatological: 
 
Left limb erythematous and edematous Wound: 1 Location: left leg x3 Measurements: per RN note Margins: indurated Drainage: serous Odor: none Wound base: 50% fibrotic 50% granular Lymphangitic streaking? No. 
Undermining? No. 
Sinus tracts? No. 
Exposed bone? No. 
Subcutaneous crepitation on palpation? No. 
 
ound: 1 Location: right leg x4 Measurements: per RN note Margins: indurated Drainage: serous Odor: none Wound base: 50% fibrotic, 50% granular Lymphangitic streaking? No. 
Undermining? No. 
Sinus tracts? No. 
Exposed bone? No. 
Subcutaneous crepitation on palpation? No. 
 
Nails are thickened, elongated, discolored, painful to palpation, 3mm thick, with subungual debris. There are extensive skin cracks at both heels. Skin is dry and scaly, exhibits hemosiderin deposition. There is no maceration of the interspaces of the feet b/l. Lesions are present consisting of hyperkeratosis at left 4th interspace Neurological: DTR are present, protective sensation per 5.07 [place] monofilament is diminished, patient is AAOx3, mood is normal. Epicritic sensation is intact. Orthopedic: B/L LE are symmetric, ROM of ankle, STJ, 1st MTPJ is limited, MMT 5 out of 5 for B/L LE. There has been no amputations Constitutional: Pt is a well developed, elderly average male Lymphatics: negative tenderness to palpation of neck/axillary/inguinal nodes.

## 2019-04-25 NOTE — WOUND CARE
04/25/19 1335 Wound Leg Lower Right; Anterior Date First Assessed/Time First Assessed: 02/07/19 1349   POA: Yes  Wound Type: Venous  Location: Leg Lower  Orientation: Right; Anterior Dressing Status  Clean, dry, and intact; Removed Dressing Type  Compression Wrap/Venous Stasis Wound Length (cm) 1.5 cm Wound Width (cm) 1 cm Wound Depth (cm) 0.4 Wound Surface area (cm^2) 1.5 cm^2 Change in Wound Size % 50 Condition of Base Pink;Slough Condition of Edges Closed Drainage Amount  Small Drainage Color Serous Wound Odor None Periwound Skin Condition Intact Cleansing and Cleansing Agents  Soap and water Wound Leg Lower Medial;Right Date First Assessed/Time First Assessed: 02/07/19 1351   POA: Yes  Wound Type: Venous  Location: Leg Lower  Orientation: Medial;Right Dressing Status  Clean, dry, and intact; Removed Dressing Type  Compression Wrap/Venous Stasis Wound Length (cm) 2 cm Wound Width (cm) 0.5 cm Wound Depth (cm) 0.1 Wound Surface area (cm^2) 1 cm^2 Change in Wound Size % -78.57 Condition of Base Pink;Slough Condition of Edges Open Drainage Amount  None Drainage Color Serous Wound Odor None Periwound Skin Condition Intact Cleansing and Cleansing Agents  Soap and water Wound Leg Lower Posterior;Right Date First Assessed/Time First Assessed: 02/07/19 1357   POA: Yes  Wound Type: Venous  Location: Leg Lower  Orientation: Posterior;Right Dressing Status  Clean, dry, and intact; Removed Dressing Type  Compression Wrap/Venous Stasis Wound Length (cm) 1 cm Wound Width (cm) 0.9 cm Wound Depth (cm) 0.5 Wound Surface area (cm^2) 0.9 cm^2 Change in Wound Size % 10 Condition of [facility] Drainage Amount  Small Drainage Color Serous Wound Odor None Periwound Skin Condition Intact Cleansing and Cleansing Agents  Soap and water Wound Leg Lower Left;Posterior Date First Assessed/Time First Assessed: 02/07/19 1358   POA: Yes  Wound Type: Venous  Location: Leg Lower  Orientation: Left;Posterior Dressing Status  Clean, dry, and intact; Removed Dressing Type  Compression Wrap/Venous Stasis Wound Length (cm) 2 cm Wound Width (cm) 1.5 cm Wound Depth (cm) 0.3 Wound Surface area (cm^2) 3 cm^2 Change in Wound Size % -200 Condition of Base [place];Slough Drainage Amount  Moderate Drainage Color Serous Wound Odor None Periwound Skin Condition Intact Cleansing and Cleansing Agents  Soap and water Wound Leg Lower Anterior; Left Date First Assessed/Time First Assessed: 02/07/19 1400   POA: Yes  Wound Type: Venous  Location: Leg Lower  Orientation: Anterior; Left Dressing Status  Clean, dry, and intact; Removed Dressing Type  Compression Wrap/Venous Stasis Wound Length (cm) 0.5 cm Wound Width (cm) 0.4 cm Wound Depth (cm) 0.1 Wound Surface area (cm^2) 0.2 cm^2 Change in Wound Size % 80 Condition of [place];Slough Drainage Amount  Moderate Drainage Color Serous Wound Odor None Periwound Skin Condition Intact Cleansing and Cleansing Agents  Soap and water Wound Leg Lower Lateral;Left Date First Assessed/Time First Assessed: 02/07/19 1401   POA: Yes  Wound Type: Venous  Location: Leg Lower  Orientation: Lateral;Left Dressing Status  Clean, dry, and intact; Removed Dressing Type  Compression Wrap/Venous Stasis Wound Length (cm) 2 cm Wound Width (cm) 1.5 cm Wound Depth (cm) 0.5 Wound Surface area (cm^2) 3 cm^2 Change in Wound Size % -53.06 Condition of Base Pink;Slough;[place] Drainage Amount  Moderate Drainage Color Serous Wound Odor None Periwound Skin Condition Intact Cleansing and Cleansing Agents  Soap and water Visit Vitals /89 (BP 1 Location: Right arm, BP Patient Position: Sitting) Pulse 83 Temp 98.9 °F (37.2 °C) Resp 16

## 2019-04-29 ENCOUNTER — HOSPITAL ENCOUNTER (OUTPATIENT)
Dept: WOUND CARE | Age: 84
Discharge: HOME | End: 2019-04-29
Payer: MEDICARE

## 2019-04-29 VITALS
SYSTOLIC BLOOD PRESSURE: 111 MMHG | HEART RATE: 71 BPM | DIASTOLIC BLOOD PRESSURE: 77 MMHG | RESPIRATION RATE: 18 BRPM | TEMPERATURE: 97.7 F

## 2019-04-29 PROCEDURE — 29581 APPL MULTLAYER CMPRN SYS LEG: CPT

## 2019-04-29 NOTE — WOUND CARE
Nurse Visit for dressing Change 04/29/19 0940 Wound Leg Lower Right; Anterior Date First Assessed/Time First Assessed: 02/07/19 1349   POA: Yes  Wound Type: Venous  Location: Leg Lower  Orientation: Right; Anterior Dressing Status  Breakthrough drainage;Removed Dressing Type  Compression Wrap/Venous Stasis; Absorptive; Aquacel Drainage Amount  Large Drainage Color Serous Wound Odor None Periwound Skin Condition Intact Cleansing and Cleansing Agents  Soap and water Dressing Type Applied Silver products;ABD pad;Compression Wrap/Venous Stasis Procedure Tolerated Well Wound Leg Lower Medial;Right Date First Assessed/Time First Assessed: 02/07/19 1351   POA: Yes  Wound Type: Venous  Location: Leg Lower  Orientation: Medial;Right Dressing Status  Breakthrough drainage;Removed Dressing Type  Compression Wrap/Venous Stasis; Absorptive; Aquacel Drainage Amount  Large Drainage Color Serous Wound Odor None Periwound Skin Condition Intact Cleansing and Cleansing Agents  Soap and water Dressing Type Applied Silver products;ABD pad;Compression Wrap/Venous Stasis Procedure Tolerated Well Wound Leg Lower Posterior;Right Date First Assessed/Time First Assessed: 02/07/19 1357   POA: Yes  Wound Type: Venous  Location: Leg Lower  Orientation: Posterior;Right Dressing Status  Breakthrough drainage;Removed Dressing Type  Compression Wrap/Venous Stasis; Absorptive; Aquacel Drainage Amount  Large Drainage Color Serous Wound Odor None Periwound Skin Condition Intact Cleansing and Cleansing Agents  Soap and water Dressing Type Applied Silver products;ABD pad;Compression Wrap/Venous Stasis Wound Leg Lower Left;Posterior Date First Assessed/Time First Assessed: 02/07/19 1358   POA: Yes  Wound Type: Venous  Location: Leg Lower  Orientation: Left;Posterior Dressing Status  Removed Dressing Type  Compression Wrap/Venous Stasis; Absorptive; Aquacel Drainage Amount  Moderate Drainage Color Serous Wound Odor None Periwound Skin Condition Intact Cleansing and Cleansing Agents  Soap and water Wound Leg Lower Anterior; Left Date First Assessed/Time First Assessed: 02/07/19 1400   POA: Yes  Wound Type: Venous  Location: Leg Lower  Orientation: Anterior; Left Dressing Status  Removed Dressing Type  Compression Wrap/Venous Stasis; Absorptive; Aquacel Drainage Amount  Moderate Drainage Color Serous Wound Odor None Periwound Skin Condition Intact Cleansing and Cleansing Agents  Soap and water Dressing Type Applied Silver products; Absorptive; Compression Wrap/Venous Stasis Procedure Tolerated Well Wound Leg Lower Lateral;Left Date First Assessed/Time First Assessed: 02/07/19 1401   POA: Yes  Wound Type: Venous  Location: Leg Lower  Orientation: Lateral;Left Dressing Status  Removed Dressing Type  Compression Wrap/Venous Stasis; Absorptive; Aquacel Drainage Amount  Moderate Drainage Color Serous Wound Odor None Periwound Skin Condition Intact Cleansing and Cleansing Agents  Soap and water Dressing Type Applied Silver products; Absorptive; Compression Wrap/Venous Stasis Procedure Tolerated Well Visit Vitals /77 (BP 1 Location: Right arm, BP Patient Position: Sitting) Pulse 71 Temp 97.7 °F (36.5 °C) Resp 18 Discharge Condition:Stable Ambulatory Status:Walker Discharge Destination:Facility Transportation: Private Accompanied by: Family

## 2019-05-02 ENCOUNTER — HOSPITAL ENCOUNTER (OUTPATIENT)
Dept: WOUND CARE | Age: 84
Discharge: HOME | End: 2019-05-02
Payer: MEDICARE

## 2019-05-02 VITALS
SYSTOLIC BLOOD PRESSURE: 121 MMHG | RESPIRATION RATE: 18 BRPM | TEMPERATURE: 97.4 F | HEART RATE: 81 BPM | DIASTOLIC BLOOD PRESSURE: 84 MMHG

## 2019-05-02 PROCEDURE — 74011000250 HC RX REV CODE- 250: Performed by: PODIATRIST

## 2019-05-02 PROCEDURE — 11042 DBRDMT SUBQ TIS 1ST 20SQCM/<: CPT

## 2019-05-02 RX ADMIN — Medication: at 14:00

## 2019-05-02 NOTE — WOUND CARE
05/02/19 1301 Wound Leg lower Posterior;Right;Distal  
Date First Assessed/Time First Assessed: 05/02/19 1304   Present on Hospital Admission: Yes  Primary Wound Type: Venous  Location: Leg lower  Wound Location Orientation: Posterior;Right;Distal  
Dressing Status Other (Comment) Dressing Type Open to air Non-staged Wound Description Partial thickness Wound Length (cm) 0.9 cm Wound Width (cm) 0.4 cm Wound Depth (cm) 0.3 cm Wound Volume (cm^3) 0.11 cm^3 Condition of [facility] Drainage Amount Moderate Drainage Color Serous Wound Odor None Dana-wound Assessment Intact Cleansing and Cleansing Agents  Normal saline Wound Leg Lower Anterior; Left Date First Assessed/Time First Assessed: 02/07/19 1400   POA: Yes  Wound Type: Venous  Location: Leg Lower  Orientation: Anterior; Left Dressing Status  Other (comment) Dressing Type  Open to air Non-Pressure Injury Partial thickness (epider/derm) Wound Length (cm) 0.2 cm Wound Width (cm) 0.2 cm Wound Depth (cm) 0.1 Wound Surface area (cm^2) 0.04 cm^2 Change in Wound Size % 96 Condition of [facility] Drainage Amount  Moderate Drainage Color Serous Wound Odor None Periwound Skin Condition Intact Cleansing and Cleansing Agents  Normal saline Wound Leg Lower Lateral;Left Date First Assessed/Time First Assessed: 02/07/19 1401   POA: Yes  Wound Type: Venous  Location: Leg Lower  Orientation: Lateral;Left Dressing Status  Other (comment) Dressing Type  Open to air Non-Pressure Injury Partial thickness (epider/derm) Wound Length (cm) 1.5 cm Wound Width (cm) 1.3 cm Wound Depth (cm) 0.7 Wound Surface area (cm^2) 1.95 cm^2 Change in Wound Size % 0.51 Condition of [facility] Drainage Amount  Moderate Drainage Color Serous Wound Odor None Periwound Skin Condition Intact Cleansing and Cleansing Agents  Normal saline Wound Leg Lower Left;Posterior Date First Assessed/Time First Assessed: 02/07/19 1358   POA: Yes  Wound Type: Venous  Location: Leg Lower  Orientation: Left;Posterior Dressing Status  Other (comment) Dressing Type  Open to air Non-Pressure Injury Partial thickness (epider/derm) Wound Length (cm) 2 cm Wound Width (cm) 0.6 cm Wound Depth (cm) 0.3 Wound Surface area (cm^2) 1.2 cm^2 Change in Wound Size % -20 Condition of [facility] Drainage Amount  Moderate Drainage Color Serous Wound Odor None Periwound Skin Condition Intact Cleansing and Cleansing Agents  Normal saline Wound Leg Lower Right; Anterior Date First Assessed/Time First Assessed: 02/07/19 1349   POA: Yes  Wound Type: Venous  Location: Leg Lower  Orientation: Right; Anterior Dressing Status  Other (comment) Dressing Type  Open to air Non-Pressure Injury Partial thickness (epider/derm) Wound Length (cm) 1.3 cm Wound Width (cm) 0.8 cm Wound Depth (cm) 0.3 Wound Surface area (cm^2) 1.04 cm^2 Change in Wound Size % 65.33 Condition of [facility] Drainage Amount  Moderate Drainage Color Serous Wound Odor None Periwound Skin Condition Intact Cleansing and Cleansing Agents  Normal saline Wound Leg Lower Medial;Right Date First Assessed/Time First Assessed: 02/07/19 1351   POA: Yes  Wound Type: Venous  Location: Leg Lower  Orientation: Medial;Right Dressing Status  Other (comment) Dressing Type  Open to air Non-Pressure Injury Partial thickness (epider/derm) Wound Length (cm) 0.4 cm Wound Width (cm) 0.2 cm Wound Depth (cm) 0.2 Wound Surface area (cm^2) 0.08 cm^2 Change in Wound Size % 85.71 Condition of [facility] Drainage Amount  Moderate Drainage Color Serous Wound Odor None Periwound Skin Condition Intact Cleansing and Cleansing Agents  Normal saline Wound Leg Lower Posterior;Right;Proximal  
Date First Assessed/Time First Assessed: 02/07/19 1357   POA: Yes  Wound Type: Venous  Location: Leg Lower  Orientation: Posterior;Right;Proximal  
Dressing Status  Other (comment) Dressing Type  Open to air Non-Pressure Injury Partial thickness (epider/derm) Wound Length (cm) 0.9 cm Wound Width (cm) 0.5 cm Wound Depth (cm) 0.4 Wound Surface area (cm^2) 0.45 cm^2 Change in Wound Size % 55 Condition of [facility] Drainage Amount  Moderate Drainage Color Serous Wound Odor None Periwound Skin Condition Intact Cleansing and Cleansing Agents  Normal saline Visit Vitals /84 Pulse 81 Temp 97.4 °F (36.3 °C) Resp 18

## 2019-05-02 NOTE — WOUND CARE
April Tuttle DPM - Omar Harris, Lito Davis 3 - Follow up Assessment/Plan: 
 
 
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 - legs improved since last seen - Wound debrided - see procedure note - SONIDO/ PVR - revewied - no significant vascular disease - Venous duplex- No DVT noted - Aquacell applied to both leg ulceration - Compression with B/L 3 layer compression wraps. -F/U 1 week. Subjective: 
Patient comes in for follow up to B/L LE ulcerations. Patient's wife states wraps were changed at the [facility] during a nurse visit . Negative for fevers chills, nausea, vomitting, SOB. HPI: 
Pt complains of wound to left and right leg. States they have been present for 6 months. Wound have not improved and gotten worse. Has tried antibiotic ointment with minimal success. Patient was referred here by PCP for wound care. He comes in with a caregiver who states he lives at [facility] AT [place]. Caregiver states that he is not compliant with compression. He had a DVT 6 months ago and an IVC filter was placed. History: 
Wound Care Allergies Allergen Reactions  Adhesive Tape-Silicones Other (comments) REDNESS  Aggrenox [Aspirin-Dipyridamole] Other (comments)  
  headache  Amlodipine Rash  Diuril [Chlorothiazide] Unknown (comments)  Hydrochlorothiazide Other (comments) LOW NA Family History Problem Relation Age of Onset  Cancer Mother   
     colon  Cancer Father   
     throat  No Known Problems Brother  Anesth Problems Neg Hx Past Medical History:  
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   
 pt denies  Nasal sinus tumor  Numbness of LEFT hand & LEFT face   
 RBBB (right bundle branch block)  TIA (transient ischemic attack) 12 TIA's over 9 YRS.1ST ONE IN - LAST IN   Trigeminal neuralgia Past Surgical History:  
Procedure Laterality Date  HX CATARACT REMOVAL Bilateral   
 HX GI    
 COLONOSCOPY  
 HX HEENT    
 BIOPSY OF SINUS   
 HX HEENT Left   
 sew eye closed  HX HERNIA REPAIR Right INGUINAL  
 HX KNEE ARTHROSCOPY Right 2007  HX KNEE REPLACEMENT Right 2012 Total Knee replacement- right  HX ROTATOR CUFF REPAIR Right   
 right rotator cuff repair Social History Tobacco Use  Smoking status: Former Smoker Packs/day: 1.00 Years: 40.00 Pack years: 40.00 Last attempt to quit: 1982 Years since quittin.3  Smokeless tobacco: Never Used  Tobacco comment: Wendie Hernandez Substance Use Topics  Alcohol use: No  
  Alcohol/week: 10.5 oz Types: 21 Glasses of wine per week Frequency: Never Social History Substance and Sexual Activity Alcohol Use No  
 Alcohol/week: 10.5 oz  Types: 21 Glasses of wine per week  Frequency: Never Social History Substance and Sexual Activity Drug Use No  
  
Social History Tobacco Use Smoking Status Former Smoker  Packs/day: 1.00  Years: 40.00  Pack years: 40.00  Last attempt to quit: 1982  Years since quittin.3 Smokeless Tobacco Never Used Tobacco Comment Wendie Hernandez Current Outpatient Medications Medication Sig  
 spironolactone (ALDACTONE) 25 mg tablet Take 25 mg by mouth daily.  furosemide (LASIX) 40 mg tablet Take 1 Tab by mouth every Monday, Thursday, Saturday.  peg 400-propylene glycol (SYSTANE, PROPYLENE GLYCOL,) 0.4-0.3 % drop 1 Drop four (4) times daily. Indications: Both eyes  dexamethasone (DECADRON) 1 mg tablet Take 2 mg by mouth daily.  SENNOSIDES (SENNA LAXATIVE PO) Take 8.6 mg by mouth daily.  acetaminophen (TYLENOL EXTRA STRENGTH) 500 mg tablet Take 2 Tabs by mouth as needed for Pain. No more than 3grams in 24hour period  Indications: Pain  
 gabapentin (NEURONTIN) 600 mg tablet Take 0.5 Tabs by mouth two (2) times a day. Indications: trigeminal neuralgia  levETIRAcetam (KEPPRA) 500 mg tablet Take 1 Tab by mouth two (2) times a day.  cyanocobalamin (VITAMIN B-12) 1,000 mcg tablet Take 1,000 mcg by mouth daily.  cholecalciferol (VITAMIN D3) 1,000 unit cap Take 1,000 Units by mouth daily.  docusate sodium (COLACE) 100 mg capsule Take 100 mg by mouth nightly.  bacitracin ophthalmic ointment Administer  to left eye daily.  clopidogrel (PLAVIX) 75 mg tablet Take 75 mg by mouth daily. TAKES IN AM  
 atorvastatin (LIPITOR) 10 mg tablet Take 10 mg by mouth nightly. No current facility-administered medications for this encounter. Objective: 
Visit Vitals /84 Pulse 81 Temp 97.4 °F (36.3 °C) Resp 18 Vascular: B/L LE 
DP 1/4; PT 1/4 
capillary fill time brisk, pitting edema is present, skin temperature is cool, varicosities are present. Dermatological: 
 
Left limb erythematous and edematous Wound: 1 Location: left leg x3 Measurements: per RN note Margins: indurated Drainage: serous Odor: none Wound base: 50% fibrotic 50% granular Lymphangitic streaking? No. 
Undermining? No. 
Sinus tracts? No. 
Exposed bone? No. 
Subcutaneous crepitation on palpation? No. 
 
ound: 1 Location: right leg x4 Measurements: per RN note Margins: indurated Drainage: serous Odor: none Wound base: 50% fibrotic, 50% granular Lymphangitic streaking? No. 
Undermining? No. 
Sinus tracts? No. 
Exposed bone?  No. 
 Subcutaneous crepitation on palpation? No. 
 
Nails are thickened, elongated, discolored, painful to palpation, 3mm thick, with subungual debris. There are extensive skin cracks at both heels. Skin is dry and scaly, exhibits hemosiderin deposition. There is no maceration of the interspaces of the feet b/l. Lesions are present consisting of hyperkeratosis at left 4th interspace Neurological: DTR are present, protective sensation per 5.07 [place] Tamir monofilament is diminished, patient is AAOx3, mood is normal. Epicritic sensation is intact. Orthopedic: B/L LE are symmetric, ROM of ankle, STJ, 1st MTPJ is limited, MMT 5 out of 5 for B/L LE. There has been no amputations Constitutional: Pt is a well developed, elderly average male Lymphatics: negative tenderness to palpation of neck/axillary/inguinal nodes. 
 
  
Procedure Note: 
Excisional debridement through level of left and right leg. Location / Ulcer: left and right leg Indication: to remove non-viable tissue from wound bed. Consent in chart. Anesthesia: 2% lidocaine Instrument: sneha Residual necrosis: none Bleeding: minimal 
Hemostasis: pressure Pre-Procedure Pain: 0 Post-Procedure Pain: 2 Area debrided < 20 cm sq. Pre-Debridement measurements: see nursing notes Post-Debridement measurements: see nursing notes This is part of a series of staged procedures in an attempt at limb salvage.

## 2019-05-02 NOTE — WOUND CARE
Discharge Condition:Stable Ambulatory Status:Walker Discharge Destination:Home Transportation: Private Accompanied by: Family

## 2019-05-09 ENCOUNTER — APPOINTMENT (OUTPATIENT)
Dept: GENERAL RADIOLOGY | Age: 84
DRG: 602 | End: 2019-05-09
Attending: EMERGENCY MEDICINE
Payer: MEDICARE

## 2019-05-09 ENCOUNTER — HOSPITAL ENCOUNTER (INPATIENT)
Age: 84
LOS: 6 days | Discharge: NURSING, REHAB OR LONG-TERM CARE | DRG: 602 | End: 2019-05-15
Attending: EMERGENCY MEDICINE | Admitting: HOSPITALIST
Payer: MEDICARE

## 2019-05-09 ENCOUNTER — APPOINTMENT (OUTPATIENT)
Dept: CT IMAGING | Age: 84
DRG: 602 | End: 2019-05-09
Attending: HOSPITALIST
Payer: MEDICARE

## 2019-05-09 ENCOUNTER — HOSPITAL ENCOUNTER (OUTPATIENT)
Dept: WOUND CARE | Age: 84
Discharge: HOME | End: 2019-05-09
Payer: MEDICARE

## 2019-05-09 VITALS
TEMPERATURE: 98 F | DIASTOLIC BLOOD PRESSURE: 92 MMHG | RESPIRATION RATE: 16 BRPM | HEART RATE: 102 BPM | SYSTOLIC BLOOD PRESSURE: 137 MMHG

## 2019-05-09 DIAGNOSIS — R41.82 ALTERED MENTAL STATUS, UNSPECIFIED ALTERED MENTAL STATUS TYPE: ICD-10-CM

## 2019-05-09 DIAGNOSIS — A41.9 SEPSIS, DUE TO UNSPECIFIED ORGANISM: Primary | ICD-10-CM

## 2019-05-09 DIAGNOSIS — L03.119 CELLULITIS OF LOWER EXTREMITY, UNSPECIFIED LATERALITY: ICD-10-CM

## 2019-05-09 PROBLEM — L03.90 CELLULITIS: Status: ACTIVE | Noted: 2019-05-09

## 2019-05-09 LAB
ALBUMIN SERPL-MCNC: 2.9 G/DL (ref 3.5–5)
ALBUMIN/GLOB SERPL: 0.7 {RATIO} (ref 1.1–2.2)
ALP SERPL-CCNC: 58 U/L (ref 45–117)
ALT SERPL-CCNC: 63 U/L (ref 12–78)
ANION GAP SERPL CALC-SCNC: 9 MMOL/L (ref 5–15)
APPEARANCE UR: CLEAR
AST SERPL-CCNC: 22 U/L (ref 15–37)
BASOPHILS # BLD: 0.1 K/UL (ref 0–0.1)
BASOPHILS NFR BLD: 1 % (ref 0–1)
BILIRUB SERPL-MCNC: 0.6 MG/DL (ref 0.2–1)
BILIRUB UR QL: NEGATIVE
BUN SERPL-MCNC: 21 MG/DL (ref 6–20)
BUN/CREAT SERPL: 15 (ref 12–20)
CALCIUM SERPL-MCNC: 9.3 MG/DL (ref 8.5–10.1)
CHLORIDE SERPL-SCNC: 98 MMOL/L (ref 97–108)
CO2 SERPL-SCNC: 29 MMOL/L (ref 21–32)
COLOR UR: NORMAL
COMMENT, HOLDF: NORMAL
CREAT SERPL-MCNC: 1.4 MG/DL (ref 0.7–1.3)
DIFFERENTIAL METHOD BLD: ABNORMAL
EOSINOPHIL # BLD: 0 K/UL (ref 0–0.4)
EOSINOPHIL NFR BLD: 0 % (ref 0–7)
ERYTHROCYTE [DISTWIDTH] IN BLOOD BY AUTOMATED COUNT: 16.1 % (ref 11.5–14.5)
GLOBULIN SER CALC-MCNC: 4.4 G/DL (ref 2–4)
GLUCOSE SERPL-MCNC: 174 MG/DL (ref 65–100)
GLUCOSE UR STRIP.AUTO-MCNC: NEGATIVE MG/DL
HCT VFR BLD AUTO: 44.5 % (ref 36.6–50.3)
HGB BLD-MCNC: 14.1 G/DL (ref 12.1–17)
HGB UR QL STRIP: NEGATIVE
IMM GRANULOCYTES # BLD AUTO: 0.3 K/UL (ref 0–0.04)
IMM GRANULOCYTES NFR BLD AUTO: 3 % (ref 0–0.5)
KETONES UR QL STRIP.AUTO: NEGATIVE MG/DL
LACTATE BLD-SCNC: 2.27 MMOL/L (ref 0.4–2)
LACTATE BLD-SCNC: 2.53 MMOL/L (ref 0.4–2)
LEUKOCYTE ESTERASE UR QL STRIP.AUTO: NEGATIVE
LYMPHOCYTES # BLD: 1.7 K/UL (ref 0.8–3.5)
LYMPHOCYTES NFR BLD: 16 % (ref 12–49)
MCH RBC QN AUTO: 32.9 PG (ref 26–34)
MCHC RBC AUTO-ENTMCNC: 31.7 G/DL (ref 30–36.5)
MCV RBC AUTO: 103.7 FL (ref 80–99)
MONOCYTES # BLD: 0.6 K/UL (ref 0–1)
MONOCYTES NFR BLD: 6 % (ref 5–13)
NEUTS SEG # BLD: 8.1 K/UL (ref 1.8–8)
NEUTS SEG NFR BLD: 74 % (ref 32–75)
NITRITE UR QL STRIP.AUTO: NEGATIVE
NRBC # BLD: 0 K/UL (ref 0–0.01)
NRBC BLD-RTO: 0 PER 100 WBC
PH UR STRIP: 5.5 [PH] (ref 5–8)
PLATELET # BLD AUTO: 199 K/UL (ref 150–400)
PMV BLD AUTO: 10 FL (ref 8.9–12.9)
POTASSIUM SERPL-SCNC: 4.2 MMOL/L (ref 3.5–5.1)
PROT SERPL-MCNC: 7.3 G/DL (ref 6.4–8.2)
PROT UR STRIP-MCNC: NEGATIVE MG/DL
PROT UR-MCNC: 9 MG/DL (ref 0–11.9)
RBC # BLD AUTO: 4.29 M/UL (ref 4.1–5.7)
RBC MORPH BLD: ABNORMAL
RBC MORPH BLD: ABNORMAL
SAMPLES BEING HELD,HOLD: NORMAL
SODIUM SERPL-SCNC: 136 MMOL/L (ref 136–145)
SODIUM UR-SCNC: 48 MMOL/L
SP GR UR REFRACTOMETRY: 1.01 (ref 1–1.03)
UROBILINOGEN UR QL STRIP.AUTO: 0.2 EU/DL (ref 0.2–1)
WBC # BLD AUTO: 10.8 K/UL (ref 4.1–11.1)

## 2019-05-09 PROCEDURE — 99215 OFFICE O/P EST HI 40 MIN: CPT

## 2019-05-09 PROCEDURE — 84300 ASSAY OF URINE SODIUM: CPT

## 2019-05-09 PROCEDURE — 74011250636 HC RX REV CODE- 250/636: Performed by: EMERGENCY MEDICINE

## 2019-05-09 PROCEDURE — 83605 ASSAY OF LACTIC ACID: CPT

## 2019-05-09 PROCEDURE — 70450 CT HEAD/BRAIN W/O DYE: CPT

## 2019-05-09 PROCEDURE — 71045 X-RAY EXAM CHEST 1 VIEW: CPT

## 2019-05-09 PROCEDURE — 36415 COLL VENOUS BLD VENIPUNCTURE: CPT

## 2019-05-09 PROCEDURE — 74011000250 HC RX REV CODE- 250: Performed by: HOSPITALIST

## 2019-05-09 PROCEDURE — 74011250637 HC RX REV CODE- 250/637: Performed by: HOSPITALIST

## 2019-05-09 PROCEDURE — 74011000258 HC RX REV CODE- 258: Performed by: EMERGENCY MEDICINE

## 2019-05-09 PROCEDURE — 84156 ASSAY OF PROTEIN URINE: CPT

## 2019-05-09 PROCEDURE — 81003 URINALYSIS AUTO W/O SCOPE: CPT

## 2019-05-09 PROCEDURE — 99285 EMERGENCY DEPT VISIT HI MDM: CPT

## 2019-05-09 PROCEDURE — 65660000000 HC RM CCU STEPDOWN

## 2019-05-09 PROCEDURE — 87040 BLOOD CULTURE FOR BACTERIA: CPT

## 2019-05-09 PROCEDURE — 74011000258 HC RX REV CODE- 258: Performed by: HOSPITALIST

## 2019-05-09 PROCEDURE — 74011250636 HC RX REV CODE- 250/636: Performed by: HOSPITALIST

## 2019-05-09 PROCEDURE — 80053 COMPREHEN METABOLIC PANEL: CPT

## 2019-05-09 PROCEDURE — 77030011943

## 2019-05-09 PROCEDURE — 96365 THER/PROPH/DIAG IV INF INIT: CPT

## 2019-05-09 PROCEDURE — 85025 COMPLETE CBC W/AUTO DIFF WBC: CPT

## 2019-05-09 PROCEDURE — 93005 ELECTROCARDIOGRAM TRACING: CPT

## 2019-05-09 RX ORDER — LEVETIRACETAM 500 MG/1
500 TABLET ORAL 2 TIMES DAILY
Status: DISCONTINUED | OUTPATIENT
Start: 2019-05-09 | End: 2019-05-15 | Stop reason: HOSPADM

## 2019-05-09 RX ORDER — SENNOSIDES 8.6 MG/1
1 TABLET ORAL DAILY
Status: DISCONTINUED | OUTPATIENT
Start: 2019-05-10 | End: 2019-05-15 | Stop reason: HOSPADM

## 2019-05-09 RX ORDER — SODIUM CHLORIDE 450 MG/100ML
50 INJECTION, SOLUTION INTRAVENOUS CONTINUOUS
Status: DISCONTINUED | OUTPATIENT
Start: 2019-05-09 | End: 2019-05-10

## 2019-05-09 RX ORDER — VANCOMYCIN 2 GRAM/500 ML IN 0.9 % SODIUM CHLORIDE INTRAVENOUS
2000 ONCE
Status: COMPLETED | OUTPATIENT
Start: 2019-05-09 | End: 2019-05-09

## 2019-05-09 RX ORDER — ACETAMINOPHEN 500 MG
1000 TABLET ORAL
Status: DISCONTINUED | OUTPATIENT
Start: 2019-05-09 | End: 2019-05-09 | Stop reason: SDUPTHER

## 2019-05-09 RX ORDER — SODIUM CHLORIDE 0.9 % (FLUSH) 0.9 %
5-10 SYRINGE (ML) INJECTION AS NEEDED
Status: DISCONTINUED | OUTPATIENT
Start: 2019-05-09 | End: 2019-05-15 | Stop reason: HOSPADM

## 2019-05-09 RX ORDER — SPIRONOLACTONE 25 MG/1
25 TABLET ORAL DAILY
Status: CANCELLED | OUTPATIENT
Start: 2019-05-10

## 2019-05-09 RX ORDER — DOCUSATE SODIUM 100 MG/1
100 CAPSULE, LIQUID FILLED ORAL
Status: DISCONTINUED | OUTPATIENT
Start: 2019-05-09 | End: 2019-05-15 | Stop reason: HOSPADM

## 2019-05-09 RX ORDER — ENOXAPARIN SODIUM 100 MG/ML
40 INJECTION SUBCUTANEOUS EVERY 24 HOURS
Status: DISCONTINUED | OUTPATIENT
Start: 2019-05-09 | End: 2019-05-15 | Stop reason: HOSPADM

## 2019-05-09 RX ORDER — ONDANSETRON 2 MG/ML
4 INJECTION INTRAMUSCULAR; INTRAVENOUS
Status: DISCONTINUED | OUTPATIENT
Start: 2019-05-09 | End: 2019-05-15 | Stop reason: HOSPADM

## 2019-05-09 RX ORDER — DEXAMETHASONE 1 MG/1
2 TABLET ORAL DAILY
Status: DISCONTINUED | OUTPATIENT
Start: 2019-05-10 | End: 2019-05-15 | Stop reason: HOSPADM

## 2019-05-09 RX ORDER — LANOLIN ALCOHOL/MO/W.PET/CERES
1000 CREAM (GRAM) TOPICAL DAILY
Status: DISCONTINUED | OUTPATIENT
Start: 2019-05-10 | End: 2019-05-15 | Stop reason: HOSPADM

## 2019-05-09 RX ORDER — POLYVINYL ALCOHOL 14 MG/ML
1 SOLUTION/ DROPS OPHTHALMIC 4 TIMES DAILY
Status: DISCONTINUED | OUTPATIENT
Start: 2019-05-09 | End: 2019-05-15 | Stop reason: HOSPADM

## 2019-05-09 RX ORDER — CIPROFLOXACIN HYDROCHLORIDE 3.5 MG/ML
1 SOLUTION/ DROPS TOPICAL
Status: DISCONTINUED | OUTPATIENT
Start: 2019-05-09 | End: 2019-05-15

## 2019-05-09 RX ORDER — GABAPENTIN 600 MG/1
300 TABLET ORAL 2 TIMES DAILY
Status: DISCONTINUED | OUTPATIENT
Start: 2019-05-09 | End: 2019-05-15 | Stop reason: HOSPADM

## 2019-05-09 RX ORDER — CLOPIDOGREL BISULFATE 75 MG/1
75 TABLET ORAL DAILY
Status: DISCONTINUED | OUTPATIENT
Start: 2019-05-10 | End: 2019-05-15 | Stop reason: HOSPADM

## 2019-05-09 RX ORDER — FUROSEMIDE 40 MG/1
40 TABLET ORAL
Status: CANCELLED | OUTPATIENT
Start: 2019-05-09

## 2019-05-09 RX ORDER — ATORVASTATIN CALCIUM 10 MG/1
10 TABLET, FILM COATED ORAL
Status: DISCONTINUED | OUTPATIENT
Start: 2019-05-09 | End: 2019-05-15 | Stop reason: HOSPADM

## 2019-05-09 RX ORDER — SODIUM CHLORIDE 0.9 % (FLUSH) 0.9 %
5-40 SYRINGE (ML) INJECTION AS NEEDED
Status: DISCONTINUED | OUTPATIENT
Start: 2019-05-09 | End: 2019-05-15 | Stop reason: HOSPADM

## 2019-05-09 RX ORDER — BACITRACIN ZINC AND POLYMYXIN B SULFATE 500; 10000 [USP'U]/G; [USP'U]/G
OINTMENT OPHTHALMIC
Status: DISCONTINUED | OUTPATIENT
Start: 2019-05-09 | End: 2019-05-15 | Stop reason: HOSPADM

## 2019-05-09 RX ORDER — ACETAMINOPHEN 325 MG/1
650 TABLET ORAL
Status: DISCONTINUED | OUTPATIENT
Start: 2019-05-09 | End: 2019-05-15 | Stop reason: HOSPADM

## 2019-05-09 RX ORDER — VANCOMYCIN HYDROCHLORIDE
1250 EVERY 24 HOURS
Status: DISCONTINUED | OUTPATIENT
Start: 2019-05-10 | End: 2019-05-10 | Stop reason: DRUGHIGH

## 2019-05-09 RX ORDER — SODIUM CHLORIDE 0.9 % (FLUSH) 0.9 %
5-40 SYRINGE (ML) INJECTION EVERY 8 HOURS
Status: DISCONTINUED | OUTPATIENT
Start: 2019-05-09 | End: 2019-05-15 | Stop reason: HOSPADM

## 2019-05-09 RX ADMIN — VANCOMYCIN HYDROCHLORIDE 2000 MG: 10 INJECTION, POWDER, LYOPHILIZED, FOR SOLUTION INTRAVENOUS at 19:33

## 2019-05-09 RX ADMIN — SODIUM CHLORIDE 50 ML/HR: 450 INJECTION, SOLUTION INTRAVENOUS at 20:04

## 2019-05-09 RX ADMIN — CIPROFLOXACIN HYDROCHLORIDE 1 DROP: 3 SOLUTION/ DROPS OPHTHALMIC at 19:34

## 2019-05-09 RX ADMIN — ENOXAPARIN SODIUM 40 MG: 40 INJECTION SUBCUTANEOUS at 23:25

## 2019-05-09 RX ADMIN — DOCUSATE SODIUM 100 MG: 100 CAPSULE, LIQUID FILLED ORAL at 23:27

## 2019-05-09 RX ADMIN — GABAPENTIN 300 MG: 600 TABLET, FILM COATED ORAL at 23:25

## 2019-05-09 RX ADMIN — LEVETIRACETAM 500 MG: 500 TABLET, FILM COATED ORAL at 23:33

## 2019-05-09 RX ADMIN — BACITRACIN ZINC AND POLYMYXIN B SULFATES: 500; 10000 OINTMENT OPHTHALMIC at 23:25

## 2019-05-09 RX ADMIN — CEFAZOLIN 1 G: 1 INJECTION, POWDER, FOR SOLUTION INTRAMUSCULAR; INTRAVENOUS at 23:25

## 2019-05-09 RX ADMIN — SODIUM CHLORIDE 3 G: 900 INJECTION, SOLUTION INTRAVENOUS at 17:16

## 2019-05-09 RX ADMIN — SODIUM CHLORIDE 1000 ML: 900 INJECTION, SOLUTION INTRAVENOUS at 17:06

## 2019-05-09 RX ADMIN — SODIUM CHLORIDE 1000 ML: 900 INJECTION, SOLUTION INTRAVENOUS at 18:11

## 2019-05-09 RX ADMIN — POLYVINYL ALCOHOL 1 DROP: 14 SOLUTION/ DROPS OPHTHALMIC at 23:25

## 2019-05-09 RX ADMIN — SODIUM CHLORIDE 267 ML: 900 INJECTION, SOLUTION INTRAVENOUS at 18:11

## 2019-05-09 RX ADMIN — ATORVASTATIN CALCIUM 10 MG: 10 TABLET, FILM COATED ORAL at 23:31

## 2019-05-09 RX ADMIN — Medication 10 ML: at 23:27

## 2019-05-09 NOTE — PROGRESS NOTES
Patient more lethargic in last 24-48 hours per family. Patient with increased HR. Patient advised by DPM to proceed to ER.

## 2019-05-09 NOTE — WOUND CARE
Chani Levi DPM - Cain Browne, Lito Davis 3 - Follow up Assessment/Plan: 
 
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 - Patient sent to the ER since he is increased somnolence and lethargic over the past 48 hours. - B/L LE wounds and limbs stable at this point - SONIDO/ PVR - revewied - no significant vascular disease - Venous duplex- No DVT noted - Aquacell applied to both leg ulceration - Compression with double tubigrip.  
-F/U next week if patient is not admitted Subjective: 
Patient comes in for follow up to B/L LE ulcerations. Wife states patient has been somnolent and has been lethargic over the last 48 hours. Negative for fevers chills, nausea, vomitting, SOB. HPI: 
Pt complains of wound to left and right leg. States they have been present for 6 months. Wound have not improved and gotten worse. Has tried antibiotic ointment with minimal success. Patient was referred here by PCP for wound care. He comes in with a caregiver who states he lives at [facility] AT [place]. Caregiver states that he is not compliant with compression. He had a DVT 6 months ago and an IVC filter was placed. History: 
Wound Care Allergies Allergen Reactions  Adhesive Tape-Silicones Other (comments) REDNESS  Aggrenox [Aspirin-Dipyridamole] Other (comments)  
  headache  Amlodipine Rash  Diuril [Chlorothiazide] Unknown (comments)  Hydrochlorothiazide Other (comments) LOW NA Family History Problem Relation Age of Onset  Cancer Mother   
     colon  Cancer Father throat  No Known Problems Brother  Anesth Problems Neg Hx Past Medical History:  
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   
 pt denies  Nasal sinus tumor  Numbness of LEFT hand & LEFT face   
 RBBB (right bundle branch block)  TIA (transient ischemic attack) 12 TIA's over 9 YRS.1ST ONE IN - LAST IN   Trigeminal neuralgia Past Surgical History:  
Procedure Laterality Date  HX CATARACT REMOVAL Bilateral   
 HX GI    
 COLONOSCOPY  
 HX HEENT    
 BIOPSY OF SINUS   
 HX HEENT Left   
 sew eye closed  HX HERNIA REPAIR Right INGUINAL  
 HX KNEE ARTHROSCOPY Right 2007  HX KNEE REPLACEMENT Right 2012 Total Knee replacement- right  HX ROTATOR CUFF REPAIR Right   
 right rotator cuff repair Social History Tobacco Use  Smoking status: Former Smoker Packs/day: 1.00 Years: 40.00 Pack years: 40.00 Last attempt to quit: 1982 Years since quittin.3  Smokeless tobacco: Never Used  Tobacco comment: Karina Banda Substance Use Topics  Alcohol use: No  
  Alcohol/week: 10.5 oz Types: 21 Glasses of wine per week Frequency: Never Social History Substance and Sexual Activity Alcohol Use No  
 Alcohol/week: 10.5 oz  Types: 21 Glasses of wine per week  Frequency: Never Social History Substance and Sexual Activity Drug Use No  
  
Social History Tobacco Use Smoking Status Former Smoker  Packs/day: 1.00  Years: 40.00  Pack years: 40.00  Last attempt to quit: 1982  Years since quittin.3 Smokeless Tobacco Never Used Tobacco Comment Karina Banda Current Outpatient Medications Medication Sig  
  spironolactone (ALDACTONE) 25 mg tablet Take 25 mg by mouth daily.  furosemide (LASIX) 40 mg tablet Take 1 Tab by mouth every Monday, Thursday, Saturday.  peg 400-propylene glycol (SYSTANE, PROPYLENE GLYCOL,) 0.4-0.3 % drop 1 Drop four (4) times daily. Indications: Both eyes  dexamethasone (DECADRON) 1 mg tablet Take 2 mg by mouth daily.  SENNOSIDES (SENNA LAXATIVE PO) Take 8.6 mg by mouth daily.  acetaminophen (TYLENOL EXTRA STRENGTH) 500 mg tablet Take 2 Tabs by mouth as needed for Pain. No more than 3grams in 24hour period  Indications: Pain  
 gabapentin (NEURONTIN) 600 mg tablet Take 0.5 Tabs by mouth two (2) times a day. Indications: trigeminal neuralgia  levETIRAcetam (KEPPRA) 500 mg tablet Take 1 Tab by mouth two (2) times a day.  cyanocobalamin (VITAMIN B-12) 1,000 mcg tablet Take 1,000 mcg by mouth daily.  cholecalciferol (VITAMIN D3) 1,000 unit cap Take 1,000 Units by mouth daily.  docusate sodium (COLACE) 100 mg capsule Take 100 mg by mouth nightly.  bacitracin ophthalmic ointment Administer  to left eye daily.  clopidogrel (PLAVIX) 75 mg tablet Take 75 mg by mouth daily. TAKES IN AM  
 atorvastatin (LIPITOR) 10 mg tablet Take 10 mg by mouth nightly. No current facility-administered medications for this encounter. Objective: 
Visit Vitals BP (!) 137/92 (BP 1 Location: Right arm, BP Patient Position: Sitting) Pulse (!) 102 Temp 98 °F (36.7 °C) Resp 16 Vascular: B/L LE 
DP 1/4; PT 1/4 
capillary fill time brisk, pitting edema is present, skin temperature is cool, varicosities are present. Dermatological: 
 
Left limb erythematous and edematous Wound: 1 Location: left leg x3 Measurements: per RN note Margins: indurated Drainage: serous Odor: none Wound base: 50% fibrotic 50% granular Lymphangitic streaking? No. 
Undermining? No. 
Sinus tracts? No. 
Exposed bone? No. 
Subcutaneous crepitation on palpation? No. 
 
ound: 1 Location: right leg x4 Measurements: per RN note Margins: indurated Drainage: serous Odor: none Wound base: 50% fibrotic, 50% granular Lymphangitic streaking? No. 
Undermining? No. 
Sinus tracts? No. 
Exposed bone? No. 
Subcutaneous crepitation on palpation? No. 
 
Nails are thickened, elongated, discolored, painful to palpation, 3mm thick, with subungual debris. There are extensive skin cracks at both heels. Skin is dry and scaly, exhibits hemosiderin deposition. There is no maceration of the interspaces of the feet b/l. Lesions are present consisting of hyperkeratosis at left 4th interspace Neurological: DTR are present, protective sensation per 5.07 [place] Tamir monofilament is diminished, patient is AAOx0, mood is somnolent. Epicritic sensation is intact. Orthopedic: B/L LE are symmetric, ROM of ankle, STJ, 1st MTPJ is limited, MMT 5 out of 5 for B/L LE. There has been no amputations Constitutional: Pt is a well developed, elderly average male Lymphatics: negative tenderness to palpation of neck/axillary/inguinal nodes.

## 2019-05-09 NOTE — PROGRESS NOTES
Admission Medication Reconciliation: 
Information obtained from:  Family/RxQuery Comments/Recommendations: Updated PTA meds/reviewed patient's allergies. 1)  Reviewed patient's medication list with family in the room. Family states that at the [facility] patient was prescribed an oral antifungal for the fungus on patient's foot, but cannot recall its name. Attempted to contact [facility] but no answer at this time. Family expresses concern for patient's need for eye lubricant at this time. 2)  Medication changes (since last review): Added 
-Antifungal po (unknown medication) - for fungus on foot Adjusted 
-Systane 0.4-0.3% eye drops 1drop QID both eyes --> right eye Allergies:  Adhesive tape-silicones; Aggrenox [aspirin-dipyridamole]; Amlodipine; Diuril [chlorothiazide]; and Hydrochlorothiazide Significant PMH/Disease States:  
Past Medical History:  
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  
 GERD (gastroesophageal reflux disease) Hypercholesterolemia Hypertension   
 pt denies Nasal sinus tumor Numbness of LEFT hand & LEFT face 2010 RBBB (right bundle branch block) TIA (transient ischemic attack) 12 TIA's over 9 YRS.1ST ONE IN 8/03- LAST IN 2/12 Trigeminal neuralgia Chief Complaint for this Admission: Chief Complaint Patient presents with Wound Check Altered mental status Prior to Admission Medications:  
Prior to Admission Medications Prescriptions Last Dose Informant Patient Reported? Taking? OTHER   Yes Yes Sig: PO Antifungal - family says that patient was instructed to take for fungus on foot. acetaminophen (TYLENOL EXTRA STRENGTH) 500 mg tablet   No Yes Sig: Take 2 Tabs by mouth as needed for Pain. No more than 3grams in 24hour period  Indications: Pain  
atorvastatin (LIPITOR) 10 mg tablet  Self Yes Yes Sig: Take 10 mg by mouth nightly. bacitracin ophthalmic ointment  Self Yes Yes Sig: Administer  to left eye daily. cholecalciferol (VITAMIN D3) 1,000 unit cap  Self Yes Yes Sig: Take 1,000 Units by mouth daily. clopidogrel (PLAVIX) 75 mg tablet   Yes Yes Sig: Take 75 mg by mouth daily. TAKES IN AM  
cyanocobalamin (VITAMIN B-12) 1,000 mcg tablet  Self Yes Yes Sig: Take 1,000 mcg by mouth daily. dexamethasone (DECADRON) 1 mg tablet   Yes Yes Sig: Take 2 mg by mouth daily. docusate sodium (COLACE) 100 mg capsule  Self Yes Yes Sig: Take 100 mg by mouth nightly. furosemide (LASIX) 40 mg tablet   No Yes Sig: Take 1 Tab by mouth every Monday, Thursday, Saturday. gabapentin (NEURONTIN) 600 mg tablet   No Yes Sig: Take 0.5 Tabs by mouth two (2) times a day. Indications: trigeminal neuralgia  
levETIRAcetam (KEPPRA) 500 mg tablet   No Yes Sig: Take 1 Tab by mouth two (2) times a day. peg 400-propylene glycol (SYSTANE, PROPYLENE GLYCOL,) 0.4-0.3 % drop   Yes Yes Sig: Administer 1 Drop to right eye four (4) times daily. senna (SENNA LAXATIVE) 8.6 mg tablet   Yes Yes Sig: Take 8.6 mg by mouth daily. spironolactone (ALDACTONE) 25 mg tablet   Yes Yes Sig: Take 25 mg by mouth daily. Facility-Administered Medications: None

## 2019-05-09 NOTE — PROGRESS NOTES
Pharmacist Note - Vancomycin Dosing Consult provided for this 80 y.o. male for indication of cellulitis. Antibiotic regimen(s): vanc + unasyn Recent Labs 19 
1655 WBC 10.8 CREA 1.40* BUN 21* Frequency of BMP: daily through  Height: 175.3 cm Weight: 108.9 kg Est CrCl: 44.4 ml/min (AdjBW); UO: unmeasured ml/kg/hr Temp (24hrs), Av.7 °F (36.5 °C), Min:97.4 °F (36.3 °C), Max:98 °F (36.7 °C) Cultures: 
 blood - pending Goal trough = 10 - 15 mcg/mL Therapy will be initiated with a loading dose of 2000 mg IV x 1 to be followed by a maintenance dose of 1250 mg IV every 24 hours for an anticipated trough level of ~ 13.5 mcg/ml. Pharmacy to follow patient daily and order levels / make dose adjustments as appropriate.

## 2019-05-09 NOTE — ED PROVIDER NOTES
80 y.o. male with past medical history significant for maxillary sinus CA, skin CA, HTN, hypercholesterolemia, RBBB, h/o TIA, GERD, trigeminal neuralgia, arthritis, who presents to the ED via EMS, with chief complaint of altered mental status, and for further evaluation of bilateral lower leg wounds. EMS personnel report that they transported the patient from [facility], for further evaluation of wounds to patient's bilateral lower legs and altered mental status. They state that wound care staff have noticed that patient's wounds have become more \"red and irritated\" and are concerned for possible infection. Spouse reports that wound care recommends admission and IV antibiotics. EMS also state that wound care found the patient to be \"more confused\" today, and was unable to answer simple questions. They also note that patient had a blood glucose of \"208\" en route. There are no other acute medical concerns at this time. Review of medical records indicate that the patient has additional h/o sepsis, PE, necrosis of brain, brain tumor and acute encephalopathy. Social hx: Tobacco use (former smoker, quit date: 1/1/82); Negative for EtOH use; Negative for Illicit Drug use PCP: Jeovany Caldwell MD 
 
Full history, physical exam, and ROS unable to be obtained due to: Altered mental status. Note written by Kofi Dickey, as dictated by Brit Brooks MD 4:25 PM 
 
The history is provided by medical records, the EMS personnel and a relative. No  was used. Past Medical History:  
Diagnosis Date  Arthritis HANDS  Beta-blocker therapy  Cancer [facility]) 2013 MAXILLARY SINUS CANCER, RADIATION AND CHEMO  Cancer ([facility] [address] SKIN CA ON NOSE  GERD (gastroesophageal reflux disease)  Hypercholesterolemia  Hypertension   
 pt denies  Nasal sinus tumor  Numbness of LEFT hand & LEFT face 2010  
 RBBB (right bundle branch block)  TIA (transient ischemic attack) 12 TIA's over 9 YRS.1ST ONE IN - LAST IN   Trigeminal neuralgia Past Surgical History:  
Procedure Laterality Date  HX CATARACT REMOVAL Bilateral   
 HX GI    
 COLONOSCOPY  
 HX HEENT    
 BIOPSY OF SINUS   
 HX HEENT Left   
 sew eye closed  HX HERNIA REPAIR Right INGUINAL  
 HX KNEE ARTHROSCOPY Right 2007  HX KNEE REPLACEMENT Right 2012 Total Knee replacement- right  HX ROTATOR CUFF REPAIR Right   
 right rotator cuff repair Family History:  
Problem Relation Age of Onset  Cancer Mother   
     colon  Cancer Father   
     throat  No Known Problems Brother  Anesth Problems Neg Hx Social History Socioeconomic History  Marital status:  Spouse name: Not on file  Number of children: Not on file  Years of education: Not on file  Highest education level: Not on file Occupational History  Not on file Social Needs  Financial resource strain: Not on file  Food insecurity:  
  Worry: Not on file Inability: Not on file  Transportation needs:  
  Medical: Not on file Non-medical: Not on file Tobacco Use  Smoking status: Former Smoker Packs/day: 1.00 Years: 40.00 Pack years: 40.00 Last attempt to quit: 1982 Years since quittin.3  Smokeless tobacco: Never Used  Tobacco comment: Karina Banda Substance and Sexual Activity  Alcohol use: No  
  Alcohol/week: 10.5 oz Types: 21 Glasses of wine per week Frequency: Never  Drug use: No  
 Sexual activity: Not on file Lifestyle  Physical activity:  
  Days per week: Not on file Minutes per session: Not on file  Stress: Not on file Relationships  Social connections:  
  Talks on phone: Not on file Gets together: Not on file Attends Confucianism service: Not on file Active member of club or organization: Not on file Attends meetings of clubs or organizations: Not on file Relationship status: Not on file  Intimate partner violence:  
  Fear of current or ex partner: Not on file Emotionally abused: Not on file Physically abused: Not on file Forced sexual activity: Not on file Other Topics Concern [address] Service Not Asked  Blood Transfusions Not Asked  Caffeine Concern Not Asked  Occupational Exposure Not Asked Kathy Garcia Hazards Not Asked  Sleep Concern Not Asked  Stress Concern Not Asked  Weight Concern Not Asked  Special Diet Not Asked  Back Care Not Asked  Exercise Not Asked  Bike Helmet Not Asked  Seat Belt Not Asked  Self-Exams Not Asked Social History Narrative  Not on file ALLERGIES: Adhesive tape-silicones; Aggrenox [aspirin-dipyridamole]; Amlodipine; Diuril [chlorothiazide]; and Hydrochlorothiazide Review of Systems Unable to perform ROS: Mental status change Skin: Positive for wound (bilateral lower legs). Vitals:  
 05/09/19 1632 BP: 124/78 Pulse: 97 Resp: 12 Temp: 97.4 °F (36.3 °C) SpO2: 96% Weight: 108.9 kg (240 lb) Height: 5' 9\" (1.753 m) Physical Exam  
Constitutional: He is oriented to person, place, and time. He appears well-developed and well-nourished. No distress. HENT:  
Head: Normocephalic and atraumatic. Eyes: Conjunctivae and EOM are normal.  
Neck: Normal range of motion. Cardiovascular: Regular rhythm, normal heart sounds and intact distal pulses. Tachycardia present. No murmur heard. Pulmonary/Chest: Effort normal and breath sounds normal. No stridor. No respiratory distress. Abdominal: Soft. Bowel sounds are normal. There is no tenderness. Musculoskeletal: Normal range of motion. He exhibits edema. He exhibits no tenderness or deformity. Neurological: He is alert and oriented to person, place, and time. Skin: He is not diaphoretic. There is erythema. Lower extremities with erythema and draining wounds Psychiatric: He has a normal mood and affect. Nursing note and vitals reviewed. MDM Number of Diagnoses or Management Options Cellulitis of lower extremity, unspecified laterality:  
Sepsis, due to unspecified organism [facility]):  
Diagnosis management comments: Patient with cellulitis of the legs and possible sepsis - started IVF and IV ABX and will admit for further care. Head CT added for reported AMS Amount and/or Complexity of Data Reviewed Clinical lab tests: reviewed and ordered Tests in the radiology section of CPT®: reviewed and ordered Discuss the patient with other providers: yes Risk of Complications, Morbidity, and/or Mortality Presenting problems: high Diagnostic procedures: high Management options: high Critical Care Total time providing critical care: 30-74 minutes (Total critical care time spent exclusive of procedures:  30 minutes) Procedures ED EKG interpretation: 
Time: 16:52 Rhythm: sinus rhythm with premature supraventricular complexes; RBBB; Rate (approx.): 96 bpm; no STEMI; 
Note written by Kofi Dickey, as dictated by Brit Brooks MD 5:00 PM 
 
CONSULT NOTE: 
6:21 PM Brit Brooks MD communicated with Dr. Jus Flores MD, Consult for Hospitalist via [place] [facility] Text. Discussed available diagnostic tests and clinical findings. Dr. Amparo Looney will evaluate the patient for admission to the hospital.  
 
6:21 PM 
Patient is being admitted to the hospital.  The results of their tests and reasons for their admission have been discussed with them and/or available family. They convey agreement and understanding for the need to be admitted and for their admission diagnosis. PROGRESS NOTE: 
6:23 PM 
Code Sepsis called at this time. VITALS:  
Patient Vitals for the past 8 hrs: 
 Temp Pulse Resp BP SpO2  
05/09/19 1632 97.4 °F (36.3 °C) 97 12 124/78 96 % Recent Results (from the past 24 hour(s)) POC LACTIC ACID Collection Time: 05/09/19  4:51 PM  
Result Value Ref Range Lactic Acid (POC) 2.53 (HH) 0.40 - 2.00 mmol/L  
EKG, 12 LEAD, INITIAL Collection Time: 05/09/19  4:52 PM  
Result Value Ref Range Ventricular Rate 96 BPM  
 Atrial Rate 96 BPM  
 P-R Interval 204 ms QRS Duration 128 ms Q-T Interval 376 ms QTC Calculation (Bezet) 475 ms Calculated P Axis 47 degrees Calculated R Axis 23 degrees Calculated T Axis -14 degrees Diagnosis Sinus rhythm with premature supraventricular complexes Right bundle branch block When compared with ECG of 23-AUG-2018 17:16, 
fusion complexes are no longer present 
premature ventricular complexes are no longer present T wave inversion now evident in Anterior leads SAMPLES BEING HELD Collection Time: 05/09/19  4:55 PM  
Result Value Ref Range SAMPLES BEING HELD 1BLU,1RED COMMENT Add-on orders for these samples will be processed based on acceptable specimen integrity and analyte stability, which may vary by analyte. METABOLIC PANEL, COMPREHENSIVE Collection Time: 05/09/19  4:55 PM  
Result Value Ref Range Sodium 136 136 - 145 mmol/L Potassium 4.2 3.5 - 5.1 mmol/L Chloride 98 97 - 108 mmol/L  
 CO2 29 21 - 32 mmol/L Anion gap 9 5 - 15 mmol/L Glucose 174 (H) 65 - 100 mg/dL BUN 21 (H) 6 - 20 MG/DL Creatinine 1.40 (H) 0.70 - 1.30 MG/DL  
 BUN/Creatinine ratio 15 12 - 20 GFR est AA 58 (L) >60 ml/min/1.73m2 GFR est non-AA 48 (L) >60 ml/min/1.73m2 Calcium 9.3 8.5 - 10.1 MG/DL Bilirubin, total 0.6 0.2 - 1.0 MG/DL  
 ALT (SGPT) 63 12 - 78 U/L  
 AST (SGOT) 22 15 - 37 U/L Alk. phosphatase 58 45 - 117 U/L Protein, total 7.3 6.4 - 8.2 g/dL Albumin 2.9 (L) 3.5 - 5.0 g/dL Globulin 4.4 (H) 2.0 - 4.0 g/dL A-G Ratio 0.7 (L) 1.1 - 2.2    
CBC WITH AUTOMATED DIFF Collection Time: 05/09/19  4:55 PM  
Result Value Ref Range WBC 10.8 4.1 - 11.1 K/uL  
 RBC 4.29 4. 10 - 5.70 M/uL  
 HGB 14.1 12.1 - 17.0 g/dL HCT 44.5 36.6 - 50.3 % .7 (H) 80.0 - 99.0 FL  
 MCH 32.9 26.0 - 34.0 PG  
 MCHC 31.7 30.0 - 36.5 g/dL  
 RDW 16.1 (H) 11.5 - 14.5 % PLATELET 557 525 - 096 K/uL MPV 10.0 8.9 - 12.9 FL  
 NRBC 0.0 0  WBC ABSOLUTE NRBC 0.00 0.00 - 0.01 K/uL NEUTROPHILS 74 32 - 75 % LYMPHOCYTES 16 12 - 49 % MONOCYTES 6 5 - 13 % EOSINOPHILS 0 0 - 7 % BASOPHILS 1 0 - 1 % IMMATURE GRANULOCYTES 3 (H) 0.0 - 0.5 % ABS. NEUTROPHILS 8.1 (H) 1.8 - 8.0 K/UL  
 ABS. LYMPHOCYTES 1.7 0.8 - 3.5 K/UL  
 ABS. MONOCYTES 0.6 0.0 - 1.0 K/UL  
 ABS. EOSINOPHILS 0.0 0.0 - 0.4 K/UL  
 ABS. BASOPHILS 0.1 0.0 - 0.1 K/UL  
 ABS. IMM. GRANS. 0.3 (H) 0.00 - 0.04 K/UL  
 DF SMEAR SCANNED    
 RBC COMMENTS NORMOCYTIC, NORMOCHROMIC    
 RBC COMMENTS MACROCYTOSIS 
1+ POC LACTIC ACID Collection Time: 05/09/19  7:48 PM  
Result Value Ref Range Lactic Acid (POC) 2.27 (HH) 0.40 - 2.00 mmol/L Ct Head Wo Cont Result Date: 5/9/2019 EXAM: CT HEAD WO CONT INDICATION: Confusion and increased redness around chronic soft tissue wounds. COMPARISON: CT head on 7/14/2018. TECHNIQUE: Noncontrast head CT. Coronal and sagittal reformats. CT dose reduction was achieved through the use of a standardized protocol tailored for this examination and automatic exposure control for dose modulation. Adaptive statistical iterative reconstruction (ASIR) was utilized. FINDINGS: The ventricles and sulci are age-appropriate without hydrocephalus. There is no mass effect or midline shift. There is no intracranial hemorrhage or extra-axial fluid collection. Encephalomalacia in the left temporal lobe is unchanged in volume. However, there is new subtle increased attenuation of the central aspect of the encephalomalacia in the anterior left temporal lobe.  Chronic microvascular ischemic disease is otherwise unchanged. The calvarium is intact. Previous left temporal bone surgery. Partial opacification of the ethmoid air cells. Air-filled secretions are in the right nasal cavity. Chronic fluid is increased in the left middle ear cavity. Left mastoid air cell inflammation is unchanged. IMPRESSION: 1. New increased attenuation is focal in the left temporal encephalomalacia. Differential diagnosis is calcification versus focal intra-axial hemorrhage. Less than 1 mL of bloody if this is blood. 2. No CT evidence of acute infarct. 3. Paranasal sinusitis, left otitis media, and chronic left mastoiditis. Recommendation: Repeat noncontrast CT head in 5-6 hours to reassess. I discussed the possible hemorrhage and follow-up recommendation with CRUZ Hernandez at 1928 hours on 5/9/2019. Xr Chest Cinderella Cuff Result Date: 5/9/2019 EXAM: XR CHEST PORT INDICATION: Sepsis, increased redness around soft tissue wounds. COMPARISON: Portable chest on 7/14/2018. TECHNIQUE: Semiupright portable chest AP view FINDINGS: Cardiac monitoring wires overlie the thorax. Cardiomegaly is unchanged. Aortic arch is not well evaluated with this technique. The pulmonary vasculature is within normal limits. Lung volumes are low. Possible left lung base opacity. No evidence of pneumothorax. Osteopenia is unchanged. IMPRESSION: Limited study. Left lung base atelectasis versus pneumonia. Consider PA and lateral chest views when the patient can better tolerate.

## 2019-05-09 NOTE — PROGRESS NOTES
05/09/19 1437 Wound Right; Lower;Medial;Proximal  
Date First Assessed/Time First Assessed: 05/09/19 1506   Wound Location Orientation: Right; Lower;Medial;Proximal  
Wound Length (cm) 1.5 cm Wound Width (cm) 0.3 cm Wound Depth (cm) 0.1 cm Wound Volume (cm^3) 0.04 cm^3 Wound Leg lower Posterior;Right;Distal  
Date First Assessed/Time First Assessed: 05/02/19 1304   Present on Hospital Admission: Yes  Primary Wound Type: Venous  Location: Leg lower  Wound Location Orientation: Posterior;Right;Distal  
Dressing Status Breakthrough drainage Dressing Type Compression Wrap/Venous Stasis;ABD pad; Absorptive Wound Length (cm) 101 cm Wound Width (cm) 1 cm Wound Depth (cm) 0.6 cm Wound Volume (cm^3) 60.6 cm^3 Condition of [facility] Drainage Amount Moderate Drainage Color Serous Wound Odor Mild Dana-wound Assessment Maceration;Red;Blanchable erythema Cleansing and Cleansing Agents  Chlorhexidine gluconate 4%; Soap and water Wound Leg lower Right;Medial;Distal  
Date First Assessed/Time First Assessed: 05/09/19 1508   Present on Hospital Admission: Yes  Location: Leg lower  Wound Location Orientation: Right;Medial;Distal  
Wound Length (cm) 0.2 cm Wound Width (cm) 0.2 cm Wound Depth (cm) 0.1 cm Wound Volume (cm^3) 0 cm^3 Wound Leg lower Right;Lateral;Posterior Date First Assessed/Time First Assessed: 05/09/19 1514   Location: Leg lower  Wound Location Orientation: Right;Lateral;Posterior Dressing Type ABD pad;Compression Wrap/Venous Stasis Wound Length (cm) 1 cm Wound Width (cm) 0.8 cm Wound Depth (cm) 0.4 cm Wound Volume (cm^3) 0.32 cm^3 Condition of [facility] Condition of [place] Dana-wound Assessment Maceration Wound Leg Lower Anterior; Left Date First Assessed/Time First Assessed: 02/07/19 1400   POA: Yes  Wound Type: Venous  Location: Leg Lower  Orientation: Anterior; Left Dressing Status  Breakthrough drainage Wound Length (cm) 0.2 cm Wound Width (cm) 0.2 cm Wound Depth (cm) 0.1 Wound Surface area (cm^2) 0.04 cm^2 Change in Wound Size % 96 Condition of [facility] Condition of [place] Drainage Amount  Moderate Wound Odor Mild Periwound Skin Condition Intact Cleansing and Cleansing Agents  Normal saline; Chlorhexidine gluconate 4% Wound Leg Lower Lateral;Left Date First Assessed/Time First Assessed: 02/07/19 1401   POA: Yes  Wound Type: Venous  Location: Leg Lower  Orientation: Lateral;Left Dressing Status  Breakthrough drainage Dressing Type  Compression Wrap/Venous Stasis Wound Length (cm) 1.5 cm Wound Width (cm) 1 cm Wound Depth (cm) 0.6 Wound Surface area (cm^2) 1.5 cm^2 Change in Wound Size % 23.47 Condition of [facility] Condition of [place] Drainage Amount  Moderate Drainage Color Purulent Wound Odor None Periwound Skin Condition Macerated;Erythema, blanchable;Edematous Cleansing and Cleansing Agents  Chlorhexidine gluconate 4%;Normal saline Wound Leg Lower Left;Posterior Date First Assessed/Time First Assessed: 02/07/19 1358   POA: Yes  Wound Type: Venous  Location: Leg Lower  Orientation: Left;Posterior Dressing Status  Breakthrough drainage Wound Length (cm) 1.7 cm Wound Width (cm) 0.6 cm Wound Depth (cm) 0.4 Wound Surface area (cm^2) 1.02 cm^2 Change in Wound Size % -2 Condition of [facility] Condition of [place] Drainage Amount  Small Drainage Color Purulent Wound Leg Lower Right; Anterior Date First Assessed/Time First Assessed: 02/07/19 1349   POA: Yes  Wound Type: Venous  Location: Leg Lower  Orientation: Right; Anterior Dressing Status  Breakthrough drainage Dressing Type  ABD pad;Compression Wrap/Venous Stasis Non-Pressure Injury Partial thickness (epider/derm) Wound Length (cm) 1.4 cm Wound Width (cm) 0.9 cm Wound Depth (cm) 0.3 Wound Surface area (cm^2) 1.26 cm^2 Change in Wound Size % 58 Condition of [facility] Condition of [place] Drainage Amount  Large Drainage Color Serous Wound Odor Mild Periwound Skin Condition Macerated Wound Leg Lower Medial;Right Date First Assessed/Time First Assessed: 02/07/19 1351   POA: Yes  Wound Type: Venous  Location: Leg Lower  Orientation: Medial;Right Dressing Status  Other (comment) Dressing Type  Compression Wrap/Venous Stasis;ABD pad Non-Pressure Injury Partial thickness (epider/derm) Wound Length (cm) 0.3 cm Wound Width (cm) 0.3 cm Wound Depth (cm) 0.2 Wound Surface area (cm^2) 0.09 cm^2 Change in Wound Size % 83.93 Condition of [facility] Condition of [place] Periwound Skin Condition Macerated Wound Leg Lower Posterior;Right;Proximal  
Date First Assessed/Time First Assessed: 02/07/19 1357   POA: Yes  Wound Type: Venous  Location: Leg Lower  Orientation: Posterior;Right;Proximal  
Wound Length (cm) 1 cm Wound Width (cm) 0.8 cm Wound Depth (cm) 0.7 Wound Surface area (cm^2) 0.8 cm^2 Change in Wound Size % 20 Condition of [facility] Condition of [place] Drainage Amount  Moderate Drainage Color Serous Wound Odor Mild Periwound Skin Condition Macerated Visit Vitals BP (!) 137/92 (BP 1 Location: Right arm, BP Patient Position: Sitting) Pulse (!) 102 Temp 98 °F (36.7 °C) Resp 16

## 2019-05-09 NOTE — ED TRIAGE NOTES
Pt arrives via EMS from the outpatient wound care facility d/t increased redness in his wounds, and altered mental status. Pt normally able to ambulate with walker, but he was unable to do so today. Hx of CHF

## 2019-05-10 ENCOUNTER — APPOINTMENT (OUTPATIENT)
Dept: VASCULAR SURGERY | Age: 84
DRG: 602 | End: 2019-05-10
Attending: HOSPITALIST
Payer: MEDICARE

## 2019-05-10 ENCOUNTER — APPOINTMENT (OUTPATIENT)
Dept: CT IMAGING | Age: 84
DRG: 602 | End: 2019-05-10
Attending: NURSE PRACTITIONER
Payer: MEDICARE

## 2019-05-10 LAB
ANION GAP SERPL CALC-SCNC: 7 MMOL/L (ref 5–15)
ATRIAL RATE: 96 BPM
BASOPHILS # BLD: 0.1 K/UL (ref 0–0.1)
BASOPHILS NFR BLD: 1 % (ref 0–1)
BUN SERPL-MCNC: 18 MG/DL (ref 6–20)
BUN/CREAT SERPL: 18 (ref 12–20)
CALCIUM SERPL-MCNC: 8.2 MG/DL (ref 8.5–10.1)
CALCULATED P AXIS, ECG09: 47 DEGREES
CALCULATED R AXIS, ECG10: 23 DEGREES
CALCULATED T AXIS, ECG11: -14 DEGREES
CHLORIDE SERPL-SCNC: 105 MMOL/L (ref 97–108)
CO2 SERPL-SCNC: 27 MMOL/L (ref 21–32)
CREAT SERPL-MCNC: 1 MG/DL (ref 0.7–1.3)
DIAGNOSIS, 93000: NORMAL
DIFFERENTIAL METHOD BLD: ABNORMAL
EOSINOPHIL # BLD: 0.1 K/UL (ref 0–0.4)
EOSINOPHIL NFR BLD: 1 % (ref 0–7)
ERYTHROCYTE [DISTWIDTH] IN BLOOD BY AUTOMATED COUNT: 16 % (ref 11.5–14.5)
EST. AVERAGE GLUCOSE BLD GHB EST-MCNC: 180 MG/DL
GLUCOSE SERPL-MCNC: 143 MG/DL (ref 65–100)
HBA1C MFR BLD: 7.9 % (ref 4.2–6.3)
HCT VFR BLD AUTO: 38.6 % (ref 36.6–50.3)
HGB BLD-MCNC: 12.5 G/DL (ref 12.1–17)
IMM GRANULOCYTES # BLD AUTO: 0.2 K/UL (ref 0–0.04)
IMM GRANULOCYTES NFR BLD AUTO: 2 % (ref 0–0.5)
LYMPHOCYTES # BLD: 1.7 K/UL (ref 0.8–3.5)
LYMPHOCYTES NFR BLD: 17 % (ref 12–49)
MCH RBC QN AUTO: 34 PG (ref 26–34)
MCHC RBC AUTO-ENTMCNC: 32.4 G/DL (ref 30–36.5)
MCV RBC AUTO: 104.9 FL (ref 80–99)
MONOCYTES # BLD: 0.8 K/UL (ref 0–1)
MONOCYTES NFR BLD: 8 % (ref 5–13)
NEUTS SEG # BLD: 7.2 K/UL (ref 1.8–8)
NEUTS SEG NFR BLD: 71 % (ref 32–75)
NRBC # BLD: 0 K/UL (ref 0–0.01)
NRBC BLD-RTO: 0 PER 100 WBC
P-R INTERVAL, ECG05: 204 MS
PLATELET # BLD AUTO: 155 K/UL (ref 150–400)
PMV BLD AUTO: 9.8 FL (ref 8.9–12.9)
POTASSIUM SERPL-SCNC: 4.1 MMOL/L (ref 3.5–5.1)
Q-T INTERVAL, ECG07: 376 MS
QRS DURATION, ECG06: 128 MS
QTC CALCULATION (BEZET), ECG08: 475 MS
RBC # BLD AUTO: 3.68 M/UL (ref 4.1–5.7)
SODIUM SERPL-SCNC: 139 MMOL/L (ref 136–145)
VENTRICULAR RATE, ECG03: 96 BPM
WBC # BLD AUTO: 10 K/UL (ref 4.1–11.1)

## 2019-05-10 PROCEDURE — 85025 COMPLETE CBC W/AUTO DIFF WBC: CPT

## 2019-05-10 PROCEDURE — 51798 US URINE CAPACITY MEASURE: CPT

## 2019-05-10 PROCEDURE — 74011000258 HC RX REV CODE- 258: Performed by: HOSPITALIST

## 2019-05-10 PROCEDURE — 74011250636 HC RX REV CODE- 250/636: Performed by: HOSPITALIST

## 2019-05-10 PROCEDURE — 83036 HEMOGLOBIN GLYCOSYLATED A1C: CPT

## 2019-05-10 PROCEDURE — 80048 BASIC METABOLIC PNL TOTAL CA: CPT

## 2019-05-10 PROCEDURE — 95816 EEG AWAKE AND DROWSY: CPT | Performed by: INTERNAL MEDICINE

## 2019-05-10 PROCEDURE — 74011250637 HC RX REV CODE- 250/637: Performed by: HOSPITALIST

## 2019-05-10 PROCEDURE — 74011000250 HC RX REV CODE- 250: Performed by: HOSPITALIST

## 2019-05-10 PROCEDURE — 65660000000 HC RM CCU STEPDOWN

## 2019-05-10 PROCEDURE — 36415 COLL VENOUS BLD VENIPUNCTURE: CPT

## 2019-05-10 PROCEDURE — 70450 CT HEAD/BRAIN W/O DYE: CPT

## 2019-05-10 RX ORDER — VANCOMYCIN/0.9 % SOD CHLORIDE 1.5G/250ML
1500 PLASTIC BAG, INJECTION (ML) INTRAVENOUS
Status: DISCONTINUED | OUTPATIENT
Start: 2019-05-10 | End: 2019-05-13 | Stop reason: DRUGHIGH

## 2019-05-10 RX ADMIN — CIPROFLOXACIN HYDROCHLORIDE 1 DROP: 3 SOLUTION/ DROPS OPHTHALMIC at 05:24

## 2019-05-10 RX ADMIN — CIPROFLOXACIN HYDROCHLORIDE 1 DROP: 3 SOLUTION/ DROPS OPHTHALMIC at 12:34

## 2019-05-10 RX ADMIN — CLOPIDOGREL BISULFATE 75 MG: 75 TABLET ORAL at 08:37

## 2019-05-10 RX ADMIN — ENOXAPARIN SODIUM 40 MG: 40 INJECTION SUBCUTANEOUS at 21:40

## 2019-05-10 RX ADMIN — Medication 10 ML: at 21:40

## 2019-05-10 RX ADMIN — CIPROFLOXACIN HYDROCHLORIDE 1 DROP: 3 SOLUTION/ DROPS OPHTHALMIC at 19:47

## 2019-05-10 RX ADMIN — GABAPENTIN 300 MG: 600 TABLET, FILM COATED ORAL at 08:37

## 2019-05-10 RX ADMIN — ATORVASTATIN CALCIUM 10 MG: 10 TABLET, FILM COATED ORAL at 21:40

## 2019-05-10 RX ADMIN — GABAPENTIN 300 MG: 600 TABLET, FILM COATED ORAL at 17:24

## 2019-05-10 RX ADMIN — Medication 10 ML: at 05:25

## 2019-05-10 RX ADMIN — SENNOSIDES 8.6 MG: 8.6 TABLET, FILM COATED ORAL at 08:37

## 2019-05-10 RX ADMIN — CIPROFLOXACIN HYDROCHLORIDE 1 DROP: 3 SOLUTION/ DROPS OPHTHALMIC at 21:40

## 2019-05-10 RX ADMIN — CIPROFLOXACIN HYDROCHLORIDE 1 DROP: 3 SOLUTION/ DROPS OPHTHALMIC at 10:55

## 2019-05-10 RX ADMIN — CIPROFLOXACIN HYDROCHLORIDE 1 DROP: 3 SOLUTION/ DROPS OPHTHALMIC at 07:05

## 2019-05-10 RX ADMIN — CIPROFLOXACIN HYDROCHLORIDE 1 DROP: 3 SOLUTION/ DROPS OPHTHALMIC at 01:18

## 2019-05-10 RX ADMIN — LEVETIRACETAM 500 MG: 500 TABLET, FILM COATED ORAL at 08:37

## 2019-05-10 RX ADMIN — Medication 10 ML: at 12:35

## 2019-05-10 RX ADMIN — VANCOMYCIN HYDROCHLORIDE 1500 MG: 10 INJECTION, POWDER, LYOPHILIZED, FOR SOLUTION INTRAVENOUS at 10:49

## 2019-05-10 RX ADMIN — CIPROFLOXACIN HYDROCHLORIDE 1 DROP: 3 SOLUTION/ DROPS OPHTHALMIC at 14:42

## 2019-05-10 RX ADMIN — CEFAZOLIN 1 G: 1 INJECTION, POWDER, FOR SOLUTION INTRAMUSCULAR; INTRAVENOUS at 03:46

## 2019-05-10 RX ADMIN — CEFAZOLIN 1 G: 1 INJECTION, POWDER, FOR SOLUTION INTRAMUSCULAR; INTRAVENOUS at 21:39

## 2019-05-10 RX ADMIN — DEXAMETHASONE 2 MG: 1 TABLET ORAL at 08:36

## 2019-05-10 RX ADMIN — LEVETIRACETAM 500 MG: 500 TABLET, FILM COATED ORAL at 17:23

## 2019-05-10 RX ADMIN — CEFAZOLIN 1 G: 1 INJECTION, POWDER, FOR SOLUTION INTRAMUSCULAR; INTRAVENOUS at 09:29

## 2019-05-10 RX ADMIN — POLYVINYL ALCOHOL 1 DROP: 14 SOLUTION/ DROPS OPHTHALMIC at 12:34

## 2019-05-10 RX ADMIN — CEFAZOLIN 1 G: 1 INJECTION, POWDER, FOR SOLUTION INTRAMUSCULAR; INTRAVENOUS at 15:31

## 2019-05-10 RX ADMIN — CIPROFLOXACIN HYDROCHLORIDE 1 DROP: 3 SOLUTION/ DROPS OPHTHALMIC at 03:41

## 2019-05-10 RX ADMIN — POLYVINYL ALCOHOL 1 DROP: 14 SOLUTION/ DROPS OPHTHALMIC at 17:24

## 2019-05-10 RX ADMIN — DOCUSATE SODIUM 100 MG: 100 CAPSULE, LIQUID FILLED ORAL at 21:39

## 2019-05-10 RX ADMIN — CIPROFLOXACIN HYDROCHLORIDE 1 DROP: 3 SOLUTION/ DROPS OPHTHALMIC at 08:36

## 2019-05-10 RX ADMIN — CIPROFLOXACIN HYDROCHLORIDE 1 DROP: 3 SOLUTION/ DROPS OPHTHALMIC at 23:53

## 2019-05-10 RX ADMIN — POLYVINYL ALCOHOL 1 DROP: 14 SOLUTION/ DROPS OPHTHALMIC at 08:36

## 2019-05-10 RX ADMIN — Medication 1000 MCG: at 08:36

## 2019-05-10 RX ADMIN — POLYVINYL ALCOHOL 1 DROP: 14 SOLUTION/ DROPS OPHTHALMIC at 21:40

## 2019-05-10 RX ADMIN — CIPROFLOXACIN HYDROCHLORIDE 1 DROP: 3 SOLUTION/ DROPS OPHTHALMIC at 17:24

## 2019-05-10 NOTE — PROGRESS NOTES
Day #2 of Vancomycin Indication:  cellulitis Current regimen:  1250 mg Q24H Abx regimen:  vancomycin + Ancef ID Following ?: YES- consulted, but has not yet seen patient Concomitant nephrotoxic drugs (requires more frequent monitoring): None Frequency of BMP?: daily through  Recent Labs 05/10/19 
0240 05/10/19 
0138 19 
1655 WBC  --  10.0 10.8 CREA 1.00  --  1.40* BUN 18  --  21* Est CrCl: 65 ml/min; UO: 0.6 ml/kg/hr Temp (24hrs), Av °F (36.7 °C), Min:97.4 °F (36.3 °C), Max:98.9 °F (37.2 °C) Cultures:  
 blood, pending Goal trough = 10 - 15 mcg/mL Recent trough history (date/time/level/dose/action taken): NA 
 
Plan: Change to vancomycin 1500 mg Q16H; based on population kinetics, anticipate a trough of ~ 11 mch/ml. Pharmacy to follow patient daily and order levels / make dose adjustments as appropriate.

## 2019-05-10 NOTE — H&P
[address]  HISTORY AND PHYSICAL Name:  Tammy Peralta 
MR#:  [id number] :  11/10/1930 ACCOUNT #:  [de-identified] ADMIT DATE:  2019 TIME:  6:40 p.m. ADMITTING:  Guanaco Roper MD 
 
PRIMARY CARE PHYSICIAN:  Dr. Alcides Delgado CHIEF COMPLAINT:  Lethargic as well as worsening leg wounds. HISTORY OF PRESENT ILLNESS:  This is an 51-year-old  male with a past medical history of hypertension, sinus cancer, hypercholesterolemia, history of TIA, GERD, trigeminal neuralgia. He comes over here because of changes in mental status that were observed since last night. This whole history as far as the patient's significant other as well as the patient's daughter who are sitting beside the patient. The patient wants to go home and is not willing to give any significant history. It seems that last night when the patient was at the dinner table he was quite drowsy, did not eat enough. He did not have his breakfast as well as lunch today. He was taken to the wound care as he goes to wound care every week. Over there, he was noted to be quite weak and lethargic, that is why he was taken on a wheelchair. When he was evaluated over there, he was found to have worsening infection of his wound, that is why the patient was brought in the ER. No chest pain, shortness of breath, headache, dizziness, nausea, vomiting, no changes in bowel movements. REVIEW OF SYSTEMS:  Pertinent positive as above. Rest of the review of systems was done. They were all negative except for above. PAST MEDICAL HISTORY: 
1.  Osteoarthritis. 2.  Cancer. 3.  GERD. 4.  Hypercholesterolemia. 5.  Multiple TIAs. 6.  Trigeminal neuralgia. PAST SURGICAL HISTORY: 
1. Cataract removal. 
2.  Hernia repair. 3.  Knee replacement. 4.  Rotator cuff repair. FAMILY HISTORY:  Significant for colon cancer and throat cancer. SOCIAL HISTORY:  Former smoker, quit in . Nonalcoholic. Non-IV-drug abuser. ALLERGIES:  THE PATIENT IS ALLERGIC TO ADHESIVE TAPE, AGGRENOX, AMLODIPINE, DIURIL, AND HYDROCHLOROTHIAZIDE. FAMILY HISTORY:  Not significant for coronary artery disease. PHYSICAL EXAMINATION: 
VITAL SIGNS:  At the time the patient was seen, the patient's vitals were as follows:  Blood pressure 124/78, pulse 97, afebrile, respiratory rate 12, saturating 96% on room air. GENERAL:  Not in acute distress, currently does not look confused. HEENT:  Eyes:  There is slight purulence, bilateral eyes, otherwise no growth. Nose:  No polyps, no bleeding. Ears:  No growth. Hard of hearing. Mouth:  Dry mucous membrane. Decent oral hygiene. NECK:  Supple. No JVD. No thyromegaly. RESPIRATORY:  Clear to auscultation bilaterally. No adventitious breath sounds. CARDIOVASCULAR:  S1 and S2 normal.  No murmurs, rubs, or gallops. GASTROINTESTINAL:  Bowel sounds present. Soft, nontender, nondistended. NEUROLOGIC:  Cranial nerves II through XII intact. Motor 5/5 bilaterally upper limb and lower limb. Sensory normal. 
PSYCHIATRIC:  Mood and affect appropriate. Alert, awake, and oriented x3. SKIN:  There is significant redness especially on the right foot. LABORATORY AND RADIOLOGICAL RESULTS:  WBC 10.8, hemoglobin 14.1, hematocrit 44, and platelet count of 574. Sodium 136, potassium 4.2, chloride 98, bicarb 29, BUN 21, creatinine 1.4. Chest x-ray was done which shows left lung base atelectasis versus pneumonia. EKG shows sinus rhythms with premature SVCs, right bundle branch block. No ST-T wave changes suggestive of ischemia. ASSESSMENT AND PLAN:  This is an 80-year-old  male with a past medical history of hypertension, hypercholesterolemia, sinus cancer, gastroesophageal reflux disease, trigeminal neuralgia, history of deep venous thrombosis, on Eliquis. He comes over here because of lethargy as well as reported worsening of wound. 1.  Reported lethargy. Currently, the patient seems quite oriented, but he is still not active as he usually is. I would get a CT scan of the head that needs to be followed. His altered mental status could be secondary to acute metabolic encephalopathy resulting from probable dehydration. I will hold the patient's diuretics and would put the patient on gentle fluids. These might need to be stopped tomorrow based on his volume status. Currently, I do not think that the patient requires extensive workup regarding the same. 2.  Bilateral wounds. I think his bilateral lower extremity wounds are quite stable. I looked at Dr. Quintin Riojas notes from today. It seems that she had sent the patient over here because of changes in mental status and she as far as the wounds are concerned, she had noted that the patient's wounds are stable. Would get an ID consult for the confirmation. 3.  Hypertension. I will continue the patient's home medications. 4.  The patient is DNR. I spent about 50 minutes in taking care of this patient. Carmen Galvez MD 
 
 
[id number] 
D:  05/09/2019 18:54 
T:  05/09/2019 21:38 
JOB #:  [id number]

## 2019-05-10 NOTE — PROGRESS NOTES
Problem: Falls - Risk of 
Goal: *Absence of Falls Description Document Jannette Trevino Fall Risk and appropriate interventions in the flowsheet. Outcome: Progressing Towards Goal 
  
Problem: Patient Education: Go to Patient Education Activity Goal: Patient/Family Education Outcome: Progressing Towards Goal 
  
Problem: Patient Education: Go to Patient Education Activity Goal: Patient/Family Education Outcome: Progressing Towards Goal 
  
Problem: Pressure Injury - Risk of 
Goal: *Prevention of pressure injury Description Document Davy Scale and appropriate interventions in the flowsheet. Outcome: Progressing Towards Goal 
  
Problem: Patient Education: Go to Patient Education Activity Goal: Patient/Family Education Outcome: Progressing Towards Goal

## 2019-05-10 NOTE — PROGRESS NOTES
Reason for Admission:   Lethargic as well as worsening leg wounds. Past medical history of hypertension, sinus cancer, hypercholesterolemia, history of TIA, GERD and trigeminal neuralgia. RRAT Score:     19 Do you (patient/family) have any concerns for transition/discharge? No concerns noted. Plan for utilizing home health:   Not indicated at this time. Current Advanced Directive/Advance Care Plan: On file. Patient's son, Lelia Weldon is listed as mPOA. Likelihood of readmission? Low/Moderate Transition of Care Plan:    TBD; Pending patient's disposition and recommendations. Met with patient, his spouse, Ulysses Sesay of 13 years and son, Lelia Weldon at bedside to introduce self/role. Patient lives at [facility]. Requires assistance with ADLs/IADLs and uses a walker and transfer chair as needed. Family will provide transportation upon discharge. CM to monitor for discharge needs. Care Management Interventions PCP Verified by CM: Yes 
Palliative Care Criteria Met (RRAT>21 & CHF Dx)?: No 
Mode of Transport at Discharge: Other (see comment)(family will transport ) Transition of Care Consult (CM Consult): Discharge Planning Discharge Durable Medical Equipment: No 
Physical Therapy Consult: No 
Occupational Therapy Consult: No 
Speech Therapy Consult: No 
Current Support Network: Assisted Living([facility]) Confirm Follow Up Transport: Family Plan discussed with Pt/Family/Caregiver: Yes Discharge Location Discharge Placement: Assisted Living(TBD )

## 2019-05-10 NOTE — PROGRESS NOTES
Nocturnist NP Progress note Name: Geremias Blackmon YOB: 1930 MRN: [id number] Admission Date: 5/9/2019  4:28 PM 
 
Date of service: 5/9/2019 8:33 PM 
 
Overnight Update:  
  
 
Paged Dr Bailey Atkinson, [facility] to discuss results of head CT scan. No intervention needed. Gi BAUER-C, PA-C 
[phone] or TigErendirat

## 2019-05-10 NOTE — PROGRESS NOTES
Hospitalist Progress Note Tracy Petty MD 
Answering service: [phone] OR [phone] from in house phone Date of Service:  5/10/2019 NAME:  Leslie Salazar :  11/10/1930 MRN:  [id number] Admission Summary:  
Lethargic as well as worsening leg wounds Interval history / Subjective: F/U for AMS Patient is seen and examined this morning. Son at bedside. Patient looks very drowsy. 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 Since then, he will have a waxing and waning mental state. Assessment & Plan: # Acute encephalopathy metabolic due to seizure vs infectious  
- CT head X2 no hemorrhage, consistent with chronic change - UA negative, blood culture pending - Chest x-ray Left lung base atelectasis versus pneumonia - ct with Keppra - ct with abx  
- hold IVF due to generalized swelling  
- will obtain EEG 
- seizure and fall precaution # Bilateral lower extremity cellulitis on the bases of chronic wound  
- on Vancomycin and cefazolin  
- may consider lasix to reduce the swelling - ID following  
- wound care following  
- seen by Podiatry wound looks stable  
- will obtain doppler US leg # HTN: resume home meds # HLD: ct with statin # Radiation necrosis s/p craniotomy  
- continue Keppra DVT prop: Enoxaparin Code: DNR Dispo: TBD Hospital Problems  Date Reviewed: 2018 Codes Class Noted POA Cellulitis ICD-10-CM: L03.90 ICD-9-CM: 682.9  2019 Unknown Review of Systems:  
Difficult to obtain due to patient unresponsiveness Vital Signs:  
 Last 24hrs VS reviewed since prior progress note. Most recent are: 
Visit Vitals /71 (BP 1 Location: Right arm, BP Patient Position: At rest) Pulse 70 Temp 97 °F (36.1 °C) Resp 17 Ht 5' 9\" (1.753 m) Wt 117.9 kg (259 lb 14.8 oz) SpO2 95% BMI 38.38 kg/m² Intake/Output Summary (Last 24 hours) at 5/10/2019 1739 Last data filed at 5/10/2019 1300 Gross per 24 hour Intake 815 ml Output 1400 ml Net -585 ml Physical Examination:  
 
 
     
Constitutional:  No acute distress, drowsy ENT:  moon face, Oral mucous moist, oropharynx benign. Neck supple, Resp:  CTA bilaterally. No wheezing/rhonchi/rales. No accessory muscle use CV:  Regular rhythm, normal rate, no murmurs, gallops, rubs GI:  Soft, non distended, non tender. normoactive bowel sounds, no hepatosplenomegaly Musculoskeletal:  bilateral +3 pitting edema. Both legs covered with dressing Neurologic:  Moves all extremities. AAOx3, CN II-XII reviewed Data Review:  
 I personally reviewed labs and imaging Labs:  
 
Recent Labs 05/10/19 
0138 05/09/19 
1655 WBC 10.0 10.8 HGB 12.5 14.1 HCT 38.6 44.5  199 Recent Labs 05/10/19 
0240 05/09/19 
1655  136  
K 4.1 4.2  98 CO2 27 29 BUN 18 21* CREA 1.00 1.40* * 174* CA 8.2* 9.3 Recent Labs 05/09/19 
1655 SGOT 22 ALT 63 AP 58 TBILI 0.6 TP 7.3 ALB 2.9*  
GLOB 4.4* No results for input(s): INR, PTP, APTT in the last 72 hours. No lab exists for component: INREXT No results for input(s): FE, TIBC, PSAT, FERR in the last 72 hours. Lab Results Component Value Date/Time Folate 8.6 11/20/2017 03:03 PM  
  
No results for input(s): PH, PCO2, PO2 in the last 72 hours. No results for input(s): CPK, CKNDX, TROIQ in the last 72 hours. No lab exists for component: CPKMB Lab Results Component Value Date/Time Cholesterol, total 163 07/14/2018 12:42 PM  
 HDL Cholesterol 89 07/14/2018 12:42 PM  
 LDL, calculated 59.4 07/14/2018 12:42 PM  
 Triglyceride 73 07/14/2018 12:42 PM  
 CHOL/HDL Ratio 1.8 07/14/2018 12:42 PM  
 
Lab Results Component Value Date/Time  Glucose (POC) 197 (H) 11/25/2017 11:27 AM  
 Glucose (POC) 128 (H) 11/25/2017 05:36 AM  
 Glucose (POC) 117 (H) 11/24/2017 11:22 PM  
 Glucose (POC) 113 (H) 11/24/2017 05:47 PM  
 Glucose (POC) 198 (H) 11/24/2017 11:57 AM  
 
Lab Results Component Value Date/Time Color YELLOW/STRAW 05/09/2019 08:11 PM  
 Appearance CLEAR 05/09/2019 08:11 PM  
 Specific gravity 1.014 05/09/2019 08:11 PM  
 Specific gravity 1.005 08/23/2018 05:34 PM  
 pH (UA) 5.5 05/09/2019 08:11 PM  
 Protein NEGATIVE  05/09/2019 08:11 PM  
 Glucose NEGATIVE  05/09/2019 08:11 PM  
 Ketone NEGATIVE  05/09/2019 08:11 PM  
 Bilirubin NEGATIVE  05/09/2019 08:11 PM  
 Urobilinogen 0.2 05/09/2019 08:11 PM  
 Nitrites NEGATIVE  05/09/2019 08:11 PM  
 Leukocyte Esterase NEGATIVE  05/09/2019 08:11 PM  
 Epithelial cells FEW 07/14/2018 04:09 PM  
 Bacteria NEGATIVE  07/14/2018 04:09 PM  
 WBC 0-4 07/14/2018 04:09 PM  
 RBC 0-5 07/14/2018 04:09 PM  
 
 
 
Medications Reviewed:  
 
Current Facility-Administered Medications Medication Dose Route Frequency  vancomycin (VANCOCIN) 1500 mg in  ml infusion  1,500 mg IntraVENous Q16H  
 sodium chloride (NS) flush 5-10 mL  5-10 mL IntraVENous PRN  
 sodium chloride (NS) flush 5-40 mL  5-40 mL IntraVENous Q8H  
 sodium chloride (NS) flush 5-40 mL  5-40 mL IntraVENous PRN  
 acetaminophen (TYLENOL) tablet 650 mg  650 mg Oral Q4H PRN  
 ondansetron (ZOFRAN) injection 4 mg  4 mg IntraVENous Q4H PRN  
 enoxaparin (LOVENOX) injection 40 mg  40 mg SubCUTAneous Q24H  
 atorvastatin (LIPITOR) tablet 10 mg  10 mg Oral QHS  clopidogrel (PLAVIX) tablet 75 mg  75 mg Oral DAILY  bacitracin-polymyxin b (POLYSPORIN) 500-10,000 unit/gram ophthalmic ointment   Left Eye QHS  docusate sodium (COLACE) capsule 100 mg  100 mg Oral QHS  cyanocobalamin (VITAMIN B12) tablet 1,000 mcg  1,000 mcg Oral DAILY  gabapentin (NEURONTIN) tablet 300 mg  300 mg Oral BID  levETIRAcetam (KEPPRA) tablet 500 mg  500 mg Oral BID  
  senna (SENOKOT) tablet 8.6 mg  1 Tab Oral DAILY  dexamethasone (DECADRON) tablet 2 mg  2 mg Oral DAILY  polyvinyl alcohol (LIQUIFILM TEARS) 1.4 % ophthalmic solution 1 Drop  1 Drop Right Eye QID  ceFAZolin (ANCEF) 1 g in 0.9% sodium chloride (MBP/ADV) 50 mL  1 g IntraVENous Q6H  
 ciprofloxacin HCl (CILOXAN) 0.3 % ophthalmic solution 1 Drop  1 Drop Both Eyes Q2H  Vancomycin - pharmacy to dose   Other Rx Dosing/Monitoring  
 
______________________________________________________________________ EXPECTED LENGTH OF STAY: - - - 
ACTUAL LENGTH OF STAY:          1 Himanshu Newby MD  
Patient has given Verbal permission to discuss medical care with  
persons present in the room and and also with contact as listed on face sheet.

## 2019-05-10 NOTE — PROGRESS NOTES
Problem: Falls - Risk of 
Goal: *Absence of Falls Description Document Abimael Garcia Fall Risk and appropriate interventions in the flowsheet. Outcome: Progressing Towards Goal 
  
Problem: Patient Education: Go to Patient Education Activity Goal: Patient/Family Education Outcome: Progressing Towards Goal 
  
Problem: Diabetes Self-Management Goal: *Disease process and treatment process Description Define diabetes and identify own type of diabetes; list 3 options for treating diabetes. Outcome: Progressing Towards Goal 
Goal: *Incorporating nutritional management into lifestyle Description Describe effect of type, amount and timing of food on blood glucose; list 3 methods for planning meals. Outcome: Progressing Towards Goal 
Goal: *Incorporating physical activity into lifestyle Description State effect of exercise on blood glucose levels. Outcome: Progressing Towards Goal 
Goal: *Developing strategies to promote health/change behavior Description Define the ABC's of diabetes; identify appropriate screenings, schedule and personal plan for screenings. Outcome: Progressing Towards Goal 
Goal: *Using medications safely Description State effect of diabetes medications on diabetes; name diabetes medication taking, action and side effects. Outcome: Progressing Towards Goal 
Goal: *Monitoring blood glucose, interpreting and using results Description Identify recommended blood glucose targets  and personal targets. Outcome: Progressing Towards Goal 
Goal: *Prevention, detection, treatment of acute complications Description List symptoms of hyper- and hypoglycemia; describe how to treat low blood sugar and actions for lowering  high blood glucose level. Outcome: Progressing Towards Goal 
Goal: *Prevention, detection and treatment of chronic complications Description Define the natural course of diabetes and describe the relationship of blood glucose levels to long term complications of diabetes. Outcome: Progressing Towards Goal 
Goal: *Developing strategies to address psychosocial issues Description Describe feelings about living with diabetes; identify support needed and support network Outcome: Progressing Towards Goal 
Goal: *Insulin pump training Outcome: Progressing Towards Goal 
Goal: *Sick day guidelines Outcome: Progressing Towards Goal 
Goal: *Patient Specific Goal (EDIT GOAL, INSERT TEXT) Outcome: Progressing Towards Goal 
  
Problem: Patient Education: Go to Patient Education Activity Goal: Patient/Family Education Outcome: Progressing Towards Goal

## 2019-05-10 NOTE — ROUTINE PROCESS
Bedside and Verbal shift change report given to CRUZ Ruth (oncoming nurse) by Tha Rojas RN (offgoing nurse). Report included the following information SBAR, Kardex, Intake/Output, MAR, Recent Results and Cardiac Rhythm NSR.

## 2019-05-10 NOTE — WOUND CARE
Wound Consult:  New Patient Visit. Chart reviewed. Consulted for lower leg wounds. In earlier today to examine and treat; his significant other and son were at bedside. Patient is resting on a Vessix P500 air support bed. Patient went to wound clinic yesterday and was sent via EMS to hospital. 
Assessment: The following wounds are noted POA: 
Right lower leg - anterior ulcer (largest) 1.2 x 1 x 0.3 cm, moist yellow to red base; yellow is somewhat easily removed with Q tip, then two smaller areas one medially and one laterally. Redness, edema and some weeping and warmth from lower leg with cellulitis. Right dorsal foot - 3 x 8 cm area of ecchymosis/violet discoloration of shiny intact skin. Appears shoe/leg wraps caused injury (was in compression wraps with weekly changes from [address] [place] [address],[address]). Left lower leg - posterior/lateral (largest) 2 x 1.4 x 0.6 cm, moist yellow to red base; yellow also somewhat easily removed with Q tip swab; also has two much smaller areas on leg medially and anteriorly. Redness, edema and some weeping and warmth from lower leg with cellulitis. Left dorsal foot - 1 x 5 cm area of light maroon discoloration of intact skin. Sacrum/buttocks/heels - no redness to intact skin. Right upper flank - has ecchymosis from fall per his significant other. Treatment: 
Moisturized lower legs; medi-honey alginate to all wounds, then dry gauze/ABD pads, jenna and ace wraps to secure. Floated heels with leg elevation. Wound Recommendations: 
Bilateral lower legs : wash with saline, pat dry, moisturize intact skin with aloe vesta protective ointment; used Medi-honey alginate to fill wounds, then dry gauze, ABD pads, kerlex/jenna and light ace wrap to legs to secure every other day. Skin Care Recommendations: 1. Minimize friction/shear: minimize layers of linen/pads under patient.  
2. Off load pressure/reposition: continue to turn and reposition approximately every 2 hours; float heels or use Prevalon boots. 3. Manage Moisture - keep skin folds dry; promote continence. 4. Continue to monitor nutrition, pain, and skin risk scale, and skin assessment. Plan: 
Spoke with Dr. Kady Salter regarding findings and obtained orders for treatment. We will continue to reassess routinely and as needed. Liu Zuniga RN,[facility] Office [phone] Pager [phone]

## 2019-05-10 NOTE — ROUTINE PROCESS
Sepsis SBAR Form TRANSFER - OUT REPORT: 
 
Verbal report given to Mason Vanessa RN(name) on Cassy Tony  being transferred to 6S(unit) for routine progression of care Report consisted of patients Situation, Background, Assessment and  
Recommendations(SBAR). Information from the following report(s) SBAR, ED Summary and MAR was reviewed with the receiving nurse. Lines:  
Peripheral IV 05/09/19 Left Wrist (Active) Site Assessment Clean, dry, & intact 5/9/2019  4:49 PM  
Phlebitis Assessment 0 5/9/2019  4:49 PM  
Infiltration Assessment 0 5/9/2019  4:49 PM  
Dressing Status Clean, dry, & intact 5/9/2019  4:49 PM  
Hub Color/Line Status Pink 5/9/2019  4:49 PM  
  
 
Opportunity for questions and clarification was provided. Patient transported with: 
 Registered Nurse Code Sepsis called:Yes  When? 0475 
? Sepsis Criteria met: 
? Patient actual weight measured: Wt Readings from Last 1 Encounters:  
05/09/19 108.9 kg (240 lb) ? Initial Lactic Acid/Pro-Calcitonin: 
Abnormal Labs Reviewed METABOLIC PANEL, COMPREHENSIVE - Abnormal; Notable for the following components:  
    Result Value Glucose 174 (*) BUN 21 (*) Creatinine 1.40 (*)   
 GFR est AA 58 (*)   
 GFR est non-AA 48 (*) Albumin 2.9 (*) Globulin 4.4 (*) A-G Ratio 0.7 (*) All other components within normal limits CBC WITH AUTOMATED DIFF - Abnormal; Notable for the following components: .7 (*)   
 RDW 16.1 (*) IMMATURE GRANULOCYTES 3 (*)   
 ABS. NEUTROPHILS 8.1 (*)   
 ABS. IMM. GRANS. 0.3 (*) All other components within normal limits POC LACTIC ACID - Abnormal; Notable for the following components:  
 Lactic Acid (POC) 2.53 (*) All other components within normal limits POC LACTIC ACID - Abnormal; Notable for the following components:  
 Lactic Acid (POC) 2.27 (*) All other components within normal limits Lactic redraw needed: 0000 ? Fluid resuscitation status:Yes 
 ? 
Blood cultures drawn: Yes ? Antibiotics started:Yes 
? 
VS x2 post-fluid resuscitation:Yes 
? Vasopressors required? Started:No 
? 
MD reassessment complete: Yes     By Whom? MD Kiera Ayala ? Additional Interventions/Comments:

## 2019-05-10 NOTE — PROGRESS NOTES
Received via stretcher from the ED about 21:30 this shift. Patient left eye sewn shut. Significant other accompanied patient to the room but could not stay to answer admission questions. 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 Left-sided hearing worse than right; speak to the patient from his right side. Patient touch sensitive; jumps when touched. Disoriented to place, time, situation. Knows his name and . Hearing aides were placed in the cabinet above the sink by his significant other before she left last night. Patient was cooperative at times, but mostly uncooperative. Cursing often. His significant other tells me he does not want to live in his condition. Patient pulled out his IV about 5:30. Cleaned up; dry linen and gown provided. Daughter in briefly this morning to visit on her way to work. Both the patient's Significant other and daughter states he is usually cooperative with his care. See flowsheet for further documentation.

## 2019-05-10 NOTE — CONSULTS
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 male admitted with significant lethargy that began the middle of the week. He lives at a local assisted care facility, but generally does well. He has been more and more sleepy, according to family, to the point that he doesn't wake up much over the past 24 hours or so. He is admitted for further management. He has not had any complaints, according to family. He was just recently started on an oral antifungal for his toes, however.       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, Y84.2    Aspiration into airway T17.908A    Sepsis ([address] A41.9    Trigeminal neuralgia G50.0    Pulmonary emboli (HCC) I26.99    Cellulitis L03.90     Past Medical History:   Diagnosis Date    Arthritis     HANDS    Beta-blocker therapy     Cancer ([facility] [address] 2013    MAXILLARY SINUS CANCER, RADIATION AND CHEMO    Cancer ([facility] [address]     SKIN CA ON NOSE    GERD (gastroesophageal reflux disease)     Hypercholesterolemia     Hypertension     pt denies    Nasal sinus tumor     Numbness of LEFT hand & LEFT face     RBBB (right bundle branch block)     TIA (transient ischemic attack)     12 TIA's over 9 YRS.1ST ONE IN - LAST IN       Trigeminal neuralgia       Family History   Problem Relation Age of Onset    Cancer Mother         colon    Cancer Father         throat    No Known Problems Brother     Anesth Problems Neg Hx       Social History     Tobacco Use    Smoking status: Former Smoker     Packs/day: 1.00     Years: 40.00     Pack years: 40.00     Last attempt to quit: 1982     Years since quittin.3    Smokeless tobacco: Never Used    Tobacco comment: OCCAS CIGAR   Substance Use Topics    Alcohol use: No     Alcohol/week: 10.5 oz     Types: 21 Glasses of wine per week     Frequency: Never     Past Surgical History:   Procedure Laterality Date    HX CATARACT REMOVAL Bilateral     HX GI      COLONOSCOPY    HX HEENT      BIOPSY OF SINUS     HX HEENT Left     sew eye closed    HX HERNIA REPAIR Right     INGUINAL    HX KNEE ARTHROSCOPY Right 2007    HX KNEE REPLACEMENT Right 2012    Total Knee replacement- right    HX ROTATOR CUFF REPAIR Right     right rotator cuff repair      Prior to Admission medications    Medication Sig Start Date End Date Taking? Authorizing Provider   OTHER PO Antifungal - family says that patient was instructed to take for fungus on foot. Yes Provider, Historical   spironolactone (ALDACTONE) 25 mg tablet Take 25 mg by mouth daily. Yes Provider, Historical   furosemide (LASIX) 40 mg tablet Take 1 Tab by mouth every Monday, Thursday, Saturday. 18  Yes aPblo Ward MD   peg 400-propylene glycol (SYSTANE, PROPYLENE GLYCOL,) 0.4-0.3 % drop Administer 1 Drop to right eye four (4) times daily.    Yes Other, MD Brayan   dexamethasone (DECADRON) 1 mg tablet Take 2 mg by mouth daily. Yes Christi Prasad MD   senna (SENNA LAXATIVE) 8.6 mg tablet Take 8.6 mg by mouth daily. 17  Yes Provider, Historical   acetaminophen (TYLENOL EXTRA STRENGTH) 500 mg tablet Take 2 Tabs by mouth as needed for Pain. No more than 3grams in 24hour period  Indications: Pain 17  Yes Ana Diaz MD   gabapentin (NEURONTIN) 600 mg tablet Take 0.5 Tabs by mouth two (2) times a day. Indications: trigeminal neuralgia 17  Yes Ana Diaz MD   levETIRAcetam (KEPPRA) 500 mg tablet Take 1 Tab by mouth two (2) times a day. 17  Yes Ana Diaz MD   cyanocobalamin (VITAMIN B-12) 1,000 mcg tablet Take 1,000 mcg by mouth daily. Yes Provider, Historical   cholecalciferol (VITAMIN D3) 1,000 unit cap Take 1,000 Units by mouth daily. Yes Provider, Historical   docusate sodium (COLACE) 100 mg capsule Take 100 mg by mouth nightly. Yes Provider, Historical   bacitracin ophthalmic ointment Administer  to left eye daily. 1/2/15  Yes Provider, Historical   clopidogrel (PLAVIX) 75 mg tablet Take 75 mg by mouth daily. TAKES IN AM   Yes Provider, Historical   atorvastatin (LIPITOR) 10 mg tablet Take 10 mg by mouth nightly. Yes Provider, Historical       Allergies   Allergen Reactions    Adhesive Tape-Silicones Other (comments)     REDNESS    Aggrenox [Aspirin-Dipyridamole] Other (comments)     headache    Amlodipine Rash    Diuril [Chlorothiazide] Unknown (comments)    Hydrochlorothiazide Other (comments)     LOW NA        Review of Systems:  Review of systems not obtained due to patient factors.     Objective:     Visit Vitals  /78 (BP 1 Location: Right arm, BP Patient Position: At rest)   Pulse 72   Temp 97 °F (36.1 °C)   Resp 18   Ht 5' 9\" (1.753 m)   Wt 117.9 kg (259 lb 14.8 oz)   SpO2 93%   BMI 38.38 kg/m²     Temp (24hrs), Av.9 °F (36.6 °C), Min:97 °F (36.1 °C), Max:98.9 °F (37.2 °C)       Lines:  Peripheral IV:       Physical Exam:  Lungs:  clear to auscultation bilaterally  Heart:  regular rate and rhythm  Abdomen:  soft, non-tender.  Bowel sounds normal. No masses,  no organomegaly  Skin:  no rash or abnormalities    Data Review:     CBC:  Recent Labs     05/10/19  0138 05/09/19  1655   WBC 10.0 10.8   GRANS 71 74   MONOS 8 6   EOS 1 0   ANEU 7.2 8.1*   ABL 1.7 1.7   HGB 12.5 14.1   HCT 38.6 44.5    199       BMP:  Recent Labs     05/10/19  0240 05/09/19  1655   CREA 1.00 1.40*   BUN 18 21*    136   K 4.1 4.2    98   CO2 27 29   AGAP 7 9   * 174*       LFTS:  Recent Labs     05/09/19  1655   TBILI 0.6   ALT 63   SGOT 22   AP 58   TP 7.3   ALB 2.9*       Microbiology:     All Micro Results     Procedure Component Value Units Date/Time    CULTURE, BLOOD, PAIRED [[id number]] Collected:  05/09/19 1655    Order Status:  Completed Specimen:  Blood Updated:  05/10/19 1135     Special Requests: NO SPECIAL REQUESTS        Culture result: NO GROWTH AFTER 17 HOURS             Imaging:   Reviewed     Signed By: Keyur Diaz MD     May 10, 2019

## 2019-05-10 NOTE — ED NOTES
MD Khushi Aguero notified that pt's lactic was down, but still 2.27. Maintenance fluids started, and lactic acid to be rechecked in 4 hours

## 2019-05-11 ENCOUNTER — APPOINTMENT (OUTPATIENT)
Dept: VASCULAR SURGERY | Age: 84
DRG: 602 | End: 2019-05-11
Attending: HOSPITALIST
Payer: MEDICARE

## 2019-05-11 LAB
ANION GAP SERPL CALC-SCNC: 8 MMOL/L (ref 5–15)
BUN SERPL-MCNC: 13 MG/DL (ref 6–20)
BUN/CREAT SERPL: 15 (ref 12–20)
CALCIUM SERPL-MCNC: 8.2 MG/DL (ref 8.5–10.1)
CHLORIDE SERPL-SCNC: 105 MMOL/L (ref 97–108)
CO2 SERPL-SCNC: 24 MMOL/L (ref 21–32)
COMMENT, HOLDF: NORMAL
CREAT SERPL-MCNC: 0.88 MG/DL (ref 0.7–1.3)
GLUCOSE SERPL-MCNC: 126 MG/DL (ref 65–100)
POTASSIUM SERPL-SCNC: 3.9 MMOL/L (ref 3.5–5.1)
SAMPLES BEING HELD,HOLD: NORMAL
SODIUM SERPL-SCNC: 137 MMOL/L (ref 136–145)

## 2019-05-11 PROCEDURE — 51798 US URINE CAPACITY MEASURE: CPT

## 2019-05-11 PROCEDURE — 94664 DEMO&/EVAL PT USE INHALER: CPT

## 2019-05-11 PROCEDURE — 74011250636 HC RX REV CODE- 250/636: Performed by: HOSPITALIST

## 2019-05-11 PROCEDURE — 74011000258 HC RX REV CODE- 258: Performed by: HOSPITALIST

## 2019-05-11 PROCEDURE — 97161 PT EVAL LOW COMPLEX 20 MIN: CPT

## 2019-05-11 PROCEDURE — 93970 EXTREMITY STUDY: CPT

## 2019-05-11 PROCEDURE — 94640 AIRWAY INHALATION TREATMENT: CPT

## 2019-05-11 PROCEDURE — 74011000250 HC RX REV CODE- 250: Performed by: INTERNAL MEDICINE

## 2019-05-11 PROCEDURE — 36415 COLL VENOUS BLD VENIPUNCTURE: CPT

## 2019-05-11 PROCEDURE — 80048 BASIC METABOLIC PNL TOTAL CA: CPT

## 2019-05-11 PROCEDURE — 65660000000 HC RM CCU STEPDOWN

## 2019-05-11 PROCEDURE — 74011250637 HC RX REV CODE- 250/637: Performed by: HOSPITALIST

## 2019-05-11 PROCEDURE — 97530 THERAPEUTIC ACTIVITIES: CPT

## 2019-05-11 PROCEDURE — 74011250637 HC RX REV CODE- 250/637: Performed by: INTERNAL MEDICINE

## 2019-05-11 RX ORDER — IPRATROPIUM BROMIDE AND ALBUTEROL SULFATE 2.5; .5 MG/3ML; MG/3ML
3 SOLUTION RESPIRATORY (INHALATION)
Status: DISCONTINUED | OUTPATIENT
Start: 2019-05-11 | End: 2019-05-13

## 2019-05-11 RX ORDER — FUROSEMIDE 40 MG/1
20 TABLET ORAL DAILY
Status: COMPLETED | OUTPATIENT
Start: 2019-05-11 | End: 2019-05-14

## 2019-05-11 RX ADMIN — CEFAZOLIN 1 G: 1 INJECTION, POWDER, FOR SOLUTION INTRAMUSCULAR; INTRAVENOUS at 15:22

## 2019-05-11 RX ADMIN — CIPROFLOXACIN HYDROCHLORIDE 1 DROP: 3 SOLUTION/ DROPS OPHTHALMIC at 17:31

## 2019-05-11 RX ADMIN — POLYVINYL ALCOHOL 1 DROP: 14 SOLUTION/ DROPS OPHTHALMIC at 13:56

## 2019-05-11 RX ADMIN — Medication 1000 MCG: at 08:55

## 2019-05-11 RX ADMIN — CIPROFLOXACIN HYDROCHLORIDE 1 DROP: 3 SOLUTION/ DROPS OPHTHALMIC at 08:56

## 2019-05-11 RX ADMIN — CIPROFLOXACIN HYDROCHLORIDE 1 DROP: 3 SOLUTION/ DROPS OPHTHALMIC at 13:56

## 2019-05-11 RX ADMIN — VANCOMYCIN HYDROCHLORIDE 1500 MG: 10 INJECTION, POWDER, LYOPHILIZED, FOR SOLUTION INTRAVENOUS at 10:05

## 2019-05-11 RX ADMIN — LEVETIRACETAM 500 MG: 500 TABLET, FILM COATED ORAL at 17:30

## 2019-05-11 RX ADMIN — POLYVINYL ALCOHOL 1 DROP: 14 SOLUTION/ DROPS OPHTHALMIC at 17:31

## 2019-05-11 RX ADMIN — CIPROFLOXACIN HYDROCHLORIDE 1 DROP: 3 SOLUTION/ DROPS OPHTHALMIC at 07:40

## 2019-05-11 RX ADMIN — POLYVINYL ALCOHOL 1 DROP: 14 SOLUTION/ DROPS OPHTHALMIC at 21:22

## 2019-05-11 RX ADMIN — LEVETIRACETAM 500 MG: 500 TABLET, FILM COATED ORAL at 08:56

## 2019-05-11 RX ADMIN — Medication 10 ML: at 07:40

## 2019-05-11 RX ADMIN — CIPROFLOXACIN HYDROCHLORIDE 1 DROP: 3 SOLUTION/ DROPS OPHTHALMIC at 21:22

## 2019-05-11 RX ADMIN — Medication 10 ML: at 21:22

## 2019-05-11 RX ADMIN — GABAPENTIN 300 MG: 600 TABLET, FILM COATED ORAL at 08:55

## 2019-05-11 RX ADMIN — BACITRACIN ZINC AND POLYMYXIN B SULFATES: 500; 10000 OINTMENT OPHTHALMIC at 22:00

## 2019-05-11 RX ADMIN — CIPROFLOXACIN HYDROCHLORIDE 1 DROP: 3 SOLUTION/ DROPS OPHTHALMIC at 11:30

## 2019-05-11 RX ADMIN — CIPROFLOXACIN HYDROCHLORIDE 1 DROP: 3 SOLUTION/ DROPS OPHTHALMIC at 19:07

## 2019-05-11 RX ADMIN — CLOPIDOGREL BISULFATE 75 MG: 75 TABLET ORAL at 08:56

## 2019-05-11 RX ADMIN — GABAPENTIN 300 MG: 600 TABLET, FILM COATED ORAL at 17:31

## 2019-05-11 RX ADMIN — CEFAZOLIN 1 G: 1 INJECTION, POWDER, FOR SOLUTION INTRAMUSCULAR; INTRAVENOUS at 09:03

## 2019-05-11 RX ADMIN — DEXAMETHASONE 2 MG: 1 TABLET ORAL at 08:56

## 2019-05-11 RX ADMIN — DOCUSATE SODIUM 100 MG: 100 CAPSULE, LIQUID FILLED ORAL at 21:23

## 2019-05-11 RX ADMIN — ENOXAPARIN SODIUM 40 MG: 40 INJECTION SUBCUTANEOUS at 21:23

## 2019-05-11 RX ADMIN — ATORVASTATIN CALCIUM 10 MG: 10 TABLET, FILM COATED ORAL at 21:23

## 2019-05-11 RX ADMIN — SENNOSIDES 8.6 MG: 8.6 TABLET, FILM COATED ORAL at 08:55

## 2019-05-11 RX ADMIN — IPRATROPIUM BROMIDE AND ALBUTEROL SULFATE 3 ML: .5; 3 SOLUTION RESPIRATORY (INHALATION) at 21:28

## 2019-05-11 RX ADMIN — CIPROFLOXACIN HYDROCHLORIDE 1 DROP: 3 SOLUTION/ DROPS OPHTHALMIC at 22:43

## 2019-05-11 RX ADMIN — FUROSEMIDE 20 MG: 40 TABLET ORAL at 14:14

## 2019-05-11 RX ADMIN — CIPROFLOXACIN HYDROCHLORIDE 1 DROP: 3 SOLUTION/ DROPS OPHTHALMIC at 01:38

## 2019-05-11 RX ADMIN — CIPROFLOXACIN HYDROCHLORIDE 1 DROP: 3 SOLUTION/ DROPS OPHTHALMIC at 03:19

## 2019-05-11 RX ADMIN — CEFAZOLIN 1 G: 1 INJECTION, POWDER, FOR SOLUTION INTRAMUSCULAR; INTRAVENOUS at 21:22

## 2019-05-11 RX ADMIN — CIPROFLOXACIN HYDROCHLORIDE 1 DROP: 3 SOLUTION/ DROPS OPHTHALMIC at 05:12

## 2019-05-11 RX ADMIN — CIPROFLOXACIN HYDROCHLORIDE 1 DROP: 3 SOLUTION/ DROPS OPHTHALMIC at 15:22

## 2019-05-11 RX ADMIN — Medication 10 ML: at 11:31

## 2019-05-11 RX ADMIN — POLYVINYL ALCOHOL 1 DROP: 14 SOLUTION/ DROPS OPHTHALMIC at 08:56

## 2019-05-11 NOTE — PROGRESS NOTES
Problem: Mobility Impaired (Adult and Pediatric) Goal: *Acute Goals and Plan of Care (Insert Text) Description Physical Therapy Goals Initiated 5/11/2019 1. Patient will move from supine to sit and sit to supine  and roll side to side in bed with supervision/set-up within 7 day(s). 2.  Patient will transfer from bed to chair and chair to bed with supervision/set-up using the least restrictive device within 7 day(s). 3.  Patient will perform sit to stand with supervision/set-up within 7 day(s). 4.  Patient will ambulate with supervision/set-up for 150 feet with the least restrictive device within 7 day(s). Outcome: Progressing Towards Goal 
 PHYSICAL THERAPY EVALUATION Patient: Omkar Camargo (51 y.o. male) Date: 5/11/2019 Primary Diagnosis: Cellulitis [L03.90] Cellulitis [L03.90] Precautions: fall ASSESSMENT :  
Based on the objective data described below, patient presents with Contact guard assistance and Maximum assistance overall for functional mobility. Gait training completed at Contact guard assistance, 25 feet and using a gait belt and rolling walker. Most difficultly with supine to sit but questionable if due to cognitive status as returned to supine well. Anticipate being able to return to [facility] facility can assist more if needed upon return. The following are barriers to independence while in acute care:  
-Cognitive and/or behavioral: orientation, attention to task, processing and safety awareness 
-Medical condition: functional endurance, standing balance, pain tolerance, medical history and edema bilateral LE's    
-Other:    
 
The patient will benefit from skilled acute intervention to address the above impairments and their rehabilitation potential is considered to be Good Discharge recommendations: Home health (to increase independence and safety) If above is not an option then recommend: None Patient's barriers to discharging home, in addition to above impairments: none. Equipment recommendations for successful discharge (if) home: none PLAN : 
Recommendations and Planned Interventions: bed mobility training, transfer training, gait training, therapeutic exercises, patient and family training/education and therapeutic activities Frequency/Duration: Patient will be followed by physical therapy  5 times a week to address goals. SUBJECTIVE:  
Patient stated ? Its good to walk.? OBJECTIVE DATA SUMMARY:  
HISTORY:   
Past Medical History:  
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  
 GERD (gastroesophageal reflux disease) Hypercholesterolemia Hypertension   
 pt denies Nasal sinus tumor Numbness of LEFT hand & LEFT face 2010 RBBB (right bundle branch block) TIA (transient ischemic attack) 12 TIA's over 9 YRS.1ST ONE IN 8/03- LAST IN 2/12 Trigeminal neuralgia Past Surgical History:  
Procedure Laterality Date HX CATARACT REMOVAL Bilateral   
 HX GI    
 COLONOSCOPY  
 HX HEENT    
 BIOPSY OF SINUS   
 HX HEENT Left   
 sew eye closed HX HERNIA REPAIR Right INGUINAL  
 HX KNEE ARTHROSCOPY Right 2007 HX KNEE REPLACEMENT Right 2012 Total Knee replacement- right HX ROTATOR CUFF REPAIR Right   
 right rotator cuff repair Prior Level of Function/Home Situation: modified independent with bed, transfers and gait. Falls from chair due to not sitting back in chair; incidental assist with bathing and ADLS Personal factors and/or comorbidities impacting plan of care:  
 
Home Situation Home Environment: Assisted living([facility]) One/Two Story Residence: Two story Living Alone: No 
Support Systems: Spouse/Significant Other/Partner Patient Expects to be Discharged to[de-identified] Assisted living Current DME Used/Available at Home: [employer] Bardales, rollator EXAMINATION/PRESENTATION/DECISION MAKING:  
Critical Behavior: 
Neurologic State: Alert Orientation Level: Oriented to person, Disoriented to place, Disoriented to situation, Disoriented to time Cognition: Decreased attention/concentration Hearing: Auditory Auditory Impairment: Hard of hearing, left side Skin:  see nursing notes Edema: bilateral LE's 
Range Of Motion: 
AROM: Generally decreased, functional 
  
  
  
PROM: Generally decreased, functional 
  
  
  
Strength:   
Strength: Generally decreased, functional 
  
  
  
  
  
  
Tone & Sensation:  
Tone: Normal 
  
  
  
  
  
  
  
  
   
Coordination: 
Coordination: Generally decreased, functional 
Vision:  
 Decreased/blind left eye Functional Mobility: 
Bed Mobility: 
  
Supine to Sit: Maximum assistance Sit to Supine: Stand-by assistance Transfers: 
Sit to Stand: Contact guard assistance Stand to Sit: Contact guard assistance Balance:  
Sitting: Intact Standing: Impaired; With support Standing - Static: Good Standing - Dynamic : Good Ambulation/Gait Training: 
Distance (ft): 25 Feet (ft) Assistive Device: Gait belt;Walker, rolling Ambulation - Level of Assistance: Contact guard assistance Gait Description (WDL): Exceptions to [facility] Gait Abnormalities: Decreased step clearance Base of Support: Widened Speed/Olive: Pace decreased (<100 feet/min) Step Length: Right shortened;Left shortened Functional Measure: 
Timed up and go: 
 
Timed Get Up And Go Test: (Unable without assist) Physical Therapy Evaluation Charge Determination History Examination Presentation Decision-Making HIGH Complexity :3+ comorbidities / personal factors will impact the outcome/ POC  LOW Complexity : 1-2 Standardized tests and measures addressing body structure, function, activity limitation and / or participation in recreation Based on the above components, the patient evaluation is determined to be of the following complexity level: LOW Activity Tolerance:  
Fair and observed SOB with activity Please refer to the flowsheet for vital signs taken during this treatment. After treatment patient left:  
Supine in bed Bed alarm/tab alert on Bed/Chair-wheels locked Bed in low position Call light within reach RN notified Family at bedside COMMUNICATION/EDUCATION:  
The patient?s plan of care was discussed with: Registered Nurse. Fall prevention education was provided and the patient/caregiver indicated understanding., Patient/family have participated as able in goal setting and plan of care. and Patient/family agree to work toward stated goals and plan of care. Thank you for this referral. 
Ras Loza, PT Time Calculation: 30 mins

## 2019-05-11 NOTE — PROGRESS NOTES
Hospitalist Progress Note Mian Anguiano MD 
Answering service: [phone] OR [phone] from in house phone Date of Service:  2019 NAME:  Stef Huitron :  11/10/1930 MRN:  [id number] Admission Summary:  
Lethargic as well as worsening leg wounds Interval history / Subjective: F/U for AMS Patient is seen and examined this morning. Daughter at bedside. He looks and feels better. He is alert and oriented X3. Good appetite, no N/V. Assessment & Plan: # Acute encephalopathy metabolic due to seizure vs infectious  
- CT head X2 no hemorrhage, consistent with chronic change - UA negative, blood culture pending - Chest x-ray Left lung base atelectasis versus pneumonia - ct with Keppra - ct with abx  
- hold IVF due to generalized swelling  
- EEG done result pending  
- seizure and fall precaution # Bilateral lower extremity cellulitis on the bases of chronic ulcer # Bilateral lower extremity edema  
- on Vancomycin and cefazolin  
- oral lasix to reduce the swelling - ID following  
- wound care following  
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   
- vascular surgery consult # HTN: resume home meds # HLD: ct with statin # Radiation necrosis s/p craniotomy  
- continue Keppra DVT prop: Enoxaparin Code: DNR Dispo: TBD Hospital Problems  Date Reviewed: 2018 Codes Class Noted POA Cellulitis ICD-10-CM: L03.90 ICD-9-CM: 682.9  2019 Unknown Review of Systems:  
Difficult to obtain due to patient unresponsiveness Vital Signs:  
 Last 24hrs VS reviewed since prior progress note. Most recent are: 
Visit Vitals /70 (BP 1 Location: Right arm, BP Patient Position: At rest) Pulse 69 Temp 97.6 °F (36.4 °C) Resp 20 Ht 5' 9\" (1.753 m) Wt 117.7 kg (259 lb 7.7 oz) SpO2 95% BMI 38.32 kg/m² Intake/Output Summary (Last 24 hours) at 5/11/2019 1514 Last data filed at 5/11/2019 1140 Gross per 24 hour Intake  Output 200 ml Net -200 ml Physical Examination:  
 
 
     
Constitutional:  No acute distress, drowsy ENT:  moon face, Oral mucous moist, oropharynx benign. Neck supple, Resp:  CTA bilaterally. No wheezing/rhonchi/rales. No accessory muscle use CV:  Regular rhythm, normal rate, no murmurs, gallops, rubs GI:  Soft, non distended, non tender. normoactive bowel sounds, no hepatosplenomegaly Musculoskeletal:  bilateral +3 pitting edema. Both legs covered with dressing Neurologic:  Moves all extremities. AAOx3, CN II-XII reviewed Data Review:  
 I personally reviewed labs and imaging Labs:  
 
Recent Labs 05/10/19 
0138 05/09/19 
1655 WBC 10.0 10.8 HGB 12.5 14.1 HCT 38.6 44.5  199 Recent Labs 05/11/19 
0410 05/10/19 
0240 05/09/19 
1655  139 136  
K 3.9 4.1 4.2  105 98 CO2 24 27 29 BUN 13 18 21* CREA 0.88 1.00 1.40* * 143* 174* CA 8.2* 8.2* 9.3 Recent Labs 05/09/19 
1655 SGOT 22 ALT 63 AP 58 TBILI 0.6 TP 7.3 ALB 2.9*  
GLOB 4.4* No results for input(s): INR, PTP, APTT in the last 72 hours. No lab exists for component: INREXT, INREXT No results for input(s): FE, TIBC, PSAT, FERR in the last 72 hours. Lab Results Component Value Date/Time Folate 8.6 11/20/2017 03:03 PM  
  
No results for input(s): PH, PCO2, PO2 in the last 72 hours. No results for input(s): CPK, CKNDX, TROIQ in the last 72 hours. No lab exists for component: CPKMB Lab Results Component Value Date/Time Cholesterol, total 163 07/14/2018 12:42 PM  
 HDL Cholesterol 89 07/14/2018 12:42 PM  
 LDL, calculated 59.4 07/14/2018 12:42 PM  
 Triglyceride 73 07/14/2018 12:42 PM  
 CHOL/HDL Ratio 1.8 07/14/2018 12:42 PM  
 
Lab Results Component Value Date/Time Glucose (POC) 197 (H) 11/25/2017 11:27 AM  
 Glucose (POC) 128 (H) 11/25/2017 05:36 AM  
 Glucose (POC) 117 (H) 11/24/2017 11:22 PM  
 Glucose (POC) 113 (H) 11/24/2017 05:47 PM  
 Glucose (POC) 198 (H) 11/24/2017 11:57 AM  
 
Lab Results Component Value Date/Time Color YELLOW/STRAW 05/09/2019 08:11 PM  
 Appearance CLEAR 05/09/2019 08:11 PM  
 Specific gravity 1.014 05/09/2019 08:11 PM  
 Specific gravity 1.005 08/23/2018 05:34 PM  
 pH (UA) 5.5 05/09/2019 08:11 PM  
 Protein NEGATIVE  05/09/2019 08:11 PM  
 Glucose NEGATIVE  05/09/2019 08:11 PM  
 Ketone NEGATIVE  05/09/2019 08:11 PM  
 Bilirubin NEGATIVE  05/09/2019 08:11 PM  
 Urobilinogen 0.2 05/09/2019 08:11 PM  
 Nitrites NEGATIVE  05/09/2019 08:11 PM  
 Leukocyte Esterase NEGATIVE  05/09/2019 08:11 PM  
 Epithelial cells FEW 07/14/2018 04:09 PM  
 Bacteria NEGATIVE  07/14/2018 04:09 PM  
 WBC 0-4 07/14/2018 04:09 PM  
 RBC 0-5 07/14/2018 04:09 PM  
 
 
 
Medications Reviewed:  
 
Current Facility-Administered Medications Medication Dose Route Frequency  albuterol-ipratropium (DUO-NEB) 2.5 MG-0.5 MG/3 ML  3 mL Nebulization Q6H RT  
 furosemide (LASIX) tablet 20 mg  20 mg Oral DAILY  vancomycin (VANCOCIN) 1500 mg in  ml infusion  1,500 mg IntraVENous Q16H  
 sodium chloride (NS) flush 5-10 mL  5-10 mL IntraVENous PRN  
 sodium chloride (NS) flush 5-40 mL  5-40 mL IntraVENous Q8H  
 sodium chloride (NS) flush 5-40 mL  5-40 mL IntraVENous PRN  
 acetaminophen (TYLENOL) tablet 650 mg  650 mg Oral Q4H PRN  
 ondansetron (ZOFRAN) injection 4 mg  4 mg IntraVENous Q4H PRN  
 enoxaparin (LOVENOX) injection 40 mg  40 mg SubCUTAneous Q24H  
 atorvastatin (LIPITOR) tablet 10 mg  10 mg Oral QHS  clopidogrel (PLAVIX) tablet 75 mg  75 mg Oral DAILY  bacitracin-polymyxin b (POLYSPORIN) 500-10,000 unit/gram ophthalmic ointment   Left Eye QHS  docusate sodium (COLACE) capsule 100 mg  100 mg Oral QHS  cyanocobalamin (VITAMIN B12) tablet 1,000 mcg  1,000 mcg Oral DAILY  gabapentin (NEURONTIN) tablet 300 mg  300 mg Oral BID  levETIRAcetam (KEPPRA) tablet 500 mg  500 mg Oral BID  senna (SENOKOT) tablet 8.6 mg  1 Tab Oral DAILY  dexamethasone (DECADRON) tablet 2 mg  2 mg Oral DAILY  polyvinyl alcohol (LIQUIFILM TEARS) 1.4 % ophthalmic solution 1 Drop  1 Drop Right Eye QID  ceFAZolin (ANCEF) 1 g in 0.9% sodium chloride (MBP/ADV) 50 mL  1 g IntraVENous Q6H  
 ciprofloxacin HCl (CILOXAN) 0.3 % ophthalmic solution 1 Drop  1 Drop Both Eyes Q2H  Vancomycin - pharmacy to dose   Other Rx Dosing/Monitoring  
 
______________________________________________________________________ EXPECTED LENGTH OF STAY: - - - 
ACTUAL LENGTH OF STAY:          2 Keaton Sheffield MD  
Patient has given Verbal permission to discuss medical care with  
persons present in the room and and also with contact as listed on face sheet.

## 2019-05-11 NOTE — PROGRESS NOTES
Problem: Falls - Risk of 
Goal: *Absence of Falls Description Document Alanalimike Jay Fall Risk and appropriate interventions in the flowsheet. Outcome: Progressing Towards Goal 
  
Problem: Patient Education: Go to Patient Education Activity Goal: Patient/Family Education Outcome: Progressing Towards Goal 
  
Problem: Pressure Injury - Risk of 
Goal: *Prevention of pressure injury Description Document Davy Scale and appropriate interventions in the flowsheet. Outcome: Progressing Towards Goal 
  
Problem: Patient Education: Go to Patient Education Activity Goal: Patient/Family Education Outcome: Progressing Towards Goal

## 2019-05-12 LAB
ANION GAP SERPL CALC-SCNC: 7 MMOL/L (ref 5–15)
BUN SERPL-MCNC: 11 MG/DL (ref 6–20)
BUN/CREAT SERPL: 11 (ref 12–20)
CALCIUM SERPL-MCNC: 8.1 MG/DL (ref 8.5–10.1)
CHLORIDE SERPL-SCNC: 108 MMOL/L (ref 97–108)
CO2 SERPL-SCNC: 26 MMOL/L (ref 21–32)
COMMENT, HOLDF: NORMAL
CREAT SERPL-MCNC: 0.96 MG/DL (ref 0.7–1.3)
GLUCOSE SERPL-MCNC: 148 MG/DL (ref 65–100)
POTASSIUM SERPL-SCNC: 4 MMOL/L (ref 3.5–5.1)
SAMPLES BEING HELD,HOLD: NORMAL
SODIUM SERPL-SCNC: 141 MMOL/L (ref 136–145)

## 2019-05-12 PROCEDURE — 65660000000 HC RM CCU STEPDOWN

## 2019-05-12 PROCEDURE — 74011250637 HC RX REV CODE- 250/637: Performed by: HOSPITALIST

## 2019-05-12 PROCEDURE — 74011250636 HC RX REV CODE- 250/636: Performed by: HOSPITALIST

## 2019-05-12 PROCEDURE — 74011000250 HC RX REV CODE- 250: Performed by: HOSPITALIST

## 2019-05-12 PROCEDURE — 77030018836 HC SOL IRR NACL ICUM -A

## 2019-05-12 PROCEDURE — 36415 COLL VENOUS BLD VENIPUNCTURE: CPT

## 2019-05-12 PROCEDURE — 74011250637 HC RX REV CODE- 250/637: Performed by: INTERNAL MEDICINE

## 2019-05-12 PROCEDURE — 94640 AIRWAY INHALATION TREATMENT: CPT

## 2019-05-12 PROCEDURE — 74011000258 HC RX REV CODE- 258: Performed by: HOSPITALIST

## 2019-05-12 PROCEDURE — 80048 BASIC METABOLIC PNL TOTAL CA: CPT

## 2019-05-12 PROCEDURE — 94664 DEMO&/EVAL PT USE INHALER: CPT

## 2019-05-12 PROCEDURE — 77010033678 HC OXYGEN DAILY

## 2019-05-12 PROCEDURE — 51798 US URINE CAPACITY MEASURE: CPT

## 2019-05-12 PROCEDURE — 74011000250 HC RX REV CODE- 250: Performed by: INTERNAL MEDICINE

## 2019-05-12 RX ADMIN — IPRATROPIUM BROMIDE AND ALBUTEROL SULFATE 3 ML: .5; 3 SOLUTION RESPIRATORY (INHALATION) at 21:00

## 2019-05-12 RX ADMIN — CEFAZOLIN 1 G: 1 INJECTION, POWDER, FOR SOLUTION INTRAMUSCULAR; INTRAVENOUS at 03:14

## 2019-05-12 RX ADMIN — IPRATROPIUM BROMIDE AND ALBUTEROL SULFATE 3 ML: .5; 3 SOLUTION RESPIRATORY (INHALATION) at 18:11

## 2019-05-12 RX ADMIN — FUROSEMIDE 20 MG: 40 TABLET ORAL at 08:22

## 2019-05-12 RX ADMIN — IPRATROPIUM BROMIDE AND ALBUTEROL SULFATE 3 ML: .5; 3 SOLUTION RESPIRATORY (INHALATION) at 01:11

## 2019-05-12 RX ADMIN — VANCOMYCIN HYDROCHLORIDE 1500 MG: 10 INJECTION, POWDER, LYOPHILIZED, FOR SOLUTION INTRAVENOUS at 17:16

## 2019-05-12 RX ADMIN — BACITRACIN ZINC AND POLYMYXIN B SULFATES: 500; 10000 OINTMENT OPHTHALMIC at 22:56

## 2019-05-12 RX ADMIN — CEFAZOLIN 1 G: 1 INJECTION, POWDER, FOR SOLUTION INTRAMUSCULAR; INTRAVENOUS at 21:36

## 2019-05-12 RX ADMIN — VANCOMYCIN HYDROCHLORIDE 1500 MG: 10 INJECTION, POWDER, LYOPHILIZED, FOR SOLUTION INTRAVENOUS at 01:19

## 2019-05-12 RX ADMIN — GABAPENTIN 300 MG: 600 TABLET, FILM COATED ORAL at 08:21

## 2019-05-12 RX ADMIN — BACITRACIN ZINC AND POLYMYXIN B SULFATES: 500; 10000 OINTMENT OPHTHALMIC at 23:21

## 2019-05-12 RX ADMIN — POLYVINYL ALCOHOL 1 DROP: 14 SOLUTION/ DROPS OPHTHALMIC at 12:00

## 2019-05-12 RX ADMIN — ATORVASTATIN CALCIUM 10 MG: 10 TABLET, FILM COATED ORAL at 21:36

## 2019-05-12 RX ADMIN — CIPROFLOXACIN HYDROCHLORIDE 1 DROP: 3 SOLUTION/ DROPS OPHTHALMIC at 13:35

## 2019-05-12 RX ADMIN — Medication 10 ML: at 06:46

## 2019-05-12 RX ADMIN — CIPROFLOXACIN HYDROCHLORIDE 1 DROP: 3 SOLUTION/ DROPS OPHTHALMIC at 01:19

## 2019-05-12 RX ADMIN — GABAPENTIN 300 MG: 600 TABLET, FILM COATED ORAL at 17:16

## 2019-05-12 RX ADMIN — SENNOSIDES 8.6 MG: 8.6 TABLET, FILM COATED ORAL at 08:21

## 2019-05-12 RX ADMIN — DEXAMETHASONE 2 MG: 1 TABLET ORAL at 08:21

## 2019-05-12 RX ADMIN — LEVETIRACETAM 500 MG: 500 TABLET, FILM COATED ORAL at 08:21

## 2019-05-12 RX ADMIN — CIPROFLOXACIN HYDROCHLORIDE 1 DROP: 3 SOLUTION/ DROPS OPHTHALMIC at 18:59

## 2019-05-12 RX ADMIN — CEFAZOLIN 1 G: 1 INJECTION, POWDER, FOR SOLUTION INTRAMUSCULAR; INTRAVENOUS at 15:36

## 2019-05-12 RX ADMIN — POLYVINYL ALCOHOL 1 DROP: 14 SOLUTION/ DROPS OPHTHALMIC at 23:22

## 2019-05-12 RX ADMIN — CEFAZOLIN 1 G: 1 INJECTION, POWDER, FOR SOLUTION INTRAMUSCULAR; INTRAVENOUS at 09:11

## 2019-05-12 RX ADMIN — ENOXAPARIN SODIUM 40 MG: 40 INJECTION SUBCUTANEOUS at 21:36

## 2019-05-12 RX ADMIN — CIPROFLOXACIN HYDROCHLORIDE 1 DROP: 3 SOLUTION/ DROPS OPHTHALMIC at 11:59

## 2019-05-12 RX ADMIN — CIPROFLOXACIN HYDROCHLORIDE 1 DROP: 3 SOLUTION/ DROPS OPHTHALMIC at 15:36

## 2019-05-12 RX ADMIN — CIPROFLOXACIN HYDROCHLORIDE 1 DROP: 3 SOLUTION/ DROPS OPHTHALMIC at 20:32

## 2019-05-12 RX ADMIN — DOCUSATE SODIUM 100 MG: 100 CAPSULE, LIQUID FILLED ORAL at 20:32

## 2019-05-12 RX ADMIN — CIPROFLOXACIN HYDROCHLORIDE 1 DROP: 3 SOLUTION/ DROPS OPHTHALMIC at 07:01

## 2019-05-12 RX ADMIN — Medication 10 ML: at 12:00

## 2019-05-12 RX ADMIN — IPRATROPIUM BROMIDE AND ALBUTEROL SULFATE 3 ML: .5; 3 SOLUTION RESPIRATORY (INHALATION) at 09:41

## 2019-05-12 RX ADMIN — POLYVINYL ALCOHOL 1 DROP: 14 SOLUTION/ DROPS OPHTHALMIC at 08:25

## 2019-05-12 RX ADMIN — LEVETIRACETAM 500 MG: 500 TABLET, FILM COATED ORAL at 17:16

## 2019-05-12 RX ADMIN — Medication 1000 MCG: at 08:21

## 2019-05-12 RX ADMIN — CIPROFLOXACIN HYDROCHLORIDE 1 DROP: 3 SOLUTION/ DROPS OPHTHALMIC at 03:15

## 2019-05-12 RX ADMIN — POLYVINYL ALCOHOL 1 DROP: 14 SOLUTION/ DROPS OPHTHALMIC at 17:16

## 2019-05-12 RX ADMIN — CLOPIDOGREL BISULFATE 75 MG: 75 TABLET ORAL at 08:21

## 2019-05-12 RX ADMIN — CIPROFLOXACIN HYDROCHLORIDE 1 DROP: 3 SOLUTION/ DROPS OPHTHALMIC at 08:25

## 2019-05-12 RX ADMIN — CIPROFLOXACIN HYDROCHLORIDE 1 DROP: 3 SOLUTION/ DROPS OPHTHALMIC at 17:16

## 2019-05-12 RX ADMIN — CIPROFLOXACIN HYDROCHLORIDE 1 DROP: 3 SOLUTION/ DROPS OPHTHALMIC at 23:21

## 2019-05-12 RX ADMIN — CIPROFLOXACIN HYDROCHLORIDE 1 DROP: 3 SOLUTION/ DROPS OPHTHALMIC at 05:45

## 2019-05-12 NOTE — PROGRESS NOTES
Hospitalist Progress Note Trevor Riojas MD 
Answering service: [phone] OR [phone] from in house phone Date of Service:  2019 NAME:  Win Morales :  11/10/1930 MRN:  [id number] Admission Summary:  
Lethargic as well as worsening leg wounds Interval history / Subjective: F/U for AMS Patient is seen and examined this morning. Daughters at bedside. Family complained about patient being excessively sleepy. During my visit he was able to be easily awaken and asked for food. He was able to identify his family members. The leg wound is examined while patient having dressing change. Assessment & Plan: # Acute encephalopathy metabolic from infection vs seizure - CT head X2 no hemorrhage, consistent with chronic change - UA negative, blood culture NGTD 
- Chest x-ray Left lung base atelectasis versus pneumonia - ct with Keppra - ct with abx  
- hold IVF due to generalized swelling  
- sleep wake EEG negative 
- seizure and fall precaution  
- will obtain an MRI # Bilateral lower extremity cellulitis on the bases of chronic ulcer # Bilateral lower extremity edema  
- blood culture no growth in 3 days  
- on Vancomycin and cefazolin  
- oral lasix to reduce the swelling - ID following  
- wound care following  
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  
- it a resolving chronic DVT, no intervention needed   
- vascular surgery input appt # HTN: resume home meds # HLD: ct with statin # Radiation necrosis s/p craniotomy  
- continue Keppra # Generalized weakness/debility: PT/OT 
 
 
DVT prop: Enoxaparin Code: DNR Dispo: TBD Hospital Problems  Date Reviewed: 2018 Codes Class Noted POA Cellulitis ICD-10-CM: L03.90 ICD-9-CM: 682.9  2019 Unknown Review of Systems: Difficult to obtain due to patient unresponsiveness Vital Signs:  
 Last 24hrs VS reviewed since prior progress note. Most recent are: 
Visit Vitals /59 (BP 1 Location: Right arm, BP Patient Position: At rest) Pulse 68 Temp 97.7 °F (36.5 °C) Resp 15 Ht 5' 9\" (1.753 m) Wt 116.8 kg (257 lb 8 oz) SpO2 94% BMI 38.03 kg/m² Intake/Output Summary (Last 24 hours) at 5/12/2019 1600 Last data filed at 5/12/2019 1300 Gross per 24 hour Intake  Output 2430 ml Net -2430 ml Physical Examination:  
 
 
     
Constitutional:  No acute distress, drowsy ENT:  moon face, Oral mucous moist, oropharynx benign. Neck supple, Resp:  CTA bilaterally. No wheezing/rhonchi/rales. No accessory muscle use CV:  Regular rhythm, normal rate, no murmurs, gallops, rubs GI:  Soft, non distended, non tender. normoactive bowel sounds, no hepatosplenomegaly Musculoskeletal:  bilateral +3 pitting edema. Both legs covered with dressing Neurologic:  Moves all extremities. AAOx3, CN II-XII reviewed Data Review:  
 I personally reviewed labs and imaging Labs:  
 
Recent Labs 05/10/19 
0138 05/09/19 
1655 WBC 10.0 10.8 HGB 12.5 14.1 HCT 38.6 44.5  199 Recent Labs 05/12/19 
0136 05/11/19 
0410 05/10/19 
0240  137 139  
K 4.0 3.9 4.1  105 105 CO2 26 24 27 BUN 11 13 18 CREA 0.96 0.88 1.00 * 126* 143* CA 8.1* 8.2* 8.2* Recent Labs 05/09/19 
1655 SGOT 22 ALT 63 AP 58 TBILI 0.6 TP 7.3 ALB 2.9*  
GLOB 4.4* No results for input(s): INR, PTP, APTT in the last 72 hours. No lab exists for component: INREXT, INREXT No results for input(s): FE, TIBC, PSAT, FERR in the last 72 hours. Lab Results Component Value Date/Time Folate 8.6 11/20/2017 03:03 PM  
  
No results for input(s): PH, PCO2, PO2 in the last 72 hours. No results for input(s): CPK, CKNDX, TROIQ in the last 72 hours. No lab exists for component: CPKMB Lab Results Component Value Date/Time Cholesterol, total 163 07/14/2018 12:42 PM  
 HDL Cholesterol 89 07/14/2018 12:42 PM  
 LDL, calculated 59.4 07/14/2018 12:42 PM  
 Triglyceride 73 07/14/2018 12:42 PM  
 CHOL/HDL Ratio 1.8 07/14/2018 12:42 PM  
 
Lab Results Component Value Date/Time Glucose (POC) 197 (H) 11/25/2017 11:27 AM  
 Glucose (POC) 128 (H) 11/25/2017 05:36 AM  
 Glucose (POC) 117 (H) 11/24/2017 11:22 PM  
 Glucose (POC) 113 (H) 11/24/2017 05:47 PM  
 Glucose (POC) 198 (H) 11/24/2017 11:57 AM  
 
Lab Results Component Value Date/Time Color YELLOW/STRAW 05/09/2019 08:11 PM  
 Appearance CLEAR 05/09/2019 08:11 PM  
 Specific gravity 1.014 05/09/2019 08:11 PM  
 Specific gravity 1.005 08/23/2018 05:34 PM  
 pH (UA) 5.5 05/09/2019 08:11 PM  
 Protein NEGATIVE  05/09/2019 08:11 PM  
 Glucose NEGATIVE  05/09/2019 08:11 PM  
 Ketone NEGATIVE  05/09/2019 08:11 PM  
 Bilirubin NEGATIVE  05/09/2019 08:11 PM  
 Urobilinogen 0.2 05/09/2019 08:11 PM  
 Nitrites NEGATIVE  05/09/2019 08:11 PM  
 Leukocyte Esterase NEGATIVE  05/09/2019 08:11 PM  
 Epithelial cells FEW 07/14/2018 04:09 PM  
 Bacteria NEGATIVE  07/14/2018 04:09 PM  
 WBC 0-4 07/14/2018 04:09 PM  
 RBC 0-5 07/14/2018 04:09 PM  
 
 
 
Medications Reviewed:  
 
Current Facility-Administered Medications Medication Dose Route Frequency  [START ON 5/13/2019] Vancomycin, Trough - Please draw IMMEDIATELY PRIOR to starting 1000 dose on Monday, 5/12/2019   Other ONCE  
 albuterol-ipratropium (DUO-NEB) 2.5 MG-0.5 MG/3 ML  3 mL Nebulization Q6H RT  
 furosemide (LASIX) tablet 20 mg  20 mg Oral DAILY  vancomycin (VANCOCIN) 1500 mg in  ml infusion  1,500 mg IntraVENous Q16H  
 sodium chloride (NS) flush 5-10 mL  5-10 mL IntraVENous PRN  
 sodium chloride (NS) flush 5-40 mL  5-40 mL IntraVENous Q8H  
 sodium chloride (NS) flush 5-40 mL  5-40 mL IntraVENous PRN  
  acetaminophen (TYLENOL) tablet 650 mg  650 mg Oral Q4H PRN  
 ondansetron (ZOFRAN) injection 4 mg  4 mg IntraVENous Q4H PRN  
 enoxaparin (LOVENOX) injection 40 mg  40 mg SubCUTAneous Q24H  
 atorvastatin (LIPITOR) tablet 10 mg  10 mg Oral QHS  clopidogrel (PLAVIX) tablet 75 mg  75 mg Oral DAILY  bacitracin-polymyxin b (POLYSPORIN) 500-10,000 unit/gram ophthalmic ointment   Left Eye QHS  docusate sodium (COLACE) capsule 100 mg  100 mg Oral QHS  cyanocobalamin (VITAMIN B12) tablet 1,000 mcg  1,000 mcg Oral DAILY  gabapentin (NEURONTIN) tablet 300 mg  300 mg Oral BID  levETIRAcetam (KEPPRA) tablet 500 mg  500 mg Oral BID  senna (SENOKOT) tablet 8.6 mg  1 Tab Oral DAILY  dexamethasone (DECADRON) tablet 2 mg  2 mg Oral DAILY  polyvinyl alcohol (LIQUIFILM TEARS) 1.4 % ophthalmic solution 1 Drop  1 Drop Right Eye QID  ceFAZolin (ANCEF) 1 g in 0.9% sodium chloride (MBP/ADV) 50 mL  1 g IntraVENous Q6H  
 ciprofloxacin HCl (CILOXAN) 0.3 % ophthalmic solution 1 Drop  1 Drop Both Eyes Q2H  Vancomycin - pharmacy to dose   Other Rx Dosing/Monitoring  
 
______________________________________________________________________ EXPECTED LENGTH OF STAY: - - - 
ACTUAL LENGTH OF STAY:          3 Ashley Arrieta MD  
Patient has given Verbal permission to discuss medical care with  
persons present in the room and and also with contact as listed on face sheet.

## 2019-05-12 NOTE — PROCEDURES
PROCEDURE: ROUTINE INPATIENT EEG  NAME:   Win Morales  ACCOUNT NUMBER : [de-identified]  MRN:   [id number]  DATE OF SERVICE: 5/11/2019     HISTORY/INDICATION: Pt admitted with AMS. EEG is performed to assess for evidence of seizure as an etiology.      MEDICATIONS:   Current Facility-Administered Medications   Medication Dose Route Frequency Provider Last Rate Last Dose    albuterol-ipratropium (DUO-NEB) 2.5 MG-0.5 MG/3 ML  3 mL Nebulization Q6H RT Laurel Josue MD        furosemide (LASIX) tablet 20 mg  20 mg Oral DAILY Laurel Josue MD   20 mg at 05/11/19 1414    vancomycin (VANCOCIN) 1500 mg in  ml infusion  1,500 mg IntraVENous Q16H Carine Garay  mL/hr at 05/11/19 1005 1,500 mg at 05/11/19 1005    sodium chloride (NS) flush 5-10 mL  5-10 mL IntraVENous PRN Carine Garay MD        sodium chloride (NS) flush 5-40 mL  5-40 mL IntraVENous Q8H Carine Garay MD   10 mL at 05/11/19 1131    sodium chloride (NS) flush 5-40 mL  5-40 mL IntraVENous PRN Carine Garay MD        acetaminophen (TYLENOL) tablet 650 mg  650 mg Oral Q4H PRN Carine Garay MD        ondansetron (ZOFRAN) injection 4 mg  4 mg IntraVENous Q4H PRN Carine Garay MD        enoxaparin (LOVENOX) injection 40 mg  40 mg SubCUTAneous Q24H Wade Pitt MD   40 mg at 05/10/19 2140    atorvastatin (LIPITOR) tablet 10 mg  10 mg Oral QHS Carine Garay MD   10 mg at 05/10/19 2140    clopidogrel (PLAVIX) tablet 75 mg  75 mg Oral DAILY Carine Garay MD   75 mg at 05/11/19 0856    bacitracin-polymyxin b (POLYSPORIN) 500-10,000 unit/gram ophthalmic ointment   Left Eye QHS Carine Garay MD        docusate sodium (COLACE) capsule 100 mg  100 mg Oral QHS Carine Garay MD   100 mg at 05/10/19 2139    cyanocobalamin (VITAMIN B12) tablet 1,000 mcg  1,000 mcg Oral DAILY Carine Garay MD   1,000 mcg at 05/11/19 0855    gabapentin (NEURONTIN) tablet 300 mg  300 mg Oral BID Carine Garay MD   300 mg at 05/11/19 1731    levETIRAcetam (KEPPRA) tablet 500 mg 500 mg Oral BID Jake Burrell MD   500 mg at 05/11/19 1730    senna (SENOKOT) tablet 8.6 mg  1 Tab Oral DAILY Jake Burrell MD   8.6 mg at 05/11/19 0855    dexamethasone (DECADRON) tablet 2 mg  2 mg Oral DAILY Jake Burrell MD   2 mg at 05/11/19 0856    polyvinyl alcohol (LIQUIFILM TEARS) 1.4 % ophthalmic solution 1 Drop  1 Drop Right Eye QID Jake Burrell MD   1 Drop at 05/11/19 1731    ceFAZolin (ANCEF) 1 g in 0.9% sodium chloride (MBP/ADV) 50 mL  1 g IntraVENous Q6H Jake Burrell  mL/hr at 05/11/19 1522 1 g at 05/11/19 1522    ciprofloxacin HCl (CILOXAN) 0.3 % ophthalmic solution 1 Drop  1 Drop Both Eyes Q2H Jake Burrell MD   1 Drop at 05/11/19 1907    Vancomycin - pharmacy to dose   Other Rx Dosing/Monitoring Jake Burrell MD           CONDITIONS OF RECORDING: This is a routine 21-channel EEG recording performed in accordance with the international 10-20 system with one channel devoted to limited EKG. This study was done during states of wakefulness and sleep. Photic stimulation was performed as an activating procedure. DESCRIPTION:   Upon maximal arousal the posterior dominant rhythm has a frequency of 9Hz with an amplitude of 20uV. This activity is symmetric over the bilateral posterior derivations and attenuates with eye opening. Photic stimulation did not significantly alter the tracing. Normal sleep architecture is seen with stage I sleep recognized by the presence of symmetric vertex waves. There are no focal abnormalities, epileptiform discharges, or electrographic seizures seen. INTERPRETATION: Normal asleep and awake EEG    CLINICAL CORRELATION: A normal EEG does not definitively exclude a diagnosis of epilepsy if clinical suspicion is high consider sleep deprived EEG.      Reji Hebert MD

## 2019-05-12 NOTE — PROGRESS NOTES
Problem: Falls - Risk of 
Goal: *Absence of Falls Description Document Anand Fontana Fall Risk and appropriate interventions in the flowsheet. Outcome: Progressing Towards Goal 
Note:  
Fall Risk Interventions: 
Mobility Interventions: Bed/chair exit alarm Mentation Interventions: Bed/chair exit alarm Medication Interventions: Bed/chair exit alarm Elimination Interventions: Patient to call for help with toileting needs History of Falls Interventions: Consult care management for discharge planning Problem: Patient Education: Go to Patient Education Activity Goal: Patient/Family Education Outcome: Progressing Towards Goal 
  
Problem: Diabetes Self-Management Goal: *Incorporating nutritional management into lifestyle Description Describe effect of type, amount and timing of food on blood glucose; list 3 methods for planning meals. Outcome: Progressing Towards Goal 
Goal: *Incorporating physical activity into lifestyle Description State effect of exercise on blood glucose levels. Outcome: Progressing Towards Goal 
Goal: *Developing strategies to promote health/change behavior Description Define the ABC's of diabetes; identify appropriate screenings, schedule and personal plan for screenings. Outcome: Progressing Towards Goal 
Goal: *Using medications safely Description State effect of diabetes medications on diabetes; name diabetes medication taking, action and side effects. Outcome: Progressing Towards Goal 
Goal: *Monitoring blood glucose, interpreting and using results Description Identify recommended blood glucose targets  and personal targets. Outcome: Progressing Towards Goal 
Goal: *Prevention, detection, treatment of acute complications Description List symptoms of hyper- and hypoglycemia; describe how to treat low blood sugar and actions for lowering  high blood glucose level.  
Outcome: Progressing Towards Goal 
 Goal: *Prevention, detection and treatment of chronic complications Description Define the natural course of diabetes and describe the relationship of blood glucose levels to long term complications of diabetes. Outcome: Progressing Towards Goal 
Goal: *Developing strategies to address psychosocial issues Description Describe feelings about living with diabetes; identify support needed and support network Outcome: Progressing Towards Goal 
  
Problem: Patient Education: Go to Patient Education Activity Goal: Patient/Family Education Outcome: Progressing Towards Goal 
  
Problem: Pressure Injury - Risk of 
Goal: *Prevention of pressure injury Description Document Davy Scale and appropriate interventions in the flowsheet. Outcome: Progressing Towards Goal 
  
Problem: Patient Education: Go to Patient Education Activity Goal: Patient/Family Education Outcome: Progressing Towards Goal 
  
Problem: Patient Education: Go to Patient Education Activity Goal: Patient/Family Education Outcome: Progressing Towards Goal

## 2019-05-12 NOTE — CONSULTS
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

## 2019-05-12 NOTE — PROGRESS NOTES
Problem: Falls - Risk of 
Goal: *Absence of Falls Description Document Shaan Boyle Fall Risk and appropriate interventions in the flowsheet. Outcome: Progressing Towards Goal 
  
Problem: Patient Education: Go to Patient Education Activity Goal: Patient/Family Education Outcome: Progressing Towards Goal 
  
Problem: Pressure Injury - Risk of 
Goal: *Prevention of pressure injury Description Document Davy Scale and appropriate interventions in the flowsheet. Outcome: Progressing Towards Goal 
  
Problem: Patient Education: Go to Patient Education Activity Goal: Patient/Family Education Outcome: Progressing Towards Goal

## 2019-05-13 LAB
ANION GAP SERPL CALC-SCNC: 9 MMOL/L (ref 5–15)
BUN SERPL-MCNC: 9 MG/DL (ref 6–20)
BUN/CREAT SERPL: 10 (ref 12–20)
CALCIUM SERPL-MCNC: 8.1 MG/DL (ref 8.5–10.1)
CHLORIDE SERPL-SCNC: 107 MMOL/L (ref 97–108)
CO2 SERPL-SCNC: 25 MMOL/L (ref 21–32)
CREAT SERPL-MCNC: 0.91 MG/DL (ref 0.7–1.3)
DATE LAST DOSE: ABNORMAL
ERYTHROCYTE [DISTWIDTH] IN BLOOD BY AUTOMATED COUNT: 15.9 % (ref 11.5–14.5)
GLUCOSE SERPL-MCNC: 150 MG/DL (ref 65–100)
HCT VFR BLD AUTO: 36.6 % (ref 36.6–50.3)
HGB BLD-MCNC: 11.6 G/DL (ref 12.1–17)
MCH RBC QN AUTO: 33.3 PG (ref 26–34)
MCHC RBC AUTO-ENTMCNC: 31.7 G/DL (ref 30–36.5)
MCV RBC AUTO: 105.2 FL (ref 80–99)
NRBC # BLD: 0.03 K/UL (ref 0–0.01)
NRBC BLD-RTO: 0.4 PER 100 WBC
PLATELET # BLD AUTO: 191 K/UL (ref 150–400)
PMV BLD AUTO: 9.9 FL (ref 8.9–12.9)
POTASSIUM SERPL-SCNC: 3.7 MMOL/L (ref 3.5–5.1)
RBC # BLD AUTO: 3.48 M/UL (ref 4.1–5.7)
REPORTED DOSE,DOSE: ABNORMAL UNITS
REPORTED DOSE/TIME,TMG: ABNORMAL
SODIUM SERPL-SCNC: 141 MMOL/L (ref 136–145)
VANCOMYCIN TROUGH SERPL-MCNC: 17.5 UG/ML (ref 5–10)
WBC # BLD AUTO: 7.7 K/UL (ref 4.1–11.1)

## 2019-05-13 PROCEDURE — 94640 AIRWAY INHALATION TREATMENT: CPT

## 2019-05-13 PROCEDURE — 80048 BASIC METABOLIC PNL TOTAL CA: CPT

## 2019-05-13 PROCEDURE — 74011250637 HC RX REV CODE- 250/637: Performed by: INTERNAL MEDICINE

## 2019-05-13 PROCEDURE — 97165 OT EVAL LOW COMPLEX 30 MIN: CPT

## 2019-05-13 PROCEDURE — 85027 COMPLETE CBC AUTOMATED: CPT

## 2019-05-13 PROCEDURE — 74011000250 HC RX REV CODE- 250: Performed by: INTERNAL MEDICINE

## 2019-05-13 PROCEDURE — 74011250637 HC RX REV CODE- 250/637: Performed by: HOSPITALIST

## 2019-05-13 PROCEDURE — 36415 COLL VENOUS BLD VENIPUNCTURE: CPT

## 2019-05-13 PROCEDURE — 74011000258 HC RX REV CODE- 258: Performed by: HOSPITALIST

## 2019-05-13 PROCEDURE — 97530 THERAPEUTIC ACTIVITIES: CPT

## 2019-05-13 PROCEDURE — 65660000000 HC RM CCU STEPDOWN

## 2019-05-13 PROCEDURE — 80202 ASSAY OF VANCOMYCIN: CPT

## 2019-05-13 PROCEDURE — 74011250636 HC RX REV CODE- 250/636: Performed by: HOSPITALIST

## 2019-05-13 PROCEDURE — 97535 SELF CARE MNGMENT TRAINING: CPT

## 2019-05-13 RX ORDER — IPRATROPIUM BROMIDE AND ALBUTEROL SULFATE 2.5; .5 MG/3ML; MG/3ML
3 SOLUTION RESPIRATORY (INHALATION)
Status: DISCONTINUED | OUTPATIENT
Start: 2019-05-13 | End: 2019-05-15 | Stop reason: HOSPADM

## 2019-05-13 RX ORDER — VANCOMYCIN 1.75 GRAM/500 ML IN 0.9 % SODIUM CHLORIDE INTRAVENOUS
1750 EVERY 24 HOURS
Status: DISCONTINUED | OUTPATIENT
Start: 2019-05-14 | End: 2019-05-14

## 2019-05-13 RX ADMIN — IPRATROPIUM BROMIDE AND ALBUTEROL SULFATE 3 ML: .5; 3 SOLUTION RESPIRATORY (INHALATION) at 10:05

## 2019-05-13 RX ADMIN — SENNOSIDES 8.6 MG: 8.6 TABLET, FILM COATED ORAL at 08:47

## 2019-05-13 RX ADMIN — POLYVINYL ALCOHOL 1 DROP: 14 SOLUTION/ DROPS OPHTHALMIC at 21:26

## 2019-05-13 RX ADMIN — CIPROFLOXACIN HYDROCHLORIDE 1 DROP: 3 SOLUTION/ DROPS OPHTHALMIC at 08:45

## 2019-05-13 RX ADMIN — CEFAZOLIN 1 G: 1 INJECTION, POWDER, FOR SOLUTION INTRAMUSCULAR; INTRAVENOUS at 10:24

## 2019-05-13 RX ADMIN — CIPROFLOXACIN HYDROCHLORIDE 1 DROP: 3 SOLUTION/ DROPS OPHTHALMIC at 17:15

## 2019-05-13 RX ADMIN — FUROSEMIDE 20 MG: 40 TABLET ORAL at 08:48

## 2019-05-13 RX ADMIN — POLYVINYL ALCOHOL 1 DROP: 14 SOLUTION/ DROPS OPHTHALMIC at 08:53

## 2019-05-13 RX ADMIN — CIPROFLOXACIN HYDROCHLORIDE 1 DROP: 3 SOLUTION/ DROPS OPHTHALMIC at 05:05

## 2019-05-13 RX ADMIN — CEFAZOLIN 1 G: 1 INJECTION, POWDER, FOR SOLUTION INTRAMUSCULAR; INTRAVENOUS at 05:05

## 2019-05-13 RX ADMIN — DEXAMETHASONE 2 MG: 1 TABLET ORAL at 08:47

## 2019-05-13 RX ADMIN — CIPROFLOXACIN HYDROCHLORIDE 1 DROP: 3 SOLUTION/ DROPS OPHTHALMIC at 11:48

## 2019-05-13 RX ADMIN — ATORVASTATIN CALCIUM 10 MG: 10 TABLET, FILM COATED ORAL at 21:19

## 2019-05-13 RX ADMIN — Medication 10 ML: at 21:20

## 2019-05-13 RX ADMIN — GABAPENTIN 300 MG: 600 TABLET, FILM COATED ORAL at 08:47

## 2019-05-13 RX ADMIN — CIPROFLOXACIN HYDROCHLORIDE 1 DROP: 3 SOLUTION/ DROPS OPHTHALMIC at 00:23

## 2019-05-13 RX ADMIN — CIPROFLOXACIN HYDROCHLORIDE 1 DROP: 3 SOLUTION/ DROPS OPHTHALMIC at 18:24

## 2019-05-13 RX ADMIN — Medication 1000 MCG: at 08:46

## 2019-05-13 RX ADMIN — POLYVINYL ALCOHOL 1 DROP: 14 SOLUTION/ DROPS OPHTHALMIC at 13:15

## 2019-05-13 RX ADMIN — Medication 10 ML: at 13:21

## 2019-05-13 RX ADMIN — CIPROFLOXACIN HYDROCHLORIDE 1 DROP: 3 SOLUTION/ DROPS OPHTHALMIC at 15:18

## 2019-05-13 RX ADMIN — VANCOMYCIN HYDROCHLORIDE 1500 MG: 10 INJECTION, POWDER, LYOPHILIZED, FOR SOLUTION INTRAVENOUS at 11:36

## 2019-05-13 RX ADMIN — CEFAZOLIN 1 G: 1 INJECTION, POWDER, FOR SOLUTION INTRAMUSCULAR; INTRAVENOUS at 16:05

## 2019-05-13 RX ADMIN — IPRATROPIUM BROMIDE AND ALBUTEROL SULFATE 3 ML: .5; 3 SOLUTION RESPIRATORY (INHALATION) at 20:21

## 2019-05-13 RX ADMIN — LEVETIRACETAM 500 MG: 500 TABLET, FILM COATED ORAL at 08:47

## 2019-05-13 RX ADMIN — CIPROFLOXACIN HYDROCHLORIDE 1 DROP: 3 SOLUTION/ DROPS OPHTHALMIC at 21:25

## 2019-05-13 RX ADMIN — Medication 10 ML: at 05:06

## 2019-05-13 RX ADMIN — BACITRACIN ZINC AND POLYMYXIN B SULFATES: 500; 10000 OINTMENT OPHTHALMIC at 21:28

## 2019-05-13 RX ADMIN — DOCUSATE SODIUM 100 MG: 100 CAPSULE, LIQUID FILLED ORAL at 21:19

## 2019-05-13 RX ADMIN — CEFAZOLIN 1 G: 1 INJECTION, POWDER, FOR SOLUTION INTRAMUSCULAR; INTRAVENOUS at 21:20

## 2019-05-13 RX ADMIN — ENOXAPARIN SODIUM 40 MG: 40 INJECTION SUBCUTANEOUS at 21:20

## 2019-05-13 RX ADMIN — CIPROFLOXACIN HYDROCHLORIDE 1 DROP: 3 SOLUTION/ DROPS OPHTHALMIC at 13:15

## 2019-05-13 RX ADMIN — CLOPIDOGREL BISULFATE 75 MG: 75 TABLET ORAL at 08:47

## 2019-05-13 RX ADMIN — CIPROFLOXACIN HYDROCHLORIDE 1 DROP: 3 SOLUTION/ DROPS OPHTHALMIC at 02:15

## 2019-05-13 RX ADMIN — POLYVINYL ALCOHOL 1 DROP: 14 SOLUTION/ DROPS OPHTHALMIC at 18:24

## 2019-05-13 RX ADMIN — LEVETIRACETAM 500 MG: 500 TABLET, FILM COATED ORAL at 18:22

## 2019-05-13 RX ADMIN — GABAPENTIN 300 MG: 600 TABLET, FILM COATED ORAL at 18:23

## 2019-05-13 NOTE — PROGRESS NOTES
Pharmacist Note - Vancomycin Dosing Therapy day 5 Indication: cellulitis Current regimen: 1500 mg Q16H A Trough Level resulted at 17.5 mcg/mL which was obtained 18.25 hrs post-dose. The extrapolated \"true\" trough is approximately 19.4 mcg/mL based on the patient's known kinetics. Goal trough: 10 - 15 mcg/mL Plan: Change to vancomycin 1750 mg IV Q24H with anticipated trough of 12.5 mcg/ml based on patient's known kinetics . Pharmacy will continue to monitor this patient daily for changes in clinical status and renal function.

## 2019-05-13 NOTE — PROGRESS NOTES
Problem: Self Care Deficits Care Plan (Adult) Goal: *Acute Goals and Plan of Care (Insert Text) Description Occupational Therapy Goals Initiated 5/13/2019 1. Patient will perform grooming standing at sink with supervision/set-up within 7 day(s). 2.  Patient will perform upper body dressing with supervision/set-up within 7 day(s). 3.  Patient will perform lower body dressing with moderate assistance  within 7 day(s). 4.  Patient will perform toilet transfers with supervision/set-up within 7 day(s). 5.  Patient will perform all aspects of toileting with supervision/set-up within 7 day(s). 6.  Patient will participate in upper extremity therapeutic exercise/activities with supervision/set-up for 10 minutes within 7 day(s). 7.  Patient will utilize energy conservation techniques during functional activities with verbal cues within 7 day(s). Outcome: Progressing Towards Goal 
 
OCCUPATIONAL THERAPY EVALUATION Patient: Terrence Crespo (26 y.o. male) Date: 5/13/2019 Primary Diagnosis: Cellulitis [L03.90] Cellulitis [L03.90] Precautions: fall ASSESSMENT : 
Based on the objective data described below, patient presents with Setup to Minimum assistance upper body ADLs, Maximum assistance to Total assistance lower body ADLs, Contact guard assist for supine-sit (with HOB raised), Minimum assistance for sit<>stand, and Minimum assistance ambulating to/from bathroom using RW. Patient was impulsive with mobility, demonstrated poor safety awareness, and required max verbal/ tactile cues for positioning and RW management to safety navigate environment. The following are barriers to ADL independence while in acute care:  
- Cognitive and/or behavioral: orientation, attention to task, safety awareness and insight into deficits - Medical condition: ROM, strength, functional endurance, standing balance and coordination Patient will benefit from skilled acute intervention to address the above impairments. Patient?s rehabilitation potential is considered to be Good Prior level of function: Patient resides at [address] [place] [address]. PLOF provided by patient's daughter and SO.  PTA, patient was ambulating to/ from bathroom and to/from dining marques without assistance using rollator. Reports 1 recent fall after patient fell asleep in chair. Patient receives assistance with bathing and dressing at baseline but was performing toileting without assistance. PLAN : 
Recommendations and Planned Interventions: self care training, functional mobility training, therapeutic exercise, balance training, therapeutic activities, cognitive retraining, endurance activities, patient education, home safety training and family training/education Frequency/Duration: Patient will be followed by occupational therapy 5 times a week to address goals. Discharge recommendations: Patient is below functional baseline and currently not safe to return to [facility] due to lethargy, poor safety awareness/ impulsive with mobility, decreased insight into deficits, and increased risk for falls. Patient has had recent functional decline and reports weakness due to immobility during hospital admission. Patient would benefit from inpatient rehab to maximize functional recovery. Family reports patient has done well at [facility] previously and requested this facility. If above is not an option then recommend: Rehab at skilled nursing facility (SNF) (to regain functional baseline patient requires rehab) Barriers to discharging home, in addition to above listed impairments: level of physical assist required to maintain patient safety, increased risk for falls, functional decline SUBJECTIVE:  
Patient stated ? I'm weak from being in the bed for four days. ? OBJECTIVE DATA SUMMARY:  
HISTORY:  
Past Medical History:  
Diagnosis Date Arthritis  HANDS  
 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  
 GERD (gastroesophageal reflux disease) Hypercholesterolemia Hypertension   
 pt denies Nasal sinus tumor Numbness of LEFT hand & LEFT face 2010 RBBB (right bundle branch block) TIA (transient ischemic attack) 12 TIA's over 9 YRS.1ST ONE IN 8/03- LAST IN 2/12 Trigeminal neuralgia Past Surgical History:  
Procedure Laterality Date HX CATARACT REMOVAL Bilateral   
 HX GI    
 COLONOSCOPY  
 HX HEENT    
 BIOPSY OF SINUS   
 HX HEENT Left   
 sew eye closed HX HERNIA REPAIR Right INGUINAL  
 HX KNEE ARTHROSCOPY Right 2007 HX KNEE REPLACEMENT Right 2012 Total Knee replacement- right HX ROTATOR CUFF REPAIR Right   
 right rotator cuff repair Expanded or extensive additional review of patient history:  
 
Home Situation Home Environment: Assisted living Care Facility Name: [facility] [place] One/Two Story Residence: Two story Living Alone: No 
Support Systems: Spouse/Significant Other/Partner Patient Expects to be Discharged to[de-identified] Assisted living Current DME Used/Available at Home: rowan Collins EXAMINATION OF PERFORMANCE DEFICITS: 
Cognitive/Behavioral Status: 
  
  
Cognition: Decreased attention/concentration; Follows commands;Poor safety awareness Perception: Appears intact Perseveration: No perseveration noted Safety/Judgement: Decreased insight into deficits; Decreased awareness of need for safety;Decreased awareness of need for assistance Skin: visible skin appears intact, BLE wounds wrapped/ not observed Edema: none noted Hearing: Auditory Auditory Impairment: Hard of hearing, left side Vision/Perceptual:   
    
    
    
  
    
Acuity: Within Defined Limits;Able to read clock/calendar on wall without difficulty; Able to read employee name badge without difficulty(R eye only, L eyelid sewn shut at baseline) Corrective Lenses: Glasses Range of Motion: 
AROM: Generally decreased, functional(BUE) Strength: 
Strength: Generally decreased, functional(BUE) Coordination: 
Coordination: Generally decreased, functional 
Fine Motor Skills-Upper: Left Impaired;Right Impaired Gross Motor Skills-Upper: Right Impaired;Left Impaired Tone & Sensation: 
Tone: Normal 
Sensation: Impaired(reports L facial numbness at baseline) Balance: 
Sitting: Intact Standing: Impaired Standing - Static: Good Standing - Dynamic : Fair Functional Mobility and Transfers for ADLs: 
Bed Mobility: 
Supine to Sit: Contact guard assistance(with HOB raised) Transfers: 
Sit to Stand: Minimum assistance Stand to Sit: Minimum assistance(for positioning and controlled descent) Bed to Chair: Minimum assistance ADL Assessment: 
Feeding: Setup(inferred) Oral Facial Hygiene/Grooming: Setup(inferred) Bathing: Moderate assistance(inferred due to activity tolerance, ROM) Upper Body Dressing: Minimum assistance(inferred due to AROM, activity tolerance) Lower Body Dressing: Total assistance(inferred due to AROM, activity tolerance, BLE wounds) Toileting: Maximum assistance(inferred) ADL Intervention and task modifications: 
  
 
  
 
  
 
  
 
  
 
  
 
  
 
Cognitive Retraining Safety/Judgement: Decreased insight into deficits; Decreased awareness of need for safety;Decreased awareness of need for assistance Functional Measure: 
Barthel Index: 
Bathin Bladder: 5 Bowels: 10 
Groomin Dressin Feedin Mobility: 0 Stairs: 0 Toilet Use: 0 Transfer (Bed to Chair and Back): 10 Total: 40/100 Percentage of impairment  
0% 1-19% 20-39% 40-59% 60-79% 80-99% 100% Barthel Score 0-100 100 99-80 79-60 59-40 20-39 1-19 
 0 The Barthel ADL Index: Guidelines 1. The index should be used as a record of what a patient does, not as a record of what a patient could do. 2. The main aim is to establish degree of independence from any help, physical or verbal, however minor and for whatever reason. 3. The need for supervision renders the patient not independent. 4. A patient's performance should be established using the best available evidence. Asking the patient, friends/relatives and nurses are the usual sources, but direct observation and common sense are also important. However direct testing is not needed. 5. Usually the patient's performance over the preceding 24-48 hours is important, but occasionally longer periods will be relevant. 6. Middle categories imply that the patient supplies over 50 per cent of the effort. 7. Use of aids to be independent is allowed. Zuleyka Angelo., Barthel, D.W. (1204). Functional evaluation: the Barthel Index. [address] [facility] (14)2. Hard Iman cailin CHALRI Myers, Allie Grayson., Azeem Diaz., [place], [address] [place] [address] (1999). Measuring the change indisability after inpatient rehabilitation; comparison of the responsiveness of the Barthel Index and Functional [place] Measure. Journal of Neurology, Neurosurgery, and Psychiatry, 66(4), 317-339. Tim Cortes, N.J.A, CHRISTINA Gregory, & Sandy Granados MLENNY. (2004.) Assessment of post-stroke quality of life in cost-effectiveness studies: The usefulness of the Barthel Index and the EuroQoL-5D. [facility], 73, 369-40 Occupational Therapy Evaluation Charge Determination History Examination Decision-Making LOW Complexity : Brief history review  MEDIUM Complexity : 3-5 performance deficits relating to physical, cognitive , or psychosocial skils that result in activity limitations and / or participation restrictions MEDIUM Complexity : Patient may present with comorbidities that affect occupational performnce.  Miniml to moderate modification of tasks or assistance (eg, physical or verbal ) with assesment(s) is necessary to enable patient to complete evaluation Based on the above components, the patient evaluation is determined to be of the following complexity level: LOW Pain: 
Patient reports no pain Activity Tolerance: ESPINAL but maintained SPO2 >95% on RA. HR 80s After treatment patient left:  
Up in chair Chair alarm/tab alert on Call light within reach RN notified Family at bedside COMMUNICATION/EDUCATION:  
The patient?s plan of care was discussed with: Physical Therapist, Registered Nurse and . Home safety education was provided and the patient/caregiver indicated understanding., Patient/family have participated as able in goal setting and plan of care. and Patient/family agree to work toward stated goals and plan of care. This patient?s plan of care is appropriate for delegation to [facility]. Thank you for this referral. 
Josie Mcintosh OT Time Calculation: 34 mins

## 2019-05-13 NOTE — PROGRESS NOTES
Problem: Falls - Risk of 
Goal: *Absence of Falls Description Document Frandy Genaob Fall Risk and appropriate interventions in the flowsheet. Outcome: Progressing Towards Goal 
  
Problem: Patient Education: Go to Patient Education Activity Goal: Patient/Family Education Outcome: Progressing Towards Goal 
  
Problem: Pressure Injury - Risk of 
Goal: *Prevention of pressure injury Description Document Davy Scale and appropriate interventions in the flowsheet. Outcome: Progressing Towards Goal 
  
Problem: Patient Education: Go to Patient Education Activity Goal: Patient/Family Education Outcome: Progressing Towards Goal 
  
Problem: Patient Education: Go to Patient Education Activity Goal: Patient/Family Education Outcome: Progressing Towards Goal 
  
Problem: Patient Education: Go to Patient Education Activity Goal: Patient/Family Education Outcome: Progressing Towards Goal 
  
Problem: Cellulitis Care Plan (Adult) Goal: *Control of acute pain Outcome: Progressing Towards Goal 
Goal: *Skin integrity maintained Outcome: Progressing Towards Goal 
Goal: *Absence of infection signs and symptoms Outcome: Progressing Towards Goal 
  
Problem: Patient Education: Go to Patient Education Activity Goal: Patient/Family Education Outcome: Progressing Towards Goal 
  
Problem: Pain Goal: *Control of Pain Outcome: Progressing Towards Goal 
Goal: *PALLIATIVE CARE:  Alleviation of Pain Outcome: Progressing Towards Goal 
  
Problem: Patient Education: Go to Patient Education Activity Goal: Patient/Family Education Outcome: Progressing Towards Goal

## 2019-05-13 NOTE — PROGRESS NOTES
Hospitalist Progress Note Andressa Polanco MD 
Answering service: [phone] OR [phone] from in house phone Date of Service:  2019 NAME:  Pat Cordero :  11/10/1930 MRN:  [id number] Admission Summary:  
Lethargic as well as worsening leg wounds Interval history / Subjective: F/U for AMS 
: Patient is seen and examined this morning. Daughters at bedside. Family complained about patient being excessively sleepy. During my visit he was able to be easily awaken and asked for food. He was able to identify his family members. The leg wound is examined while patient having dressing change. : Patient seen and examined. Sitting up in a chair feeding himself. Aware and alert. Assessment & Plan: # Acute encephalopathy metabolic from infection vs seizure - CT head X2 no hemorrhage, consistent with chronic change - UA negative, blood culture NGTD 
- Chest x-ray Left lung base atelectasis versus pneumonia - ct with Keppra - ct with abx  
- hold IVF due to generalized swelling  
- sleep wake EEG negative 
- seizure and fall precaution  
- will obtain an MRI- due hx of head/neck cancer # Bilateral lower extremity cellulitis on the bases of chronic ulcer # Bilateral lower extremity edema  
- blood culture no growth in 3 days  
- on Vancomycin and cefazolin  
- oral lasix to reduce the swelling - ID following  
- wound care following  
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  
- it a resolving chronic DVT, no intervention needed   
- vascular surgery input appt # HTN: resume home meds # HLD: ct with statin # Radiation necrosis s/p craniotomy  
- continue Keppra # Generalized weakness/debility: PT/OT 
 
 
DVT prop: Enoxaparin Code: DNR Dispo: TBD Hospital Problems  Date Reviewed: 2018 Codes Class Noted POA Cellulitis ICD-10-CM: L03.90 ICD-9-CM: 682.9  5/9/2019 Unknown Review of Systems:  
Difficult to obtain due to patient unresponsiveness Vital Signs:  
 Last 24hrs VS reviewed since prior progress note. Most recent are: 
Visit Vitals /77 (BP 1 Location: Right arm, BP Patient Position: At rest) Pulse 96 Temp 98.6 °F (37 °C) Resp 18 Ht 5' 9\" (1.753 m) Wt 115 kg (253 lb 8.5 oz) SpO2 98% BMI 37.44 kg/m² Intake/Output Summary (Last 24 hours) at 5/13/2019 1851 Last data filed at 5/13/2019 8447 Gross per 24 hour Intake 240 ml Output 725 ml Net -485 ml Physical Examination:  
 
 
     
Constitutional:  No acute distress, awake, alert, oriented ENT:  moon face, Oral mucous moist, oropharynx benign. Neck supple, Resp:  CTA bilaterally. No wheezing/rhonchi/rales. No accessory muscle use CV:  Regular rhythm, normal rate, no murmurs, gallops, rubs GI:  Soft, non distended, non tender. normoactive bowel sounds, no hepatosplenomegaly Musculoskeletal:  bilateral +3 pitting edema. Both legs covered with dressing Neurologic:  Moves all extremities. AAOx3, CN II-XII reviewed Data Review:  
 I personally reviewed labs and imaging Labs:  
 
Recent Labs 05/13/19 
0250 WBC 7.7 HGB 11.6* HCT 36.6  Recent Labs 05/13/19 
0250 05/12/19 
0136 05/11/19 
0410  141 137  
K 3.7 4.0 3.9  108 105 CO2 25 26 24 BUN 9 11 13 CREA 0.91 0.96 0.88 * 148* 126* CA 8.1* 8.1* 8.2* No results for input(s): SGOT, GPT, ALT, AP, TBIL, TBILI, TP, ALB, GLOB, GGT, AML, LPSE in the last 72 hours. No lab exists for component: AMYP, HLPSE No results for input(s): INR, PTP, APTT in the last 72 hours. No lab exists for component: INREXT, INREXT No results for input(s): FE, TIBC, PSAT, FERR in the last 72 hours. Lab Results Component Value Date/Time  Folate 8.6 11/20/2017 03:03 PM  
  
 No results for input(s): PH, PCO2, PO2 in the last 72 hours. No results for input(s): CPK, CKNDX, TROIQ in the last 72 hours. No lab exists for component: CPKMB Lab Results Component Value Date/Time Cholesterol, total 163 07/14/2018 12:42 PM  
 HDL Cholesterol 89 07/14/2018 12:42 PM  
 LDL, calculated 59.4 07/14/2018 12:42 PM  
 Triglyceride 73 07/14/2018 12:42 PM  
 CHOL/HDL Ratio 1.8 07/14/2018 12:42 PM  
 
Lab Results Component Value Date/Time Glucose (POC) 197 (H) 11/25/2017 11:27 AM  
 Glucose (POC) 128 (H) 11/25/2017 05:36 AM  
 Glucose (POC) 117 (H) 11/24/2017 11:22 PM  
 Glucose (POC) 113 (H) 11/24/2017 05:47 PM  
 Glucose (POC) 198 (H) 11/24/2017 11:57 AM  
 
Lab Results Component Value Date/Time Color YELLOW/STRAW 05/09/2019 08:11 PM  
 Appearance CLEAR 05/09/2019 08:11 PM  
 Specific gravity 1.014 05/09/2019 08:11 PM  
 Specific gravity 1.005 08/23/2018 05:34 PM  
 pH (UA) 5.5 05/09/2019 08:11 PM  
 Protein NEGATIVE  05/09/2019 08:11 PM  
 Glucose NEGATIVE  05/09/2019 08:11 PM  
 Ketone NEGATIVE  05/09/2019 08:11 PM  
 Bilirubin NEGATIVE  05/09/2019 08:11 PM  
 Urobilinogen 0.2 05/09/2019 08:11 PM  
 Nitrites NEGATIVE  05/09/2019 08:11 PM  
 Leukocyte Esterase NEGATIVE  05/09/2019 08:11 PM  
 Epithelial cells FEW 07/14/2018 04:09 PM  
 Bacteria NEGATIVE  07/14/2018 04:09 PM  
 WBC 0-4 07/14/2018 04:09 PM  
 RBC 0-5 07/14/2018 04:09 PM  
 
 
 
Medications Reviewed:  
 
Current Facility-Administered Medications Medication Dose Route Frequency  albuterol-ipratropium (DUO-NEB) 2.5 MG-0.5 MG/3 ML  3 mL Nebulization BID RT  
 [START ON 5/14/2019] vancomycin (VANCOCIN) 1750 mg in  ml infusion  1,750 mg IntraVENous Q24H  
 furosemide (LASIX) tablet 20 mg  20 mg Oral DAILY  sodium chloride (NS) flush 5-10 mL  5-10 mL IntraVENous PRN  
 sodium chloride (NS) flush 5-40 mL  5-40 mL IntraVENous Q8H  
 sodium chloride (NS) flush 5-40 mL  5-40 mL IntraVENous PRN  
  acetaminophen (TYLENOL) tablet 650 mg  650 mg Oral Q4H PRN  
 ondansetron (ZOFRAN) injection 4 mg  4 mg IntraVENous Q4H PRN  
 enoxaparin (LOVENOX) injection 40 mg  40 mg SubCUTAneous Q24H  
 atorvastatin (LIPITOR) tablet 10 mg  10 mg Oral QHS  clopidogrel (PLAVIX) tablet 75 mg  75 mg Oral DAILY  bacitracin-polymyxin b (POLYSPORIN) 500-10,000 unit/gram ophthalmic ointment   Left Eye QHS  docusate sodium (COLACE) capsule 100 mg  100 mg Oral QHS  cyanocobalamin (VITAMIN B12) tablet 1,000 mcg  1,000 mcg Oral DAILY  gabapentin (NEURONTIN) tablet 300 mg  300 mg Oral BID  levETIRAcetam (KEPPRA) tablet 500 mg  500 mg Oral BID  senna (SENOKOT) tablet 8.6 mg  1 Tab Oral DAILY  dexamethasone (DECADRON) tablet 2 mg  2 mg Oral DAILY  polyvinyl alcohol (LIQUIFILM TEARS) 1.4 % ophthalmic solution 1 Drop  1 Drop Right Eye QID  ceFAZolin (ANCEF) 1 g in 0.9% sodium chloride (MBP/ADV) 50 mL  1 g IntraVENous Q6H  
 ciprofloxacin HCl (CILOXAN) 0.3 % ophthalmic solution 1 Drop  1 Drop Both Eyes Q2H  Vancomycin - pharmacy to dose   Other Rx Dosing/Monitoring  
 
______________________________________________________________________ EXPECTED LENGTH OF STAY: - - - 
ACTUAL LENGTH OF STAY:          4 Divya Ballesteros MD  
Patient has given Verbal permission to discuss medical care with  
persons present in the room and and also with contact as listed on face sheet.

## 2019-05-13 NOTE — PROGRESS NOTES
ID Progress Note 2019 Subjective:  
 
Significant improvement over the weekend--he is alert and interactive. Objective: Antibiotics: 1. Vancomycin 2. Cefazolin Vitals:  
Visit Vitals /77 (BP 1 Location: Right arm, BP Patient Position: At rest) Pulse 89 Temp 97.6 °F (36.4 °C) Resp 19 Ht 5' 9\" (1.753 m) Wt 115 kg (253 lb 8.5 oz) SpO2 93% BMI 37.44 kg/m² Tmax:  Temp (24hrs), Av.1 °F (36.7 °C), Min:97.6 °F (36.4 °C), Max:98.6 °F (37 °C) Exam:  Lungs:  clear to auscultation bilaterally Heart:  regular rate and rhythm Abdomen:  soft, non-tender. Bowel sounds normal. No masses,  no organomegaly Skin:  no rash or abnormalities Labs:     
Recent Labs 19 
0250 19 
0136 19 
0410 WBC 7.7  --   --   
HGB 11.6*  --   --   
  --   --   
BUN 9 11 13 CREA 0.91 0.96 0.88 Cultures: No results found for: [facility] Lab Results Component Value Date/Time Culture result: NO GROWTH 4 DAYS 2019 04:55 PM  
 Culture result: NO GROWTH 5 DAYS 2018 12:47 PM  
 Culture result: NO GROWTH 5 DAYS 2017 09:45 AM  
 
 
Radiology:  
 
Line/Insert Date:        
 
Assessment: 1. AMS--improved 2. Head and neck cancer--status post Rx Objective: 1. Continue current therapy for now 2. D/W family at bedside Candis Landa MD

## 2019-05-14 LAB
BACTERIA SPEC CULT: NORMAL
SERVICE CMNT-IMP: NORMAL

## 2019-05-14 PROCEDURE — 94664 DEMO&/EVAL PT USE INHALER: CPT

## 2019-05-14 PROCEDURE — 97116 GAIT TRAINING THERAPY: CPT

## 2019-05-14 PROCEDURE — 97530 THERAPEUTIC ACTIVITIES: CPT

## 2019-05-14 PROCEDURE — 94640 AIRWAY INHALATION TREATMENT: CPT

## 2019-05-14 PROCEDURE — 74011250636 HC RX REV CODE- 250/636: Performed by: HOSPITALIST

## 2019-05-14 PROCEDURE — 65660000000 HC RM CCU STEPDOWN

## 2019-05-14 PROCEDURE — 74011000250 HC RX REV CODE- 250: Performed by: INTERNAL MEDICINE

## 2019-05-14 PROCEDURE — 97535 SELF CARE MNGMENT TRAINING: CPT

## 2019-05-14 PROCEDURE — 74011000258 HC RX REV CODE- 258: Performed by: HOSPITALIST

## 2019-05-14 PROCEDURE — 74011250637 HC RX REV CODE- 250/637: Performed by: HOSPITALIST

## 2019-05-14 PROCEDURE — 74011250637 HC RX REV CODE- 250/637: Performed by: INTERNAL MEDICINE

## 2019-05-14 RX ORDER — FUROSEMIDE 10 MG/ML
20 INJECTION INTRAMUSCULAR; INTRAVENOUS ONCE
Status: COMPLETED | OUTPATIENT
Start: 2019-05-14 | End: 2019-05-14

## 2019-05-14 RX ORDER — LORAZEPAM 0.5 MG/1
0.25 TABLET ORAL
Status: ACTIVE | OUTPATIENT
Start: 2019-05-14 | End: 2019-05-15

## 2019-05-14 RX ADMIN — IPRATROPIUM BROMIDE AND ALBUTEROL SULFATE 3 ML: .5; 3 SOLUTION RESPIRATORY (INHALATION) at 21:33

## 2019-05-14 RX ADMIN — FUROSEMIDE 20 MG: 10 INJECTION, SOLUTION INTRAMUSCULAR; INTRAVENOUS at 10:51

## 2019-05-14 RX ADMIN — CIPROFLOXACIN HYDROCHLORIDE 1 DROP: 3 SOLUTION/ DROPS OPHTHALMIC at 15:00

## 2019-05-14 RX ADMIN — CEFAZOLIN 1 G: 1 INJECTION, POWDER, FOR SOLUTION INTRAMUSCULAR; INTRAVENOUS at 21:50

## 2019-05-14 RX ADMIN — DOCUSATE SODIUM 100 MG: 100 CAPSULE, LIQUID FILLED ORAL at 21:49

## 2019-05-14 RX ADMIN — POLYVINYL ALCOHOL 1 DROP: 14 SOLUTION/ DROPS OPHTHALMIC at 17:39

## 2019-05-14 RX ADMIN — Medication 10 ML: at 05:31

## 2019-05-14 RX ADMIN — VANCOMYCIN HYDROCHLORIDE 1750 MG: 10 INJECTION, POWDER, LYOPHILIZED, FOR SOLUTION INTRAVENOUS at 10:51

## 2019-05-14 RX ADMIN — CEFAZOLIN 1 G: 1 INJECTION, POWDER, FOR SOLUTION INTRAMUSCULAR; INTRAVENOUS at 03:09

## 2019-05-14 RX ADMIN — CEFAZOLIN 1 G: 1 INJECTION, POWDER, FOR SOLUTION INTRAMUSCULAR; INTRAVENOUS at 10:04

## 2019-05-14 RX ADMIN — LEVETIRACETAM 500 MG: 500 TABLET, FILM COATED ORAL at 08:50

## 2019-05-14 RX ADMIN — CIPROFLOXACIN HYDROCHLORIDE 1 DROP: 3 SOLUTION/ DROPS OPHTHALMIC at 03:09

## 2019-05-14 RX ADMIN — GABAPENTIN 300 MG: 600 TABLET, FILM COATED ORAL at 17:36

## 2019-05-14 RX ADMIN — SENNOSIDES 8.6 MG: 8.6 TABLET, FILM COATED ORAL at 08:50

## 2019-05-14 RX ADMIN — IPRATROPIUM BROMIDE AND ALBUTEROL SULFATE 3 ML: .5; 3 SOLUTION RESPIRATORY (INHALATION) at 08:53

## 2019-05-14 RX ADMIN — ATORVASTATIN CALCIUM 10 MG: 10 TABLET, FILM COATED ORAL at 21:49

## 2019-05-14 RX ADMIN — Medication 10 ML: at 21:50

## 2019-05-14 RX ADMIN — CLOPIDOGREL BISULFATE 75 MG: 75 TABLET ORAL at 08:50

## 2019-05-14 RX ADMIN — CIPROFLOXACIN HYDROCHLORIDE 1 DROP: 3 SOLUTION/ DROPS OPHTHALMIC at 21:51

## 2019-05-14 RX ADMIN — DEXAMETHASONE 2 MG: 1 TABLET ORAL at 08:50

## 2019-05-14 RX ADMIN — CIPROFLOXACIN HYDROCHLORIDE 1 DROP: 3 SOLUTION/ DROPS OPHTHALMIC at 04:27

## 2019-05-14 RX ADMIN — POLYVINYL ALCOHOL 1 DROP: 14 SOLUTION/ DROPS OPHTHALMIC at 21:57

## 2019-05-14 RX ADMIN — ENOXAPARIN SODIUM 40 MG: 40 INJECTION SUBCUTANEOUS at 21:56

## 2019-05-14 RX ADMIN — CEFAZOLIN 1 G: 1 INJECTION, POWDER, FOR SOLUTION INTRAMUSCULAR; INTRAVENOUS at 17:37

## 2019-05-14 RX ADMIN — Medication 10 ML: at 14:00

## 2019-05-14 RX ADMIN — FUROSEMIDE 20 MG: 40 TABLET ORAL at 08:50

## 2019-05-14 RX ADMIN — GABAPENTIN 300 MG: 600 TABLET, FILM COATED ORAL at 08:51

## 2019-05-14 RX ADMIN — LEVETIRACETAM 500 MG: 500 TABLET, FILM COATED ORAL at 17:36

## 2019-05-14 RX ADMIN — Medication 1000 MCG: at 08:50

## 2019-05-14 RX ADMIN — CIPROFLOXACIN HYDROCHLORIDE 1 DROP: 3 SOLUTION/ DROPS OPHTHALMIC at 17:39

## 2019-05-14 NOTE — PROGRESS NOTES
Problem: Self Care Deficits Care Plan (Adult) Goal: *Acute Goals and Plan of Care (Insert Text) Description Occupational Therapy Goals Initiated 5/13/2019 1. Patient will perform grooming standing at sink with supervision/set-up within 7 day(s). 2.  Patient will perform upper body dressing with supervision/set-up within 7 day(s). 3.  Patient will perform lower body dressing with moderate assistance  within 7 day(s). 4.  Patient will perform toilet transfers with supervision/set-up within 7 day(s). 5.  Patient will perform all aspects of toileting with supervision/set-up within 7 day(s). 6.  Patient will participate in upper extremity therapeutic exercise/activities with supervision/set-up for 10 minutes within 7 day(s). 7.  Patient will utilize energy conservation techniques during functional activities with verbal cues within 7 day(s). Outcome: Progressing Towards Goal 
  
OCCUPATIONAL THERAPY TREATMENT Patient: Beryle Corners (83 y.o. male) Date: 5/14/2019 Diagnosis: Cellulitis [L03.90] Cellulitis [L03.90] Cellulitis Precautions:  fall Chart, occupational therapy assessment, plan of care, and goals were reviewed. ASSESSMENT:  
Based on the objective data described below, patient presents with Setup to Minimum assistance upper body ADLs, Maximum assistance to Total assistance lower body ADLs,Moderate- Maximum assistance supine<>sit with HOB flat, Minimum assistance for sit<>stand, and Contact Guard to Minimum assistance ambulating using RW. Patient demonstrates improving alertness and activity tolerance but continues to demonstrate impulsive behavior with mobility and poor safety awareness requiring max verbal/ tactile cues for RW management and body positioning. The following are barriers to ADL independence while in acute care:  
- Cognitive and/or behavioral: orientation, attention to task, safety awareness and insight into deficits - Medical condition: ROM, strength, functional endurance, standing balance and coordination Patient will benefit from skilled acute intervention to address the above impairments. Patients rehabilitation potential is considered to be Good Prior level of function: Patient resides at [address] [place] [address]. PLOF provided by patient's daughter and SO.  PTA, patient was ambulating to/ from bathroom and to/from dining marques without assistance using rollator. Was performing bed mobility with flat bed independently. Reports 1 recent fall after patient fell asleep in chair. Patient receives assistance with bathing and dressing at baseline but was performing toileting without assistance. PLAN: 
Patient continues to benefit from skilled intervention to address the above impairments. Continue treatment per established plan of care. Discharge recommendations: Patient is below functional baseline and currently not safe to return to [facility] due to lethargy, poor safety awareness/ impulsive with mobility, decreased insight into deficits, and increased risk for falls.  Patient has had recent functional decline and reports weakness due to immobility during hospital admission. Lupe Barillas would benefit from inpatient rehab to maximize functional recovery.  Family reports patient has done well at [facility] previously and requested this facility. Barriers to discharging home, in addition to above listed impairments:  level of physical assist required to maintain patient safety, increased risk for falls, functional decline SUBJECTIVE:  
Patient stated I want to walk!  OBJECTIVE DATA SUMMARY:  
Cognitive/Behavioral Status: 
Neurologic State: Alert Orientation Level: Oriented to person;Oriented to place; Disoriented to situation;Disoriented to time Cognition: Decreased attention/concentration; Follows commands; Impulsive;Poor safety awareness Perception: Appears intact Perseveration: No perseveration noted Safety/Judgement: Decreased awareness of need for assistance;Decreased insight into deficits; Decreased awareness of need for safety Functional Mobility and Transfers for ADLs: 
Bed Mobility: 
Supine to Sit: Moderate assistance(for raising trunk, HOB flat, using rail) Sit to Supine: Maximum assistance Transfers: 
Sit to Stand: Minimum assistance Balance: 
Sitting: Impaired Sitting - Static: Fair (occasional) Sitting - Dynamic: Fair (occasional) Standing: Impaired Standing - Static: Fair;Constant support Standing - Dynamic : Fair ADL Intervention: Toileting Bladder Hygiene: Maximum assistance(standing with urinal, assistance with brief and steadying) Cognitive Retraining Safety/Judgement: Decreased awareness of need for assistance;Decreased insight into deficits; Decreased awareness of need for safety Pain: 
Patient did not report pain Activity Tolerance:  
Supine post activity: HR 78, SPO2 97% on RA, although ESPINAL After treatment patient left:  
Supine in bed Bed/Chair-wheels locked Bed in low position Call light within reach RN notified COMMUNICATION/COLLABORATION:  
The patients plan of care was discussed with: Physical Therapist and Registered Nurse Enrike Riddle OT Time Calculation: 34 mins

## 2019-05-14 NOTE — PROGRESS NOTES
Chart reviewed; Therapy is recommending IP Rehab. Met with patient, his daughter, Vivienne Wilcox [facility]) P# [phone] and partner, Jameson Thomas  P# [phone] at bedside to discuss transitions of care. Offered freedom of choice. Family selected [facility]. Sent referral via [employer]; Awaiting response. Patient expected to discharge tomorrow pending medical clearance. CM to follow. BRITTNY Ledesma,CRM

## 2019-05-14 NOTE — PROGRESS NOTES
Bedside and Verbal shift change report given to Radha Trejo RN (oncoming nurse) by Jaz Gomez RN (offgoing nurse). Report included the following information SBAR, Kardex, Intake/Output, MAR, Recent Results, Med Rec Status and Cardiac Rhythm SR and SR w/ BBB.

## 2019-05-14 NOTE — WOUND CARE
WOCN Note: Follow-up visit for lower leg wounds. 2 sisters at bedside, very pleasant and conversing with writer. Asking good questions and really concerned for patent. Assessment: wounds POA/ patient resting on a [facility] P500 Bed. Patient is A&O x , communicative, continent ( spills at times). Patient is mobile with assistance. Need 2 people to reposition and lift up in bed. Patient reports no pain with present assessment. Bilateral heels, buttocks and sacral skin intact and with mild blanchable  erythema. Heels offloaded on pillows. Removed ace wraps, jenna, ABD and 4x4's. Cleaned leg wounds with NS removing old medi honey. Lotioned legs and feet with Aloe moisturizing cream. 
-right lower leg: anterior ulcer and 2 smaller medial and 1 lateral  
- right dorsal foot: red, raw, peeling skin. Lotion applied. -left lower leg: large and 2 small areas medially and anteriorly. Left dorsal foot: light maroon discoloration. Wounds with moist yellow wound bases and family states appearing much better. Larger wounds each on both legs showing pink in wound bases. both legs and feet lotioned with aloe cream. 
 
Legs weeping clear fluid, taught, feet with 3-4+edema. Cleaned all wounds and applied Medi-Honey cut to size, 4x4 and ABD, wrapped to secure with jenna and ace wrap to both legs with heels exposed for assessment. Changing Wounds every other day. Recommendations:   
Continue current wound care:  
Minimize layers of linen/pads under patient to optimize support surface. Turn/reposition approximately every 2 hours and offload heels. Manage incontinence / promote continence; Aloe Vesta to buttocks and sacrum daily and as needed with incontinence care. Specialty bed: [facility] P500 Discussed above plan with patient, RN and sisters.  
 
Transition of Care: Plan to follow weekly and as needed while admitted to hospital.  
 
 Merlyn ANDRADE RN Wound Care Department Office: [phone] Pager: [phone]

## 2019-05-14 NOTE — PROGRESS NOTES
ID Progress Note 2019 Subjective:  
 
Doing well--he is alert and interactive. Objective: Antibiotics: 1. Vancomycin 2. Cefazolin Vitals:  
Visit Vitals /58 (BP 1 Location: Left arm, BP Patient Position: At rest) Pulse 77 Temp 98.1 °F (36.7 °C) Resp 18 Ht 5' 9\" (1.753 m) Wt 117.2 kg (258 lb 6.4 oz) SpO2 97% BMI 38.16 kg/m² Tmax:  Temp (24hrs), Av.2 °F (36.8 °C), Min:98.1 °F (36.7 °C), Max:98.6 °F (37 °C) Exam:  Lungs:  clear to auscultation bilaterally Heart:  regular rate and rhythm Abdomen:  soft, non-tender. Bowel sounds normal. No masses,  no organomegaly Skin:  no rash or abnormalities Labs:     
Recent Labs 19 
0250 19 
0136 WBC 7.7  --   
HGB 11.6*  --   
  --   
BUN 9 11 CREA 0.91 0.96 Cultures: No results found for: [facility] Lab Results Component Value Date/Time Culture result: NO GROWTH 5 DAYS 2019 04:55 PM  
 Culture result: NO GROWTH 5 DAYS 2018 12:47 PM  
 Culture result: NO GROWTH 5 DAYS 2017 09:45 AM  
 
 
Radiology:  
 
Line/Insert Date:        
 
Assessment: 1. AMS--improved 2. Head and neck cancer--status post Rx Objective: 1. Can be sent out on 5 more days oral cephalexin Mike Seth MD

## 2019-05-14 NOTE — PROGRESS NOTES
Bedside shift change report given to CRUZ Ryan (oncoming nurse) by Kellee Johnson RN (offgoing nurse). Report included the following information SBAR, Kardex, Intake/Output, MAR, Recent Results and Cardiac Rhythm NSR.

## 2019-05-14 NOTE — PROGRESS NOTES
Problem: Mobility Impaired (Adult and Pediatric) Goal: *Acute Goals and Plan of Care (Insert Text) Description Physical Therapy Goals Initiated 5/11/2019 1. Patient will move from supine to sit and sit to supine  and roll side to side in bed with supervision/set-up within 7 day(s). 2.  Patient will transfer from bed to chair and chair to bed with supervision/set-up using the least restrictive device within 7 day(s). 3.  Patient will perform sit to stand with supervision/set-up within 7 day(s). 4.  Patient will ambulate with supervision/set-up for 150 feet with the least restrictive device within 7 day(s). Outcome: Progressing Towards Goal 
 PHYSICAL THERAPY TREATMENT Patient: Ariel Crawford (27 y.o. male) Date: 5/14/2019 Diagnosis: Cellulitis [L03.90] Cellulitis [L03.90] Cellulitis Precautions:  Fall, hard of hearing (L>R) Chart, physical therapy assessment, plan of care and goals were reviewed. ASSESSMENT: 
Patient required mod/max assist for supine<>sit transfers, intermittent support to maintain sitting balance, Haroon x2 for sit<>stand transfers, and CGA for ambulation. Patient tolerated increased ambulation tolerance this date, however continues to require cuing for safety. Patient's mobility is most limited by his impulsivity and poor safety awareness, as he required max cuing for, sequencing, walker management, and positioning during all mobility. Discharge recommendations: Patient is below functional baseline and currently not safe to return to [facility] due to lethargy, poor safety awareness/ impulsive with mobility, decreased insight into deficits, and increased risk for falls. Patient has had recent functional decline and reports weakness due to immobility during hospital admission. Patient would benefit from inpatient rehab to maximize functional recovery. Family reports patient has done well at [facility] previously and requested this facility. PLOF:  Patient resides at [address] [place] [address]. PLOF provided by patient's daughter and SO.  PTA, patient was ambulating to/ from bathroom and to/from dining marques without assistance using rollator. Reports 1 recent fall after patient fell asleep in chair. Patient receives assistance with bathing and dressing at baseline but was performing toileting without assistance. Progression toward goals: 
?    Improving appropriately and progressing toward goals ? Improving slowly and progressing toward goals ? Not making progress toward goals and plan of care will be adjusted PLAN: 
Patient continues to benefit from skilled intervention to address the above impairments. Continue treatment per established plan of care. Discharge Recommendations:  Inpatient Rehab Further Equipment Recommendations for Discharge:  TBD SUBJECTIVE:  
Patient stated ? Are you going to flunk me? Did I at least get a 70%. ? OBJECTIVE DATA SUMMARY:  
Critical Behavior: 
Neurologic State: Alert Orientation Level: Oriented to person, Oriented to place, Disoriented to situation, Disoriented to time Cognition: Decreased attention/concentration, Follows commands, Impulsive, Poor safety awareness Safety/Judgement: Decreased awareness of need for assistance, Decreased insight into deficits, Decreased awareness of need for safety Functional Mobility Training: 
Bed Mobility: 
  
Supine to Sit: Moderate assistance(for raising trunk, HOB flat, using rail) Sit to Supine: Maximum assistance Transfers: 
Sit to Stand: Minimum assistance Balance: 
Sitting: Impaired Sitting - Static: Fair (occasional) Sitting - Dynamic: Fair (occasional) Standing: Impaired Standing - Static: Fair;Constant support Standing - Dynamic : Fair Ambulation/Gait Training: 
Distance (ft): 125 Feet (ft) Assistive Device: Gait belt;Walker, rolling Ambulation - Level of Assistance: Contact guard assistance Gait Abnormalities: Decreased step clearance;Shuffling gait;Trunk sway increased Base of Support: Widened Speed/Olive: Shuffled;Fluctuations Step Length: Left shortened;Right shortened Pain: 
Pain Scale 1: Numeric (0 - 10) Pain Intensity 1: 0 Activity Tolerance:  
Fair Please refer to the flowsheet for vital signs taken during this treatment. After treatment:  
?    Patient left in no apparent distress sitting up in chair ? Patient left in no apparent distress in bed 
? Call bell left within reach ? Nursing notified ? Caregiver present ? Bed alarm activated COMMUNICATION/COLLABORATION:  
The patient?s plan of care was discussed with: Occupational Therapist and Registered Nurse Steve Sep, PT, DPT Time Calculation: 31 mins

## 2019-05-14 NOTE — PROGRESS NOTES
Hospitalist Progress Note Petty Hobson MD 
Answering service: [phone] from in house phone Date of Service:  2019 NAME:  Shelton Garcia :  11/10/1930 MRN:  [id number] Admission Summary: This is an 77-year-old  male with a past medical history of hypertension, sinus cancer, hypercholesterolemia, history of TIA, GERD, trigeminal neuralgia. He comes over here because of changes in mental status that were observed since last night. This whole history as far as the patient's significant other as well as the patient's daughter who are sitting beside the patient. The patient wants to go home and is not willing to give any significant history. It seems that last night when the patient was at the dinner table he was quite drowsy, did not eat enough. He did not have his breakfast as well as lunch today. He was taken to the wound care as he goes to wound care every week. Over there, he was noted to be quite weak and lethargic, that is why he was taken on a wheelchair. When he was evaluated over there, he was found to have worsening infection of his wound, that is why the patient was brought in the ER. No chest  
 
Interval history / Subjective: F/U for AMS The patient is back to baseline mental status \"I am fine, can I go home? \" Assessment & Plan: # Acute encephalopathy metabolic from infection vs seizure - CT head X2 no hemorrhage, consistent with chronic change - UA negative, blood culture NGTD 
- Chest x-ray Left lung base atelectasis versus pneumonia - ct with home Keppra 
- hold IVF due to generalized swelling  
- sleep wake EEG negative 
- seizure and fall precaution - I do not think that there is any need to get MRI, the patient is back to baseline mental status. The patient and the family agree # Bilateral lower extremity cellulitis on the bases of chronic ulcer # Bilateral lower extremity edema  
- blood culture no growth in 3 days  
- on Vancomycin and cefazolin, not sure if the patient still needs antibiotics 
- oral lasix to reduce the swelling - ID following, spoke to Dr Blade Brewster, would be able to stop antibiotics if culture negative today 
- wound care following  
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  
- it a resolving chronic DVT, no intervention needed   
- vascular surgery input appt # HTN: resume home meds # HLD: ct with statin # Radiation necrosis s/p craniotomy  
- continue Keppra # Generalized weakness/debility: PT/OT 
 
PT/OT rehab at [facility] DVT prop: Enoxaparin Code: DNR 
PTA: Yair SPENCE Baseline: Walking with rollator Plan: Rehab when available ?today Dispo: TBD Hospital Problems  Date Reviewed: 8/25/2018 Codes Class Noted POA Cellulitis ICD-10-CM: L03.90 ICD-9-CM: 682.9  5/9/2019 Unknown Review of Systems:  
Difficult to obtain due to patient unresponsiveness Vital Signs:  
 Last 24hrs VS reviewed since prior progress note. Most recent are: 
Visit Vitals /77 Pulse 80 Temp 98.1 °F (36.7 °C) Resp 19 Ht 5' 9\" (1.753 m) Wt 117.2 kg (258 lb 6.4 oz) SpO2 96% BMI 38.16 kg/m² Intake/Output Summary (Last 24 hours) at 5/14/2019 1005 Last data filed at 5/13/2019 2024 Gross per 24 hour Intake  Output 500 ml Net -500 ml Physical Examination:  
 
 
     
Constitutional:  No acute distress, awake, alert, oriented ENT:  moon face, Oral mucous moist, oropharynx benign. Neck supple, Resp:  CTA bilaterally. No wheezing/rhonchi/rales. No accessory muscle use CV:  Regular rhythm, normal rate, no murmurs, gallops, rubs GI:  Soft, non distended, non tender. normoactive bowel sounds, no hepatosplenomegaly Musculoskeletal:  bilateral +3 pitting edema. Both legs covered with dressing Neurologic:  Moves all extremities. AAOx3, CN II-XII reviewed Data Review:  
 I personally reviewed labs and imaging Labs:  
 
Recent Labs 05/13/19 
0250 WBC 7.7 HGB 11.6* HCT 36.6  Recent Labs 05/13/19 
0250 05/12/19 
0136  141  
K 3.7 4.0  
 108 CO2 25 26 BUN 9 11 CREA 0.91 0.96  
* 148* CA 8.1* 8.1* No results for input(s): SGOT, GPT, ALT, AP, TBIL, TBILI, TP, ALB, GLOB, GGT, AML, LPSE in the last 72 hours. No lab exists for component: AMYP, HLPSE No results for input(s): INR, PTP, APTT in the last 72 hours. No lab exists for component: INREXT, INREXT No results for input(s): FE, TIBC, PSAT, FERR in the last 72 hours. Lab Results Component Value Date/Time Folate 8.6 11/20/2017 03:03 PM  
  
No results for input(s): PH, PCO2, PO2 in the last 72 hours. No results for input(s): CPK, CKNDX, TROIQ in the last 72 hours. No lab exists for component: CPKMB Lab Results Component Value Date/Time Cholesterol, total 163 07/14/2018 12:42 PM  
 HDL Cholesterol 89 07/14/2018 12:42 PM  
 LDL, calculated 59.4 07/14/2018 12:42 PM  
 Triglyceride 73 07/14/2018 12:42 PM  
 CHOL/HDL Ratio 1.8 07/14/2018 12:42 PM  
 
Lab Results Component Value Date/Time Glucose (POC) 197 (H) 11/25/2017 11:27 AM  
 Glucose (POC) 128 (H) 11/25/2017 05:36 AM  
 Glucose (POC) 117 (H) 11/24/2017 11:22 PM  
 Glucose (POC) 113 (H) 11/24/2017 05:47 PM  
 Glucose (POC) 198 (H) 11/24/2017 11:57 AM  
 
Lab Results Component Value Date/Time  Color YELLOW/STRAW 05/09/2019 08:11 PM  
 Appearance CLEAR 05/09/2019 08:11 PM  
 Specific gravity 1.014 05/09/2019 08:11 PM  
 Specific gravity 1.005 08/23/2018 05:34 PM  
 pH (UA) 5.5 05/09/2019 08:11 PM  
 Protein NEGATIVE  05/09/2019 08:11 PM  
 Glucose NEGATIVE  05/09/2019 08:11 PM  
 Ketone NEGATIVE  05/09/2019 08:11 PM  
 Bilirubin NEGATIVE  05/09/2019 08:11 PM  
 Urobilinogen 0.2 05/09/2019 08:11 PM  
 Nitrites NEGATIVE  05/09/2019 08:11 PM  
 Leukocyte Esterase NEGATIVE  05/09/2019 08:11 PM  
 Epithelial cells FEW 07/14/2018 04:09 PM  
 Bacteria NEGATIVE  07/14/2018 04:09 PM  
 WBC 0-4 07/14/2018 04:09 PM  
 RBC 0-5 07/14/2018 04:09 PM  
 
 
 
Medications Reviewed:  
 
Current Facility-Administered Medications Medication Dose Route Frequency  albuterol-ipratropium (DUO-NEB) 2.5 MG-0.5 MG/3 ML  3 mL Nebulization BID RT  
 vancomycin (VANCOCIN) 1750 mg in  ml infusion  1,750 mg IntraVENous Q24H  
 sodium chloride (NS) flush 5-10 mL  5-10 mL IntraVENous PRN  
 sodium chloride (NS) flush 5-40 mL  5-40 mL IntraVENous Q8H  
 sodium chloride (NS) flush 5-40 mL  5-40 mL IntraVENous PRN  
 acetaminophen (TYLENOL) tablet 650 mg  650 mg Oral Q4H PRN  
 ondansetron (ZOFRAN) injection 4 mg  4 mg IntraVENous Q4H PRN  
 enoxaparin (LOVENOX) injection 40 mg  40 mg SubCUTAneous Q24H  
 atorvastatin (LIPITOR) tablet 10 mg  10 mg Oral QHS  clopidogrel (PLAVIX) tablet 75 mg  75 mg Oral DAILY  bacitracin-polymyxin b (POLYSPORIN) 500-10,000 unit/gram ophthalmic ointment   Left Eye QHS  docusate sodium (COLACE) capsule 100 mg  100 mg Oral QHS  cyanocobalamin (VITAMIN B12) tablet 1,000 mcg  1,000 mcg Oral DAILY  gabapentin (NEURONTIN) tablet 300 mg  300 mg Oral BID  levETIRAcetam (KEPPRA) tablet 500 mg  500 mg Oral BID  senna (SENOKOT) tablet 8.6 mg  1 Tab Oral DAILY  dexamethasone (DECADRON) tablet 2 mg  2 mg Oral DAILY  polyvinyl alcohol (LIQUIFILM TEARS) 1.4 % ophthalmic solution 1 Drop  1 Drop Right Eye QID  ceFAZolin (ANCEF) 1 g in 0.9% sodium chloride (MBP/ADV) 50 mL  1 g IntraVENous Q6H  
 ciprofloxacin HCl (CILOXAN) 0.3 % ophthalmic solution 1 Drop  1 Drop Both Eyes Q2H  Vancomycin - pharmacy to dose   Other Rx Dosing/Monitoring  
 
______________________________________________________________________ EXPECTED LENGTH OF STAY: - - - 
ACTUAL LENGTH OF STAY:          5 
 
 Kj Whaley MD  
Patient has given Verbal permission to discuss medical care with  
persons present in the room and and also with contact as listed on face sheet.

## 2019-05-14 NOTE — PROGRESS NOTES
Problem: Falls - Risk of 
Goal: *Absence of Falls Description Document Clide Failing Fall Risk and appropriate interventions in the flowsheet. Outcome: Progressing Towards Goal 
  
Problem: Patient Education: Go to Patient Education Activity Goal: Patient/Family Education Outcome: Progressing Towards Goal 
  
Problem: Diabetes Self-Management Goal: *Disease process and treatment process Description Define diabetes and identify own type of diabetes; list 3 options for treating diabetes. Outcome: Progressing Towards Goal 
Goal: *Incorporating nutritional management into lifestyle Description Describe effect of type, amount and timing of food on blood glucose; list 3 methods for planning meals. Outcome: Progressing Towards Goal 
Goal: *Incorporating physical activity into lifestyle Description State effect of exercise on blood glucose levels. Outcome: Progressing Towards Goal 
Goal: *Developing strategies to promote health/change behavior Description Define the ABC's of diabetes; identify appropriate screenings, schedule and personal plan for screenings. Outcome: Progressing Towards Goal 
Goal: *Using medications safely Description State effect of diabetes medications on diabetes; name diabetes medication taking, action and side effects. Outcome: Progressing Towards Goal 
Goal: *Monitoring blood glucose, interpreting and using results Description Identify recommended blood glucose targets  and personal targets. Outcome: Progressing Towards Goal 
Goal: *Prevention, detection, treatment of acute complications Description List symptoms of hyper- and hypoglycemia; describe how to treat low blood sugar and actions for lowering  high blood glucose level. Outcome: Progressing Towards Goal 
Goal: *Prevention, detection and treatment of chronic complications Description Define the natural course of diabetes and describe the relationship of blood glucose levels to long term complications of diabetes. Outcome: Progressing Towards Goal 
Goal: *Developing strategies to address psychosocial issues Description Describe feelings about living with diabetes; identify support needed and support network Outcome: Progressing Towards Goal 
Goal: *Insulin pump training Outcome: Progressing Towards Goal 
Goal: *Sick day guidelines Outcome: Progressing Towards Goal 
Goal: *Patient Specific Goal (EDIT GOAL, INSERT TEXT) Outcome: Progressing Towards Goal 
  
Problem: Patient Education: Go to Patient Education Activity Goal: Patient/Family Education Outcome: Progressing Towards Goal 
  
Problem: Pressure Injury - Risk of 
Goal: *Prevention of pressure injury Description Document Davy Scale and appropriate interventions in the flowsheet. Outcome: Progressing Towards Goal 
  
Problem: Patient Education: Go to Patient Education Activity Goal: Patient/Family Education Outcome: Progressing Towards Goal 
  
Problem: Cellulitis Care Plan (Adult) Goal: *Control of acute pain Outcome: Progressing Towards Goal 
Goal: *Skin integrity maintained Outcome: Progressing Towards Goal 
Goal: *Absence of infection signs and symptoms Outcome: Progressing Towards Goal 
  
Problem: Patient Education: Go to Patient Education Activity Goal: Patient/Family Education Outcome: Progressing Towards Goal 
  
Problem: Pain Goal: *Control of Pain Outcome: Progressing Towards Goal 
Goal: *PALLIATIVE CARE:  Alleviation of Pain Outcome: Progressing Towards Goal 
  
Problem: Patient Education: Go to Patient Education Activity Goal: Patient/Family Education Outcome: Progressing Towards Goal

## 2019-05-14 NOTE — PROGRESS NOTES
Bedside and Verbal shift change report given to Jnuior Gamez RN (oncoming nurse) by Sridhar Veloz RN (offgoing nurse). Report included the following information SBAR, Kardex, Procedure Summary, Intake/Output, MAR, Recent Results and Cardiac Rhythm NSR.

## 2019-05-14 NOTE — PROGRESS NOTES
Bedside shift change report given to Vish Zabala rn (oncoming nurse) by Lesia Westbrook (offgoing nurse). Report included the following information SBAR, Kardex, Procedure Summary, Intake/Output, MAR, Recent Results and Cardiac Rhythm NSR.

## 2019-05-15 VITALS
TEMPERATURE: 98.2 F | BODY MASS INDEX: 37.77 KG/M2 | OXYGEN SATURATION: 93 % | DIASTOLIC BLOOD PRESSURE: 78 MMHG | HEIGHT: 69 IN | SYSTOLIC BLOOD PRESSURE: 122 MMHG | RESPIRATION RATE: 20 BRPM | HEART RATE: 101 BPM | WEIGHT: 255 LBS

## 2019-05-15 LAB
ANION GAP SERPL CALC-SCNC: 5 MMOL/L (ref 5–15)
BUN SERPL-MCNC: 12 MG/DL (ref 6–20)
BUN/CREAT SERPL: 11 (ref 12–20)
CALCIUM SERPL-MCNC: 8 MG/DL (ref 8.5–10.1)
CHLORIDE SERPL-SCNC: 107 MMOL/L (ref 97–108)
CO2 SERPL-SCNC: 29 MMOL/L (ref 21–32)
CREAT SERPL-MCNC: 1.07 MG/DL (ref 0.7–1.3)
GLUCOSE SERPL-MCNC: 129 MG/DL (ref 65–100)
POTASSIUM SERPL-SCNC: 3.9 MMOL/L (ref 3.5–5.1)
SODIUM SERPL-SCNC: 141 MMOL/L (ref 136–145)

## 2019-05-15 PROCEDURE — 74011250637 HC RX REV CODE- 250/637: Performed by: HOSPITALIST

## 2019-05-15 PROCEDURE — 97535 SELF CARE MNGMENT TRAINING: CPT

## 2019-05-15 PROCEDURE — 94640 AIRWAY INHALATION TREATMENT: CPT

## 2019-05-15 PROCEDURE — 36415 COLL VENOUS BLD VENIPUNCTURE: CPT

## 2019-05-15 PROCEDURE — 97530 THERAPEUTIC ACTIVITIES: CPT

## 2019-05-15 PROCEDURE — 74011250636 HC RX REV CODE- 250/636: Performed by: HOSPITALIST

## 2019-05-15 PROCEDURE — 80048 BASIC METABOLIC PNL TOTAL CA: CPT

## 2019-05-15 PROCEDURE — 74011000258 HC RX REV CODE- 258: Performed by: HOSPITALIST

## 2019-05-15 PROCEDURE — 74011000250 HC RX REV CODE- 250: Performed by: INTERNAL MEDICINE

## 2019-05-15 RX ORDER — CEPHALEXIN 500 MG/1
250 CAPSULE ORAL 2 TIMES DAILY
Qty: 10 CAP | Refills: 0 | Status: SHIPPED | OUTPATIENT
Start: 2019-05-15 | End: 2019-07-03

## 2019-05-15 RX ADMIN — CEFAZOLIN 1 G: 1 INJECTION, POWDER, FOR SOLUTION INTRAMUSCULAR; INTRAVENOUS at 05:54

## 2019-05-15 RX ADMIN — IPRATROPIUM BROMIDE AND ALBUTEROL SULFATE 3 ML: .5; 3 SOLUTION RESPIRATORY (INHALATION) at 07:57

## 2019-05-15 RX ADMIN — DEXAMETHASONE 2 MG: 1 TABLET ORAL at 08:39

## 2019-05-15 RX ADMIN — SENNOSIDES 8.6 MG: 8.6 TABLET, FILM COATED ORAL at 08:40

## 2019-05-15 RX ADMIN — POLYVINYL ALCOHOL 1 DROP: 14 SOLUTION/ DROPS OPHTHALMIC at 13:20

## 2019-05-15 RX ADMIN — CIPROFLOXACIN HYDROCHLORIDE 1 DROP: 3 SOLUTION/ DROPS OPHTHALMIC at 08:42

## 2019-05-15 RX ADMIN — POLYVINYL ALCOHOL 1 DROP: 14 SOLUTION/ DROPS OPHTHALMIC at 08:42

## 2019-05-15 RX ADMIN — Medication 10 ML: at 05:54

## 2019-05-15 RX ADMIN — CLOPIDOGREL BISULFATE 75 MG: 75 TABLET ORAL at 08:39

## 2019-05-15 RX ADMIN — LEVETIRACETAM 500 MG: 500 TABLET, FILM COATED ORAL at 08:40

## 2019-05-15 RX ADMIN — CIPROFLOXACIN HYDROCHLORIDE 1 DROP: 3 SOLUTION/ DROPS OPHTHALMIC at 05:55

## 2019-05-15 RX ADMIN — Medication 1000 MCG: at 08:39

## 2019-05-15 RX ADMIN — CIPROFLOXACIN HYDROCHLORIDE 1 DROP: 3 SOLUTION/ DROPS OPHTHALMIC at 03:00

## 2019-05-15 RX ADMIN — GABAPENTIN 300 MG: 600 TABLET, FILM COATED ORAL at 08:39

## 2019-05-15 NOTE — PROGRESS NOTES
Hospitalist Progress Note Reba Campoverde MD 
Answering service: [phone] from in house phone Date of Service:  5/15/2019 NAME:  Omkar Camargo :  11/10/1930 MRN:  [id number] Admission Summary: This is an 80-year-old  male with a past medical history of hypertension, sinus cancer, hypercholesterolemia, history of TIA, GERD, trigeminal neuralgia. He comes over here because of changes in mental status that were observed since last night. This whole history as far as the patient's significant other as well as the patient's daughter who are sitting beside the patient. The patient wants to go home and is not willing to give any significant history. It seems that last night when the patient was at the dinner table he was quite drowsy, did not eat enough. He did not have his breakfast as well as lunch today. He was taken to the wound care as he goes to wound care every week. Over there, he was noted to be quite weak and lethargic, that is why he was taken on a wheelchair. When he was evaluated over there, he was found to have worsening infection of his wound, that is why the patient was brought in the ER. No chest  
 
Interval history / Subjective: F/U for AMS Continues to remain stable No new issues Assessment & Plan: # Acute encephalopathy metabolic from infection vs seizure - CT head X2 no hemorrhage, consistent with chronic change - UA negative, blood culture NGTD 
- Chest x-ray Left lung base atelectasis versus pneumonia - ct with home Keppra 
- hold IVF due to generalized swelling  
- sleep wake EEG negative 
- seizure and fall precaution - I do not think that there is any need to get MRI, the patient is back to baseline mental status. The patient and the family agree # Bilateral lower extremity cellulitis on the bases of chronic ulcer # Bilateral lower extremity edema - blood culture no growth in 3 days  
- on Vancomycin and cefazolin, not sure if the patient still needs antibiotics 
- oral lasix to reduce the swelling  
-Appreciate ID input, the patient can be discharge on 5 more days of Keflex 
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  
- it a resolving chronic DVT, no intervention needed   
- vascular surgery input appt # HTN: resume home meds # HLD: ct with statin # Radiation necrosis s/p craniotomy  
- continue Keppra # Generalized weakness/debility: PT/OT 
 
PT/OT rehab at [facility] DVT prop: Enoxaparin Code: DNR 
PTA: Yair Meyer BEBE Baseline: Walking with rollator Plan: Rehab when available ?today Dispo: TBD Hospital Problems  Date Reviewed: 5/14/2019 Codes Class Noted POA * (Principal) Cellulitis ICD-10-CM: L03.90 ICD-9-CM: 682.9  5/9/2019 Yes Review of Systems:  
Difficult to obtain due to patient unresponsiveness Vital Signs:  
 Last 24hrs VS reviewed since prior progress note. Most recent are: 
Visit Vitals /62 (BP 1 Location: Left arm, BP Patient Position: At rest;Sitting) Pulse 98 Temp 97.3 °F (36.3 °C) Resp 25 Ht 5' 9\" (1.753 m) Wt 115.7 kg (255 lb) SpO2 95% BMI 37.66 kg/m² No intake or output data in the 24 hours ending 05/15/19 1103 Physical Examination:  
 
 
     
Constitutional:  No acute distress, awake, alert, oriented ENT:  moon face, Oral mucous moist, oropharynx benign. Neck supple, Resp:  CTA bilaterally. No wheezing/rhonchi/rales. No accessory muscle use CV:  Regular rhythm, normal rate, no murmurs, gallops, rubs GI:  Soft, non distended, non tender. normoactive bowel sounds, no hepatosplenomegaly Musculoskeletal:  bilateral +3 pitting edema. Both legs covered with dressing Neurologic:  Moves all extremities. AAOx3, CN II-XII reviewed Data Review: I personally reviewed labs and imaging Labs:  
 
Recent Labs 05/13/19 
0250 WBC 7.7 HGB 11.6* HCT 36.6  Recent Labs 05/15/19 
0119 05/13/19 
0250  141  
K 3.9 3.7  107 CO2 29 25 BUN 12 9 CREA 1.07 0.91  
* 150* CA 8.0* 8.1* No results for input(s): SGOT, GPT, ALT, AP, TBIL, TBILI, TP, ALB, GLOB, GGT, AML, LPSE in the last 72 hours. No lab exists for component: AMYP, HLPSE No results for input(s): INR, PTP, APTT in the last 72 hours. No lab exists for component: INREXT, INREXT No results for input(s): FE, TIBC, PSAT, FERR in the last 72 hours. Lab Results Component Value Date/Time Folate 8.6 11/20/2017 03:03 PM  
  
No results for input(s): PH, PCO2, PO2 in the last 72 hours. No results for input(s): CPK, CKNDX, TROIQ in the last 72 hours. No lab exists for component: CPKMB Lab Results Component Value Date/Time Cholesterol, total 163 07/14/2018 12:42 PM  
 HDL Cholesterol 89 07/14/2018 12:42 PM  
 LDL, calculated 59.4 07/14/2018 12:42 PM  
 Triglyceride 73 07/14/2018 12:42 PM  
 CHOL/HDL Ratio 1.8 07/14/2018 12:42 PM  
 
Lab Results Component Value Date/Time Glucose (POC) 197 (H) 11/25/2017 11:27 AM  
 Glucose (POC) 128 (H) 11/25/2017 05:36 AM  
 Glucose (POC) 117 (H) 11/24/2017 11:22 PM  
 Glucose (POC) 113 (H) 11/24/2017 05:47 PM  
 Glucose (POC) 198 (H) 11/24/2017 11:57 AM  
 
Lab Results Component Value Date/Time  Color YELLOW/STRAW 05/09/2019 08:11 PM  
 Appearance CLEAR 05/09/2019 08:11 PM  
 Specific gravity 1.014 05/09/2019 08:11 PM  
 Specific gravity 1.005 08/23/2018 05:34 PM  
 pH (UA) 5.5 05/09/2019 08:11 PM  
 Protein NEGATIVE  05/09/2019 08:11 PM  
 Glucose NEGATIVE  05/09/2019 08:11 PM  
 Ketone NEGATIVE  05/09/2019 08:11 PM  
 Bilirubin NEGATIVE  05/09/2019 08:11 PM  
 Urobilinogen 0.2 05/09/2019 08:11 PM  
 Nitrites NEGATIVE  05/09/2019 08:11 PM  
 Leukocyte Esterase NEGATIVE  05/09/2019 08:11 PM  
 Epithelial cells FEW 07/14/2018 04:09 PM  
 Bacteria NEGATIVE  07/14/2018 04:09 PM  
 WBC 0-4 07/14/2018 04:09 PM  
 RBC 0-5 07/14/2018 04:09 PM  
 
 
 
Medications Reviewed:  
 
Current Facility-Administered Medications Medication Dose Route Frequency  albuterol-ipratropium (DUO-NEB) 2.5 MG-0.5 MG/3 ML  3 mL Nebulization BID RT  
 sodium chloride (NS) flush 5-10 mL  5-10 mL IntraVENous PRN  
 sodium chloride (NS) flush 5-40 mL  5-40 mL IntraVENous Q8H  
 sodium chloride (NS) flush 5-40 mL  5-40 mL IntraVENous PRN  
 acetaminophen (TYLENOL) tablet 650 mg  650 mg Oral Q4H PRN  
 ondansetron (ZOFRAN) injection 4 mg  4 mg IntraVENous Q4H PRN  
 enoxaparin (LOVENOX) injection 40 mg  40 mg SubCUTAneous Q24H  
 atorvastatin (LIPITOR) tablet 10 mg  10 mg Oral QHS  clopidogrel (PLAVIX) tablet 75 mg  75 mg Oral DAILY  bacitracin-polymyxin b (POLYSPORIN) 500-10,000 unit/gram ophthalmic ointment   Left Eye QHS  docusate sodium (COLACE) capsule 100 mg  100 mg Oral QHS  cyanocobalamin (VITAMIN B12) tablet 1,000 mcg  1,000 mcg Oral DAILY  gabapentin (NEURONTIN) tablet 300 mg  300 mg Oral BID  levETIRAcetam (KEPPRA) tablet 500 mg  500 mg Oral BID  senna (SENOKOT) tablet 8.6 mg  1 Tab Oral DAILY  dexamethasone (DECADRON) tablet 2 mg  2 mg Oral DAILY  polyvinyl alcohol (LIQUIFILM TEARS) 1.4 % ophthalmic solution 1 Drop  1 Drop Right Eye QID  ceFAZolin (ANCEF) 1 g in 0.9% sodium chloride (MBP/ADV) 50 mL  1 g IntraVENous Q6H  
 ciprofloxacin HCl (CILOXAN) 0.3 % ophthalmic solution 1 Drop  1 Drop Both Eyes Q2H  
 
______________________________________________________________________ EXPECTED LENGTH OF STAY: 3d 7h 
ACTUAL LENGTH OF STAY:          6 Sushil Magana MD  
Patient has given Verbal permission to discuss medical care with  
persons present in the room and and also with contact as listed on face sheet.

## 2019-05-15 NOTE — PROGRESS NOTES
Chart reviewed; Patient being discharged today. Patient to switch to PO antibiotics upon discharge. Spoke with Javid Ramos Liaison to check status on referral. Referral pending at [facility]. Notified family at bedside. Family is agreeable to plan. CM to follow. BRITTNY Ann,CRM  
 
13:38PM- Patient has been accepted to [facility]. Call nursing report to [phone]# [phone]. Completed EMTALA forms. Faxed discharge instructions and MAR to [phone]. Family will transport. Notified RN. CM available in case needs arise. BRITTNY Ann,CRM

## 2019-05-15 NOTE — DISCHARGE SUMMARY
Discharge Summary       PATIENT ID: Cristina Melvin  MRN: [id number]   YOB: 1930    DATE OF ADMISSION: 5/9/2019  4:28 PM    DATE OF DISCHARGE: 5/15/2019   PRIMARY CARE PROVIDER: Rosalia Leavitt MD     ATTENDING PHYSICIAN: Dr Nasreen Manzanares  DISCHARGING PROVIDER: Nasreen Manzanares MD    To contact this individual call [phone] and ask the  to page. If unavailable ask to be transferred the Adult Hospitalist Department. CONSULTATIONS: IP CONSULT TO HOSPITALIST  IP CONSULT TO INFECTIOUS DISEASES  IP CONSULT TO VASCULAR SURGERY    PROCEDURES/SURGERIES: * No surgery found *    ADMITTING [address] [place] [address] COURSE:   # Acute encephalopathy metabolic from infection vs seizure   - CT head X2 no hemorrhage, consistent with chronic change   - UA negative, blood culture NGTD  - Chest x-ray Left lung base atelectasis versus pneumonia  - ct with home Keppra  - hold IVF due to generalized swelling   - sleep wake EEG negative  - seizure and fall precaution   - I do not think that there is any need to get MRI, the patient is back to baseline mental status. 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 / PLAN:      1. Acute encephalopathy  2. Cellulitis       PENDING TEST RESULTS:   At the time of discharge the following test results are still pending: none    FOLLOW UP APPOINTMENTS:    Follow-up Information     Follow up With Specialties Details Why Contact Info    Osvaldo Jacobson MD [facility] Practice In 1 week  [address]  [phone]             ADDITIONAL CARE RECOMMENDATIONS:   Follow up with PMD  Daily weight  BMP in 4-5 days    DIET: Cardiac Diet    ACTIVITY: Activity as tolerated      DISCHARGE MEDICATIONS:   See Medication Reconciliation Form      NOTIFY YOUR PHYSICIAN FOR ANY OF THE FOLLOWING:   Fever over 101 degrees for 24 hours. Chest pain, shortness of breath, fever, chills, nausea, vomiting, diarrhea, change in mentation, falling, weakness, bleeding. Severe pain or pain not relieved by medications. Or, any other signs or symptoms that you may have questions about.     DISPOSITION:    Home With:   OT  PT  HH  RN      x SNF/Inpatient Rehab/LTAC    Independent/assisted living    Hospice    Other:       PATIENT CONDITION AT DISCHARGE:     Functional status    Poor    x Deconditioned     Independent      Cognition   x  Lucid     Forgetful     Dementia      Catheters/lines (plus indication)    Diaz     PICC     PEG    x None      Code status     Full code    x DNR      PHYSICAL EXAMINATION AT DISCHARGE:  Please see progress note      CHRONIC MEDICAL DIAGNOSES:  Problem List as of 5/15/2019 Date Reviewed: 5/14/2019          Codes Class Noted - Resolved    * (Principal) Cellulitis ICD-10-CM: L03.90  ICD-9-CM: 682.9  5/9/2019 - Present        Pulmonary emboli ([facility] [address] ICD-10-CM: I26.99  ICD-9-CM: 415.19  8/24/2018 - Present        Leucocytosis ICD-10-CM: Q71.193  ICD-9-CM: 288.60  11/25/2017 - Present        Hyponatremia ICD-10-CM: E87.1  ICD-9-CM: 276.1  11/25/2017 - Present    Overview Signed 11/25/2017 10:31 AM by Román Kimball MD     Acute on chronic             Rhabdomyolysis ICD-10-CM: Z42.34  ICD-9-CM: 728.88  11/25/2017 - Present        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 ICD-10-CM: I67.89, Y84.2  ICD-9-CM: 437.8, E879.2  11/25/2017 - Present        Aspiration into airway ICD-10-CM: T17.908A  ICD-9-CM: 934.9  11/25/2017 - Present        Sepsis ([facility] ICD-10-CM: A41.9  ICD-9-CM: 038.9, 995.91  11/25/2017 - Present        Trigeminal neuralgia (Chronic) ICD-10-CM: G50.0  ICD-9-CM: 350.1  11/25/2017 - Present        Brain tumor ([facility] ICD-10-CM: D49.6  ICD-9-CM: 239.6  10/19/2017 - Present        Pneumonia ICD-10-CM: J18.9  ICD-9-CM: 946  10/23/2015 - Present        Head and neck cancer ([facility] ICD-10-CM: C76.0  ICD-9-CM: 195.0  10/13/2014 - Present        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) ICD-10-CM: I63.9  ICD-9-CM: 434.91  3/13/2012 - Present    Overview Signed 3/13/2012  2:32 PM by Roman Bruno PA-C     Residual left-sided facial numbness             Hypercholesteremia ICD-10-CM: E78.00  ICD-9-CM: 272.0  3/13/2012 - Present        GERD (gastroesophageal reflux disease) ICD-10-CM: K21.9  ICD-9-CM: 530.81  3/13/2012 - Present        Numbness and tingling in left hand ICD-10-CM: R20.0, R20.2  ICD-9-CM: 782.0  2/25/2012 - Present        TIA (transient ischemic attack) ICD-10-CM: G45.9  ICD-9-CM: 435.9  12/23/2010 - Present        HTN (hypertension) ICD-10-CM: I10  ICD-9-CM: 401.9  12/23/2010 - Present        RESOLVED: Pulmonary embolism ([facility] [address] ICD-10-CM: I26.99  ICD-9-CM: 415.19  8/23/2018 - 8/25/2018        RESOLVED: Altered mental state ICD-10-CM: R41.82  ICD-9-CM: 780.97  7/14/2018 - 7/16/2018        RESOLVED: Acute encephalopathy ICD-10-CM: G93.40  ICD-9-CM: 348.30  11/20/2017 - 11/24/2017              Greater than 39 minutes were spent with the patient on counseling and coordination of care    Signed:   Reba Campoverde MD  5/15/2019  11:13 AM

## 2019-05-15 NOTE — PROGRESS NOTES
I have reviewed discharge instructions with the patient and patient's daughter. The patient and patient's daughter verbalized understanding. Telemetry and PIV discontinued. Patient discharged with belongings via daughter to [facility]. Report called to Santa Barragan RN at [facility].

## 2019-05-15 NOTE — PROGRESS NOTES
Problem: Falls - Risk of 
Goal: *Absence of Falls Description Document Micaela Bliss Fall Risk and appropriate interventions in the flowsheet. Outcome: Progressing Towards Goal 
  
Problem: Pressure Injury - Risk of 
Goal: *Prevention of pressure injury Description Document Davy Scale and appropriate interventions in the flowsheet. Outcome: Progressing Towards Goal 
  
Problem: Cellulitis Care Plan (Adult) Goal: *Control of acute pain Outcome: Progressing Towards Goal 
Goal: *Skin integrity maintained Outcome: Progressing Towards Goal 
Goal: *Absence of infection signs and symptoms Outcome: Progressing Towards Goal 
  
Problem: Pain Goal: *Control of Pain Outcome: Progressing Towards Goal

## 2019-05-15 NOTE — PROGRESS NOTES
Problem: Self Care Deficits Care Plan (Adult) Goal: *Acute Goals and Plan of Care (Insert Text) Description Occupational Therapy Goals Initiated 5/13/2019 1. Patient will perform grooming standing at sink with supervision/set-up within 7 day(s). 2.  Patient will perform upper body dressing with supervision/set-up within 7 day(s). 3.  Patient will perform lower body dressing with moderate assistance  within 7 day(s). 4.  Patient will perform toilet transfers with supervision/set-up within 7 day(s). 5.  Patient will perform all aspects of toileting with supervision/set-up within 7 day(s). 6.  Patient will participate in upper extremity therapeutic exercise/activities with supervision/set-up for 10 minutes within 7 day(s). 7.  Patient will utilize energy conservation techniques during functional activities with verbal cues within 7 day(s). Outcome: Progressing Towards Goal 
  
OCCUPATIONAL THERAPY TREATMENT Patient: Venida Dubin (35 y.o. male) Date: 5/15/2019 Diagnosis: Cellulitis [L03.90] Cellulitis [L03.90] Cellulitis Precautions:  fall Chart, occupational therapy assessment, plan of care, and goals were reviewed. ASSESSMENT:  
Based on the objective data described below, patient continues to present with Setup to Minimum assistance upper body ADLs, Maximum assistance to Total assistance lower body ADLs, Minimum assistance for sit<>stand, and Contact Guard to Minimum assistance ambulating using RW. Patient continues to demonstrate improving alertness and activity tolerance but also continues to demonstrate impulsive behavior with mobility and poor safety awareness requiring max verbal/ tactile cues for RW management and body positioning. Remains below functional baseline. The following are barriers to ADL independence while in acute care:  
- Cognitive and/or behavioral: orientation, attention to task, safety awareness and insight into deficits - Medical condition: ROM, strength, functional endurance, standing balance and coordination Patient will benefit from skilled acute intervention to address the above impairments. Patient?s rehabilitation potential is considered to be Good Prior level of function: Patient resides at [address] [place] [address]. PLOF provided by patient's daughter and SO.  PTA, patient was ambulating to/ from bathroom and to/from dining marques without assistance using rollator. Was performing bed mobility with flat bed independently. Reports 1 recent fall after patient fell asleep in chair. Patient receives assistance with bathing and dressing at baseline but was performing toileting without assistance. PLAN: 
Patient continues to benefit from skilled intervention to address the above impairments. Continue treatment per established plan of care. Discharge recommendations: Patient is below functional baseline and currently not [facility] due to poor safety awareness/ impulsive with mobility, decreased insight into deficits, and increased risk for falls. Patient has had recent functional decline and reports weakness due to immobility during hospital admission. Patient would benefit from inpatient rehab to maximize functional recovery. Family reports patient has done well at [facility] previously and requested this facility. Barriers to discharging home, in addition to above listed impairments: Below functional baseline, increased risk for falls SUBJECTIVE:  
Patient stated ? I want to get walking! \" OBJECTIVE DATA SUMMARY:  
Cognitive/Behavioral Status: 
Neurologic State: Alert Orientation Level: Oriented to place;Oriented to situation;Disoriented to situation;Disoriented to time Cognition: Decreased attention/concentration;Poor safety awareness Perception: Appears intact Perseveration: No perseveration noted Safety/Judgement: Decreased awareness of need for assistance;Decreased awareness of need for safety;Decreased insight into deficits Functional Mobility and Transfers for ADLs: 
 
Transfers: 
Sit to Stand: Minimum assistance Balance: 
Sitting: Impaired Sitting - Static: Good (unsupported) Sitting - Dynamic: Fair (occasional) Standing: Impaired Standing - Static: Fair;Constant support Standing - Dynamic : Fair ADL Intervention: 
  
 
Grooming Washing Hands: Minimum assistance(for steadying, standing at sink with RW) Brushing Teeth: Minimum assistance(for steadying, standing at sink with RW) Toileting Bladder Hygiene: Maximum assistance(for positioning/ holding urinal and steadying, standing) Clothing Management: Total assistance (dependent)(for raising brief while standing, plus steadying) Cognitive Retraining Safety/Judgement: Decreased awareness of need for assistance;Decreased awareness of need for safety;Decreased insight into deficits Pain: 
Patient reports no pain Activity Tolerance:  
Seated post activity: , SPO2 97% on RA After treatment patient left:  
Up in chair Chair alarm/tab alert on Call light within reach RN notified Family at bedside COMMUNICATION/COLLABORATION:  
The patient?s plan of care was discussed with: Physical Therapist and Registered Nurse Juan Giordano OT Time Calculation: 41 mins

## 2019-05-15 NOTE — DISCHARGE INSTRUCTIONS
Discharge SNF/Rehab Instructions/LTAC       PATIENT ID: Geremias Blackmon  MRN: [id number]   YOB: 1930    DATE OF ADMISSION: 5/9/2019  4:28 PM    DATE OF DISCHARGE: 5/15/2019    PRIMARY CARE PROVIDER: Ervin Hammond MD       ATTENDING PHYSICIAN: Mita Grey MD  DISCHARGING PROVIDER: Nito Espinosa MD     To contact this individual call [phone] and ask the  to page. If unavailable ask to be transferred the Adult Hospitalist Department. CONSULTATIONS: IP CONSULT TO HOSPITALIST  IP CONSULT TO INFECTIOUS DISEASES  IP CONSULT TO VASCULAR SURGERY    PROCEDURES/SURGERIES: * No surgery found *    ADMITTING [address] [place] [address] COURSE:   # Acute encephalopathy metabolic from infection vs seizure   - CT head X2 no hemorrhage, consistent with chronic change   - UA negative, blood culture NGTD  - Chest x-ray Left lung base atelectasis versus pneumonia  - ct with home Keppra  - hold IVF due to generalized swelling   - sleep wake EEG negative  - seizure and fall precaution   - I do not think that there is any need to get MRI, the patient is back to baseline mental status.  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 Acute encephalopathy  2. Cellulitis       PENDING TEST RESULTS:   At the time of discharge the following test results are still pending: none    FOLLOW UP APPOINTMENTS:    Follow-up Information     Follow up With Specialties Details Why Contact Info    Yajaira Gonzales MD [facility] In 1 week  [address]  [phone]             ADDITIONAL CARE RECOMMENDATIONS:   Follow up with PMD  Daily weight  BMP in 4-5 days    DIET: Cardiac Diet    ACTIVITY: Activity as tolerated      DISCHARGE MEDICATIONS:   See Medication Reconciliation Form      NOTIFY YOUR PHYSICIAN FOR ANY OF THE FOLLOWING:   Fever over 101 degrees for 24 hours. Chest pain, shortness of breath, fever, chills, nausea, vomiting, diarrhea, change in mentation, falling, weakness, bleeding. Severe pain or pain not relieved by medications. Or, any other signs or symptoms that you may have questions about.     DISPOSITION:    Home With:   OT  PT  HH  RN      x SNF/Inpatient Rehab/LTAC    Independent/assisted living    Hospice    Other:       PATIENT CONDITION AT DISCHARGE:     Functional status    Poor    x Deconditioned     Independent      Cognition   x  Lucid     Forgetful     Dementia      Catheters/lines (plus indication)    Diaz     PICC     PEG    x None      Code status     Full code    x DNR      PHYSICAL EXAMINATION AT DISCHARGE:  Please see progress note      CHRONIC MEDICAL DIAGNOSES:  Problem List as of 5/15/2019 Date Reviewed: 5/14/2019          Codes Class Noted - Resolved    * (Principal) Cellulitis ICD-10-CM: L03.90  ICD-9-CM: 682.9  5/9/2019 - Present        Pulmonary emboli ([facility] ICD-10-CM: I26.99  ICD-9-CM: 415.19  8/24/2018 - Present        Leucocytosis ICD-10-CM: Q30.762  ICD-9-CM: 288.60  11/25/2017 - Present        Hyponatremia ICD-10-CM: E87.1  ICD-9-CM: 276.1  11/25/2017 - Present    Overview Signed 11/25/2017 10:31 AM by Ruby Billingsley MD     Acute on chronic Rhabdomyolysis ICD-10-CM: M62.82  ICD-9-CM: 728.88  11/25/2017 - Present        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 ICD-10-CM: I67.89, Y84.2  ICD-9-CM: 437.8, E879.2  11/25/2017 - Present        Aspiration into airway ICD-10-CM: T17.908A  ICD-9-CM: 934.9  11/25/2017 - Present        Sepsis ([facility] ICD-10-CM: A41.9  ICD-9-CM: 038.9, 995.91  11/25/2017 - Present        Trigeminal neuralgia (Chronic) ICD-10-CM: G50.0  ICD-9-CM: 350.1  11/25/2017 - Present        Brain tumor ([facility] ICD-10-CM: D49.6  ICD-9-CM: 239.6  10/19/2017 - Present        Pneumonia ICD-10-CM: J18.9  ICD-9-CM: 082  10/23/2015 - Present        Head and neck cancer ([facility] ICD-10-CM: C76.0  ICD-9-CM: 195.0  10/13/2014 - Present        Cancer of sinus ([facility] ICD-10-CM: C31.9  ICD-9-CM: 160.9  10/13/2014 - Present        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  ICD-9-CM: 715.96  3/13/2012 - Present    Overview Signed 3/13/2012  2:30 PM by Allen Land PA-C     Scheduled for RIGHT UNI vs TKR on 03-14-12             History of TIAs ICD-10-CM: Z86.73  ICD-9-CM: V12.54  3/13/2012 - Present    Overview Addendum 3/13/2012  2:31 PM by Allen Land PA-C     11 in last 7 yrs             Stroke [facility]) ICD-10-CM: I63.9  ICD-9-CM: 434.91  3/13/2012 - Present    Overview Signed 3/13/2012  2:32 PM by Allen Land PA-C     Residual left-sided facial numbness             Hypercholesteremia ICD-10-CM: E78.00  ICD-9-CM: 272.0  3/13/2012 - Present        GERD (gastroesophageal reflux disease) ICD-10-CM: K21.9  ICD-9-CM: 530.81  3/13/2012 - Present        Numbness and tingling in left hand ICD-10-CM: R20.0, R20.2  ICD-9-CM: 782.0  2/25/2012 - Present        TIA (transient ischemic attack) ICD-10-CM: G45.9  ICD-9-CM: 435.9  12/23/2010 - Present        HTN (hypertension) ICD-10-CM: I10  ICD-9-CM: 401.9  12/23/2010 - Present        RESOLVED: Pulmonary embolism ([address] ICD-10-CM: I26.99  ICD-9-CM: 415.19  8/23/2018 - 8/25/2018        RESOLVED: Altered mental state ICD-10-CM: R41.82  ICD-9-CM: 780.97  7/14/2018 - 7/16/2018        RESOLVED: Acute encephalopathy ICD-10-CM: G93.40  ICD-9-CM: 348.30  11/20/2017 - 11/24/2017                CDMP Checked:   Yes x     PROBLEM LIST Updated:  Yes x         Signed:   Sofya Cat MD  5/15/2019  11:11 AM

## 2019-05-15 NOTE — PROGRESS NOTES
Problem: Falls - Risk of 
Goal: *Absence of Falls Description Document John Fleming Fall Risk and appropriate interventions in the flowsheet. Outcome: Progressing Towards Goal 
  
Problem: Patient Education: Go to Patient Education Activity Goal: Patient/Family Education Outcome: Progressing Towards Goal 
  
Problem: Diabetes Self-Management Goal: *Disease process and treatment process Description Define diabetes and identify own type of diabetes; list 3 options for treating diabetes. Outcome: Progressing Towards Goal 
Goal: *Incorporating nutritional management into lifestyle Description Describe effect of type, amount and timing of food on blood glucose; list 3 methods for planning meals. Outcome: Progressing Towards Goal 
Goal: *Incorporating physical activity into lifestyle Description State effect of exercise on blood glucose levels. Outcome: Progressing Towards Goal 
Goal: *Developing strategies to promote health/change behavior Description Define the ABC's of diabetes; identify appropriate screenings, schedule and personal plan for screenings. Outcome: Progressing Towards Goal 
Goal: *Using medications safely Description State effect of diabetes medications on diabetes; name diabetes medication taking, action and side effects. Outcome: Progressing Towards Goal 
Goal: *Monitoring blood glucose, interpreting and using results Description Identify recommended blood glucose targets  and personal targets. Outcome: Progressing Towards Goal 
Goal: *Prevention, detection, treatment of acute complications Description List symptoms of hyper- and hypoglycemia; describe how to treat low blood sugar and actions for lowering  high blood glucose level. Outcome: Progressing Towards Goal 
Goal: *Prevention, detection and treatment of chronic complications Description Define the natural course of diabetes and describe the relationship of blood glucose levels to long term complications of diabetes. Outcome: Progressing Towards Goal 
Goal: *Developing strategies to address psychosocial issues Description Describe feelings about living with diabetes; identify support needed and support network Outcome: Progressing Towards Goal 
Goal: *Insulin pump training Outcome: Progressing Towards Goal 
Goal: *Sick day guidelines Outcome: Progressing Towards Goal 
Goal: *Patient Specific Goal (EDIT GOAL, INSERT TEXT) Outcome: Progressing Towards Goal 
  
Problem: Pressure Injury - Risk of 
Goal: *Prevention of pressure injury Description Document Davy Scale and appropriate interventions in the flowsheet. Outcome: Progressing Towards Goal 
  
Problem: Patient Education: Go to Patient Education Activity Goal: Patient/Family Education Outcome: Progressing Towards Goal 
  
Problem: Cellulitis Care Plan (Adult) Goal: *Control of acute pain Outcome: Progressing Towards Goal 
Goal: *Skin integrity maintained Outcome: Progressing Towards Goal 
Goal: *Absence of infection signs and symptoms Outcome: Progressing Towards Goal 
  
Problem: Patient Education: Go to Patient Education Activity Goal: Patient/Family Education Outcome: Progressing Towards Goal 
  
Problem: Patient Education: Go to Patient Education Activity Goal: Patient/Family Education Outcome: Progressing Towards Goal

## 2019-06-06 ENCOUNTER — HOSPITAL ENCOUNTER (OUTPATIENT)
Dept: WOUND CARE | Age: 84
Discharge: HOME | End: 2019-06-06
Payer: MEDICARE

## 2019-06-06 VITALS
SYSTOLIC BLOOD PRESSURE: 135 MMHG | DIASTOLIC BLOOD PRESSURE: 88 MMHG | HEART RATE: 92 BPM | RESPIRATION RATE: 18 BRPM | TEMPERATURE: 98.4 F

## 2019-06-06 PROCEDURE — 74011000250 HC RX REV CODE- 250: Performed by: PODIATRIST

## 2019-06-06 PROCEDURE — 29581 APPL MULTLAYER CMPRN SYS LEG: CPT

## 2019-06-06 RX ORDER — DOXYCYCLINE 100 MG/1
100 CAPSULE ORAL 2 TIMES DAILY
COMMUNITY
End: 2019-09-10

## 2019-06-06 RX ADMIN — Medication: at 14:00

## 2019-06-06 NOTE — WOUND CARE
06/06/19 1441 Wound Leg lower Posterior Date First Assessed/Time First Assessed: 06/06/19 1332   Present on Hospital Admission: Yes  Location: Leg lower  Wound Location Orientation: Posterior Dressing Type Applied Honey;ABD pad;Compression Wrap/Venous Stasis Wound Right; Lower;Medial;Proximal  
Date First Assessed/Time First Assessed: 05/09/19 1506   Wound Location Orientation: Right; Lower;Medial;Proximal  
Dressing Type Applied Honey;ABD pad;Compression Wrap/Venous Stasis Wound Leg lower Right;Medial;Distal  
Date First Assessed/Time First Assessed: 05/09/19 1508   Present on Hospital Admission: Yes  Location: Leg lower  Wound Location Orientation: Right;Medial;Distal  
Dressing Type Applied Honey;ABD pad;Compression Wrap/Venous Stasis Wound Leg lower Right;Lateral;Posterior Date First Assessed/Time First Assessed: 05/09/19 1514   Location: Leg lower  Wound Location Orientation: Right;Lateral;Posterior Dressing Type Applied Honey;ABD pad;Compression Wrap/Venous Stasis Wound Leg Lower Lateral;Left Date First Assessed/Time First Assessed: 02/07/19 1401   POA: Yes  Wound Type: Venous  Location: Leg Lower  Orientation: Lateral;Left Dressing Type Applied Honey;ABD pad;Compression Wrap/Venous Stasis Discharge Condition:stable Ambulatory Status: with walker Discharge Destination: home Transportation: personal car Accompanied by: daughter and friend.

## 2019-06-06 NOTE — PROGRESS NOTES
06/06/19 1327   Wound Leg lower Posterior   Date First Assessed/Time First Assessed: 06/06/19 1332   Present on Hospital Admission: Yes  Location: Leg lower  Wound Location Orientation: Posterior   Dressing Status Clean, dry, and intact   Dressing Type Open to air   Wound Length (cm) 6 cm   Wound Width (cm) 4 cm   Wound Depth (cm) 0.2 cm   Wound Volume (cm^3) 4.8 cm^3   Drainage Amount Small   Drainage Color Serous   Wound Odor None   Wound Right; Lower;Medial;Proximal   Date First Assessed/Time First Assessed: 05/09/19 1506   Wound Location Orientation: Right; Lower;Medial;Proximal   Dressing Status Dry   Dressing Type Open to air   Wound Length (cm) 1.5 cm   Wound Width (cm) 0.5 cm   Wound Depth (cm) 0.1 cm   Wound Volume (cm^3) 0.08 cm^3   Drainage Amount Small   Drainage Color Serous   Wound Odor None   Cleansing and Cleansing Agents  Normal saline   Wound Leg lower Right;Medial;Distal   Date First Assessed/Time First Assessed: 05/09/19 1508   Present on Hospital Admission: Yes  Location: Leg lower  Wound Location Orientation: Right;Medial;Distal   Dressing Status Moist   Dressing Type Open to air   Wound Length (cm) 1 cm   Wound Width (cm) 0.8 cm   Wound Depth (cm) 0.4 cm   Wound Volume (cm^3) 0.32 cm^3   Drainage Amount Small   Drainage Color Serous   Wound Odor None   Cleansing and Cleansing Agents  Normal saline   Wound Leg lower Right;Lateral;Posterior   Date First Assessed/Time First Assessed: 05/09/19 1514   Location: Leg lower  Wound Location Orientation: Right;Lateral;Posterior   Wound Length (cm) 0.1 cm   Wound Width (cm) 0.1 cm   Wound Depth (cm) 0.1 cm   Wound Volume (cm^3) 0 cm^3   Wound Leg Lower Lateral;Left   Date First Assessed/Time First Assessed: 02/07/19 1401   POA: Yes  Wound Type: Venous  Location: Leg Lower  Orientation: Lateral;Left   Wound Length (cm) 1.5 cm   Wound Width (cm) 1.2 cm   Wound Depth (cm) 0.5   Wound Surface area (cm^2) 1.8 cm^2   Change in Wound Size % 8.16   Drainage Amount  Small    Drainage Color Serous   Wound Odor None     Visit Vitals  /88 (BP 1 Location: Left arm, BP Patient Position: Sitting)   Pulse 92   Temp 98.4 °F (36.9 °C)   Resp 18

## 2019-06-06 NOTE — WOUND CARE
Kvng Dowell DPM - Lito Bansal 3 - Follow up Assessment/Plan: 
 
 
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 - legs improved since hospital admission - Wound debrided - see procedure note - SONIDO/ PVR - revewied - no significant vascular disease - Venous duplex- No DVT noted - Medihoney applied to both leg ulceration - Compression with B/L 3 layer compression wraps. -F/U 1 week. Subjective: 
Patient comes in for follow up to B/L LE ulcerations. Wife states patient did well with medihoney and 4 layer wraps in the hospital. Negative for fevers chills, nausea, vomitting, SOB. HPI: 
Pt complains of wound to left and right leg. States they have been present for 6 months. Wound have not improved and gotten worse. Has tried antibiotic ointment with minimal success. Patient was referred here by PCP for wound care. He comes in with a caregiver who states he lives at [facility] AT [place]. Caregiver states that he is not compliant with compression. He had a DVT 6 months ago and an IVC filter was placed. History: 
Wound Care Allergies Allergen Reactions  Adhesive Tape-Silicones Other (comments) REDNESS  Aggrenox [Aspirin-Dipyridamole] Other (comments)  
  headache  Amlodipine Rash  Diuril [Chlorothiazide] Unknown (comments)  Hydrochlorothiazide Other (comments) LOW NA Family History Problem Relation Age of Onset  Cancer Mother   
     colon  Cancer Father   
     throat  No Known Problems Brother  Anesth Problems Neg Hx Past Medical History:  
Diagnosis Date  Arthritis  HANDS  
 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   
 pt denies  Nasal sinus tumor  Numbness of LEFT hand & LEFT face   
 RBBB (right bundle branch block)  TIA (transient ischemic attack) 12 TIA's over 9 YRS.1ST ONE IN - LAST IN   Trigeminal neuralgia Past Surgical History:  
Procedure Laterality Date  HX CATARACT REMOVAL Bilateral   
 HX GI    
 COLONOSCOPY  
 HX HEENT    
 BIOPSY OF SINUS   
 HX HEENT Left   
 sew eye closed  HX HERNIA REPAIR Right INGUINAL  
 HX KNEE ARTHROSCOPY Right 2007  HX KNEE REPLACEMENT Right 2012 Total Knee replacement- right  HX ROTATOR CUFF REPAIR Right   
 right rotator cuff repair Social History Tobacco Use  Smoking status: Former Smoker Packs/day: 1.00 Years: 40.00 Pack years: 40.00 Last attempt to quit: 1982 Years since quittin.4  Smokeless tobacco: Never Used  Tobacco comment: Abigail Marquez Substance Use Topics  Alcohol use: No  
  Alcohol/week: 10.5 oz Types: 21 Glasses of wine per week Frequency: Never Social History Substance and Sexual Activity Alcohol Use No  
 Alcohol/week: 10.5 oz  Types: 21 Glasses of wine per week  Frequency: Never Social History Substance and Sexual Activity Drug Use No  
  
Social History Tobacco Use Smoking Status Former Smoker  Packs/day: 1.00  Years: 40.00  Pack years: 40.00  Last attempt to quit: 1982  Years since quittin.4 Smokeless Tobacco Never Used Tobacco Comment Abigail Marquez Current Outpatient Medications Medication Sig  
 doxycycline (MONODOX) 100 mg capsule Take 100 mg by mouth two (2) times a day.  cephALEXin (KEFLEX) 500 mg capsule Take 1 Cap by mouth two (2) times a day.  spironolactone (ALDACTONE) 25 mg tablet Take 25 mg by mouth daily.  furosemide (LASIX) 40 mg tablet Take 1 Tab by mouth every Monday, Thursday, Saturday.  peg 400-propylene glycol (SYSTANE, PROPYLENE GLYCOL,) 0.4-0.3 % drop Administer 1 Drop to right eye four (4) times daily.  dexamethasone (DECADRON) 1 mg tablet Take 2 mg by mouth daily.  senna (SENNA LAXATIVE) 8.6 mg tablet Take 8.6 mg by mouth daily.  acetaminophen (TYLENOL EXTRA STRENGTH) 500 mg tablet Take 2 Tabs by mouth as needed for Pain. No more than 3grams in 24hour period  Indications: Pain  
 gabapentin (NEURONTIN) 600 mg tablet Take 0.5 Tabs by mouth two (2) times a day. Indications: trigeminal neuralgia  levETIRAcetam (KEPPRA) 500 mg tablet Take 1 Tab by mouth two (2) times a day.  cyanocobalamin (VITAMIN B-12) 1,000 mcg tablet Take 1,000 mcg by mouth daily.  cholecalciferol (VITAMIN D3) 1,000 unit cap Take 1,000 Units by mouth daily.  docusate sodium (COLACE) 100 mg capsule Take 100 mg by mouth nightly.  bacitracin ophthalmic ointment Administer  to left eye daily.  clopidogrel (PLAVIX) 75 mg tablet Take 75 mg by mouth daily. TAKES IN AM  
 atorvastatin (LIPITOR) 10 mg tablet Take 10 mg by mouth nightly. No current facility-administered medications for this encounter. Objective: 
Visit Vitals /88 (BP 1 Location: Left arm, BP Patient Position: Sitting) Pulse 92 Temp 98.4 °F (36.9 °C) Resp 18 Vascular: B/L LE 
DP 1/4; PT 1/4 
capillary fill time brisk, pitting edema is present, skin temperature is cool, varicosities are present. Dermatological: 
 
Left limb erythematous and edematous Wound: 1 Location: left leg x2 Measurements: per RN note Margins: indurated Drainage: serous Odor: none Wound base: 80% fibrotic 20% granular Lymphangitic streaking? No. 
Undermining? No. 
Sinus tracts? No. 
Exposed bone? No. 
Subcutaneous crepitation on palpation? No. 
 
ound: 1 Location: right leg x3 Measurements: per RN note Margins: indurated Drainage: serous Odor: none Wound base: 80% fibrotic, 20% granular Lymphangitic streaking? No. 
Undermining? No. 
Sinus tracts? No. 
Exposed bone? No. 
Subcutaneous crepitation on palpation? No. 
 
Nails are thickened, elongated, discolored, painful to palpation, 3mm thick, with subungual debris. There are extensive skin cracks at both heels. Skin is dry and scaly, exhibits hemosiderin deposition. There is no maceration of the interspaces of the feet b/l. Lesions are present consisting of hyperkeratosis at left 4th interspace Neurological: DTR are present, protective sensation per 5.07 [place] Tamir monofilament is diminished, patient is AAOx3, mood is normal. Epicritic sensation is intact. Orthopedic: B/L LE are symmetric, ROM of ankle, STJ, 1st MTPJ is limited, MMT 5 out of 5 for B/L LE. There has been no amputations Constitutional: Pt is a well developed, elderly average male Lymphatics: negative tenderness to palpation of neck/axillary/inguinal nodes.

## 2019-06-20 ENCOUNTER — HOSPITAL ENCOUNTER (OUTPATIENT)
Dept: WOUND CARE | Age: 84
Discharge: HOME | End: 2019-06-20
Payer: MEDICARE

## 2019-06-20 VITALS
SYSTOLIC BLOOD PRESSURE: 121 MMHG | RESPIRATION RATE: 16 BRPM | TEMPERATURE: 98.2 F | HEART RATE: 79 BPM | DIASTOLIC BLOOD PRESSURE: 76 MMHG

## 2019-06-20 PROCEDURE — 11042 DBRDMT SUBQ TIS 1ST 20SQCM/<: CPT

## 2019-06-20 PROCEDURE — 74011000250 HC RX REV CODE- 250: Performed by: PODIATRIST

## 2019-06-20 RX ADMIN — Medication: at 14:00

## 2019-06-20 NOTE — WOUND CARE
Shari Hemp, DPM - Josie Urbina, Lito Davis 3 - Follow up Assessment/Plan: 
 
 
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 - legs improved since hospital admission - Wound debrided - see procedure note - SONIDO/ PVR - revewied - no significant vascular disease - Venous duplex- No DVT noted - Medihoney alginate applied to both leg ulceration - Compression with B/L 3 layer compression wraps. - Dressing changes every other day 
-F/U 1 week. Subjective: 
Patient comes in for follow up to B/L LE ulcerations. Wife states right arm swollen and they will be getting a doppler soon. Negative for fevers chills, nausea, vomitting, SOB. HPI: 
Pt complains of wound to left and right leg. States they have been present for 6 months. Wound have not improved and gotten worse. Has tried antibiotic ointment with minimal success. Patient was referred here by PCP for wound care. He comes in with a caregiver who states he lives at [facility] AT [place]. Caregiver states that he is not compliant with compression. He had a DVT 6 months ago and an IVC filter was placed. History: 
Wound Care Allergies Allergen Reactions  Adhesive Tape-Silicones Other (comments) REDNESS  Aggrenox [Aspirin-Dipyridamole] Other (comments)  
  headache  Amlodipine Rash  Diuril [Chlorothiazide] Unknown (comments)  Hydrochlorothiazide Other (comments) LOW NA Family History Problem Relation Age of Onset  Cancer Mother   
     colon  Cancer Father   
     throat  No Known Problems Brother  Anesth Problems Neg Hx Past Medical History:  
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   
 pt denies  Nasal sinus tumor  Numbness of LEFT hand & LEFT face   
 RBBB (right bundle branch block)  TIA (transient ischemic attack) 12 TIA's over 9 YRS.1ST ONE IN - LAST IN   Trigeminal neuralgia Past Surgical History:  
Procedure Laterality Date  HX CATARACT REMOVAL Bilateral   
 HX GI    
 COLONOSCOPY  
 HX HEENT    
 BIOPSY OF SINUS   
 HX HEENT Left   
 sew eye closed  HX HERNIA REPAIR Right INGUINAL  
 HX KNEE ARTHROSCOPY Right 2007  HX KNEE REPLACEMENT Right 2012 Total Knee replacement- right  HX ROTATOR CUFF REPAIR Right   
 right rotator cuff repair Social History Tobacco Use  Smoking status: Former Smoker Packs/day: 1.00 Years: 40.00 Pack years: 40.00 Last attempt to quit: 1982 Years since quittin.4  Smokeless tobacco: Never Used  Tobacco comment: Lamar Joseph Substance Use Topics  Alcohol use: No  
  Alcohol/week: 10.5 oz Types: 21 Glasses of wine per week Frequency: Never Social History Substance and Sexual Activity Alcohol Use No  
 Alcohol/week: 10.5 oz  Types: 21 Glasses of wine per week  Frequency: Never Social History Substance and Sexual Activity Drug Use No  
  
Social History Tobacco Use Smoking Status Former Smoker  Packs/day: 1.00  Years: 40.00  Pack years: 40.00  Last attempt to quit: 1982  Years since quittin.4 Smokeless Tobacco Never Used Tobacco Comment Lamar Joseph Current Outpatient Medications Medication Sig  
 doxycycline (MONODOX) 100 mg capsule Take 100 mg by mouth two (2) times a day.  cephALEXin (KEFLEX) 500 mg capsule Take 1 Cap by mouth two (2) times a day.  spironolactone (ALDACTONE) 25 mg tablet Take 25 mg by mouth daily.  furosemide (LASIX) 40 mg tablet Take 1 Tab by mouth every Monday, Thursday, Saturday.  peg 400-propylene glycol (SYSTANE, PROPYLENE GLYCOL,) 0.4-0.3 % drop Administer 1 Drop to right eye four (4) times daily.  dexamethasone (DECADRON) 1 mg tablet Take 2 mg by mouth daily.  senna (SENNA LAXATIVE) 8.6 mg tablet Take 8.6 mg by mouth daily.  acetaminophen (TYLENOL EXTRA STRENGTH) 500 mg tablet Take 2 Tabs by mouth as needed for Pain. No more than 3grams in 24hour period  Indications: Pain  
 gabapentin (NEURONTIN) 600 mg tablet Take 0.5 Tabs by mouth two (2) times a day. Indications: trigeminal neuralgia  levETIRAcetam (KEPPRA) 500 mg tablet Take 1 Tab by mouth two (2) times a day.  cyanocobalamin (VITAMIN B-12) 1,000 mcg tablet Take 1,000 mcg by mouth daily.  cholecalciferol (VITAMIN D3) 1,000 unit cap Take 1,000 Units by mouth daily.  docusate sodium (COLACE) 100 mg capsule Take 100 mg by mouth nightly.  bacitracin ophthalmic ointment Administer  to left eye daily.  clopidogrel (PLAVIX) 75 mg tablet Take 75 mg by mouth daily. TAKES IN AM  
 atorvastatin (LIPITOR) 10 mg tablet Take 10 mg by mouth nightly. Current Facility-Administered Medications Medication Dose Route Frequency  [COMPLETED] lidocaine (ALOCANE) 4 % topical gel   Topical NOW Objective: 
Visit Vitals /76 (BP 1 Location: Left arm, BP Patient Position: Sitting) Pulse 79 Temp 98.2 °F (36.8 °C) Resp 16 Vascular: B/L LE 
DP 1/4; PT 1/4 
capillary fill time brisk, pitting edema is present, skin temperature is cool, varicosities are present. Dermatological: 
 
Left limb erythematous and edematous Wound: 1 Location: left leg x2 Measurements: per RN note Margins: indurated Drainage: serous Odor: none Wound base: 80% fibrotic 20% granular Lymphangitic streaking? No. 
Undermining? No. 
Sinus tracts? No. 
Exposed bone? No. 
Subcutaneous crepitation on palpation? No. 
 
ound: 1 Location: right leg x3 Measurements: per RN note Margins: indurated Drainage: serous Odor: none Wound base: 80% fibrotic, 20% granular Lymphangitic streaking? No. 
Undermining? No. 
Sinus tracts? No. 
Exposed bone? No. 
Subcutaneous crepitation on palpation? No. 
 
Nails are thickened, elongated, discolored, painful to palpation, 3mm thick, with subungual debris. There are extensive skin cracks at both heels. Skin is dry and scaly, exhibits hemosiderin deposition. There is no maceration of the interspaces of the feet b/l. Lesions are present consisting of hyperkeratosis at left 4th interspace Neurological: DTR are present, protective sensation per 5.07 [place] Tamir monofilament is diminished, patient is AAOx3, mood is normal. Epicritic sensation is intact. Orthopedic: B/L LE are symmetric, ROM of ankle, STJ, 1st MTPJ is limited, MMT 5 out of 5 for B/L LE. There has been no amputations Constitutional: Pt is a well developed, elderly average male Lymphatics: negative tenderness to palpation of neck/axillary/inguinal nodes. 
 
  
 
Procedure Note: 
 
Excisional debridement through level of fat Location / Ulcer: right foot Indication: to remove non-viable tissue from wound bed. Consent in chart. Anesthesia: lidocaine gel 2% Instrument: sneha Residual necrosis: none Bleeding: minimal 
Hemostasis: pressure Pre-Procedure Pain: 0 Post-Procedure Pain: 0 Area debrided < 20 cm sq. Pre-Debridement measurements: see nursing notes Post-Debridement measurements: see nursing notes This is part of a series of staged procedures in an attempt at limb salvage.

## 2019-06-20 NOTE — WOUND CARE
06/20/19 1343 Wound Leg lower Posterior; Left Date First Assessed/Time First Assessed: 06/06/19 1332   Present on Hospital Admission: Yes  Location: Leg lower  Wound Location Orientation: Posterior; Left Dressing Type Applied Honey;ABD pad;Compression Wrap/Venous Stasis Wound Knee Right Date First Assessed/Time First Assessed: 06/20/19 1320   Present on Hospital Admission: No  Primary Wound Type: Venous  Location: Knee  Wound Location Orientation: Right Cleansing and Cleansing Agents  Normal saline Dressing Type Applied Honey; Compression Wrap/Venous Stasis Wound Leg Lower Lateral;Left Date First Assessed/Time First Assessed: 02/07/19 1401   POA: Yes  Wound Type: Venous  Location: Leg Lower  Orientation: Lateral;Left Dressing Type Applied Honey;ABD pad;Compression Wrap/Venous Stasis Discharge Condition:Stable Ambulatory Status:Walker Discharge Destination:Home Transportation: Private Accompanied by: Family

## 2019-06-20 NOTE — WOUND CARE
Visit Vitals /76 (BP 1 Location: Left arm, BP Patient Position: Sitting) Pulse 79 Temp 98.2 °F (36.8 °C) Resp 16  
 
 
 06/20/19 1321 Wound Leg lower Posterior; Left Date First Assessed/Time First Assessed: 06/06/19 1332   Present on Hospital Admission: Yes  Location: Leg lower  Wound Location Orientation: Posterior; Left Dressing Status Clean, dry, and intact Dressing Type Aquacel; Compression Wrap/Venous Stasis Wound Knee Right Date First Assessed/Time First Assessed: 06/20/19 1320   Present on Hospital Admission: No  Primary Wound Type: Venous  Location: Knee  Wound Location Orientation: Right Dressing Status Clean, dry, and intact Dressing Type Aquacel; Compression Wrap/Venous Stasis Wound Length (cm) 0.5 cm Wound Width (cm) 0.7 cm Wound Depth (cm) 0.2 cm Wound Volume (cm^3) 0.07 cm^3 Condition of Base [place];Slough Drainage Amount Small Drainage Color Serous Wound Odor None Dana-wound Assessment Edema

## 2019-07-03 ENCOUNTER — HOSPITAL ENCOUNTER (OUTPATIENT)
Dept: WOUND CARE | Age: 84
Discharge: HOME | End: 2019-07-03
Payer: MEDICARE

## 2019-07-03 VITALS
HEART RATE: 76 BPM | SYSTOLIC BLOOD PRESSURE: 148 MMHG | DIASTOLIC BLOOD PRESSURE: 82 MMHG | RESPIRATION RATE: 16 BRPM | TEMPERATURE: 97.6 F

## 2019-07-03 PROBLEM — T79.2XXA: Status: ACTIVE | Noted: 2019-07-03

## 2019-07-03 PROCEDURE — 10140 I&D HMTMA SEROMA/FLUID COLLJ: CPT

## 2019-07-03 PROCEDURE — 74011000250 HC RX REV CODE- 250: Performed by: OTOLARYNGOLOGY

## 2019-07-03 PROCEDURE — 97597 DBRDMT OPN WND 1ST 20 CM/<: CPT

## 2019-07-03 PROCEDURE — 11042 DBRDMT SUBQ TIS 1ST 20SQCM/<: CPT

## 2019-07-03 RX ORDER — POTASSIUM CHLORIDE 20 MEQ/1
20 TABLET, EXTENDED RELEASE ORAL 2 TIMES DAILY
COMMUNITY

## 2019-07-03 RX ADMIN — Medication: at 14:00

## 2019-07-03 NOTE — WOUND CARE
07/03/19 1340 Wound Leg lower Right;Medial;Distal  
Date First Assessed/Time First Assessed: 05/09/19 1508   Present on Hospital Admission: Yes  Location: Leg lower  Wound Location Orientation: Right;Medial;Distal  
Dressing Status Clean, dry, and intact Dressing Type Compression Wrap/Venous Stasis Wound Length (cm) 0.1 cm Wound Width (cm) 0.1 cm Wound Depth (cm) 0.1 cm Wound Volume (cm^3) 0 cm^3 Wound Leg lower Posterior; Left Date First Assessed/Time First Assessed: 06/06/19 1332   Present on Hospital Admission: Yes  Location: Leg lower  Wound Location Orientation: Posterior; Left Dressing Status Clean, dry, and intact Dressing Type Compression Wrap/Venous Stasis Wound Length (cm) 5 cm Wound Width (cm) 1.9 cm Wound Depth (cm) 0.1 cm Wound Volume (cm^3) 0.95 cm^3 Condition of Base Slough;Pink Drainage Amount Small Drainage Color Serous Wound Odor None Dana-wound Assessment Blanchable erythema;Edema Wound Knee Right Date First Assessed/Time First Assessed: 06/20/19 1320   Present on Hospital Admission: No  Primary Wound Type: Venous  Location: Knee  Wound Location Orientation: Right Dressing Status Other (Comment) (open to air) Dressing Type Open to air Wound Length (cm) 2 cm Wound Width (cm) 0.5 cm Wound Depth (cm) 0.1 cm Wound Volume (cm^3) 0.1 cm^3 Condition of [facility] Drainage Amount Small Drainage Color Serous Wound Odor None Dana-wound Assessment Blanchable erythema;Edema Cleansing and Cleansing Agents  Soap and water Wound Leg lower Anterior;Proximal  
Date First Assessed/Time First Assessed: 07/03/19 1346   Location: Leg lower  Wound Location Orientation: Anterior;Proximal  
Dressing Status Clean, dry, and intact Dressing Type Compression Wrap/Venous Stasis Wound Length (cm) 0.2 cm Wound Width (cm) 0.5 cm Wound Depth (cm) 0.2 cm Wound Volume (cm^3) 0.02 cm^3 Condition of Base Pink;Slough Condition of Edges Rolled/curled Drainage Amount Small Drainage Color Serous Wound Odor None Dana-wound Assessment Edema;Blanchable erythema Cleansing and Cleansing Agents  Soap and water Wound Hand Date First Assessed/Time First Assessed: 07/03/19 1348   Location: Hand Dressing Status Clean, dry, and intact Dressing Type Adhesive wound dressing (Band-Aid) Wound Length (cm) 2.5 cm Wound Width (cm) 0.6 cm Wound Depth (cm) 0.1 cm Wound Volume (cm^3) 0.15 cm^3 Drainage Amount Small Drainage Color Sanguinous Wound Odor None Dana-wound Assessment Fragile;Edema;Blanchable erythema Cleansing and Cleansing Agents  Normal saline Wound Elbow Right Date First Assessed/Time First Assessed: 07/03/19 1349   Location: Elbow  Wound Location Orientation: Right Dressing Status Clean, dry, and intact; Removed Dressing Type Adhesive wound dressing (Band-Aid) Wound Length (cm) 2.8 cm Wound Width (cm) 2.1 cm Wound Depth (cm) 0.1 cm Wound Volume (cm^3) 0.59 cm^3 Drainage Amount Small Drainage Color Sanguinous Wound Odor None Dana-wound Assessment Blanchable erythema;Edema;Fragile Wound Leg Lower Medial;Right Date First Assessed/Time First Assessed: 02/07/19 1351   POA: Yes  Wound Type: Venous  Location: Leg Lower  Orientation: Medial;Right Dressing Status  Clean, dry, and intact Dressing Type  Compression Wrap/Venous Stasis Wound Length (cm) 0.1 cm Wound Width (cm) 1 cm Wound Depth (cm) 1 Wound Surface area (cm^2) 0.1 cm^2 Change in Wound Size % 82.14 Wound Leg Lower Posterior;Right;Proximal  
Date First Assessed/Time First Assessed: 02/07/19 1357   POA: Yes  Wound Type: Venous  Location: Leg Lower  Orientation: Posterior;Right;Proximal  
Dressing Status  Clean, dry, and intact Dressing Type  Compression Wrap/Venous Stasis Wound Length (cm) 0.1 cm Wound Width (cm) 0.1 cm Wound Depth (cm) 0.1 Wound Surface area (cm^2) 0.01 cm^2 Change in Wound Size % 99 Wound Leg Lower Lateral;Left Date First Assessed/Time First Assessed: 02/07/19 1401   POA: Yes  Wound Type: Venous  Location: Leg Lower  Orientation: Lateral;Left Dressing Status  Clean, dry, and intact Dressing Type  Compression Wrap/Venous Stasis Wound Length (cm) 1 cm Wound Width (cm) 0.4 cm Wound Surface area (cm^2) 0.4 cm^2 Change in Wound Size % 79.59 Condition of Base Pink;Slough Condition of Edges Rolled/curled Drainage Amount  Small Drainage Color Serous Wound Odor None Periwound Skin Condition Erythema, blanchable Visit Vitals /82 Pulse 76 Temp 97.6 °F (36.4 °C) Resp 16 Due to network connection error unable to obtain and upload image

## 2019-07-03 NOTE — PROGRESS NOTES
[address] [place] [address] PROGRESS NOTE    Name:  Deshaun Arvizu  MR#:  [id number]  :  11/10/1930  ACCOUNT #:  [de-identified]  DATE OF SERVICE:  2019      SUBJECTIVE:  The patient is seen for bilateral venous insufficiency leg wounds with ulceration I87.333 which go down to subcutaneous tissue L97.212, L97.222. In addition, he fell twice over the past 2 weeks, had Steri-Strips applied to his left posterior upper arm and he developed a large hematoma of the dorsum of his right hand T79. 2XXA. He is currently on Lasix 20 mg per day. His girlfriend states that there are always issues with his potassium level and he even has had difficulties maintaining a normal sodium level. He did have right arm swelling and had an ultrasound at the vascular surgery office which reportedly showed no evidence of a clot. OBJECTIVE:  GENERAL:  The patient is tired but easily arousable. It turns out this is his nap time of day. VITAL SIGNS:  His temperature is 97.6, pulse 76, respirations 16, blood pressure 148/82. WOUND EXAMINATION:  The new wound on the dorsum of his right hand is a large hematoma. It is recommend that this be evacuated. I explained the risks and benefits. The patient and his girlfriend understood and wished to proceed. In addition, he had a wound of the right knee which was covered with slough and needed debridement as well as a small wound just distal to the right knee and the left posterior leg. I explained the risks and benefits of wound debridement and hematoma evacuation. The patient understood and wished to proceed. Therefore, topical Xylocaine 4% gel was applied for 10 minutes. Using a 5 mm ring curette, the wounds were debrided down to healthy bleeding subcutaneous tissue and granulation tissue. The hematoma was evacuated almost 100%.   There were still some residual old clots which were unable to be removed and this was taken down almost to the level of bone where periosteum was visible. The wound was then irrigated with saline. ASSESSMENT/PLAN:  We are going to apply Aquacel Ag to the hematoma site and this will be changed every other day. For the leg wound, we are going to continue Medihoney alginate along with bilateral leg wraps. He has significant, severe edema which reported to me is improved over time, which is a welcome sign. Nevertheless, with all of this fluid, I am going to recommend that we go a little bit looser on the wraps so as not to mobilize so much fluid too quickly which could precipitate heart failure, and this was explained to the patient and his girlfriend. Therefore, we are going to use Medihoney alginate. We are going to apply bilateral three-layer wraps at 25% stretch. The patient's girlfriend will call Dr. Michael Mahajan today to discuss his cardiac function and whether or not they can increase his Lasix to help mobilize some of the third spacing liquid. He will keep his legs elevated more and this was discussed with him. He will also do gastrocnemius muscle exercise hourly while awake. I will see him again in 1 week.       Rosita Wong MD      [id number]  D:  07/03/2019 15:05  T:  07/03/2019 15:11  JOB #:  [id number]  CC:  Les Arroyo MD

## 2019-07-03 NOTE — WOUND CARE
07/03/19 1500 Wound Leg lower Right;Medial;Distal  
Date First Assessed/Time First Assessed: 05/09/19 1508   Present on Hospital Admission: Yes  Location: Leg lower  Wound Location Orientation: Right;Medial;Distal  
Dressing Type Applied ABD pad;Compression Wrap/Venous Stasis; Alginate; Honey Procedure Tolerated Well Wound Leg lower Posterior; Left Date First Assessed/Time First Assessed: 06/06/19 1332   Present on Hospital Admission: Yes  Location: Leg lower  Wound Location Orientation: Posterior; Left Dressing Type Applied ABD pad;Alginate; Compression Wrap/Venous Stasis; Honey Procedure Tolerated Well Wound Knee Right Date First Assessed/Time First Assessed: 06/20/19 1320   Present on Hospital Admission: No  Primary Wound Type: Venous  Location: Knee  Wound Location Orientation: Right Dressing Type Applied Alginate;Foam;Honey Procedure Tolerated Well Wound Leg lower Anterior;Proximal  
Date First Assessed/Time First Assessed: 07/03/19 1346   Location: Leg lower  Wound Location Orientation: Anterior;Proximal  
Dressing Type Applied Alginate;ABD pad;Compression Wrap/Venous Stasis; Honey Procedure Tolerated Well Wound Hand Date First Assessed/Time First Assessed: 07/03/19 1348   Location: Hand Dressing Type Applied 4 x 4;Alginate;Gauze wrap (jenna) Procedure Tolerated Well Wound Elbow Right Date First Assessed/Time First Assessed: 07/03/19 1349   Location: Elbow  Wound Location Orientation: Right Dressing Type Applied Foam;Xeroform Procedure Tolerated Well Wound Leg Lower Medial;Right Date First Assessed/Time First Assessed: 02/07/19 1351   POA: Yes  Wound Type: Venous  Location: Leg Lower  Orientation: Medial;Right Dressing Type Applied ABD pad;Compression Wrap/Venous Stasis; Alginate; Honey Procedure Tolerated Well Wound Leg Lower Posterior;Right;Proximal  
Date First Assessed/Time First Assessed: 02/07/19 1357   POA: Yes  Wound Type: Venous  Location: Leg Lower  Orientation: Posterior;Right;Proximal  
Dressing Type Applied ABD pad;Alginate; Compression Wrap/Venous Stasis; Honey Procedure Tolerated Well Wound Leg Lower Lateral;Left Date First Assessed/Time First Assessed: 02/07/19 1401   POA: Yes  Wound Type: Venous  Location: Leg Lower  Orientation: Lateral;Left Dressing Type Applied ABD pad;Alginate; Compression Wrap/Venous Stasis; Honey Procedure Tolerated Well Discharge Condition:stable Ambulatory Status:walker Discharge Destination:home Transportation: private auto Accompanied by: significant other

## 2019-07-11 ENCOUNTER — HOSPITAL ENCOUNTER (OUTPATIENT)
Dept: WOUND CARE | Age: 84
Discharge: HOME | End: 2019-07-11
Payer: MEDICARE

## 2019-07-11 VITALS
HEART RATE: 81 BPM | DIASTOLIC BLOOD PRESSURE: 85 MMHG | RESPIRATION RATE: 16 BRPM | SYSTOLIC BLOOD PRESSURE: 131 MMHG | TEMPERATURE: 97.3 F

## 2019-07-11 PROCEDURE — 11043 DBRDMT MUSC&/FSCA 1ST 20/<: CPT

## 2019-07-11 PROCEDURE — 74011000250 HC RX REV CODE- 250: Performed by: OTOLARYNGOLOGY

## 2019-07-11 RX ADMIN — Medication: at 10:00

## 2019-07-11 NOTE — WOUND CARE
07/11/19 1100 Wound Leg lower Anterior;Proximal;Right Date First Assessed/Time First Assessed: 07/03/19 1346   Location: Leg lower  Wound Location Orientation: Anterior;Proximal;Right Dressing Type Applied Silver products;ABD pad;Compression Wrap/Venous Stasis Wound Leg lower Posterior; Left Date First Assessed/Time First Assessed: 06/06/19 1332   Present on Hospital Admission: Yes  Location: Leg lower  Wound Location Orientation: Posterior; Left Dressing Type Applied Silver products;ABD pad;Compression Wrap/Venous Stasis Wound Leg lower Right;Medial;Distal  
Date First Assessed/Time First Assessed: 05/09/19 1508   Present on Hospital Admission: Yes  Location: Leg lower  Wound Location Orientation: Right;Medial;Distal  
Dressing Type Applied Silver products;ABD pad;Compression Wrap/Venous Stasis Wound Knee Right Date First Assessed/Time First Assessed: 06/20/19 1320   Present on Hospital Admission: No  Primary Wound Type: Venous  Location: Knee  Wound Location Orientation: Right Dressing Type Applied Silicone; Foam  
Wound Hand Date First Assessed/Time First Assessed: 07/03/19 1348   Location: Hand Dressing Type Applied Collagens/cell matrix;Foam;Gauze;Gauze wrap (jenna) Wound Elbow Right Date First Assessed/Time First Assessed: 07/03/19 1349   Location: Elbow  Wound Location Orientation: Right Dressing Type Applied Foam  
Wound Leg Lower Lateral;Left Date First Assessed/Time First Assessed: 02/07/19 1401   POA: Yes  Wound Type: Venous  Location: Leg Lower  Orientation: Lateral;Left Dressing Type Applied Silver products;ABD pad;Compression Wrap/Venous Stasis Wound Leg Lower Medial;Right Date First Assessed/Time First Assessed: 02/07/19 1351   POA: Yes  Wound Type: Venous  Location: Leg Lower  Orientation: Medial;Right Dressing Type Applied Silver products;ABD pad;Compression Wrap/Venous Stasis Wound Leg Lower Posterior;Right;Proximal  
 Date First Assessed/Time First Assessed: 02/07/19 2348   POA: Yes  Wound Type: Venous  Location: Leg Lower  Orientation: Posterior;Right;Proximal  
Dressing Type Applied Silver products;ABD pad;Compression Wrap/Venous Stasis Discharge Condition:stable Ambulatory Status: with walker Discharge Destination: nursing home Transportation:  Personal car Accompanied by:  Karen Watt

## 2019-07-11 NOTE — WOUND CARE
07/11/19 7564 Wound Leg lower Anterior;Proximal;Right Date First Assessed/Time First Assessed: 07/03/19 1346   Location: Leg lower  Wound Location Orientation: Anterior;Proximal;Right Dressing Status Clean, dry, and intact Dressing Type Other (Comment) (coban and kerlix) Non-staged Wound Description Partial thickness Wound Length (cm) 0.3 cm Wound Width (cm) 0.3 cm Wound Depth (cm) 0.1 cm Wound Volume (cm^3) 0.01 cm^3 Condition of Base Pink Drainage Amount Moderate Drainage Color Serous Wound Odor None Dana-wound Assessment Edema Cleansing and Cleansing Agents  Soap and water Wound Leg lower Posterior; Left Date First Assessed/Time First Assessed: 06/06/19 1332   Present on Hospital Admission: Yes  Location: Leg lower  Wound Location Orientation: Posterior; Left Dressing Status Clean, dry, and intact Dressing Type Other (Comment); Alginate 
(kerlix and coban) Non-staged Wound Description Partial thickness Wound Length (cm) 4 cm Wound Width (cm) 2 cm Wound Depth (cm) 0.1 cm Wound Volume (cm^3) 0.8 cm^3 Condition of [facility] Drainage Amount Small Drainage Color Serous Wound Odor None Dana-wound Assessment Intact Cleansing and Cleansing Agents  Soap and water Wound Leg lower Right;Medial;Distal  
Date First Assessed/Time First Assessed: 05/09/19 1508   Present on Hospital Admission: Yes  Location: Leg lower  Wound Location Orientation: Right;Medial;Distal  
Dressing Status Clean, dry, and intact Dressing Type Alginate; Other (Comment) 
(kerlix coban ) Non-staged Wound Description Partial thickness Wound Length (cm) 1 cm Wound Width (cm) 0.7 cm Wound Depth (cm) 0.1 cm Wound Volume (cm^3) 0.07 cm^3 Condition of [facility] Drainage Amount Small Drainage Color Serous Wound Odor None Dana-wound Assessment Intact Cleansing and Cleansing Agents  Soap and water Wound Knee Right Date First Assessed/Time First Assessed: 06/20/19 1320   Present on Hospital Admission: No  Primary Wound Type: Venous  Location: Knee  Wound Location Orientation: Right Dressing Status Other (Comment) Dressing Type Open to air Non-staged Wound Description Partial thickness Wound Length (cm) 1.8 cm Wound Width (cm) 5 cm Wound Depth (cm) 0.1 cm Wound Volume (cm^3) 0.9 cm^3 Condition of [facility] Drainage Amount Small Drainage Color Serosanguinous Wound Odor None Dana-wound Assessment Intact Cleansing and Cleansing Agents  Normal saline Wound Hand Date First Assessed/Time First Assessed: 07/03/19 1348   Location: Hand Dressing Status Clean, dry, and intact Dressing Type Other (Comment); Alginate 
(4th layer of 4 layer wrap kit) Non-staged Wound Description Full thickness Wound Length (cm) 4 cm Wound Width (cm) 3.5 cm Wound Depth (cm) 1 cm Wound Volume (cm^3) 14 cm^3 Condition of [facility] Drainage Amount Moderate Drainage Color Serosanguinous Wound Odor None Dana-wound Assessment Edema Cleansing and Cleansing Agents  Normal saline Wound Elbow Right Date First Assessed/Time First Assessed: 07/03/19 1349   Location: Elbow  Wound Location Orientation: Right Dressing Status Clean, dry, and intact Dressing Type Open to air Non-staged Wound Description Partial thickness Wound Length (cm) 3 cm Wound Width (cm) 5 cm Wound Depth (cm) 0.1 cm Wound Volume (cm^3) 1.5 cm^3 Condition of [facility] Drainage Amount None Wound Odor None Dana-wound Assessment Intact Cleansing and Cleansing Agents  Normal saline Wound Leg Lower Lateral;Left Date First Assessed/Time First Assessed: 02/07/19 1401   POA: Yes  Wound Type: Venous  Location: Leg Lower  Orientation: Lateral;Left Dressing Status  Clean, dry, and intact Dressing Type  Alginate; Other (Comment) 
(kerlix and coban) Non-Pressure Injury Partial thickness (epider/derm) Wound Length (cm) 1.4 cm Wound Width (cm) 1.2 cm Wound Depth (cm) 0.5 Wound Surface area (cm^2) 1.68 cm^2 Change in Wound Size % 14.29 Condition of [facility] Drainage Amount  Small Drainage Color Serous Wound Odor None Periwound Skin Condition Intact Cleansing and Cleansing Agents  Soap and water Wound Leg Lower Medial;Right Date First Assessed/Time First Assessed: 02/07/19 1351   POA: Yes  Wound Type: Venous  Location: Leg Lower  Orientation: Medial;Right Dressing Status  Clean, dry, and intact Dressing Type  Alginate; Other (Comment) 
(kerlix and coban) Non-Pressure Injury Partial thickness (epider/derm) Wound Length (cm) 0.1 cm Wound Width (cm) 0.1 cm Wound Depth (cm) 0.1 Wound Surface area (cm^2) 0.01 cm^2 Change in Wound Size % 98.21 Drainage Amount  Scant Drainage Color Serous Wound Odor None Periwound Skin Condition Intact Cleansing and Cleansing Agents  Soap and water Wound Leg Lower Posterior;Right;Proximal  
Date First Assessed/Time First Assessed: 02/07/19 1357   POA: Yes  Wound Type: Venous  Location: Leg Lower  Orientation: Posterior;Right;Proximal  
Dressing Status  Clean, dry, and intact Dressing Type  Alginate; Other (Comment) 
(kerlix and coban) Non-Pressure Injury Partial thickness (epider/derm) Wound Length (cm) 1 cm Wound Width (cm) 1 cm Wound Depth (cm) 0.2 Wound Surface area (cm^2) 1 cm^2 Change in Wound Size % 0 Condition of [facility] Drainage Amount  Small Drainage Color Serous Wound Odor None Periwound Skin Condition Intact Cleansing and Cleansing Agents  Soap and water Visit Vitals /85 (BP 1 Location: Left arm, BP Patient Position: Sitting) Pulse 81 Temp 97.3 °F (36.3 °C) Resp 16 Due to network connection error unable to obtain and upload image

## 2019-07-11 NOTE — PROGRESS NOTES
[address] [place] [address] PROGRESS NOTE    Name:  Jesus Riggs  MR#:  [id number]  :  11/10/1930  ACCOUNT #:  [de-identified]  DATE OF SERVICE:  2019      SUBJECTIVE:  The patient returns for treatment of bilateral venous leg ulcers I87.333, L97.912, L97.922, and a hematoma of the dorsum of the right hand T79. 2XXD. Since last seen, the patient saw Dr. Favio Lucio who placed him back on furosemide 40 mg per day. There have been no other changes noted in his medications, allergies or review of systems. OBJECTIVE:  VITAL SIGNS:  His temperature is 97.3, pulse 81, respirations 16, blood pressure 131/85. WOUND EXAMINATION:  Examination shows that all of the dressings and wraps that had been applied by home health staff were done incorrectly. The right leg had only the second layer of the three-layer wrap applied followed by Kerlix with no gauze, but it did have Coban. The left leg had Kerlix and Coban, but no gauze and no second layer. The left thigh had a very tight constricting dressing in place with he had a small spot that had been oozing. The wound of the right anterior proximal lower leg measures 0.3 x 0.3 x 0.1, has a small amount of slough which was easily cleaned with dry gauze. The wound to the right hand measures 4 x 3.5 x 1 cm. There is exposed fascia. There is exposed tendon and there is a small hematoma proximally and medially in the area of undermining, which needs debridement. I explained the risks and benefits. The patient and his daughter understood and wished to proceed. Therefore, topical Xylocaine 4% gel was applied for 10 minutes. Using a compressed 5-mm ring curette, the hematoma was evacuated. On the surface of the wound, there is nice healthy fascia and islands of granulation tissue not in need of debridement. There is excellent mobility of the fingers and the tendons.   A bleeding site under the overhang was addressed with Kaltostat for hemostatic purposes. Once this was accomplished, the bleeding ceased. The wound of the right elbow is 3 x 5 x 0.1 cm. It is clean and not in need of debridement. The wound to the right knee is also clean measures 1.8 x 5 x 0.1 cm. There is no slough. There is exposed fat. The wound to the left posterior leg is 4 x 2 x 0.1 cm and is clean with a small amount of slough which was debrided easily with a 4x4 gauze. The right medial distal leg wound is 1 x 0.7 x 0.1 cm and is clean. ASSESSMENT AND PLAN:  We are going to change the dressing to the dorsum of the right hand to Lis followed by [facility]. The Hydrofera Blue can be changed every day or every other day based upon drainage. A similar dressing will be applied to the open wound of the right knee. The wounds on the legs will now be dressed with Aquacel Ag followed by three-layer wraps at 50% stretch and he is now back on furosemide. The patient was instructed to keep his legs elevated as much as possible and he should avoid keeping his legs in a dependent position. I also again discussed with him the proper way to do calf muscle exercises to try to improve venous outflow and I encouraged him to do this hourly while awake. The patient is going to return to this office in four days for a nursing visit so he will not have dressing changes or wraps changed by home health until we get these wounds stabilized. All questions were answered. His condition on discharge is stable.       MD MORAIMA [email or web address]  D:  07/11/2019 10:42  T:  07/11/2019 10:51  JOB #:  [id number]

## 2019-07-15 ENCOUNTER — HOSPITAL ENCOUNTER (OUTPATIENT)
Dept: WOUND CARE | Age: 84
Discharge: HOME | End: 2019-07-15
Payer: MEDICARE

## 2019-07-15 VITALS
SYSTOLIC BLOOD PRESSURE: 143 MMHG | TEMPERATURE: 96.9 F | DIASTOLIC BLOOD PRESSURE: 90 MMHG | HEART RATE: 78 BPM | RESPIRATION RATE: 16 BRPM

## 2019-07-15 PROCEDURE — 29581 APPL MULTLAYER CMPRN SYS LEG: CPT

## 2019-07-15 NOTE — WOUND CARE
Nurse visit Visit Vitals /90 Pulse 78 Temp 96.9 °F (36.1 °C) Resp 16  
 
 
 07/15/19 1522 Wound Leg lower Anterior;Proximal;Right Date First Assessed/Time First Assessed: 07/03/19 1346   Location: Leg lower  Wound Location Orientation: Anterior;Proximal;Right Dressing Status Clean, dry, and intact Dressing Type Compression Wrap/Venous Stasis; Aquacel;ABD pad;Xeroform (Aquacel AG) Drainage Amount Small Drainage Color Serous Wound Odor None Dana-wound Assessment Edema;Blanchable erythema Cleansing and Cleansing Agents  Soap and water Dressing Type Applied ABD pad;Alginate; Compression Wrap/Venous Stasis; Xeroform (Aquacel ag) Procedure Tolerated Well Wound Hand Date First Assessed/Time First Assessed: 07/03/19 1348   Location: Hand Dressing Status Breakthrough drainage Dressing Type ABD pad; Absorptive;Gauze Non-staged Wound Description Full thickness Drainage Amount Moderate Drainage Color Serosanguinous Wound Odor None Dana-wound Assessment Edema Cleansing and Cleansing Agents  Normal saline Dressing Type Applied Compression Wrap/Venous Stasis;Collagens/cell matrix; Absorptive (HFBR) Procedure Tolerated Well Wound Elbow Right Date First Assessed/Time First Assessed: 07/03/19 1349   Location: Elbow  Wound Location Orientation: Right Dressing Status Clean, dry, and intact Dressing Type Open to air Drainage Amount None Wound Odor None Cleansing and Cleansing Agents  Normal saline Dressing Type Applied Xeroform; Foam  
Wound Knee Right Date First Assessed/Time First Assessed: 06/20/19 1320   Present on Hospital Admission: No  Primary Wound Type: Venous  Location: Knee  Wound Location Orientation: Right Dressing Type Open to air Drainage Amount None Wound Odor None Dana-wound Assessment Intact Cleansing and Cleansing Agents  Normal saline Dressing Type Applied Xeroform; Foam  
Procedure Tolerated Well Wound Leg lower Posterior; Left Date First Assessed/Time First Assessed: 06/06/19 1332   Present on Hospital Admission: Yes  Location: Leg lower  Wound Location Orientation: Posterior; Left Dressing Status Clean, dry, and intact; Removed Dressing Type ABD pad;Compression Wrap/Venous Stasis; Aquacel Drainage Amount Small Drainage Color Serous Wound Odor None Dana-wound Assessment Blanchable erythema;Edema Cleansing and Cleansing Agents  Soap and water Dressing Type Applied Compression Wrap/Venous Stasis;ABD pad;Alginate (Aquacel AG) Wound Leg lower Right;Medial;Distal  
Date First Assessed/Time First Assessed: 05/09/19 1508   Present on Hospital Admission: Yes  Location: Leg lower  Wound Location Orientation: Right;Medial;Distal  
Dressing Status Clean, dry, and intact Dressing Type ABD pad;Aquacel; Compression Wrap/Venous Stasis; Xeroform Drainage Amount Small Drainage Color Serous Wound Odor None Dana-wound Assessment Blanchable erythema;Edema Cleansing and Cleansing Agents  Soap and water Dressing Type Applied Alginate;ABD pad;Compression Wrap/Venous Stasis; Xeroform (Aquacel AG) Procedure Tolerated Well Wound Leg Lower Lateral;Left Date First Assessed/Time First Assessed: 02/07/19 1401   POA: Yes  Wound Type: Venous  Location: Leg Lower  Orientation: Lateral;Left Dressing Status  Clean, dry, and intact; Removed Dressing Type  Aquacel;ABD pad;Compression Wrap/Venous Stasis; Xeroform Drainage Amount  Small Drainage Color Serous Wound Odor None Periwound Skin Condition Intact Cleansing and Cleansing Agents  Soap and water Dressing Type Applied ABD pad;Alginate; Compression Wrap/Venous Stasis; Xeroform (Aquacel AG) Wound Leg Lower Medial;Right Date First Assessed/Time First Assessed: 02/07/19 1351   POA: Yes  Wound Type: Venous  Location: Leg Lower  Orientation: Medial;Right Dressing Status  Clean, dry, and intact; Removed Dressing Type  Aquacel;ABD pad;Compression Wrap/Venous Stasis; Xeroform Drainage Amount  Scant Drainage Color Serous Wound Odor None Periwound Skin Condition Intact Cleansing and Cleansing Agents  Soap and water Dressing Type Applied ABD pad;Compression Wrap/Venous Stasis; Alginate (aQUCEL AG) Wound Leg Lower Posterior;Right;Proximal  
Date First Assessed/Time First Assessed: 02/07/19 1357   POA: Yes  Wound Type: Venous  Location: Leg Lower  Orientation: Posterior;Right;Proximal  
Dressing Status  Clean, dry, and intact; Removed Dressing Type  Aquacel;ABD pad;Compression Wrap/Venous Stasis Drainage Amount  Small Drainage Color Serous Wound Odor None Periwound Skin Condition Intact Cleansing and Cleansing Agents  Soap and water Dressing Type Applied Alginate;ABD pad;Compression Wrap/Venous Stasis (Aquacel AG) Discharge Condition:Stable Ambulatory Status:Walker Discharge Destination:Home Transportation: Private Accompanied by: Daughter

## 2019-07-18 ENCOUNTER — HOSPITAL ENCOUNTER (OUTPATIENT)
Dept: WOUND CARE | Age: 84
Discharge: HOME | End: 2019-07-18
Payer: MEDICARE

## 2019-07-18 VITALS
HEART RATE: 88 BPM | TEMPERATURE: 97.5 F | SYSTOLIC BLOOD PRESSURE: 119 MMHG | DIASTOLIC BLOOD PRESSURE: 65 MMHG | RESPIRATION RATE: 16 BRPM

## 2019-07-18 PROBLEM — T79.2XXD: Status: ACTIVE | Noted: 2019-07-18

## 2019-07-18 PROBLEM — L97.912 NON-PRESSURE CHRONIC ULCER OF RIGHT LOWER LEG WITH FAT LAYER EXPOSED (HCC): Status: ACTIVE | Noted: 2019-07-18

## 2019-07-18 PROBLEM — I87.313 IDIOPATHIC CHRONIC VENOUS HYPERTENSION OF BOTH LOWER EXTREMITIES WITH ULCER (HCC): Status: ACTIVE | Noted: 2019-07-18

## 2019-07-18 PROBLEM — L97.929 IDIOPATHIC CHRONIC VENOUS HYPERTENSION OF BOTH LOWER EXTREMITIES WITH ULCER (HCC): Status: ACTIVE | Noted: 2019-07-18

## 2019-07-18 PROBLEM — L97.922 NON-PRESSURE CHRONIC ULCER OF LEFT LOWER LEG WITH FAT LAYER EXPOSED (HCC): Status: ACTIVE | Noted: 2019-07-18

## 2019-07-18 PROBLEM — L97.919 IDIOPATHIC CHRONIC VENOUS HYPERTENSION OF BOTH LOWER EXTREMITIES WITH ULCER (HCC): Status: ACTIVE | Noted: 2019-07-18

## 2019-07-18 PROCEDURE — 11043 DBRDMT MUSC&/FSCA 1ST 20/<: CPT

## 2019-07-18 NOTE — PROGRESS NOTES
[address] [place] [address] PROGRESS NOTE    Name:  Haim Harrell  MR#:  [id number]  :  11/10/1930  ACCOUNT #:  [de-identified]  DATE OF SERVICE:  2019      SUBJECTIVE:  The patient returns for treatment of multiple wounds. He has bilateral venous leg ulcers I87.313, which extend down to subcutaneous tissue L97.912, L97.922, and a wound on the dorsum of the right hand due to a recent hematoma T79. 2XXD. The patient had a good week. He was seen here for a nursing visit 3 days ago. Home health has been coming and dressing the wound of the right hand and his right knee. He has had no problems since last seen and is tolerating three-layer wraps without any difficulty and he denies any shortness of breath or other signs of heart failure. His history was reviewed with his girlfriend and the patient was fine until 2018 and then he started developing lower extremity edema and secondary wounds and previously to that he never had problems. As far she is aware, he has no evidence of heart failure which could have precipitated all of this edema and wounds. OBJECTIVE:  VITAL SIGNS:  His temperature today is 97.5, pulse 88, respirations 16, blood pressure 119/65. WOUND EXAMINATION:  The wound to the right hand measures the same at 4 x 3.5 x 1 cm. That said, deep tendons are no longer visible. Fascia is visible as are muscle fibers. There are areas of nice healthy granulation tissue along with areas of slough. It is recommend this wound be debrided. I explained the risks and benefits. The patient and his family understood and wished to proceed. Therefore, topical Xylocaine was applied for 10 minutes. Using a 5-mm ring curette, all devitalized tissue was removed down to healthy bleeding muscle. Hemostasis was achieved with firm pressure until dry. The wound was then scrubbed with chlorhexidine followed by normal saline. The wound of the right elbow has now healed.   The wound to the right knee is almost healed at 0.1 x 0.1 x 0.1 cm. The wound to the left posterior leg has healed. The left distal leg wound is still 0.6 cm in depth and filled with slough. It is recommend this wound be debrided. I explained the risks and benefits. The patient understood and wished to proceed. This was debrided down to healthy bleeding subcutaneous tissue. He has a new wound to the right lateral upper leg 0.6 x 3.5 x 0.1 cm which is quite clean and not in need of debridement. ASSESSMENT AND PLAN:  We are going to continue Lis Ag and Hydrofera Blue to be changed every other day on his right hand. We are continuing Lis Ag to the right knee. We are continuing Aquacel Ag and three-layer wraps of both lower extremities. We are going to not only keep the patient's legs elevated as much as possible and encourage him to do gastrocnemius muscle exercises, but we are going to see if home health can help us order bilateral CircAids since he is obviously going to need compression for the long-term since he remains severely edematous now in spite of using the 30 to 40 mmHg three-layer wraps. All questions were answered. His condition on discharge is stable. He will return to see me in 1 week.       MD MORAIMA [email or web address]  D:  07/18/2019 14:00  T:  07/18/2019 14:08  JOB #:  [id number]

## 2019-07-18 NOTE — WOUND CARE
07/18/19 1444   Wound Leg lower Anterior;Proximal;Right   Date First Assessed/Time First Assessed: 07/03/19 1346   Location: Leg lower  Wound Location Orientation: Anterior;Proximal;Right   Dressing Type Applied Silver products; Compression Wrap/Venous Stasis;ABD pad   Wound Hand   Date First Assessed/Time First Assessed: 07/03/19 1348   Location: Hand   Dressing Type Applied ABD pad; Absorptive;Collagens/cell matrix;Gauze;Gauze wrap (jenna)   Wound Knee Right   Date First Assessed/Time First Assessed: 06/20/19 1320   Present on Hospital Admission: No  Primary Wound Type: Venous  Location: Knee  Wound Location Orientation: Right   Dressing Type Applied Silver products; Foam   Wound Leg lower Posterior; Left   Date First Assessed/Time First Assessed: 06/06/19 1332   Present on Hospital Admission: Yes  Location: Leg lower  Wound Location Orientation: Posterior; Left   Dressing Type Applied Silver products; Compression Wrap/Venous Stasis   Wound Leg Lower Lateral;Left   Date First Assessed/Time First Assessed: 02/07/19 1401   POA: Yes  Wound Type: Venous  Location: Leg Lower  Orientation: Lateral;Left   Dressing Type Applied ABD pad;Compression Wrap/Venous Stasis;Silver products     Discharge Condition:Stable  Ambulatory Status:Walker  Discharge Destination:Home  Transportation: Private  Accompanied by:Self

## 2019-07-18 NOTE — WOUND CARE
07/18/19 1323   Wound Leg upper Right;Lateral   Date First Assessed/Time First Assessed: 07/18/19 1322   Primary Wound Type: Skin Tear  Location: Leg upper  Wound Location Orientation: Right;Lateral   Wound Length (cm) 0.6 cm   Wound Width (cm) 35 cm   Wound Depth (cm) 0.1 cm   Wound Volume (cm^3) 2.1 cm^3   Wound Leg lower Anterior;Proximal;Right   Date First Assessed/Time First Assessed: 07/03/19 1346   Location: Leg lower  Wound Location Orientation: Anterior;Proximal;Right   Wound Length (cm) 0.1 cm   Wound Width (cm) 0.1 cm   Wound Depth (cm) 0.1 cm   Wound Volume (cm^3) 0 cm^3   Wound Hand   Date First Assessed/Time First Assessed: 07/03/19 1348   Location: Hand   Wound Length (cm) 4 cm   Wound Width (cm) 3.5 cm   Wound Depth (cm) 1 cm   Wound Volume (cm^3) 14 cm^3   Wound Elbow Right   Date First Assessed/Time First Assessed: 07/03/19 1349   Location: Elbow  Wound Location Orientation: Right   Wound Length (cm) 0.1 cm   Wound Width (cm) 0.1 cm   Wound Depth (cm) 0.1 cm   Wound Volume (cm^3) 0 cm^3   Wound Knee Right   Date First Assessed/Time First Assessed: 06/20/19 1320   Present on Hospital Admission: No  Primary Wound Type: Venous  Location: Knee  Wound Location Orientation: Right   Wound Length (cm) 0.1 cm   Wound Width (cm) 0.1 cm   Wound Depth (cm) 0.1 cm   Wound Volume (cm^3) 0 cm^3   Wound Leg lower Posterior; Left   Date First Assessed/Time First Assessed: 06/06/19 1332   Present on Hospital Admission: Yes  Location: Leg lower  Wound Location Orientation: Posterior; Left   Wound Length (cm) 0.1 cm   Wound Width (cm) 0.1 cm   Wound Depth (cm) 0.1 cm   Wound Volume (cm^3) 0 cm^3   Wound Leg lower Right;Medial;Distal   Date First Assessed/Time First Assessed: 05/09/19 1508   Present on Hospital Admission: Yes  Location: Leg lower  Wound Location Orientation: Right;Medial;Distal   Wound Length (cm) 0.1 cm   Wound Width (cm) 0.1 cm   Wound Depth (cm) 0.1 cm   Wound Volume (cm^3) 0 cm^3   Wound Leg Lower Lateral;Left   Date First Assessed/Time First Assessed: 02/07/19 1401   POA: Yes  Wound Type: Venous  Location: Leg Lower  Orientation: Lateral;Left   Wound Length (cm) 1 cm   Wound Width (cm) 0.3 cm   Wound Depth (cm) 0.4   Wound Surface area (cm^2) 0.3 cm^2   Change in Wound Size % 84.69     Visit Vitals  /65   Pulse 88   Temp 97.5 °F (36.4 °C)   Resp 16     Due to network connection error unable to obtain and upload image

## 2019-07-22 ENCOUNTER — HOSPITAL ENCOUNTER (OUTPATIENT)
Dept: WOUND CARE | Age: 84
Discharge: HOME | End: 2019-07-22
Payer: MEDICARE

## 2019-07-22 VITALS
HEART RATE: 76 BPM | DIASTOLIC BLOOD PRESSURE: 85 MMHG | TEMPERATURE: 97.6 F | RESPIRATION RATE: 18 BRPM | SYSTOLIC BLOOD PRESSURE: 130 MMHG

## 2019-07-22 PROCEDURE — 29581 APPL MULTLAYER CMPRN SYS LEG: CPT

## 2019-07-22 NOTE — WOUND CARE
Visit Vitals  /85 (BP 1 Location: Right arm, BP Patient Position: Sitting)   Pulse 76   Temp 97.6 °F (36.4 °C)   Resp 18        07/22/19 1150   Wound Leg upper Right;Lateral   Date First Assessed/Time First Assessed: 07/18/19 1322   Primary Wound Type: Skin Tear  Location: Leg upper  Wound Location Orientation: Right;Lateral   Dressing Status Clean, dry, and intact   Dressing Type Aquacel; Compression Wrap/Venous Stasis   Drainage Amount Small   Drainage Color Serosanguinous   Wound Odor None   Dana-wound Assessment Intact   Cleansing and Cleansing Agents  Soap and water   Dressing Type Applied Alginate;Collagens/cell matrix   Wound Leg lower Anterior;Proximal;Right   Date First Assessed/Time First Assessed: 07/03/19 1346   Location: Leg lower  Wound Location Orientation: Anterior;Proximal;Right   Dressing Status Clean, dry, and intact   Dressing Type Aquacel; Compression Wrap/Venous Stasis   Drainage Amount None   Drainage Color Serosanguinous   Wound Odor None   Dana-wound Assessment Intact   Cleansing and Cleansing Agents  Soap and water   Dressing Type Applied Silver products; Compression Wrap/Venous Stasis   Wound Hand   Date First Assessed/Time First Assessed: 07/03/19 1348   Location: Hand   Dressing Status Breakthrough drainage   Dressing Type ABD binder;Gauze;Collagens/cell matrix;Foam;Gauze wrap (jenna)   Drainage Amount Small   Drainage Color Serosanguinous   Wound Odor None   Dana-wound Assessment Intact   Cleansing and Cleansing Agents  Normal saline   Dressing Type Applied Collagens/cell matrix  (hydrofera blue ready, gauze and rolled gauze.)   Wound Leg Lower Lateral;Left   Date First Assessed/Time First Assessed: 02/07/19 1401   POA: Yes  Wound Type: Venous  Location: Leg Lower  Orientation: Lateral;Left   Dressing Status  Clean, dry, and intact   Dressing Type  Aquacel; Compression Wrap/Venous Stasis   Drainage Amount  Scant   Drainage Color Serosanguinous   Wound Odor None   Periwound Skin Condition Intact   Cleansing and Cleansing Agents  Soap and water   Dressing Type Applied Alginate;ABD pad;Compression Wrap/Venous Stasis   Wound Leg Lower Posterior;Right;Proximal   Date First Assessed/Time First Assessed: 02/07/19 1357   POA: Yes  Wound Type: Venous  Location: Leg Lower  Orientation: Posterior;Right;Proximal   Dressing Status  Clean, dry, and intact   Dressing Type  Aquacel;ABD pad;Compression Wrap/Venous Stasis   Drainage Amount  Small    Drainage Color Serosanguinous   Wound Odor None   Periwound Skin Condition Intact   Cleansing and Cleansing Agents  Soap and water   Dressing Type Applied Alginate; Compression Wrap/Venous Stasis   Wound Leg Lower Medial;Right   Date First Assessed/Time First Assessed: 02/07/19 1351   POA: Yes  Wound Type: Venous  Location: Leg Lower  Orientation: Medial;Right   Dressing Status  Clean, dry, and intact   Dressing Type  Aquacel;ABD pad;Compression Wrap/Venous Stasis   Drainage Amount  None   Wound Odor None   Periwound Skin Condition Intact   Cleansing and Cleansing Agents  Soap and water   Dressing Type Applied Alginate; Compression Wrap/Venous Stasis     Right knee wound has healed.         Discharge Condition: stable  Ambulatory Status: with walker  Discharge Destination: nursing home  Transportation:  Personal car  Accompanied by:  friend

## 2019-07-25 ENCOUNTER — HOSPITAL ENCOUNTER (OUTPATIENT)
Dept: WOUND CARE | Age: 84
Discharge: HOME | End: 2019-07-25
Payer: MEDICARE

## 2019-07-25 VITALS
TEMPERATURE: 97.4 F | RESPIRATION RATE: 22 BRPM | HEART RATE: 80 BPM | SYSTOLIC BLOOD PRESSURE: 124 MMHG | DIASTOLIC BLOOD PRESSURE: 78 MMHG

## 2019-07-25 PROCEDURE — 74011000250 HC RX REV CODE- 250: Performed by: OTOLARYNGOLOGY

## 2019-07-25 PROCEDURE — 11042 DBRDMT SUBQ TIS 1ST 20SQCM/<: CPT

## 2019-07-25 RX ADMIN — Medication: at 14:00

## 2019-07-25 NOTE — PROGRESS NOTES
[address] [place] [address] PROGRESS NOTE    Name:  Crow Venegas  MR#:  [id number]  :  11/10/1930  ACCOUNT #:  [de-identified]  DATE OF SERVICE:  2019      SUBJECTIVE:  The patient returns for treatment of multiple wounds including bilateral venous leg ulcers, I87.313, L97.912, L97.922, and a wound to the dorsum of the right hand following hematoma evacuation, T79. 2XXD. The patient saw Dr. Army Walters yesterday. He has had a deliberate 6-pound weight loss. He is doing well and denies any problems or changes in medications, allergies, or review of systems. OBJECTIVE:  VITAL SIGNS:  His temperature today is 97.4, pulse 80, respirations 22, blood pressure 124/78. WOUND EXAMINATION:  The wound to the dorsum of the right hand is smaller at 3 x 3 x 0.3 cm. There is a small hematoma in the distal lateral portion of the wound and a small amount of slough, but the wound itself is filling in quite well and is no longer down to exposed tendon or ligaments. Exposed muscle is visible and it is recommended that this wound be debrided. The wound of the right medial leg measures 0.2 x 0.5 x 0.1 cm and is filled with slough and is in need of debridement and cauterization. The left lateral leg wound is 0.6 x 0.5 x 0.3 cm and is filled with slough and is in need of debridement and cauterization. The right posterior leg wound appears to be healed. I explained the risks and benefits of wound debridement. The patient understood and wished to proceed. Therefore, topical Xylocaine was applied to the wound to the right hand, the left lateral leg, and the right medial leg for 10 minutes. Using a 5-mm ring curette, the wounds were debrided of all devitalized tissue. On the hand, it was taken down to healthy bleeding muscle and external dimensions did not change but the depth increased by 0.1 cm.     The wound to left lateral leg was debrided and cauterized and post debridement dimensions were 0.6 x 0.6 x 0.4 cm and the right medial leg wound following debridement was 0.3 x 0.5 x 0.2 cm. This wound was also cauterized with silver nitrate followed by normal saline. ASSESSMENT AND PLAN:  The patient is doing very well on current therapy. Therefore, we are going to continue Aquacel Ag to the wounds at the legs and Lis and Hydrofera blue to the wound at the dorsum of the right hand. He will elevate his legs, do calf muscle exercises hourly while awake, and float his right calf. I will see the patient again in followup in 1 week. All questions were answered. His condition on discharge is stable.         Yessy Damon MD      [id number]  D:  07/25/2019 14:45  T:  07/25/2019 14:54  JOB #:  [id number]

## 2019-07-25 NOTE — WOUND CARE
07/25/19 1321   Wound Hand   Date First Assessed/Time First Assessed: 07/03/19 1348   Location: Hand   Dressing Status Breakthrough drainage   Dressing Type Aquacel;Gauze wrap (jenna); Hydrocolloid   Wound Length (cm) 3 cm   Wound Width (cm) 3 cm   Wound Depth (cm) 0.3 cm   Wound Volume (cm^3) 2.7 cm^3   Condition of Base Slough   Condition of Edges Open   Drainage Amount Small   Drainage Color Serosanguinous   Wound Odor None   Cleansing and Cleansing Agents  Normal saline   Wound Leg lower Posterior; Left   Date First Assessed/Time First Assessed: 06/06/19 1332   Present on Hospital Admission: Yes  Location: Leg lower  Wound Location Orientation: Posterior; Left   Dressing Status Clean, dry, and intact   Dressing Type Compression Wrap/Venous Stasis   Wound Length (cm) 0.1 cm   Wound Width (cm) 0.1 cm   Wound Depth (cm) 0.1 cm   Wound Volume (cm^3) 0 cm^3   Cleansing and Cleansing Agents  Normal saline; Soap and water   Wound Leg Lower Lateral;Left   Date First Assessed/Time First Assessed: 02/07/19 1401   POA: Yes  Wound Type: Venous  Location: Leg Lower  Orientation: Lateral;Left   Dressing Status  Clean;Dry; Intact   Dressing Type  Aquacel; Compression Wrap/Venous Stasis   Wound Length (cm) 0.6 cm   Wound Width (cm) 0.5 cm   Wound Depth (cm) 0.3   Wound Surface area (cm^2) 0.3 cm^2   Change in Wound Size % 84.69   Condition of Base Slough   Condition of Edges Open   Drainage Amount  Scant   Drainage Color Serosanguinous   Wound Odor None   Periwound Skin Condition Intact   Cleansing and Cleansing Agents  Normal saline; Sodium hypochlorite 25 %   Wound Leg Lower Medial;Right   Date First Assessed/Time First Assessed: 02/07/19 1351   POA: Yes  Wound Type: Venous  Location: Leg Lower  Orientation: Medial;Right   Dressing Status  Clean, dry, and intact   Dressing Type  Aquacel; Compression Wrap/Venous Stasis   Wound Length (cm) 0.7 cm   Wound Width (cm) 0.5 cm   Wound Depth (cm) 0.1   Wound Surface area (cm^2) 0.35 cm^2 Change in Wound Size % 37.5   Drainage Amount  None   Wound Odor None   Periwound Skin Condition Intact   Cleansing and Cleansing Agents  Normal saline; Soap and water   Wound Leg Lower Posterior;Right;Proximal   Date First Assessed/Time First Assessed: 02/07/19 1357   POA: Yes  Wound Type: Venous  Location: Leg Lower  Orientation: Posterior;Right;Proximal   Dressing Status  Clean, dry, and intact   Dressing Type  Aquacel; Compression Wrap/Venous Stasis   Wound Length (cm) 0.7 cm   Wound Width (cm) 0.6 cm   Wound Depth (cm) 0.2   Wound Surface area (cm^2) 0.42 cm^2   Change in Wound Size % 58   Drainage Amount  None   Wound Odor None

## 2019-07-25 NOTE — DISCHARGE INSTRUCTIONS
Discharge Instructions for  [address] [facility], [address] [place] [address] Avenue  Telephone: [phone]    NAME:  Kvng Salas OF BIRTH:  11/10/1930  MEDICAL RECORD NUMBER:  [id number]  DATE:  7/25/2019    Wound Cleansing:   Do not scrub or use excessive force. Cleanse wound prior to applying a clean dressing with:  [x] Normal Saline [] Keep Wound Dry in Shower    [] Wound Cleanser   [] Cleanse wound with Mild Soap & Water  [] May Shower at Discharge   [] Other:       Topical Treatments:  Do not apply lotions, creams, or ointments to wound bed unless directed. [x] Apply moisturizing lotion to skin surrounding the wound prior to dressing change.  [] Apply antifungal ointment to skin surrounding the wound prior to dressing change.  [] Apply thin film of moisture barrier ointment to skin immediately around wound. [] Other:       Dressings:           Wound Location Bilateral lower legs  [] Apply Primary Dressing:       [] MediHoney Gel [x] Alginate with Silver [] Alginate   [] Collagen [] Collagen with Silver   [] Santyl with Moisten saline gauze     [] Hydrocolloid   [] MediHoney Alginate [] Foam with Silver   [] Foam   [] Hydrofera Blue    [] Mepilex Border    [] Moisten with Saline [] Hydrogel [] Mepitel     [] Bactroban/Mupirocin [] Polysporin  [] Other:    [] Pack wound loosely with  [] Iodoform   [] Plain Packing  [] Other   [] Cover and Secure with:     [] Gauze [] Amy [] Kerlix   [] Ace Wrap [] Cover Roll Tape [x] ABD     [] Other:    Avoid contact of tape with skin.   [] Change dressing: [] Daily    [] Every Other Day [] Three times per week   [] Once a week [] Do Not Change Dressing   [x] Other:Monday in clinic  Dressings:           Wound Location Right hand  [x] Apply Primary Dressing:       [] MediHoney Gel [] Alginate with Silver [] Alginate   [x] Collagen william [] Collagen with Silver   [] Santyl with Moisten saline gauze     [] Hydrocolloid   [] MediHoney Alginate [] Foam with Silver   [] Foam   [x] Hydrofera Blue ready   [] Mepilex Border    [] Moisten with Saline [] Hydrogel [] Mepitel     [] Bactroban/Mupirocin [] Polysporin  [] Other:    [] Pack wound loosely with  [] Iodoform   [] Plain Packing  [] Other   [] Cover and Secure with:     [] Gauze [] Amy [] Kerlix   [] Ace Wrap [x] Cover Roll Tape [x] ABD     [] Other:    Avoid contact of tape with skin. [] Change dressing: [] Daily    [] Every Other Day [] Three times per week   [] Once a week [] Do Not Change Dressing   [x] Other:Monday in clinic     Edema Control:  Apply: [] Compression Stocking []Right Leg []Left Leg   [] Tubigrip []Right Leg Double Layer []Left Leg Double Layer       []Right Leg Single Layer []Left Leg Single Layer   [] SpandaGrip []Right Leg  []Left Leg      []Low compression 5-10 mm/Hg      []Medium compression 10-20 mm/Hg     []High compression  20-30 mm/Hg   every morning immediately when getting up should be applied to affected leg(s) from mid foot to knee making sure to cover the heel. Remove every night before going to bed. [x] Elevate leg(s) above the level of the heart when sitting. [x] Avoid prolonged standing in one place. [] Elevate arm/hand above the level of the heart []RightArm []LeftArm     Compression:  Apply: [x] Multilayer Compression Wrap Applied in Clinic [x]RightLeg [x]Left Leg   [x] Multi-layer compression. Do not get leg(s) with wrap wet. If wraps become too tight call the center or completely remove the wrap. [x] Elevate leg(s) above the level of the heart when sitting. [x] Avoid prolonged standing in one place. Home health not to change wraps or hand dressing  [address] [facility]. Thank You. .      Dietary:  [] Diet as tolerated: [] Calorie Diabetic Diet: [] No Added Salt:  [x] Increase Protein: [] Other:              Return Appointment:  [] Wound and dressing supply provider:   [x] ECF or Home Healthcare: [facility] please discharge patient  [] Wound Assessment: [] Physician or NP scheduled for Wound Assessment:   [x] Return Appointment: With Dr. Patti Allen  in  [facility]) Nurse visit on Monday  [] Ordered tests:      Electronically signed on 7/25/2019 at 1:47 PM     Kady Rivas [address]: Should you experience any significant changes in your wound(s) or have questions about your wound care, please contact the [facility] at [address] [place] [address] 8:00 am - 4:30. If you need help with your wound outside these hours and cannot wait until we are again available, contact your PCP or go to the hospital emergency room. PLEASE NOTE: IF YOU ARE UNABLE TO OBTAIN WOUND SUPPLIES, CONTINUE TO USE THE SUPPLIES YOU HAVE AVAILABLE UNTIL YOU ARE ABLE TO REACH US. IT IS MOST IMPORTANT TO KEEP THE WOUND COVERED AT ALL TIMES.      Physician Signature:_______________________    Date: ___________ Time:  ____________

## 2019-07-29 ENCOUNTER — HOSPITAL ENCOUNTER (OUTPATIENT)
Dept: WOUND CARE | Age: 84
Discharge: HOME | End: 2019-07-29
Payer: MEDICARE

## 2019-07-29 VITALS
TEMPERATURE: 97.9 F | HEART RATE: 81 BPM | RESPIRATION RATE: 22 BRPM | SYSTOLIC BLOOD PRESSURE: 121 MMHG | DIASTOLIC BLOOD PRESSURE: 71 MMHG

## 2019-07-29 PROCEDURE — 29581 APPL MULTLAYER CMPRN SYS LEG: CPT

## 2019-07-29 NOTE — WOUND CARE
07/29/19 1646   Wound Leg lower Anterior;Proximal;Right   Date First Assessed/Time First Assessed: 07/03/19 1346   Location: Leg lower  Wound Location Orientation: Anterior;Proximal;Right   Dressing Status Clean, dry, and intact   Dressing Type Alginate;ABD pad;Compression Wrap/Venous Stasis   Non-staged Wound Description Partial thickness   Condition of Base Slough   Drainage Amount Small   Drainage Color Serosanguinous   Wound Odor None   Dana-wound Assessment Intact   Cleansing and Cleansing Agents  Soap and water   Dressing Changed Changed/New   Dressing Type Applied ABD pad;Alginate; Compression Wrap/Venous Stasis   Procedure Tolerated Well   Wound Hand   Date First Assessed/Time First Assessed: 07/03/19 1348   Location: Hand   Dressing Status Clean, dry, and intact   Dressing Type ABD pad;Foam;Gauze wrap (jenna)   Non-staged Wound Description Full thickness   Condition of Base Pink;Slough   Drainage Amount Small   Drainage Color Serosanguinous   Wound Odor None   Dana-wound Assessment Intact   Cleansing and Cleansing Agents  Normal saline   Dressing Changed Changed/New   Dressing Type Applied ABD pad;Collagens/cell matrix;Gauze;Gauze wrap (jenna); Foam   Procedure Tolerated Well   Wound Leg lower Posterior; Left   Date First Assessed/Time First Assessed: 06/06/19 1332   Present on Hospital Admission: Yes  Location: Leg lower  Wound Location Orientation: Posterior; Left   Dressing Status Clean, dry, and intact   Dressing Type ABD pad;Alginate; Compression Wrap/Venous Stasis   Non-staged Wound Description Partial thickness   Condition of Base Slough   Drainage Amount Small   Drainage Color Serosanguinous   Wound Odor None   Dana-wound Assessment Intact   Cleansing and Cleansing Agents  Soap and water   Dressing Changed Changed/New   Dressing Type Applied ABD pad;Alginate; Compression Wrap/Venous Stasis   Procedure Tolerated Well   Wound Leg lower Right;Medial;Distal   Date First Assessed/Time First Assessed: 05/09/19 1508   Present on Hospital Admission: Yes  Location: Leg lower  Wound Location Orientation: Right;Medial;Distal   Dressing Status Clean, dry, and intact   Dressing Type ABD pad;Alginate; Compression Wrap/Venous Stasis   Non-staged Wound Description Partial thickness   Condition of Base Pink   Drainage Amount Small   Drainage Color Serosanguinous   Wound Odor None   Dana-wound Assessment Intact   Cleansing and Cleansing Agents  Soap and water   Dressing Changed Changed/New   Dressing Type Applied ABD pad;Alginate; Compression Wrap/Venous Stasis   Procedure Tolerated Well   Wound Leg Lower Lateral;Left   Date First Assessed/Time First Assessed: 02/07/19 1401   POA: Yes  Wound Type: Venous  Location: Leg Lower  Orientation: Lateral;Left   Dressing Status  Clean, dry, and intact   Dressing Type  Alginate;ABD pad;Compression Wrap/Venous Stasis   Non-Pressure Injury Partial thickness (epider/derm)   Condition of Base Pink   Drainage Amount  Moderate   Drainage Color Serosanguinous   Wound Odor None   Periwound Skin Condition Intact   Cleansing and Cleansing Agents  Soap and water   Dressing Type Applied ABD pad;Alginate; Compression Wrap/Venous Stasis   Procedure Tolerated Well   Wound Leg Lower Medial;Right   Date First Assessed/Time First Assessed: 02/07/19 1351   POA: Yes  Wound Type: Venous  Location: Leg Lower  Orientation: Medial;Right   Dressing Status  Clean, dry, and intact   Dressing Type  ABD pad;Alginate; Compression Wrap/Venous Stasis   Non-Pressure Injury Partial thickness (epider/derm)   Condition of Base Pink   Drainage Amount  Moderate   Drainage Color Serosanguinous   Wound Odor None   Periwound Skin Condition Intact   Cleansing and Cleansing Agents  Soap and water   Dressing Type Applied ABD pad;Alginate; Compression Wrap/Venous Stasis   Procedure Tolerated Well   Discharge Condition:stable  Ambulatory Status:walker  Discharge Destination:home  Transportation: private auto  Accompanied by: significant other    Nurse visit for dressing change

## 2019-08-01 ENCOUNTER — HOSPITAL ENCOUNTER (OUTPATIENT)
Dept: WOUND CARE | Age: 84
Discharge: HOME | End: 2019-08-01
Payer: MEDICARE

## 2019-08-01 VITALS
DIASTOLIC BLOOD PRESSURE: 64 MMHG | RESPIRATION RATE: 18 BRPM | TEMPERATURE: 97.1 F | SYSTOLIC BLOOD PRESSURE: 97 MMHG | HEART RATE: 74 BPM

## 2019-08-01 DIAGNOSIS — L97.922 NON-PRESSURE CHRONIC ULCER OF LEFT LOWER LEG WITH FAT LAYER EXPOSED (HCC): ICD-10-CM

## 2019-08-01 DIAGNOSIS — L97.919 IDIOPATHIC CHRONIC VENOUS HYPERTENSION OF BOTH LOWER EXTREMITIES WITH ULCER (HCC): ICD-10-CM

## 2019-08-01 DIAGNOSIS — L97.929 IDIOPATHIC CHRONIC VENOUS HYPERTENSION OF BOTH LOWER EXTREMITIES WITH ULCER (HCC): ICD-10-CM

## 2019-08-01 DIAGNOSIS — L97.912 NON-PRESSURE CHRONIC ULCER OF RIGHT LOWER LEG WITH FAT LAYER EXPOSED (HCC): ICD-10-CM

## 2019-08-01 DIAGNOSIS — I87.313 IDIOPATHIC CHRONIC VENOUS HYPERTENSION OF BOTH LOWER EXTREMITIES WITH ULCER (HCC): ICD-10-CM

## 2019-08-01 DIAGNOSIS — T79.2XXD: ICD-10-CM

## 2019-08-01 PROCEDURE — 74011000250 HC RX REV CODE- 250: Performed by: OTOLARYNGOLOGY

## 2019-08-01 PROCEDURE — 11042 DBRDMT SUBQ TIS 1ST 20SQCM/<: CPT

## 2019-08-01 RX ADMIN — Medication: at 15:00

## 2019-08-01 NOTE — WOUND CARE
08/01/19 1517 [REMOVED] Wound Leg lower Anterior;Proximal;Right Final Assessment Date/Final Assessment Time: 08/01/19 1436  Date First Assessed/Time First Assessed: 07/03/19 1346   Location: Leg lower  Wound Location Orientation: Anterior;Proximal;Right  Wound Outcome: Healed Dressing Type Applied ABD pad;Collagens/cell matrix; Compression Wrap/Venous Stasis Wound Hand Date First Assessed/Time First Assessed: 07/03/19 1348   Location: Hand Dressing Type Applied Absorptive;Collagens/cell matrix;Foam;Compression Wrap/Venous Stasis [REMOVED] Wound Leg lower Posterior; Left Final Assessment Date/Final Assessment Time: 08/01/19 1437  Date First Assessed/Time First Assessed: 06/06/19 1332   Present on Hospital Admission: Yes  Location: Leg lower  Wound Location Orientation: Posterior; Left  Wound Outcome: Healed Dressing Type Applied ABD pad;Collagens/cell matrix; Compression Wrap/Venous Stasis Wound Leg lower Right;Medial;Distal  
Date First Assessed/Time First Assessed: 05/09/19 1508   Present on Hospital Admission: Yes  Location: Leg lower  Wound Location Orientation: Right;Medial;Distal  
Dressing Type Applied Collagens/cell matrix;ABD pad;Compression Wrap/Venous Stasis Wound Leg Lower Lateral;Left Date First Assessed/Time First Assessed: 02/07/19 1401   POA: Yes  Wound Type: Venous  Location: Leg Lower  Orientation: Lateral;Left Dressing Type Applied ABD pad;Collagens/cell matrix; Compression Wrap/Venous Stasis Wound Leg Lower Right; Anterior Date First Assessed/Time First Assessed: 02/07/19 1351   POA: Yes  Wound Type: Venous  Location: Leg Lower  Orientation: Right; Anterior Dressing Type Applied ABD pad;Collagens/cell matrix; Compression Wrap/Venous Stasis Discharge Condition:stable Ambulatory Status:walker Discharge Destination:home Transportation: private Accompanied by: daughter

## 2019-08-01 NOTE — DISCHARGE INSTRUCTIONS
Discharge Instructions for  [address] [facility], [address] [place] [address] Avenue  Telephone: [phone]    NAME:  Leda Tracey OF BIRTH:  11/10/1930  MEDICAL RECORD NUMBER:  [id number]  DATE:  8/1/2019    Wound Cleansing:   Do not scrub or use excessive force. Cleanse wound prior to applying a clean dressing with:  [] Normal Saline [] Keep Wound Dry in Shower    [] Wound Cleanser   [x] Cleanse wound with Mild Soap & Water  [] May Shower at Discharge   [] Other:       Topical Treatments:  Do not apply lotions, creams, or ointments to wound bed unless directed. [x] Apply moisturizing lotion to skin surrounding the wound prior to dressing change.  [] Apply antifungal ointment to skin surrounding the wound prior to dressing change.  [] Apply thin film of moisture barrier ointment to skin immediately around wound. [] Other:       Dressings:           Wound Location Bilateral lower legs   [x] Apply Primary Dressing:       [] MediHoney Gel [] Alginate with Silver [] Alginate   [x] Collagen william ag [] Collagen with Silver   [] Santyl with Moisten saline gauze     [] Hydrocolloid   [] MediHoney Alginate [] Foam with Silver   [] Foam   [] Hydrofera Blue    [] Mepilex Border    [] Moisten with Saline [] Hydrogel [] Mepitel     [] Bactroban/Mupirocin [] Polysporin  [] Other:    [] Pack wound loosely with  [] Iodoform   [] Plain Packing  [] Other   [x] Cover and Secure with:     [] Gauze [] Amy [] Kerlix   [] Ace Wrap [] Cover Roll Tape [x] ABD     [] Other:    Avoid contact of tape with skin.   [] Change dressing: [] Daily    [] Every Other Day [] Three times per week   [] Once a week [] Do Not Change Dressing   [x] Other:monday for RN visit  Dressings:           Wound Location Right hand    [x] Apply Primary Dressing:       [] MediHoney Gel [] Alginate with Silver [] Alginate   [x] Collagen william ag [] Collagen with Silver   [] Santyl with Moisten saline gauze     [] Hydrocolloid   [] MediHoney Alginate [] Foam with Silver   [] Foam   [x] Hydrofera Blue ready [] Mepilex Border    [] Moisten with Saline [] Hydrogel [] Mepitel     [] Bactroban/Mupirocin [] Polysporin  [] Other:    [] Pack wound loosely with  [] Iodoform   [] Plain Packing  [] Other   [x] Cover and Secure with:     [x] Gauze [x] Amy [] Kerlix   [] Ace Wrap [x] Cover Roll Tape [] ABD     [x] Other: cover with F tubigrip, cut out thumb cycle   Avoid contact of tape with skin. [x] Change dressing: [] Daily    [] Every Other Day [] Three times per week   [] Once a week [] Do Not Change Dressing   [x] Other:monday for RN visit     Edema Control:  Apply: [] Compression Stocking []Right Leg []Left Leg   [] Tubigrip []Right Leg Double Layer []Left Leg Double Layer       []Right Leg Single Layer []Left Leg Single Layer   [] SpandaGrip []Right Leg  []Left Leg      []Low compression 5-10 mm/Hg      []Medium compression 10-20 mm/Hg     []High compression  20-30 mm/Hg   every morning immediately when getting up should be applied to affected leg(s) from mid foot to knee making sure to cover the heel. Remove every night before going to bed. [x] Elevate leg(s) above the level of the heart when sitting. [x] Avoid prolonged standing in one place. [x] Elevate arm/hand above the level of the heart [x]RightArm []LeftArm     Compression:  Apply: [x] Multilayer Compression Wrap Applied in Clinic 4 Layer [x]RightLeg [x]Left Leg   [x] Multi-layer compression. Do not get leg(s) with wrap wet. If wraps become too tight call the center or completely remove the wrap. [x] Elevate leg(s) above the level of the heart when sitting.     [x] Avoid prolonged standing in one place     Dietary:  [] Diet as tolerated: [] Calorie Diabetic Diet: [] No Added Salt:  [x] Increase Protein: [] Other:   Activity:  [x] Activity as tolerated:  [] Patient has no activity restrictions     [] Strict Bedrest: [] Remain off Work:     [] May return to full duty work:                                   [] Return to work with restrictions:             Return Appointment:  [] Wound and dressing supply provider:   [] ECF or Home Healthcare:  [] Wound Assessment: [] Physician or NP scheduled for Wound Assessment:   [x] Return Appointment: With Dr. Dois Hatchet  in  [facility]) Nurse visit on 8/5/19  [] Ordered tests:      Electronically signed on 8/1/2019 at 2:40 PM     Kady Cornejo 281: Should you experience any significant changes in your wound(s) or have questions about your wound care, please contact the [facility] at [address] [place] [address] 8:00 am - 4:30. If you need help with your wound outside these hours and cannot wait until we are again available, contact your PCP or go to the hospital emergency room. PLEASE NOTE: IF YOU ARE UNABLE TO OBTAIN WOUND SUPPLIES, CONTINUE TO USE THE SUPPLIES YOU HAVE AVAILABLE UNTIL YOU ARE ABLE TO REACH US. IT IS MOST IMPORTANT TO KEEP THE WOUND COVERED AT ALL TIMES.      Physician Signature:_______________________    Date: ___________ Time:  ____________

## 2019-08-01 NOTE — WOUND CARE
08/01/19 1401 Wound Leg lower Anterior;Proximal;Right Date First Assessed/Time First Assessed: 07/03/19 1346   Location: Leg lower  Wound Location Orientation: Anterior;Proximal;Right Dressing Status Clean, dry, and intact; Removed Dressing Type Aquacel;ABD pad;Compression Wrap/Venous Stasis Non-staged Wound Description Partial thickness Wound Length (cm) 0.1 cm Wound Width (cm) 0.1 cm Wound Depth (cm) 0.1 cm Wound Volume (cm^3) 0 cm^3 Condition of [facility] Drainage Amount Small Drainage Color Serosanguinous Wound Odor None Dana-wound Assessment Intact Cleansing and Cleansing Agents  Soap and water Wound Hand Date First Assessed/Time First Assessed: 07/03/19 1348   Location: Hand Dressing Status Clean, dry, and intact Dressing Type ABD pad;Foam;Gauze Non-staged Wound Description Full thickness Wound Length (cm) 2.5 cm Wound Width (cm) 2 cm Wound Depth (cm) 0.1 cm Wound Volume (cm^3) 0.5 cm^3 Condition of Base Granulation;[place];Slough Drainage Amount Small Drainage Color Serosanguinous Wound Odor None Dana-wound Assessment Intact Cleansing and Cleansing Agents  Normal saline; Soap and water Wound Leg lower Posterior; Left Date First Assessed/Time First Assessed: 06/06/19 1332   Present on Hospital Admission: Yes  Location: Leg lower  Wound Location Orientation: Posterior; Left Dressing Status Clean, dry, and intact; Removed Dressing Type ABD pad;Aquacel; Compression Wrap/Venous Stasis Non-staged Wound Description Partial thickness Wound Length (cm) 0.3 cm Wound Width (cm) 0.3 cm Wound Depth (cm) 0.1 cm Wound Volume (cm^3) 0.01 cm^3 Condition of [facility] Drainage Amount Small Drainage Color Serosanguinous Wound Odor None Dana-wound Assessment Intact Cleansing and Cleansing Agents  Soap and water;Normal saline Wound Leg lower Right;Medial;Distal  
Date First Assessed/Time First Assessed: 05/09/19 1508   Present on Hospital Admission: Yes  Location: Leg lower  Wound Location Orientation: Right;Medial;Distal  
Dressing Status Clean, dry, and intact; Removed Dressing Type ABD pad;Compression Wrap/Venous Stasis; Aquacel Non-staged Wound Description Partial thickness Wound Length (cm) 0 cm Wound Width (cm) 0 cm Wound Depth (cm) 0 cm Wound Volume (cm^3) 0 cm^3 Wound Leg Lower Lateral;Left Date First Assessed/Time First Assessed: 02/07/19 1401   POA: Yes  Wound Type: Venous  Location: Leg Lower  Orientation: Lateral;Left Dressing Status  Clean, dry, and intact; Removed Dressing Type  Aquacel;ABD pad;Compression Wrap/Venous Stasis Non-Pressure Injury Partial thickness (epider/derm) Wound Length (cm) 0.7 cm Wound Width (cm) 0.7 cm Wound Depth (cm) 0.3 Wound Surface area (cm^2) 0.49 cm^2 Change in Wound Size % 75 Condition of [place];Slough Drainage Amount  Moderate Drainage Color Serosanguinous Wound Odor None Periwound Skin Condition Intact Cleansing and Cleansing Agents  Soap and water;Normal saline Wound Leg Lower Medial;Right Date First Assessed/Time First Assessed: 02/07/19 1351   POA: Yes  Wound Type: Venous  Location: Leg Lower  Orientation: Medial;Right Dressing Status  Clean, dry, and intact; Removed Dressing Type  ABD pad;Aquacel; Compression Wrap/Venous Stasis Non-Pressure Injury Partial thickness (epider/derm) Wound Length (cm) 1 cm Wound Width (cm) 0.5 cm Wound Depth (cm) 0.3 Wound Surface area (cm^2) 0.5 cm^2 Change in Wound Size % 10.71 Condition of [place];Slough Drainage Amount  Moderate Drainage Color Purulent;Serosanguinous Wound Odor None Periwound Skin Condition Intact Cleansing and Cleansing Agents  Soap and water;Normal saline Visit Vitals BP 97/64 Pulse 74 Temp 97.1 °F (36.2 °C) Resp 18

## 2019-08-05 ENCOUNTER — HOSPITAL ENCOUNTER (OUTPATIENT)
Dept: WOUND CARE | Age: 84
Discharge: HOME | End: 2019-08-05
Payer: MEDICARE

## 2019-08-05 VITALS
HEART RATE: 67 BPM | SYSTOLIC BLOOD PRESSURE: 138 MMHG | RESPIRATION RATE: 20 BRPM | DIASTOLIC BLOOD PRESSURE: 88 MMHG | TEMPERATURE: 97.4 F

## 2019-08-05 PROCEDURE — 29581 APPL MULTLAYER CMPRN SYS LEG: CPT

## 2019-08-05 NOTE — WOUND CARE
08/05/19 1200 Wound Hand Date First Assessed/Time First Assessed: 07/03/19 1348   Location: Hand Dressing Status Clean, dry, and intact Dressing Type Collagens/cell matrix;4 x 4;Foam;Gauze wrap (jenna) Non-staged Wound Description Partial thickness Condition of [place];Slough Drainage Amount Small Drainage Color Serosanguinous Wound Odor None Dana-wound Assessment Intact Cleansing and Cleansing Agents  Normal saline Dressing Changed Changed/New Dressing Type Applied Collagens/cell matrix;4 x 4;Foam;Gauze wrap (jenna) Procedure Tolerated Well Wound Leg lower Right;Medial;Distal  
Date First Assessed/Time First Assessed: 05/09/19 1508   Present on Hospital Admission: Yes  Location: Leg lower  Wound Location Orientation: Right;Medial;Distal  
Dressing Status Clean Dressing Type Collagens/cell matrix; Compression Wrap/Venous Stasis Non-staged Wound Description Full thickness Condition of Base Pink Drainage Amount Small Drainage Color Purulent;Sanguinous Wound Odor None Dana-wound Assessment Intact Cleansing and Cleansing Agents  Soap and water Dressing Changed Changed/New Dressing Type Applied ABD pad;Collagens/cell matrix; Compression Wrap/Venous Stasis Procedure Tolerated Well Wound Leg Lower Lateral;Left Date First Assessed/Time First Assessed: 02/07/19 1401   POA: Yes  Wound Type: Venous  Location: Leg Lower  Orientation: Lateral;Left Dressing Status  Clean, dry, and intact Dressing Type  ABD pad;Collagens/cell matrix; Compression Wrap/Venous Stasis Non-Pressure Injury Full thickness (subcut/muscle) Condition of Base Pink Drainage Amount  Small Drainage Color Serosanguinous Wound Odor None Periwound Skin Condition Intact Cleansing and Cleansing Agents  Soap and water Dressing Type Applied Collagens/cell matrix;ABD pad;Compression Wrap/Venous Stasis Procedure Tolerated Well Discharge Condition:stable Ambulatory Status:walker Discharge Destination:home Transportation: private auto Accompanied by: significant other RN visit for wrap and dressing change

## 2019-08-08 ENCOUNTER — HOSPITAL ENCOUNTER (OUTPATIENT)
Dept: WOUND CARE | Age: 84
Discharge: HOME | End: 2019-08-08
Payer: MEDICARE

## 2019-08-08 VITALS
DIASTOLIC BLOOD PRESSURE: 78 MMHG | HEART RATE: 72 BPM | TEMPERATURE: 97.6 F | SYSTOLIC BLOOD PRESSURE: 148 MMHG | RESPIRATION RATE: 18 BRPM

## 2019-08-08 PROCEDURE — 88312 SPECIAL STAINS GROUP 1: CPT

## 2019-08-08 PROCEDURE — 11042 DBRDMT SUBQ TIS 1ST 20SQCM/<: CPT

## 2019-08-08 PROCEDURE — 88305 TISSUE EXAM BY PATHOLOGIST: CPT

## 2019-08-08 PROCEDURE — 74011000250 HC RX REV CODE- 250: Performed by: OTOLARYNGOLOGY

## 2019-08-08 RX ADMIN — Medication: at 14:00

## 2019-08-08 NOTE — WOUND CARE
08/08/19 1317 Wound Hand Date First Assessed/Time First Assessed: 07/03/19 1348   Location: Hand Dressing Status Old drainage Dressing Type Collagens/cell matrix Non-staged Wound Description Partial thickness Wound Length (cm) 1.1 cm Wound Width (cm) 1.4 cm Wound Depth (cm) 0.1 cm Wound Volume (cm^3) 0.15 cm^3 Condition of [place];Slough Drainage Amount Small Drainage Color Serosanguinous Wound Odor None Cleansing and Cleansing Agents  Normal saline Wound Leg lower Right;Medial;Distal  
Date First Assessed/Time First Assessed: 05/09/19 1508   Present on Hospital Admission: Yes  Location: Leg lower  Wound Location Orientation: Right;Medial;Distal  
Dressing Status Old drainage Dressing Type Collagens/cell matrix; Compression Wrap/Venous Stasis Wound Length (cm) 1.3 cm Wound Width (cm) 0.5 cm Wound Depth (cm) 0.5 cm Wound Volume (cm^3) 0.32 cm^3 Condition of [place];Slough Drainage Amount Small Drainage Color Serosanguinous Wound Odor None Cleansing and Cleansing Agents  Soap and water Wound Leg Lower Lateral;Left Date First Assessed/Time First Assessed: 02/07/19 1401   POA: Yes  Wound Type: Venous  Location: Leg Lower  Orientation: Lateral;Left Dressing Status  Old drainage Dressing Type  Collagens/cell matrix; Compression Wrap/Venous Stasis Wound Length (cm) 0.4 cm Wound Width (cm) 0.4 cm Wound Depth (cm) 0.2 Wound Surface area (cm^2) 0.16 cm^2 Change in Wound Size % 91.84 Condition of Base Pink 
(dried exudate) Drainage Amount  Small Drainage Color Serosanguinous Wound Odor None Periwound Skin Condition Intact Visit Vitals /78 Pulse 72 Temp 97.6 °F (36.4 °C) Resp 18

## 2019-08-08 NOTE — DISCHARGE INSTRUCTIONS
Discharge Instructions for  [address] [facility], [address] [place] [address] Avenue  Telephone: [phone]    NAME:  Prabhakar Severino OF BIRTH:  11/10/1930  MEDICAL RECORD NUMBER:  [id number]  DATE:  8/8/2019    Wound Cleansing:   Do not scrub or use excessive force. Cleanse wound prior to applying a clean dressing with:  [] Normal Saline [] Keep Wound Dry in Shower    [] Wound Cleanser   [x] Cleanse wound with Mild Soap & Water  [] May Shower at Discharge   [] Other:       Topical Treatments:  Do not apply lotions, creams, or ointments to wound bed unless directed. [x] Apply moisturizing lotion to skin surrounding the wound prior to dressing change.  [] Apply antifungal ointment to skin surrounding the wound prior to dressing change.  [] Apply thin film of moisture barrier ointment to skin immediately around wound. [] Other:       Dressings:           Wound Location bilateral lower legs  [x] Apply Primary Dressing:       [] MediHoney Gel [] Alginate with Silver [] Alginate   [] Collagen [x] Collagen with Silver william     [] Santyl with Moisten saline gauze     [] Hydrocolloid   [] MediHoney Alginate [] Foam with Silver   [] Foam   [] Hydrofera Blue    [] Mepilex Border    [] Moisten with Saline [] Hydrogel [] Mepitel     [] Bactroban/Mupirocin [] Polysporin  [] Other:    [] Pack wound loosely with  [] Iodoform   [] Plain Packing  [] Other   [x] Cover and Secure with:     [] Gauze [] Amy [] Kerlix   [] Ace Wrap [] Cover Roll Tape [x] ABD     [] Other:    Avoid contact of tape with skin.   [x] Change dressing: [] Daily    [] Every Other Day [] Three times per week   [] Once a week [] Do Not Change Dressing   [x] Other:Monday 8/12/19 for RN visit dressing change  Dressings:           Wound Location Right hand   [x] Apply Primary Dressing:       [] MediHoney Gel [] Alginate with Silver [] Alginate   [] Collagen [x] Collagen with Silver william ag   [] Santyl with Moisten saline gauze     [] Hydrocolloid   [] MediHoney Alginate [] Foam with Silver   [] Foam   [x] Hydrofera Blue ready    [] Mepilex Border    [] Moisten with Saline [] Hydrogel [] Mepitel     [] Bactroban/Mupirocin [] Polysporin  [] Other:    [] Pack wound loosely with  [] Iodoform   [] Plain Packing  [] Other   [x] Cover and Secure with:     [] Gauze [] Amy [x] Kerlix   [] Ace Wrap [] Cover Roll Tape [] ABD     [x] Other: cut thumb out of F tubigrip and cover dressing   Avoid contact of tape with skin. [] Change dressing: [] Daily    [] Every Other Day [] Three times per week   [] Once a week [] Do Not Change Dressing   [x] Other:RN visit  8/12/19 for dressing change         Compression:  Apply: [x] Multilayer Compression Wrap Applied in Clinic 4 LAYER [x]RightLeg [x]Left Leg   [x] Multi-layer compression. Do not get leg(s) with wrap wet. If wraps become too tight call the center or completely remove the wrap. [x] Elevate leg(s) above the level of the heart when sitting. [x] Avoid prolonged standing in one place.   PLEASE MEASURE PATIENT FOR BILATERAL CIRCAIDS       Dietary:  [x] Diet as tolerated: [] Calorie Diabetic Diet: [] No Added Salt:  [x] Increase Protein: [] Other:   Activity:  [x] Activity as tolerated:  [] Patient has no activity restrictions     [] Strict Bedrest: [] Remain off Work:     [] May return to full duty work:                                   [] Return to work with restrictions:             Return Appointment:  [x] Wound and dressing supply provider: [facility] circ[facility]  [] ECF or Home Healthcare:  [x] Wound Assessment:RN Visit Monday 8/12/19 [] Physician or NP scheduled for Wound Assessment:   [] Return Appointment: With Dr. Kavon Salmeron  in  [address] [place] [address])  [] Ordered tests:      Electronically signed on 8/8/2019 at [address] [place] [address] Information: Should you experience any significant changes in your wound(s) or have questions about your wound care, please contact the [facility] [address] [facility] [facility] at [address] 8:00 am - 4:30. If you need help with your wound outside these hours and cannot wait until we are again available, contact your PCP or go to the hospital emergency room. PLEASE NOTE: IF YOU ARE UNABLE TO OBTAIN WOUND SUPPLIES, CONTINUE TO USE THE SUPPLIES YOU HAVE AVAILABLE UNTIL YOU ARE ABLE TO REACH US. IT IS MOST IMPORTANT TO KEEP THE WOUND COVERED AT ALL TIMES.      Physician Signature:_______________________    Date: ___________ Time:  ____________

## 2019-08-08 NOTE — PROGRESS NOTES
[address] [place] [address] PROGRESS NOTE    Name:  Sarah Pickens  MR#:  [id number]  :  11/10/1930  ACCOUNT #:  [de-identified]  DATE OF SERVICE:  2019      SUBJECTIVE:  The patient returns for venous leg ulcers I87.313, L97.912, L97.922 and a wound to the right hand following evacuation of a hematoma, T79. 2XXD. The patient had a nursing visit once during the week. At that time, we were still concerned about the hypergranular wound to the right anterior leg; pressure on the periwound skin elicited a small amount of purulent material followed by serosanguineous fluid. After that, it was quite clean, but the edges remained hypergranular and heaped up and have not improved over the last month. There have been no other changes noted in his medications, allergies, or review of systems. OBJECTIVE:  VITAL SIGNS:  His temperature today is 97.6, pulse 72, respirations 18, blood pressure 148/78. WOUND EXAMINATION:  At this time, the patient only has 3 persistent wounds. The wound to the right hand continues to get smaller and today, it measures 1.1 x 1.4 x 0.1 cm. There is just a small amount of slough on this and this was easily cleaned just with a moistened 4 x 4 gauze. The wound of the left lateral leg is 0.4 x 0.4 x 0.2 cm, which is smaller than previously. It is still trapping devitalized tissue in the depths of the wound and it is recommended this wound be debrided. I explained the risks and benefits. The patient understood and wished to proceed. Therefore, topical Xylocaine 4% gel was applied for 10 minutes. Using a compressed 5 mm ring curette, the wound was debrided down to nice healthy subcutaneous tissue. Hemostasis was achieved with gentle pressure until dry. The final wound is the wound of the right medial distal leg. It is 1.3 x 0.5 x 0.5 cm. Massaging the wound shows no evidence of pus or serosanguineous drainage.   The edges of the wound are heaped up and firm.  I recommended and explained to the patient and his family that a biopsy might be of benefit. I explained the risks and benefits. Everybody understood and wished to proceed. Therefore, the periwound skin was sterilized with rubbing alcohol. 0.5 mL of 1% Xylocaine without epinephrine was infiltrated along the medial edge of the wound including normal healthy skin as well as the edge of the heaped up epibole. Using forceps without teeth and a 15 blade, an ellipse of tissue was removed which was sent for pathologic section. Hemostasis was achieved with firm pressure and was inadequate. This was then supplemented with silver nitrate until dry. The area was then irrigated with normal saline. Now that this edge of the wound was removed, the wound is flatter and hopefully that will help with healing if it turns out to be a benign biopsy. ASSESSMENT AND PLAN:  We are going to continue Lis Ag and Hydrofera Blue to the wound to the right hand. We are going to continue Lis Ag and four-layer wraps to both legs. We are going to measure him today for CircAid wraps for him to use once he has healed. He will return longterm through the week for a nursing visit and he will see me again in 1 week. All questions were answered. His condition on discharge is stable.       Vero Navarrete MD      [id number]  D:  08/08/2019 14:01  T:  08/08/2019 14:09  JOB #:  [id number]

## 2019-08-12 ENCOUNTER — HOSPITAL ENCOUNTER (EMERGENCY)
Age: 84
Discharge: HOME | End: 2019-08-12
Attending: EMERGENCY MEDICINE | Admitting: EMERGENCY MEDICINE
Payer: MEDICARE

## 2019-08-12 ENCOUNTER — APPOINTMENT (OUTPATIENT)
Dept: GENERAL RADIOLOGY | Age: 84
End: 2019-08-12
Attending: EMERGENCY MEDICINE
Payer: MEDICARE

## 2019-08-12 ENCOUNTER — HOSPITAL ENCOUNTER (OUTPATIENT)
Dept: WOUND CARE | Age: 84
Discharge: HOME | End: 2019-08-12
Payer: MEDICARE

## 2019-08-12 ENCOUNTER — HOSPITAL ENCOUNTER (OUTPATIENT)
Dept: WOUND CARE | Age: 84
End: 2019-08-12
Payer: MEDICARE

## 2019-08-12 VITALS
SYSTOLIC BLOOD PRESSURE: 147 MMHG | TEMPERATURE: 98.9 F | RESPIRATION RATE: 17 BRPM | HEART RATE: 64 BPM | DIASTOLIC BLOOD PRESSURE: 91 MMHG | OXYGEN SATURATION: 94 %

## 2019-08-12 DIAGNOSIS — L97.919 ULCERS OF BOTH LOWER LEGS (HCC): ICD-10-CM

## 2019-08-12 DIAGNOSIS — S80.02XA CONTUSION OF LEFT KNEE, INITIAL ENCOUNTER: ICD-10-CM

## 2019-08-12 DIAGNOSIS — L97.929 ULCERS OF BOTH LOWER LEGS (HCC): ICD-10-CM

## 2019-08-12 DIAGNOSIS — S60.222A CONTUSION OF LEFT HAND, INITIAL ENCOUNTER: ICD-10-CM

## 2019-08-12 DIAGNOSIS — R05.9 COUGH: Primary | ICD-10-CM
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
## 2019-08-12 LAB
ALBUMIN SERPL-MCNC: 2.9 G/DL (ref 3.5–5)
ALBUMIN/GLOB SERPL: 0.7 {RATIO} (ref 1.1–2.2)
ALP SERPL-CCNC: 55 U/L (ref 45–117)
ALT SERPL-CCNC: 66 U/L (ref 12–78)
ANION GAP SERPL CALC-SCNC: 8 MMOL/L (ref 5–15)
ARTERIAL PATENCY WRIST A: ABNORMAL
AST SERPL-CCNC: 24 U/L (ref 15–37)
BASE EXCESS BLD CALC-SCNC: 7 MMOL/L
BASOPHILS # BLD: 0.2 K/UL (ref 0–0.1)
BASOPHILS NFR BLD: 2 % (ref 0–1)
BDY SITE: ABNORMAL
BILIRUB SERPL-MCNC: 0.5 MG/DL (ref 0.2–1)
BNP SERPL-MCNC: 128 PG/ML
BUN SERPL-MCNC: 14 MG/DL (ref 6–20)
BUN/CREAT SERPL: 14 (ref 12–20)
CALCIUM SERPL-MCNC: 9 MG/DL (ref 8.5–10.1)
CHLORIDE SERPL-SCNC: 99 MMOL/L (ref 97–108)
CK SERPL-CCNC: 111 U/L (ref 39–308)
CO2 SERPL-SCNC: 31 MMOL/L (ref 21–32)
COMMENT, HOLDF: NORMAL
CREAT SERPL-MCNC: 0.99 MG/DL (ref 0.7–1.3)
DIFFERENTIAL METHOD BLD: ABNORMAL
EOSINOPHIL # BLD: 0 K/UL (ref 0–0.4)
EOSINOPHIL NFR BLD: 0 % (ref 0–7)
ERYTHROCYTE [DISTWIDTH] IN BLOOD BY AUTOMATED COUNT: 15.5 % (ref 11.5–14.5)
GAS FLOW.O2 O2 DELIVERY SYS: ABNORMAL L/MIN
GLOBULIN SER CALC-MCNC: 3.9 G/DL (ref 2–4)
GLUCOSE SERPL-MCNC: 165 MG/DL (ref 65–100)
HCO3 BLD-SCNC: 30.5 MMOL/L (ref 22–26)
HCT VFR BLD AUTO: 39.8 % (ref 36.6–50.3)
HGB BLD-MCNC: 12.8 G/DL (ref 12.1–17)
IMM GRANULOCYTES # BLD AUTO: 0 K/UL
IMM GRANULOCYTES NFR BLD AUTO: 0 %
LYMPHOCYTES # BLD: 1.3 K/UL (ref 0.8–3.5)
LYMPHOCYTES NFR BLD: 15 % (ref 12–49)
MAGNESIUM SERPL-MCNC: 2.2 MG/DL (ref 1.6–2.4)
MCH RBC QN AUTO: 33.1 PG (ref 26–34)
MCHC RBC AUTO-ENTMCNC: 32.2 G/DL (ref 30–36.5)
MCV RBC AUTO: 102.8 FL (ref 80–99)
MONOCYTES # BLD: 0.7 K/UL (ref 0–1)
MONOCYTES NFR BLD: 8 % (ref 5–13)
MYELOCYTES NFR BLD MANUAL: 2 %
NEUTS SEG # BLD: 6.3 K/UL (ref 1.8–8)
NEUTS SEG NFR BLD: 73 % (ref 32–75)
NRBC # BLD: 0 K/UL (ref 0–0.01)
NRBC BLD-RTO: 0 PER 100 WBC
PCO2 BLD: 43 MMHG (ref 35–45)
PH BLD: 7.46 [PH] (ref 7.35–7.45)
PLATELET # BLD AUTO: 204 K/UL (ref 150–400)
PMV BLD AUTO: 9.6 FL (ref 8.9–12.9)
PO2 BLD: 75 MMHG (ref 80–100)
POTASSIUM SERPL-SCNC: 3.9 MMOL/L (ref 3.5–5.1)
PROT SERPL-MCNC: 6.8 G/DL (ref 6.4–8.2)
RBC # BLD AUTO: 3.87 M/UL (ref 4.1–5.7)
RBC MORPH BLD: ABNORMAL
RBC MORPH BLD: ABNORMAL
SAMPLES BEING HELD,HOLD: NORMAL
SAO2 % BLD: 95 % (ref 92–97)
SODIUM SERPL-SCNC: 138 MMOL/L (ref 136–145)
SPECIMEN TYPE: ABNORMAL
TROPONIN I SERPL-MCNC: <0.05 NG/ML
WBC # BLD AUTO: 8.6 K/UL (ref 4.1–11.1)
WBC MORPH BLD: ABNORMAL

## 2019-08-12 PROCEDURE — 83735 ASSAY OF MAGNESIUM: CPT

## 2019-08-12 PROCEDURE — 85025 COMPLETE CBC W/AUTO DIFF WBC: CPT

## 2019-08-12 PROCEDURE — 73560 X-RAY EXAM OF KNEE 1 OR 2: CPT

## 2019-08-12 PROCEDURE — 36415 COLL VENOUS BLD VENIPUNCTURE: CPT

## 2019-08-12 PROCEDURE — 82550 ASSAY OF CK (CPK): CPT

## 2019-08-12 PROCEDURE — 80053 COMPREHEN METABOLIC PANEL: CPT

## 2019-08-12 PROCEDURE — 71046 X-RAY EXAM CHEST 2 VIEWS: CPT

## 2019-08-12 PROCEDURE — 99214 OFFICE O/P EST MOD 30 MIN: CPT

## 2019-08-12 PROCEDURE — 99285 EMERGENCY DEPT VISIT HI MDM: CPT

## 2019-08-12 PROCEDURE — 36600 WITHDRAWAL OF ARTERIAL BLOOD: CPT

## 2019-08-12 PROCEDURE — 83880 ASSAY OF NATRIURETIC PEPTIDE: CPT

## 2019-08-12 PROCEDURE — 84484 ASSAY OF TROPONIN QUANT: CPT

## 2019-08-12 PROCEDURE — 82803 BLOOD GASES ANY COMBINATION: CPT

## 2019-08-12 PROCEDURE — 73130 X-RAY EXAM OF HAND: CPT

## 2019-08-12 NOTE — WOUND CARE
Visit Vitals BP (P) 135/88 Pulse (P) 79 Temp (P) 98.2 °F (36.8 °C) Resp (P) 26 SpO2 (P) 93% Patient arrived to clinic today via wheelchair. Patient usually uses walker to ambulate. Girlfriend reports he fell at home this am and injured left hand and left knee. Patient was wheezing and lungs were congested. Call 911 to have patient transported to [facility] ER for evaluation. No wound care done in clinic. Notified Sandra Montague in hospital of patient's wound status.

## 2019-08-12 NOTE — ED NOTES
Bedside and Verbal shift change report given to Merry (oncoming nurse) by [address] (offgoing nurse). Report included the following information SBAR, Kardex, ED Summary, [facility] and Recent Results.

## 2019-08-12 NOTE — DISCHARGE INSTRUCTIONS
Patient Education        Bruises: Care Instructions  Your Care Instructions    Bruises occur when small blood vessels under the skin tear or rupture, most often from a twist, bump, or fall. Blood leaks into tissues under the skin and causes a black-and-blue spot that often turns colors, including purplish black, reddish blue, or yellowish green, as the bruise heals. Bruises hurt, but most are not serious and will go away on their own within 2 to 4 weeks. Sometimes, gravity causes them to spread down the body. A leg bruise usually will take longer to heal than a bruise on the face or arms. Follow-up care is a key part of your treatment and safety. Be sure to make and go to all appointments, and call your doctor if you are having problems. It's also a good idea to know your test results and keep a list of the medicines you take. How can you care for yourself at home? · Take pain medicines exactly as directed. ? If the doctor gave you a prescription medicine for pain, take it as prescribed. ? If you are not taking a prescription pain medicine, ask your doctor if you can take an over-the-counter medicine. · Put ice or a cold pack on the area for 10 to 20 minutes at a time. Put a thin cloth between the ice and your skin. · If you can, prop up the bruised area on pillows as much as possible for the next few days. Try to keep the bruise above the level of your heart. When should you call for help? Call your doctor now or seek immediate medical care if:    · You have signs of infection, such as:  ? Increased pain, swelling, warmth, or redness. ? Red streaks leading from the bruise. ? Pus draining from the bruise. ? A fever.     · You have a bruise on your leg and signs of a blood clot, such as:  ? Increasing redness and swelling along with warmth, tenderness, and pain in the bruised area. ? Pain in your calf, back of the knee, thigh, or groin. ?  Redness and swelling in your leg or groin.     · Your pain gets worse.    Watch closely for changes in your health, and be sure to contact your doctor if:    · You do not get better as expected. Where can you learn more? Go to http://aixa-josette.info/. Enter [phone] in the search box to learn more about \"Bruises: Care Instructions. \"  Current as of: September 23, 2018  Content Version: 12.1  © 5041-0113 [employer]. Care instructions adapted under license by [employer] (which disclaims liability or warranty for this information). If you have questions about a medical condition or this instruction, always ask your healthcare professional. [place] [address] any warranty or liability for your use of this information. Patient Education        Contusion: Care Instructions  Your Care Instructions    Contusion is the medical term for a bruise. It is the result of a direct blow or an impact, such as a fall. Contusions are common sports injuries. Most people think of a bruise as a black-and-blue spot. This happens when small blood vessels get torn and leak blood under the skin. But bones, muscles, and organs can also get bruised. This may damage deep tissues but not cause a bruise you can see. The doctor will do a physical exam to find the location of your contusion. You may also have tests to make sure you do not have a more serious injury, such as a broken bone or nerve damage. These may include X-rays or other imaging tests like a CT scan or MRI. Deep-tissue contusions may cause pain and swelling. But if there is no serious damage, they will often get better in a few weeks with home treatment. The doctor has checked you carefully, but problems can develop later. If you notice any problems or new symptoms, get medical treatment right away. Follow-up care is a key part of your treatment and safety. Be sure to make and go to all appointments, and call your doctor if you are having problems.  It's also a good idea to know your test results and keep a list of the medicines you take. How can you care for yourself at home? · Put ice or a cold pack on the sore area for 10 to 20 minutes at a time to stop swelling. Put a thin cloth between the ice pack and your skin. · Be safe with medicines. Read and follow all instructions on the label. ? If the doctor gave you a prescription medicine for pain, take it as prescribed. ? If you are not taking a prescription pain medicine, ask your doctor if you can take an over-the-counter medicine. · If you can, prop up the sore area on pillows as much as possible for the next few days. Try to keep the sore area above the level of your heart. When should you call for help? Call your doctor now or seek immediate medical care if:    · Your pain gets worse.     · You have new or worse swelling.     · You have tingling, weakness, or numbness in the area near the contusion.     · The area near the contusion is cold or pale.    Watch closely for changes in your health, and be sure to contact your doctor if:    · You do not get better as expected. Where can you learn more? Go to http://aixa-josette.info/. Enter V046 in the search box to learn more about \"Contusion: Care Instructions. \"  Current as of: September 23, 2018  Content Version: 12.1  © 0963-1898 [employer]. Care instructions adapted under license by [employer] (which disclaims liability or warranty for this information). If you have questions about a medical condition or this instruction, always ask your healthcare professional. [place] [address] any warranty or liability for your use of this information.

## 2019-08-12 NOTE — DISCHARGE INSTRUCTIONS
Discharge Instructions for  [address] [facility], [address] [place] [address] Avenue  Telephone: [phone]    NAME:  Lawanda Morton OF BIRTH:  11/10/1930  MEDICAL RECORD NUMBER:  [id number]  DATE:  8/12/2019    Wound Cleansing:   Do not scrub or use excessive force. Cleanse wound prior to applying a clean dressing with:  [] Normal Saline [] Keep Wound Dry in Shower    [] Wound Cleanser   [] Cleanse wound with Mild Soap & Water  [] May Shower at Discharge   [] Other:       Topical Treatments:  Do not apply lotions, creams, or ointments to wound bed unless directed. [] Apply moisturizing lotion to skin surrounding the wound prior to dressing change.  [] Apply antifungal ointment to skin surrounding the wound prior to dressing change.  [] Apply thin film of moisture barrier ointment to skin immediately around wound. [] Other:       Dressings:           Wound Location ***   [] Apply Primary Dressing:       [] MediHoney Gel [] Alginate with Silver [] Alginate   [] Collagen [] Collagen with Silver   [] Santyl with Moisten saline gauze     [] Hydrocolloid   [] MediHoney Alginate [] Foam with Silver   [] Foam   [] Hydrofera Blue    [] Mepilex Border    [] Moisten with Saline [] Hydrogel [] Mepitel     [] Bactroban/Mupirocin [] Polysporin  [] Other:    [] Pack wound loosely with  [] Iodoform   [] Plain Packing  [] Other   [] Cover and Secure with:     [] Gauze [] Amy [] Kerlix   [] Ace Wrap [] Cover Roll Tape [] ABD     [] Other:    Avoid contact of tape with skin.   [] Change dressing: [] Daily    [] Every Other Day [] Three times per week   [] Once a week [] Do Not Change Dressing   [] Other:    Dressings:           Wound Location ***    [] Apply Primary Dressing:       [] MediHoney Gel [] Alginate with Silver [] Alginate   [] Collagen [] Collagen with Silver   [] Santyl with Moisten saline gauze     [] Hydrocolloid   [] MediHoney Alginate [] Foam with Silver   [] Foam   [] Hydrofera Blue    [] Mepilex Border    [] Moisten with Saline [] Hydrogel [] Mepitel     [] Bactroban/Mupirocin [] Polysporin  [] Other:    [] Pack wound loosely with  [] Iodoform   [] Plain Packing  [] Other   [] Cover and Secure with:     [] Gauze [] Amy [] Kerlix   [] Ace Wrap [] Cover Roll Tape [] ABD     [] Other:    Avoid contact of tape with skin. [] Change dressing: [] Daily    [] Every Other Day [] Three times per week   [] Once a week [] Do Not Change Dressing   [] Other:     Negative Pressure:           Wound Location:   [] Pressure@           mm/Hg  []Continuous []Intermittent   [] Black  [] White Foam [] Other:   []Change dressing: []Three times per week    []Other:     Pressure Relief:  [] When sitting, shift position or do seat lifts every 15 minutes.  [] Wheelchair cushion [] Specialty Bed/Mattress  [] Turn every 2 hours when in bed. Avoid position directing pressure on wound site. Limit side lying to 30 degree tilt. Limit HOB elevation to 30 degrees. Edema Control:  Apply: [] Compression Stocking []Right Leg []Left Leg   [] Tubigrip []Right Leg Double Layer []Left Leg Double Layer       []Right Leg Single Layer []Left Leg Single Layer   [] SpandaGrip []Right Leg  []Left Leg      []Low compression 5-10 mm/Hg      []Medium compression 10-20 mm/Hg     []High compression  20-30 mm/Hg   every morning immediately when getting up should be applied to affected leg(s) from mid foot to knee making sure to cover the heel. Remove every night before going to bed. [] Elevate leg(s) above the level of the heart when sitting. [] Avoid prolonged standing in one place. [] Elevate arm/hand above the level of the heart []RightArm []LeftArm     Compression:  Apply: [] Multilayer Compression Wrap Applied in Clinic []RightLeg []Left Leg   [] Multi-layer compression. Do not get leg(s) with wrap wet. If wraps become too tight call the center or completely remove the wrap.       [] Elevate leg(s) above the level of the heart when sitting. [] Avoid prolonged standing in one place. Off-Loading:   [] Off-loading when [] walking  [] in bed [] sitting  [] Total non-weight bearing  [] Right Leg  [] Left Leg   [] Assistive Device [] Walker [] Cane  [] Wheelchair  [] Crutches   [] Surgical shoe    [] Podus Boot(s)   [] Foam Boot(s)  [] Roll About    [] Cast Boot [] CROW Boot  [] Other:    Contact Cast:  Apply: [] Total Contact Cast Applied in Clinic []RightLeg []Left Leg   [] Do not get cast wet. Contact center or go to emergency room if there is a foul odor or becomes uncomfortable due to feeling tight or swelling. Do not use objects inside of cast to scratch. Dietary:  [] Diet as tolerated: [] Calorie Diabetic Diet: [] No Added Salt:  [] Increase Protein: [] Other:   Activity:  [] Activity as tolerated:  [] Patient has no activity restrictions     [] Strict Bedrest: [] Remain off Work:     [] May return to full duty work:                                   [] Return to work with restrictions:             Return Appointment:  [] Wound and dressing supply provider:   [] ECF or Home Healthcare:  [] Wound Assessment: [] Physician or NP scheduled for Wound Assessment:   [] Return Appointment: With ***  in  *** Week(s)  [] Ordered tests:      Electronically signed on 8/12/2019 at 12:06 PM     [facility] Information: Should you experience any significant changes in your wound(s) or have questions about your wound care, please contact the [facility] at [address] [place] [address] 8:00 am - 4:30. If you need help with your wound outside these hours and cannot wait until we are again available, contact your PCP or go to the hospital emergency room. PLEASE NOTE: IF YOU ARE UNABLE TO OBTAIN WOUND SUPPLIES, CONTINUE TO USE THE SUPPLIES YOU HAVE AVAILABLE UNTIL YOU ARE ABLE TO REACH US. IT IS MOST IMPORTANT TO KEEP THE WOUND COVERED AT ALL TIMES.      Physician Signature:_______________________    Date: ___________ Time:  ____________

## 2019-08-12 NOTE — ED TRIAGE NOTES
Patient presents from wound care center with complaints of CHF exacerbation.   Patient denies CP and SOB at this time

## 2019-08-12 NOTE — ED PROVIDER NOTES
80 y.o. male with past medical history significant for GERD, hypercholesterolemia, trigeminal neuralgia, HTN, RBBB, TIA, maxillary sinus CA who presents via EMS with chief complaint of leg swelling. Per pt's caregiver, pt was seen by is wound care doctor this morning and was sent to the ED because they were concerned he had fluid on his lungs. Pt has been followed by wound care multiple times per week recently for leg ulcers. Pt recently had swelling to his R hand and was found to have a clot, which they removed. Pt now has L hand swelling. Pt has bilateral leg swelling that is reportedly improved from what it was. Pt has had increased SOB, wheezing and fatigue recently. Pt reportedly fell out of his wheelchair at assisted living this morning and now has an abrasion to his L knee. There are no other acute medical concerns at this time. Social hx: former tobacco smoker, denies EtOH consumption     PCP: Loli Lima MD    Note written by Kofi Moore, as dictated by Wilver Jay MD 1:27 PM      The history is provided by the patient. No  was used.         Past Medical History:   Diagnosis Date    Arthritis     HANDS    Beta-blocker therapy     Cancer ([facility] [address] 2013    MAXILLARY SINUS CANCER, RADIATION AND CHEMO    Cancer ([facility] [address]     SKIN CA ON NOSE    GERD (gastroesophageal reflux disease)     Hypercholesterolemia     Hypertension     pt denies    Nasal sinus tumor     Numbness of LEFT hand & LEFT face 2010    RBBB (right bundle branch block)     TIA (transient ischemic attack)     12 TIA's over 9 YRS.1ST ONE IN 8/03- LAST IN 2/12      Trigeminal neuralgia        Past Surgical History:   Procedure Laterality Date    HX CATARACT REMOVAL Bilateral     HX GI      COLONOSCOPY    HX HEENT      BIOPSY OF SINUS     HX HEENT Left     sew eye closed    HX HERNIA REPAIR Right     INGUINAL    HX KNEE ARTHROSCOPY Right 2007    HX KNEE REPLACEMENT Right 2012    Total Knee replacement- right    HX ROTATOR CUFF REPAIR Right     right rotator cuff repair         Family History:   Problem Relation Age of Onset    Cancer Mother         colon    Cancer Father         throat    No Known Problems Brother     Anesth Problems Neg Hx        Social History     Socioeconomic History    Marital status:      Spouse name: Not on file    Number of children: Not on file    Years of education: Not on file    Highest education level: Not on file   Occupational History    Not on file   Social Needs    Financial resource strain: Not on file    Food insecurity:     Worry: Not on file     Inability: Not on file    Transportation needs:     Medical: Not on file     Non-medical: Not on file   Tobacco Use    Smoking status: Former Smoker     Packs/day: 1.00     Years: 40.00     Pack years: 40.00     Last attempt to quit: 1982     Years since quittin.6    Smokeless tobacco: Never Used    Tobacco comment: [employer] CIG[employer]   Substance and Sexual Activity    Alcohol use: No     Alcohol/week: 17.5 standard drinks     Types: 21 Glasses of wine per week     Frequency: Never    Drug use: No    Sexual activity: Not on file   Lifestyle    Physical activity:     Days per week: Not on file     Minutes per session: Not on file    Stress: Not on file   Relationships    Social connections:     Talks on phone: Not on file     Gets together: Not on file     Attends Baptism service: Not on file     Active member of club or organization: Not on file     Attends meetings of clubs or organizations: Not on file     Relationship status: Not on file    Intimate partner violence:     Fear of current or ex partner: Not on file     Emotionally abused: Not on file     Physically abused: Not on file     Forced sexual activity: Not on file   Other Topics Concern   [address] Service Not Asked    Blood Transfusions Not Asked    Caffeine Concern Not Asked    Occupational Exposure Not Asked   Auther Bloch Hazards Not Asked    Sleep Concern Not Asked    Stress Concern Not Asked    Weight Concern Not Asked    Special Diet Not Asked    Back Care Not Asked    Exercise Not Asked    Bike Helmet Not Asked   [address] Not Asked    Self-Exams Not Asked   Social History Narrative    Not on file         ALLERGIES: Adhesive tape-silicones; Aggrenox [aspirin-dipyridamole]; Amlodipine; Diuril [chlorothiazide]; and Hydrochlorothiazide    Review of Systems   Constitutional: Positive for fatigue. Negative for activity change and fever. Eyes: Negative for pain. Respiratory: Positive for shortness of breath and wheezing. Negative for cough. Cardiovascular: Positive for leg swelling. Negative for chest pain. Gastrointestinal: Negative for abdominal pain. Genitourinary: Negative for flank pain and hematuria. Musculoskeletal: Negative for gait problem, neck pain and neck stiffness. Skin: Negative for color change. Neurological: Negative for speech difficulty and headaches. Hematological: Does not bruise/bleed easily. Psychiatric/Behavioral: Negative for confusion. All other systems reviewed and are negative. Vitals:    08/12/19 1249   BP: 133/75   Pulse: 72   Resp: 14   Temp: 98.9 °F (37.2 °C)   SpO2: 98%            Physical Exam   Constitutional: He is oriented to person, place, and time. He appears well-developed and well-nourished. No distress. Sleepy appearing   HENT:   Head: Normocephalic and atraumatic. Right Ear: External ear normal.   Left Ear: External ear normal.   Eyes: Pupils are equal, round, and reactive to light. EOM are normal.   Neck: Normal range of motion. Neck supple. No JVD present. No tracheal deviation present. Cardiovascular: Normal rate, regular rhythm and normal heart sounds. Exam reveals no gallop and no friction rub. No murmur heard. Pulmonary/Chest: Effort normal. No stridor. No respiratory distress. He has wheezes. He has no rales.    Faint expiratory wheezes bilaterally. Abdominal: Soft. Bowel sounds are normal. He exhibits no distension. There is no tenderness. There is no rebound and no guarding. Musculoskeletal: Normal range of motion. He exhibits edema. He exhibits no tenderness. 1+ edema bilateral arms and legs  Dressings on bilateral forearms and lower legs   Neurological: He is alert and oriented to person, place, and time. He has normal reflexes. No cranial nerve deficit. Coordination normal.   Skin: Skin is warm and dry. No rash noted. He is not diaphoretic. No erythema. Psychiatric: He has a normal mood and affect. His behavior is normal. Judgment and thought content normal.   Nursing note and vitals reviewed. Note written by Kofi Ramirez, as dictated by Alannah Ponce MD 1:39 PM        MDM  Number of Diagnoses or Management Options  Diagnosis management comments: 70-year-old white male presents from the wound care clinic with congestion and wheezing and weakness. Patient has been having more difficulty with his breathing and he fell today. The family is concerned is been more sleepy recently. He does have expiratory wheezing. We will check basic blood work. Will check chest x-ray. Will check ABG. Will reassess when testing is completed. Patient and family agree. Amount and/or Complexity of Data Reviewed  Clinical lab tests: ordered and reviewed  Tests in the radiology section of CPT®: ordered  Tests in the medicine section of CPT®: ordered and reviewed  Decide to obtain previous medical records or to obtain history from someone other than the patient: yes  Obtain history from someone other than the patient: yes  Review and summarize past medical records: yes  Independent visualization of images, tracings, or specimens: yes           Procedures    2:27 PM  Chest x-ray shows no heart failure. BNP and troponin are normal.  Will check ABG. Will reassess patient shortly and discuss with family disposition.   Patient agrees. PROGRESS NOTE:  3:12 PM  Reexamined pt, now c/o R knee pain. No laceration, just a small abrasion. Will XRAY    ABG looks unchanged from previous. O2 and CO2 levels look appropriate    Discussed with family, they say he has had episodes of intemittent sleepiness and SOB for months. PROGRESS NOTE:  3:44 PM  Pt is at his baseline, per family. They are happy with him going home with PCP follow up next week.

## 2019-08-13 NOTE — WOUND CARE
Asked to see as he came to clinic for \"avulsion type\" injuries. A recent fall left him on the floor for a prolonged time. Now he is short of breath and \"leaking\" from an injury on his L leg. A hx of cryptogenic edema, DVT etc. 
 
Alert, oriented and conversant. Visit Vitals BP (P) 135/88 Pulse (P) 79 Temp (P) 98.2 °F (36.8 °C) Resp (P) 26 SpO2 (P) 93% Pleasant. A little proptotic. \"Bull\" neck. (No JVD visible) Dull lung bases up to the scapulae and rales above that. A few wheezes. The heart too distant to hear. No rubs. Obese abdomen. 3+ edema to above the elbows. 3-4+ to the groins. Transudation dripping from a skin tear on the L knee To ER at [facility], Discussed with doctor on call. Decompensated CHF I50.23 Addendum: ER w/u did not confirm my impression. Nephrosis, cirrhosis, r sidedCHF (false low BNP in obesity), and extensive local (venous/vena cava) disease might be considered. Hypoalbuminemia noted (sans UA).

## 2019-08-15 ENCOUNTER — HOSPITAL ENCOUNTER (OUTPATIENT)
Dept: WOUND CARE | Age: 84
Discharge: HOME | End: 2019-08-15
Payer: MEDICARE

## 2019-08-15 VITALS
HEART RATE: 76 BPM | RESPIRATION RATE: 20 BRPM | TEMPERATURE: 97.9 F | DIASTOLIC BLOOD PRESSURE: 71 MMHG | SYSTOLIC BLOOD PRESSURE: 117 MMHG

## 2019-08-15 PROCEDURE — 74011000250 HC RX REV CODE- 250: Performed by: OTOLARYNGOLOGY

## 2019-08-15 PROCEDURE — 11043 DBRDMT MUSC&/FSCA 1ST 20/<: CPT

## 2019-08-15 RX ADMIN — Medication: at 14:00

## 2019-08-15 NOTE — WOUND CARE
08/15/19 1337   Wound Hand Right   Date First Assessed/Time First Assessed: 07/03/19 1348   Location: Hand  Wound Location Orientation: Right   Dressing Status Breakthrough drainage;Removed   Dressing Type Gauze; Absorptive;ABD pad  (HFBR)   Non-staged Wound Description Partial thickness   Wound Length (cm) 0.7 cm   Wound Width (cm) 1.3 cm   Wound Depth (cm) 0.1 cm   Wound Volume (cm^3) 0.09 cm^3   Condition of Base Granulation   Drainage Amount Small   Drainage Color Serosanguinous   Wound Odor None   Dana-wound Assessment Intact   Cleansing and Cleansing Agents  Normal saline   Wound Leg lower Right;Medial;Distal   Date First Assessed/Time First Assessed: 05/09/19 1508   Present on Hospital Admission: Yes  Location: Leg lower  Wound Location Orientation: Right;Medial;Distal   Dressing Status Clean, dry, and intact; Removed   Dressing Type Compression Wrap/Venous Stasis;Collagens/cell matrix   Non-staged Wound Description Full thickness   Wound Length (cm) 1 cm   Wound Width (cm) 0.8 cm   Wound Depth (cm) 0.3 cm   Wound Volume (cm^3) 0.24 cm^3   Condition of Base Pink   Drainage Amount Small   Drainage Color Purulent;Serosanguinous   Wound Odor None   Dana-wound Assessment Intact   Cleansing and Cleansing Agents  Soap and water   Wound Leg Lower Lateral;Left   Date First Assessed/Time First Assessed: 02/07/19 1401   POA: Yes  Wound Type: Venous  Location: Leg Lower  Orientation: Lateral;Left   Dressing Status  Clean, dry, and intact; Removed   Dressing Type  Compression Wrap/Venous Stasis;Collagens/cell matrix   Non-Pressure Injury Full thickness (subcut/muscle)   Wound Length (cm) 0.6 cm   Wound Width (cm) 0.4 cm   Wound Depth (cm) 0.1   Wound Surface area (cm^2) 0.24 cm^2   Change in Wound Size % 87.76   Condition of Base Slough   Drainage Amount  Small    Drainage Color Serosanguinous   Wound Odor None   Periwound Skin Condition Intact   Cleansing and Cleansing Agents  Soap and water

## 2019-08-15 NOTE — DISCHARGE INSTRUCTIONS
Discharge Instructions for  [address] [facility], [facility] [address]  Telephone: [phone]    NAME:  Murali Skinner OF BIRTH:  11/10/1930  MEDICAL RECORD NUMBER:  [id number]  DATE:  8/15/2019    Wound Cleansing:   Do not scrub or use excessive force. Cleanse wound prior to applying a clean dressing with:  [x] Normal Saline [] Keep Wound Dry in Shower    [] Wound Cleanser   [] Cleanse wound with Mild Soap & Water  [] May Shower at Discharge   [] Other:       Topical Treatments:  Do not apply lotions, creams, or ointments to wound bed unless directed. [x] Apply moisturizing lotion to skin surrounding the wound prior to dressing change.  [] Apply antifungal ointment to skin surrounding the wound prior to dressing change.  [] Apply thin film of moisture barrier ointment to skin immediately around wound. [] Other:       Dressings:           Wound Location Bilateral lower legs  [x] Apply Primary Dressing:       [] MediHoney Gel [] Alginate with Silver [] Alginate   [] Collagen [x] Collagen with Silver william   [] Santyl with Moisten saline gauze     [] Hydrocolloid   [] MediHoney Alginate [] Foam with Silver   [] Foam   [] Hydrofera Blue    [] Mepilex Border    [] Moisten with Saline [] Hydrogel [] Mepitel     [] Bactroban/Mupirocin [] Polysporin  [] Other:    [] Pack wound loosely with  [] Iodoform   [] Plain Packing  [] Other   [x] Cover and Secure with:     [] Gauze [] Amy [] Kerlix   [] Ace Wrap [] Cover Roll Tape [x] ABD     [x] Other: Unna boot paste to top of leg   Avoid contact of tape with skin.   [x] Change dressing: [] Daily    [] Every Other Day [] Three times per week   [x] Once a week [] Do Not Change Dressing   [x] Other:PRN for wrap change  Dressings:           Wound Location right hand   [x] Apply Primary Dressing:       [] MediHoney Gel [] Alginate with Silver [] Alginate   [] Collagen [] Collagen with Silver   [] Santyl with Moisten saline gauze     [] Hydrocolloid   [] MediHoney Alginate [] Foam with Silver   [] Foam   [] Hydrofera Blue    [] Mepilex Border    [] Moisten with Saline [] Hydrogel [] Mepitel     [] Bactroban/Mupirocin [] Polysporin  [x] Other:  adaptic  [] Pack wound loosely with  [] Iodoform   [] Plain Packing  [] Other   [x] Cover and Secure with:     [] Gauze [] Amy [] Kerlix   [] Ace Wrap [] Cover Roll Tape [] ABD     [x] Other: bordered foam, cover with F tubigrip with thumb cut out   Avoid contact of tape with skin. [x] Change dressing: [] Daily    [] Every Other Day [] Three times per week   [x] Once a week [] Do Not Change Dressing   [] Other:  Dressings:           Wound Location Left knee   [x] Apply Primary Dressing:       [] MediHoney Gel [] Alginate with Silver [] Alginate   [] Collagen [] Collagen with Silver   [] Santyl with Moisten saline gauze    [] Hydrocolloid   [] MediHoney Alginate [] Foam with Silver   [] Foam   [] Hydrofera Blue    [] Mepilex Border    [] Moisten with Saline [] Hydrogel [] Mepitel     [] Bactroban/Mupirocin [] Polysporin  [x] Other: adaptic   [] Pack wound loosely with  [] Iodoform   [] Plain Packing  [x] Other   [x] Cover and Secure with:     [] Gauze [] Janece Shorts [] Kerlix   [] Ace Wrap [] Cover Roll Tape [] ABD     [x] Other: bordered foam Avoid contact of tape with skin. [x] Change dressing: [] Daily    [] Every Other Day [] Three times per week   [x] Once a week [] Do Not Change Dressing   [] Other:     Compression:  Apply: [x] Multilayer Compression Wrap Applied in Clinic 4 layer [x]RightLeg [x]Left Leg   [x] Multi-layer compression. Do not get leg(s) with wrap wet. If wraps become too tight call the center or completely remove the wrap. [x] Elevate leg(s) above the level of the heart when sitting. [x] Avoid prolonged standing in one place.     Dietary:  [x] Diet as tolerated: [] Calorie Diabetic Diet: [] No Added Salt:  [x] Increase Protein: [] Other:   Activity:  [x] Activity as tolerated:  [] Patient has no activity restrictions     [] Strict Bedrest: [] Remain off Work:     [] May return to full duty work:                                   [] Return to work with restrictions:             Return Appointment:  [] Wound and dressing supply provider:   [] ECF or Home Healthcare:  [] Wound Assessment: [] Physician or NP scheduled for Wound Assessment:   [x] Return Appointment: With Dr. Marcelino Villalba  in  [facility])  [] Ordered tests:      Electronically signed on 8/15/2019 at 2:01 PM     Kady Cornejo 281: Should you experience any significant changes in your wound(s) or have questions about your wound care, please contact the [facility] at [address] [place] [address] 8:00 am - 4:30. If you need help with your wound outside these hours and cannot wait until we are again available, contact your PCP or go to the hospital emergency room. PLEASE NOTE: IF YOU ARE UNABLE TO OBTAIN WOUND SUPPLIES, CONTINUE TO USE THE SUPPLIES YOU HAVE AVAILABLE UNTIL YOU ARE ABLE TO REACH US. IT IS MOST IMPORTANT TO KEEP THE WOUND COVERED AT ALL TIMES.      Physician Signature:_______________________    Date: ___________ Time:  ____________

## 2019-08-15 NOTE — WOUND CARE
08/15/19 1459   Wound Hand Right   Date First Assessed/Time First Assessed: 07/03/19 1348   Location: Hand  Wound Location Orientation: Right   Dressing Type Applied Foam;Non-adherent   Wound Leg lower Right;Medial;Distal   Date First Assessed/Time First Assessed: 05/09/19 1508   Present on Hospital Admission: Yes  Location: Leg lower  Wound Location Orientation: Right;Medial;Distal   Dressing Type Applied Collagens/cell matrix;ABD pad;Compression Wrap/Venous Stasis  (4 layer wrap with unna boot paste at top)   Wound Leg Lower Lateral;Left   Date First Assessed/Time First Assessed: 02/07/19 1401   POA: Yes  Wound Type: Venous  Location: Leg Lower  Orientation: Lateral;Left   Dressing Type Applied ABD pad;Collagens/cell matrix; Compression Wrap/Venous Stasis  (4 layer wrap with unna boot paste at top)     Discharge Condition:Stable  Ambulatory Status:Walker  Discharge Destination:Home  Transportation: Private  Accompanied by: Self

## 2019-08-15 NOTE — PROGRESS NOTES
[address] [place] [address] PROGRESS NOTE    Name:  Deshaun Arvizu  MR#:  [id number]  :  11/10/1930  ACCOUNT #:  [de-identified]  DATE OF SERVICE:  08/15/2019      SUBJECTIVE:  The patient returns for treatment of bilateral venous leg ulcers I87.313, L97.912, L97.922 with a right-hand wound following evacuation of hematoma, T79. 2XXD. Since last seen, the patient was here for nursing visit. He was sent to the hospital after contusing his left knee and the dorsum of his left hand after falling. His workup in the hospital was negative including a chest x-ray and therefore, he returned today for followup. OBJECTIVE:  VITAL SIGNS:  His temperature is 97.9, pulse 76, respirations 20, blood pressure 117/71. I reviewed with the patient, his girlfriend and his sister. 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 I explained what this was and how to treat it with wide excision. I also explained that before I come in the room each week, the nurses able to milk out purulent material from the wound, which could certainly contribute to the development of the pseudoepitheliomatous hyperplasia. WOUND EXAMINATION:  The wound to the right hand is profoundly improved to 0.7 x 1.3 x 0.1 cm and is clean and not in need of debridement. The wound to left lateral leg is 0.6 x 0.4 x 0.1 cm and is filling in well. The wound to the right medial distal leg is smaller, 1 x 0.8 x 0.3 cm. There is redundant tissue proximally and laterally whereas last week, we excised the medial half of the wound and that is seating down well and there is no hypertrophic tissue and appears to be healthy. Therefore, I recommend that the hypertrophic tissue be excised today and in an attempt to unroof the wound completely to prevent reaccumulation of purulent material.  I explained the risks and benefits. The patient understood and wished to proceed.   Therefore, topical Xylocaine was applied for 10 minutes in the form of 4% Xylocaine gel. Using forceps and iris scissors, the redundant skin was excised. Nice healthy bleeding tissue was encountered, which was addressed with silver nitrate until dry. This was then irrigated clear with saline. The entire wound was then opened and unroofed and the post debridement dimensions were 1.5 x 0.7 x 0.2 cm. ASSESSMENT:  The dorsum of his left hand has small hematoma, but it appears to be liquefying well. It is not nearly as deep as the one he had previously on the right side and therefore, I am not going to I&D it since I would like to not give the patient one more wound with which to deal.  He will return in 1 week. If the dressings and wraps get saturated or slide down, he and his family will call for a visit, but if he does well, we will see him in 1 week. All questions were answered. His condition on discharge is stable.         MD MORAIMA [id number]  D:  08/15/2019 14:52  T:  08/15/2019 15:00  JOB #:  [id number]

## 2019-08-22 ENCOUNTER — HOSPITAL ENCOUNTER (OUTPATIENT)
Dept: WOUND CARE | Age: 84
Discharge: HOME | End: 2019-08-22
Payer: MEDICARE

## 2019-08-22 VITALS
SYSTOLIC BLOOD PRESSURE: 105 MMHG | HEART RATE: 80 BPM | TEMPERATURE: 97.3 F | DIASTOLIC BLOOD PRESSURE: 68 MMHG | RESPIRATION RATE: 20 BRPM

## 2019-08-22 PROCEDURE — 74011000250 HC RX REV CODE- 250: Performed by: OTOLARYNGOLOGY

## 2019-08-22 PROCEDURE — 11042 DBRDMT SUBQ TIS 1ST 20SQCM/<: CPT

## 2019-08-22 RX ADMIN — Medication: at 14:00

## 2019-08-22 NOTE — DISCHARGE INSTRUCTIONS
Discharge Instructions for  [address] [facility], [address] [place] [address] Avenue  Telephone: [phone]    NAME:  Seth Anthony OF BIRTH:  11/10/1930  MEDICAL RECORD NUMBER:  [id number]  DATE:  8/22/2019    Wound Cleansing:   Do not scrub or use excessive force. Cleanse wound prior to applying a clean dressing with:  [] Normal Saline [] Keep Wound Dry in Shower    [] Wound Cleanser   [] Cleanse wound with Mild Soap & Water  [] May Shower at Discharge   [] Other:       Topical Treatments:  Do not apply lotions, creams, or ointments to wound bed unless directed. [] Apply moisturizing lotion to skin surrounding the wound prior to dressing change.  [] Apply antifungal ointment to skin surrounding the wound prior to dressing change.  [] Apply thin film of moisture barrier ointment to skin immediately around wound. [] Other:       Dressings:           Wound Location Right Knee   [] Apply Primary Dressing:       [] MediHoney Gel [] Alginate with Silver [] Alginate   [] Collagen [] Collagen with Silver   [] Santyl with Moisten saline gauze     [] Hydrocolloid   [] MediHoney Alginate [] Foam with Silver   [] Foam   [] Hydrofera Blue    [x] Mepilex Border    [] Moisten with Saline [] Hydrogel [] Mepitel     [] Bactroban/Mupirocin [] Polysporin  [x] Other:  Xeroform  [] Pack wound loosely with  [] Iodoform   [] Plain Packing  [] Other   [] Cover and Secure with:     [] Gauze [] Amy [] Kerlix   [] Ace Wrap [] Cover Roll Tape [] ABD     [] Other:    Avoid contact of tape with skin.   [] Change dressing: [] Daily    [] Every Other Day [x] Three times per week   [] Once a week [] Do Not Change Dressing   [] Other:    Dressings:           Wound Location Bilateral Legs    [] Apply Primary Dressing:       [] MediHoney Gel [] Alginate with Silver [] Alginate   [] Collagen [x] Collagen with Silver   [] Santyl with Moisten saline gauze     [] Hydrocolloid   [] Sonja Worrell Alginate [] Foam with Silver   [] Foam   [] Hydrofera Blue    [] Mepilex Border    [] Moisten with Saline [] Hydrogel [] Mepitel     [] Bactroban/Mupirocin [] Polysporin  [] Other:    [] Pack wound loosely with  [] Iodoform   [] Plain Packing  [] Other   [] Cover and Secure with:     [] Gauze [] Amy [] Kerlix   [] Ace Wrap [] Cover Roll Tape [x] ABD     [] Other:    Avoid contact of tape with skin. [] Change dressing: [] Daily    [] Every Other Day [] Three times per week   [x] Once a week [] Do Not Change Dressing   [] Other:PRN for wrap change        Compression:  Apply: [] Multilayer Compression 4 Layer Wrap Applied in Clinic [x]RightLeg [x]Left Leg   [] Multi-layer compression. Do not get leg(s) with wrap wet. If wraps become too tight call the center or completely remove the wrap. [] Elevate leg(s) above the level of the heart when sitting. [] Avoid prolonged standing in one place. Return Appointment:  [] Wound and dressing supply provider:   [] ECF or Home Healthcare:  [] Wound Assessment: [] Physician or NP scheduled for Wound Assessment:   [x] Return Appointment: With Virginia Mcclellan  in  [address] [place] [address])  [] Ordered tests:      Electronically signed on 8/22/2019 at [address]: Should you experience any significant changes in your wound(s) or have questions about your wound care, please contact the [facility] at [address] [place] [address] Street 8:00 am - 4:30. If you need help with your wound outside these hours and cannot wait until we are again available, contact your PCP or go to the hospital emergency room. PLEASE NOTE: IF YOU ARE UNABLE TO OBTAIN WOUND SUPPLIES, CONTINUE TO USE THE SUPPLIES YOU HAVE AVAILABLE UNTIL YOU ARE ABLE TO REACH US. IT IS MOST IMPORTANT TO KEEP THE WOUND COVERED AT ALL TIMES.      Physician Signature:_______________________    Date: ___________ Time:  ____________

## 2019-08-22 NOTE — PROGRESS NOTES
[address] [place] [address] PROGRESS NOTE    Name:  Ash Huddleston  MR#:  [id number]  :  11/10/1930  ACCOUNT #:  [de-identified]  DATE OF SERVICE:  2019      SUBJECTIVE:  The patient returns for treatment of bilateral venous leg ulcers I87.313, which now only involves skin breakdown on the right side, L97.911 and subcutaneous tissue on the left, L97.922 with the right hand wound following a hematoma T79. 2XXD. The patient had a good week. He denies any problems or changes in medications, allergies, or review of systems. OBJECTIVE:  VITAL SIGNS:  His temperature today is 97.3, pulse 80, respirations 20, and blood pressure 105/68. WOUND EXAMINATION:  Examination shows the wound on the dorsum of the right hand to be healed. The wound on the anterior right leg is covered with a scab and this is where we excised redundant tissue last week, and it is recommended that this be removed and debrided. The post-traumatic contusion on the left knee is a small hematoma with fibrin covering the wound bed, but there is no tenderness or pus or sign of infection. The wound of left lateral lower extremity is 0.7 x 0.4 x 0.1 cm. This wound is slow in healing, but is actually starting to fill in from its depths, so there is an improvement in appearance of this wound. I recommended that the left lateral leg wound be debrided as well as the scab on the right knee. I explained the risks and benefits. The patient understood and wished to proceed. Therefore, topical Xylocaine was applied for 10 minutes. Using a 5-mm ring curette, the scab was removed from the right anterior leg wound showing a much  healthier wound measuring 1 x 0.4 x 0.2 cm. The wounds were then scrubbed with chlorhexidine followed by normal saline.     The hematoma on the left dorsum of the hand remains softer and appears to be liquefying and being absorbed well, and I would like not to open that area and create another wound. ASSESSMENT AND PLAN:  I explained to the patient and his wife that I am treating the symptoms of edema and I am not treating the cause of his edema. I explained that many possible causes exist such as right heart failure, liver disease, kidney disease, hypoalbuminemia, venous obstruction, etc.  Therefore, the patient is going to see Dr. Barbie Peguero next week, who is going to continue to work with them and try to determine the source of his problems. I will see the patient again in 1 week and we are all encouraged that we are making great strides in the healing of his wounds, but he still needs continued 4-layer wraps and therefore we will continue Lis Ag, 4-layer wraps, and leg and arm elevation to minimize edema. All questions were answered. His condition on discharge is stable.       Rafael Aguirre MD      [id number]  D:  08/22/2019 14:25  T:  08/22/2019 14:33  JOB #:  [id number]  CC:  Myrna Hamilton MD

## 2019-08-22 NOTE — WOUND CARE
08/22/19 1326   Wound Knee Left   Date First Assessed/Time First Assessed: 08/22/19 1334   Primary Wound Type: Skin Tear  Location: Knee  Wound Location Orientation: Left   Dressing Status Breakthrough drainage;Removed   Dressing Type Foam   Wound Length (cm) 2 cm   Wound Width (cm) 4 cm   Wound Depth (cm) 0.1 cm   Wound Volume (cm^3) 0.8 cm^3   Condition of Base Slough   Drainage Amount Moderate   Drainage Color Purulent   Wound Odor None   Cleansing and Cleansing Agents  Soap and water   Wound Hand Right   Date First Assessed/Time First Assessed: 07/03/19 1348   Location: Hand  Wound Location Orientation: Right   Dressing Status Removed;Clean, dry, and intact   Dressing Type Foam   Wound Length (cm) 0 cm   Wound Width (cm) 0 cm   Wound Depth (cm) 0 cm   Wound Volume (cm^3) 0 cm^3   Drainage Amount None   Wound Odor None   Wound Leg lower Right;Medial;Distal   Date First Assessed/Time First Assessed: 05/09/19 1508   Present on Hospital Admission: Yes  Location: Leg lower  Wound Location Orientation: Right;Medial;Distal   Dressing Status Clean, dry, and intact; Removed   Dressing Type Compression Wrap/Venous Stasis; Aquacel;ABD pad   Wound Length (cm) 0.1 cm   Wound Width (cm) 0.1 cm   Wound Depth (cm) 0.1 cm   Wound Volume (cm^3) 0 cm^3   Condition of Base Epithelializing   Drainage Amount Small   Drainage Color Serosanguinous   Wound Odor None   Dana-wound Assessment Intact   Cleansing and Cleansing Agents  Soap and water   Wound Leg Lower Lateral;Left   Date First Assessed/Time First Assessed: 02/07/19 1401   POA: Yes  Wound Type: Venous  Location: Leg Lower  Orientation: Lateral;Left   Dressing Status  Clean, dry, and intact; Removed   Dressing Type  Aquacel;ABD pad;Compression Wrap/Venous Stasis   Non-Pressure Injury Full thickness (subcut/muscle)   Wound Length (cm) 0.7 cm   Wound Width (cm) 0.4 cm   Wound Depth (cm) 0.1   Wound Surface area (cm^2) 0.28 cm^2   Change in Wound Size % 85.71   Condition of Base Slough   Drainage Amount  Moderate   Drainage Color Serosanguinous   Wound Odor None   Periwound Skin Condition Intact   Cleansing and Cleansing Agents  Soap and water       Visit Vitals  /68   Pulse 80   Temp 97.3 °F (36.3 °C)   Resp 20

## 2019-08-24 ENCOUNTER — HOSPITAL ENCOUNTER (EMERGENCY)
Age: 84
Discharge: HOME | End: 2019-08-24
Attending: EMERGENCY MEDICINE
Payer: MEDICARE

## 2019-08-24 ENCOUNTER — APPOINTMENT (OUTPATIENT)
Dept: CT IMAGING | Age: 84
End: 2019-08-24
Attending: EMERGENCY MEDICINE
Payer: MEDICARE

## 2019-08-24 ENCOUNTER — APPOINTMENT (OUTPATIENT)
Dept: GENERAL RADIOLOGY | Age: 84
End: 2019-08-24
Attending: EMERGENCY MEDICINE
Payer: MEDICARE

## 2019-08-24 VITALS
SYSTOLIC BLOOD PRESSURE: 127 MMHG | DIASTOLIC BLOOD PRESSURE: 79 MMHG | OXYGEN SATURATION: 95 % | RESPIRATION RATE: 14 BRPM | HEART RATE: 74 BPM | TEMPERATURE: 97.7 F

## 2019-08-24 DIAGNOSIS — R41.0 CONFUSION: Primary | ICD-10-CM

## 2019-08-24 LAB
ALBUMIN SERPL-MCNC: 2.5 G/DL (ref 3.5–5)
ALBUMIN/GLOB SERPL: 0.7 {RATIO} (ref 1.1–2.2)
ALP SERPL-CCNC: 56 U/L (ref 45–117)
ALT SERPL-CCNC: 88 U/L (ref 12–78)
ANION GAP SERPL CALC-SCNC: 8 MMOL/L (ref 5–15)
APPEARANCE UR: CLEAR
AST SERPL-CCNC: 40 U/L (ref 15–37)
ATRIAL RATE: 91 BPM
BACTERIA URNS QL MICRO: NEGATIVE /HPF
BASOPHILS # BLD: 0.1 K/UL (ref 0–0.1)
BASOPHILS NFR BLD: 1 % (ref 0–1)
BILIRUB SERPL-MCNC: 0.5 MG/DL (ref 0.2–1)
BILIRUB UR QL: NEGATIVE
BNP SERPL-MCNC: 170 PG/ML
BUN SERPL-MCNC: 12 MG/DL (ref 6–20)
BUN/CREAT SERPL: 12 (ref 12–20)
CALCIUM SERPL-MCNC: 8.7 MG/DL (ref 8.5–10.1)
CALCULATED P AXIS, ECG09: 83 DEGREES
CALCULATED R AXIS, ECG10: -5 DEGREES
CALCULATED T AXIS, ECG11: 12 DEGREES
CHLORIDE SERPL-SCNC: 101 MMOL/L (ref 97–108)
CK SERPL-CCNC: 78 U/L (ref 39–308)
CO2 SERPL-SCNC: 28 MMOL/L (ref 21–32)
COLOR UR: NORMAL
COMMENT, HOLDF: NORMAL
CREAT SERPL-MCNC: 1.02 MG/DL (ref 0.7–1.3)
DIAGNOSIS, 93000: NORMAL
DIFFERENTIAL METHOD BLD: ABNORMAL
EOSINOPHIL # BLD: 0.1 K/UL (ref 0–0.4)
EOSINOPHIL NFR BLD: 1 % (ref 0–7)
EPITH CASTS URNS QL MICRO: NORMAL /LPF
ERYTHROCYTE [DISTWIDTH] IN BLOOD BY AUTOMATED COUNT: 15.8 % (ref 11.5–14.5)
GLOBULIN SER CALC-MCNC: 3.7 G/DL (ref 2–4)
GLUCOSE SERPL-MCNC: 171 MG/DL (ref 65–100)
GLUCOSE UR STRIP.AUTO-MCNC: NEGATIVE MG/DL
HCT VFR BLD AUTO: 38.8 % (ref 36.6–50.3)
HGB BLD-MCNC: 12.5 G/DL (ref 12.1–17)
HGB UR QL STRIP: NEGATIVE
HYALINE CASTS URNS QL MICRO: NORMAL /LPF (ref 0–5)
IMM GRANULOCYTES # BLD AUTO: 0.5 K/UL (ref 0–0.04)
IMM GRANULOCYTES NFR BLD AUTO: 4 % (ref 0–0.5)
KETONES UR QL STRIP.AUTO: NEGATIVE MG/DL
LACTATE BLD-SCNC: 1.7 MMOL/L (ref 0.4–2)
LEUKOCYTE ESTERASE UR QL STRIP.AUTO: NEGATIVE
LYMPHOCYTES # BLD: 1.8 K/UL (ref 0.8–3.5)
LYMPHOCYTES NFR BLD: 15 % (ref 12–49)
MCH RBC QN AUTO: 32.3 PG (ref 26–34)
MCHC RBC AUTO-ENTMCNC: 32.2 G/DL (ref 30–36.5)
MCV RBC AUTO: 100.3 FL (ref 80–99)
MONOCYTES # BLD: 0.8 K/UL (ref 0–1)
MONOCYTES NFR BLD: 7 % (ref 5–13)
MYELOCYTES NFR BLD MANUAL: 1 %
NEUTS BAND NFR BLD MANUAL: 1 %
NEUTS SEG # BLD: 8.4 K/UL (ref 1.8–8)
NEUTS SEG NFR BLD: 70 % (ref 32–75)
NITRITE UR QL STRIP.AUTO: NEGATIVE
NRBC # BLD: 0.02 K/UL (ref 0–0.01)
NRBC BLD-RTO: 0.2 PER 100 WBC
P-R INTERVAL, ECG05: 196 MS
PH UR STRIP: 6.5 [PH] (ref 5–8)
PLATELET # BLD AUTO: 247 K/UL (ref 150–400)
PMV BLD AUTO: 9.4 FL (ref 8.9–12.9)
POTASSIUM SERPL-SCNC: 3.4 MMOL/L (ref 3.5–5.1)
PROT SERPL-MCNC: 6.2 G/DL (ref 6.4–8.2)
PROT UR STRIP-MCNC: NEGATIVE MG/DL
Q-T INTERVAL, ECG07: 392 MS
QRS DURATION, ECG06: 126 MS
QTC CALCULATION (BEZET), ECG08: 482 MS
RBC # BLD AUTO: 3.87 M/UL (ref 4.1–5.7)
RBC #/AREA URNS HPF: NORMAL /HPF (ref 0–5)
RBC MORPH BLD: ABNORMAL
RBC MORPH BLD: ABNORMAL
SAMPLES BEING HELD,HOLD: NORMAL
SODIUM SERPL-SCNC: 137 MMOL/L (ref 136–145)
SP GR UR REFRACTOMETRY: 1 (ref 1–1.03)
TROPONIN I SERPL-MCNC: <0.05 NG/ML
UR CULT HOLD, URHOLD: NORMAL
UROBILINOGEN UR QL STRIP.AUTO: 0.2 EU/DL (ref 0.2–1)
VENTRICULAR RATE, ECG03: 91 BPM
WBC # BLD AUTO: 11.8 K/UL (ref 4.1–11.1)
WBC URNS QL MICRO: NORMAL /HPF (ref 0–4)

## 2019-08-24 PROCEDURE — 36415 COLL VENOUS BLD VENIPUNCTURE: CPT

## 2019-08-24 PROCEDURE — 81001 URINALYSIS AUTO W/SCOPE: CPT

## 2019-08-24 PROCEDURE — 80053 COMPREHEN METABOLIC PANEL: CPT

## 2019-08-24 PROCEDURE — 70450 CT HEAD/BRAIN W/O DYE: CPT

## 2019-08-24 PROCEDURE — 36600 WITHDRAWAL OF ARTERIAL BLOOD: CPT

## 2019-08-24 PROCEDURE — 82550 ASSAY OF CK (CPK): CPT

## 2019-08-24 PROCEDURE — 93005 ELECTROCARDIOGRAM TRACING: CPT

## 2019-08-24 PROCEDURE — 83605 ASSAY OF LACTIC ACID: CPT

## 2019-08-24 PROCEDURE — 85025 COMPLETE CBC W/AUTO DIFF WBC: CPT

## 2019-08-24 PROCEDURE — 84484 ASSAY OF TROPONIN QUANT: CPT

## 2019-08-24 PROCEDURE — 71045 X-RAY EXAM CHEST 1 VIEW: CPT

## 2019-08-24 PROCEDURE — 72125 CT NECK SPINE W/O DYE: CPT

## 2019-08-24 PROCEDURE — 83880 ASSAY OF NATRIURETIC PEPTIDE: CPT

## 2019-08-24 PROCEDURE — 77030011943

## 2019-08-24 PROCEDURE — 99285 EMERGENCY DEPT VISIT HI MDM: CPT

## 2019-08-24 NOTE — ED NOTES
Pt to xray. Bilateral UE with swelling, wrapped; RUE only without reddened, shiny skin; attempted two IV sticks, patient screaming, \"Get away from me! You don't know how to do anything! \" S/w MD; art stick ordered.

## 2019-08-24 NOTE — ED PROVIDER NOTES
History is provided by the patient and EMS. Full history, physical exam, and ROS unable to be obtained due to:  confusion. This patient was found this morning by staff where he lives to have some disorientation and altered mental status. EMS states that he is only disoriented to time. Reportedly, he was also found sitting on the toilet and may have been there for up to 2 hours. There was also report of a possible head injury after fall yesterday. The patient denied this to EMS. The patient evidently has no complaint. He is also quite annoyed and angry about being here. 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 He has had chemotherapy and radiation evidently. Old chart reviewed: He was seen earlier this month, about 12 days ago, for cough and leg ulcers.            Past Medical History:   Diagnosis Date    Arthritis     HANDS    Beta-blocker therapy     Cancer ([facility] [address] 2013    MAXILLARY SINUS CANCER, RADIATION AND CHEMO    Cancer ([facility] [address] [facility]     SKIN CA ON NOSE    GERD (gastroesophageal reflux disease)     Hypercholesterolemia     Hypertension     pt denies    Ill-defined condition     missing left eye    Nasal sinus tumor     Numbness of LEFT hand & LEFT face 2010    PUD (peptic ulcer disease)     GERD    RBBB (right bundle branch block)     TIA (transient ischemic attack)     12 TIA's over 9 YRS.1ST ONE IN 8/03- LAST IN 2/12      Trigeminal neuralgia        Past Surgical History:   Procedure Laterality Date    HX CATARACT REMOVAL Bilateral     HX GI      COLONOSCOPY    HX HEENT      BIOPSY OF SINUS     HX HEENT Left     sew eye closed    HX HERNIA REPAIR Right     INGUINAL    HX KNEE ARTHROSCOPY Right 2007    HX KNEE REPLACEMENT Right 2012    Total Knee replacement- right    HX ROTATOR CUFF REPAIR Right     right rotator cuff repair         Family History:   Problem Relation Age of Onset    Cancer Mother         colon    Cancer Father         throat    No Known Problems Brother     Anesth Problems Neg Hx        Social History     Socioeconomic History    Marital status:      Spouse name: Not on file    Number of children: Not on file    Years of education: Not on file    Highest education level: Not on file   Occupational History    Not on file   Social Needs    Financial resource strain: Not on file    Food insecurity:     Worry: Not on file     Inability: Not on file    Transportation needs:     Medical: Not on file     Non-medical: Not on file   Tobacco Use    Smoking status: Former Smoker     Packs/day: 1.00     Years: 40.00     Pack years: 40.00     Last attempt to quit: 1982     Years since quittin.6    Smokeless tobacco: Never Used    Tobacco comment: OCCAS CIGAR   Substance and Sexual Activity    Alcohol use: No     Alcohol/week: 17.5 standard drinks     Types: 21 Glasses of wine per week     Frequency: Never    Drug use: No    Sexual activity: Not on file   Lifestyle    Physical activity:     Days per week: Not on file     Minutes per session: Not on file    Stress: Not on file   Relationships    Social connections:     Talks on phone: Not on file     Gets together: Not on file     Attends [employer] service: Not on file     Active member of club or organization: Not on file     Attends meetings of clubs or organizations: Not on file     Relationship status: Not on file    Intimate partner violence:     Fear of current or ex partner: Not on file     Emotionally abused: Not on file     Physically abused: Not on file     Forced sexual activity: Not on file   Other Topics Concern     Service Not Asked    Blood Transfusions Not Asked    Caffeine Concern Not Asked    Occupational Exposure Not Asked   [place] Hazards Not Asked    Sleep Concern Not Asked    Stress Concern Not Asked    Weight Concern Not Asked    Special Diet Not Asked    Back Care Not Asked    Exercise Not Asked    Bike Helmet Not Asked   [address] [facility] [facility] Not Asked    Self-Exams Not Asked   Social History Narrative    Not on file         ALLERGIES: Adhesive tape-silicones; Aggrenox [aspirin-dipyridamole]; Amlodipine; Diuril [chlorothiazide]; and Hydrochlorothiazide    Review of Systems    Vitals:    08/24/19 1000 08/24/19 1030 08/24/19 1100 08/24/19 1130   BP: 121/72 118/82 127/75 128/66   Pulse: 86 76 74 76   Resp: 16 18 15 16   Temp:   98.3 °F (36.8 °C)    SpO2: 93% 95% 95% 90%            Physical Exam   Constitutional: He appears well-developed and well-nourished. HENT:   Head: Normocephalic. Neck: No tracheal deviation present. Cardiovascular: Normal rate, regular rhythm and intact distal pulses. Pulmonary/Chest: Effort normal. He has rales (senior living up right lung). Abdominal: Soft. There is no tenderness. Musculoskeletal: He exhibits edema (bilateral legs up to abdomen). Neurological: He is alert. Skin: Skin is warm and dry. The patient is awake and alert. He is quite cantankerous. He intentionally does not follow direction when we asked him to do things. He is directable with multiple requests though. MDM       Procedures        ED EKG interpretation:  Rhythm: normal sinus rhythm with rbbb. artifact; and regular . Rate (approx.): 91; Axis: normal; P wave: normal; QRS interval: prolonged; ST/T wave: normal; This EKG was interpreted by Tammy Gorman DO,ED Provider. 11:18 AM -patient is resting comfortably. Here with him now is his stepson and power of . He tells me that the patient falls frequently. He states that he will not sit back in a chair but rather sits forward. While sitting forward, he typically falls asleep and then falls out into the floor. The skin change on the left frontal aspect of his face is chronic he says. I went over his negative head and neck CTs for acute injury at this time. Pt is asleep.   Lying flat, breathing fine.  Pulse ox 93% RA. Reviewed cxr image 11:38 AM        12:13 PM -I again went over all test results. The patient is still sleeping and resting comfortably. At this point, I do not think hospitalization is warranted. He had some mild confusion earlier today. At this point, I think discharge is a reasonable option. I discussed it with his power of  as well as his daughter who has just shown up. Return if worse. Labs Reviewed   METABOLIC PANEL, COMPREHENSIVE - Abnormal; Notable for the following components:       Result Value    Potassium 3.4 (*)     Glucose 171 (*)     ALT (SGPT) 88 (*)     AST (SGOT) 40 (*)     Protein, total 6.2 (*)     Albumin 2.5 (*)     A-G Ratio 0.7 (*)     All other components within normal limits   CBC WITH AUTOMATED DIFF - Abnormal; Notable for the following components:    WBC 11.8 (*)     RBC 3.87 (*)     .3 (*)     RDW 15.8 (*)     NRBC 0.2 (*)     ABSOLUTE NRBC 0.02 (*)     IMMATURE GRANULOCYTES 4 (*)     ABS. NEUTROPHILS 8.4 (*)     ABS. IMM.  GRANS. 0.5 (*)     All other components within normal limits   URINE CULTURE HOLD SAMPLE   TROPONIN I   NT-PRO BNP   SAMPLES BEING HELD   URINALYSIS W/MICROSCOPIC   CK   POC LACTIC ACID

## 2019-08-24 NOTE — DISCHARGE INSTRUCTIONS
No cause for his mild confusion was found  Return for vomiting, a fever, or for any other concerns. Patient Education        Learning About Delirium  What is delirium? Delirium is a sudden change in mental condition. It leads to confusion and unusual behavior. Delirium is also called acute confusional state. Delirium affects all age groups. It can result from problems that affect the brain, such as stroke. It can also happen after an infection or when using certain medicines. Pain may also cause the problem. Seeing delirium in a loved one can be scary and sad. But it will go away most of the time. It usually lasts hours to days. The doctor will look for a cause and take steps to treat it and keep your loved one comfortable. What are the symptoms? Symptoms of delirium usually develop over several hours to a few days. Symptoms may change and be more or less severe. Symptoms include:  · A short attention span. · Confusion. This is not knowing where you are, what time it is, or who others are. · Hallucinations. This usually is seeing or hearing things that are not really there. · Delusions. This is believing things that aren't true. · Illusions. This is making a mistake in what you think is real. For example, you think a child is crying, but it's a pillow. · Disorganized thinking. How is delirium treated? The doctor may:  · Find and treat the cause. This could be:  ? Not getting enough fluids. ? An infection. ? A medicine or combination of medicines. ? Another medical problem. · Prescribe a medicine. · Make the hospital room as quiet as possible. You may be able to help your loved one by being present and talking to and touching him or her. Follow-up care is a key part of your treatment and safety. Be sure to make and go to all appointments, and call your doctor if you are having problems. It's also a good idea to know your test results and keep a list of the medicines you take.   Where can you learn more? Go to http://aixa-josette.info/. Enter X565 in the search box to learn more about \"Learning About Delirium. \"  Current as of: September 11, 2018  Content Version: 12.1  © 2226-0223 [employer]. Care instructions adapted under license by [employer] (which disclaims liability or warranty for this information). If you have questions about a medical condition or this instruction, always ask your healthcare professional. [address] any warranty or liability for your use of this information.

## 2019-08-24 NOTE — ED TRIAGE NOTES
Pt from [facility] AT [place], had a fall yesterday. This morning was on the toilet at 0730 and refused to get off of the toilet. Blayne arrived and ambulated the patient off of the toilet with his walker. Pt arrived yelling, stating, \"Send me home! Everything is good! Everything is growing! It's getting bigger and getting taller! Goodbye! \"

## 2019-08-24 NOTE — ED NOTES
TRANSFER - OUT REPORT:    Verbal report given to Clotilde(name) on Greg Double  being transferred to [facility]) for routine progression of care       Report consisted of patients Situation, Background, Assessment and   Recommendations(SBAR). Information from the following report(s) ED Summary and Recent Results was reviewed with the receiving nurse. Lines:       Opportunity for questions and clarification was provided.       Patient transported with:   [facility]

## 2019-08-29 ENCOUNTER — HOSPITAL ENCOUNTER (OUTPATIENT)
Dept: WOUND CARE | Age: 84
Discharge: HOME | End: 2019-08-29
Payer: MEDICARE

## 2019-08-29 VITALS
DIASTOLIC BLOOD PRESSURE: 80 MMHG | SYSTOLIC BLOOD PRESSURE: 130 MMHG | TEMPERATURE: 97.9 F | RESPIRATION RATE: 18 BRPM | HEART RATE: 75 BPM

## 2019-08-29 PROCEDURE — 97597 DBRDMT OPN WND 1ST 20 CM/<: CPT

## 2019-08-29 PROCEDURE — 11042 DBRDMT SUBQ TIS 1ST 20SQCM/<: CPT

## 2019-08-29 PROCEDURE — 74011000250 HC RX REV CODE- 250: Performed by: OTOLARYNGOLOGY

## 2019-08-29 RX ADMIN — Medication: at 14:00

## 2019-08-29 NOTE — WOUND CARE
08/29/19 1447   Wound Knee Left   Date First Assessed/Time First Assessed: 08/22/19 1334   Primary Wound Type: Skin Tear  Location: Knee  Wound Location Orientation: Left   Dressing Type Applied Xeroform; Foam   Wound Leg lower Right;Medial;Distal   Date First Assessed/Time First Assessed: 05/09/19 1508   Present on Hospital Admission: Yes  Location: Leg lower  Wound Location Orientation: Right;Medial;Distal   Dressing Type Applied Xeroform;Gauze wrap (jenna); Compression Wrap/Venous Stasis   Wound Leg Lower Lateral;Left   Date First Assessed/Time First Assessed: 02/07/19 1401   POA: Yes  Wound Type: Venous  Location: Leg Lower  Orientation: Lateral;Left   Dressing Type Applied Collagens/cell matrix;Gauze; Compression Wrap/Venous Stasis       Discharge Condition:stable  Ambulatory Status: with walker  Discharge Destination: home  Transportation:  Personal car  Accompanied by:  daughter

## 2019-08-29 NOTE — PROGRESS NOTES
[address] [place] [address] PROGRESS NOTE    Name:  Edson Tuttle  MR#:  [id number]  :  11/10/1930  ACCOUNT #:  [de-identified]  DATE OF SERVICE:  2019      SUBJECTIVE:  The patient returns for treatment of bilateral venous leg ulcers, I87.313, L97.911, L97.921, with a right hand hematoma which is now resolved and left hand hematoma which is also resolved. On , the patient was seen in the emergency room for confusion and was discharged to home with no change in his treatment. OBJECTIVE:  VITAL SIGNS:  His temperature is 97.9, pulse 75, respirations 18, blood pressure 130/80. WOUND EXAMINATION:  He still has 2 residual wounds, one on the left lateral lower leg which last time measured 0.7 x 0.4 x 0.1 cm and today 0.6 x 0.4 x 0.2 cm and is filled with slough and is in need of debridement. The wound of the right anterior leg last time measured 1 x 0.4 x 0.2 cm and today it is 1 x 0.7 x 0.3 cm. There is re-formation of hypertrophic tissue on the proximal edge and it was recommended this wound be debrided and the undermining be debrided to allow this flap to seat down. I explained the risks and benefits. The patient understood and wished to proceed. Therefore, topical Xylocaine 4% gel was applied for 10 minutes. Using a 5-mm ring curette, the left lateral leg wound was debrided of all devitalized tissue down to subcutaneous tissue. The wound to the anterior right leg was debrided, so the undersurface of the flap was removed. The flap skin was then seated down and compressed and held into nice position with the underlying subcutaneous tissue. The left knee has slough over an abrasion and this was debrided of all devitalized tissue and thick inspissated secretions. ASSESSMENT AND PLAN:  For the left lateral leg, we are going to continue Lis and a 4-layer wrap.     On the right leg, we are going to apply Mepitel followed by a bolster dressing of gauze followed by a 4-layer wrap. On the left knee, we are going to continue Xeroform to protect it. ASSESSMENT AND PLAN:  The patient will keep his legs elevated as much as possible. He will do calf muscle exercises as much as possible. He did see Dr. Ugo Mason who agrees with the diagnosis of edema but feels that the patient's cardiac, renal, and hepatic function are normal.  The patient will return to see us for nursing visit in 1 week and in 2 weeks, will return to see me. At that time, hopefully he will have healed on the right side and we will be able to convert him over to [facility]. All questions were answered. His condition on discharge is stable.         MD MORAIMA [email or web address]  D:  08/29/2019 14:26  T:  08/29/2019 14:34  JOB #:  [id number]

## 2019-08-29 NOTE — DISCHARGE INSTRUCTIONS
Discharge Instructions for  [facility]. [address] [facility], [address] [place] [address] Avenue  Telephone: [phone]    NAME:  Becky Thomas OF BIRTH:  11/10/1930  MEDICAL RECORD NUMBER:  [id number]  DATE:  8/29/2019    Wound Cleansing:   Do not scrub or use excessive force. Cleanse wound prior to applying a clean dressing with:  [] Normal Saline [] Keep Wound Dry in Shower    [] Wound Cleanser   [x] Cleanse wound with Mild Soap & Water  [] May Shower at Discharge   [] Other:       Topical Treatments:  Do not apply lotions, creams, or ointments to wound bed unless directed. [x] Apply moisturizing lotion to skin surrounding the wound prior to dressing change.  [] Apply antifungal ointment to skin surrounding the wound prior to dressing change.  [] Apply thin film of moisture barrier ointment to skin immediately around wound. [] Other:       Dressings:           Wound Location right lower leg  [] Apply Primary Dressing:       [] MediHoney Gel [] Alginate with Silver [] Alginate   [] Collagen [] Collagen with Silver   [] Santyl with Moisten saline gauze     [] Hydrocolloid   [] MediHoney Alginate [] Foam with Silver   [] Foam   [] Hydrofera Blue    [] Mepilex Border    [] Moisten with Saline [] Hydrogel [] Mepitel     [] Bactroban/Mupirocin [] Polysporin  [] Other:    [] Pack wound loosely with  [] Iodoform   [] Plain Packing  [x] Other nonadherent and gauze   [] Cover and Secure with:     [] Gauze [] Amy [] Kerlix   [] Ace Wrap [] Cover Roll Tape [] ABD     [] Other:    Avoid contact of tape with skin.   [] Change dressing: [] Daily    [] Every Other Day [] Three times per week   [x] Once a week [] Do Not Change Dressing   [] Other:    Dressings:           Wound Location left lower leg  [] Apply Primary Dressing:       [] MediHoney Gel [] Alginate with Silver [] Alginate   [] Collagen [x] Collagen with Silver   [] Santyl with Moisten saline gauze     [] Hydrocolloid   [] MediHoney Alginate [] Foam with Silver   [] Foam   [] Hydrofera Blue    [] Mepilex Border    [] Moisten with Saline [] Hydrogel [] Mepitel     [] Bactroban/Mupirocin [] Polysporin  [] Other:    [] Pack wound loosely with  [] Iodoform   [] Plain Packing  [] Other   [] Cover and Secure with:     [x] Gauze [] Amy [] Kerlix   [] Ace Wrap [] Cover Roll Tape [] ABD     [] Other:    Avoid contact of tape with skin. [] Change dressing: [] Daily    [] Every Other Day [] Three times per week   [x] Once a week [] Do Not Change Dressing   [] Other:    Dressings:           Wound Location left knee  [] Apply Primary Dressing:       [] MediHoney Gel [] Alginate with Silver [] Alginate   [] Collagen [x] Collagen with Silver   [] Santyl with Moisten saline gauze     [] Hydrocolloid   [] MediHoney Alginate [] Foam with Silver   [] Foam   [] Hydrofera Blue    [] Mepilex Border    [] Moisten with Saline [] Hydrogel [] Mepitel     [] Bactroban/Mupirocin [] Polysporin  [] Other:    [] Pack wound loosely with  [] Iodoform   [] Plain Packing  [x] Other xeroform gauze  [] Cover and Secure with:     [x] Gauze [] Quentin Cap [] Kerlix   [] Ace Wrap [] Cover Roll Tape [] ABD     [] Other:    Avoid contact of tape with skin. [] Change dressing: [] Daily    [] Every Other Day [] Three times per week   [x] Once a week [] Do Not Change Dressing   [] Other:             Edema Control:  Apply: [] Compression Stocking []Right Leg []Left Leg   [] Tubigrip []Right Leg Double Layer []Left Leg Double Layer       []Right Leg Single Layer []Left Leg Single Layer   [] SpandaGrip []Right Leg  []Left Leg      []Low compression 5-10 mm/Hg      []Medium compression 10-20 mm/Hg     []High compression  20-30 mm/Hg   every morning immediately when getting up should be applied to affected leg(s) from mid foot to knee making sure to cover the heel. Remove every night before going to bed.    [x] Elevate leg(s) above the level of the heart when sitting. [] Avoid prolonged standing in one place. [x] Elevate arm/hand above the level of the heart []RightArm []LeftArm     Compression:  Apply: [x] Multilayer Compression Wrap Applied in Clinic 4 layer [x]RightLeg [x]Left Leg   [] Multi-layer compression. Do not get leg(s) with wrap wet. If wraps become too tight call the center or completely remove the wrap. [x] Elevate leg(s) above the level of the heart when sitting. [] Avoid prolonged standing in one place. Dietary:  [x] Diet as tolerated: [] Calorie Diabetic Diet: [x] No Added Salt:  [x] Increase Protein: [] Other:   Activity:  [x] Activity as tolerated:  [] Patient has no activity restrictions     [] Strict Bedrest: [] Remain off Work:     [] May return to full duty work:                                   [] Return to work with restrictions:             Return Appointment:  [] Wound and dressing supply provider:   [] ECF or Home Healthcare:  [] Wound Assessment: - Physician or NP scheduled for Wound Assessment:   - Return Appointment: With  Rodrick Miller   in  [facility])  Nurse Visit in 1 week  [] Ordered tests:      Electronically signed on 8/29/2019 at 2:03 PM     [facility] Information: Should you experience any significant changes in your wound(s) or have questions about your wound care, please contact the [facility] at [address] [place] [address] 8:00 am - 4:30. If you need help with your wound outside these hours and cannot wait until we are again available, contact your PCP or go to the hospital emergency room. PLEASE NOTE: IF YOU ARE UNABLE TO OBTAIN WOUND SUPPLIES, CONTINUE TO USE THE SUPPLIES YOU HAVE AVAILABLE UNTIL YOU ARE ABLE TO REACH US. IT IS MOST IMPORTANT TO KEEP THE WOUND COVERED AT ALL TIMES.       Physician Signature:_______________________    Date: ___________ Time:  ____________

## 2019-08-29 NOTE — WOUND CARE
08/29/19 1334   Wound Knee Left   Date First Assessed/Time First Assessed: 08/22/19 1334   Primary Wound Type: Skin Tear  Location: Knee  Wound Location Orientation: Left   Dressing Type Open to air   Wound Length (cm) 0.6 cm   Wound Width (cm) 2.5 cm   Wound Depth (cm) 0.1 cm   Wound Volume (cm^3) 0.15 cm^3   Condition of Base Slough   Drainage Amount Moderate   Drainage Color Purulent   Wound Odor None   Cleansing and Cleansing Agents  Soap and water   Wound Hand Right   Date First Assessed/Time First Assessed: 07/03/19 1348   Location: Hand  Wound Location Orientation: Right   Dressing Type Open to air   Wound Length (cm) 0.1 cm  (Blister)   Wound Width (cm) 0.1 cm   Wound Depth (cm) 0.1 cm   Wound Volume (cm^3) 0 cm^3   Condition of Base Other (comment)  (blister)   Condition of Edges Closed   Drainage Amount None   Wound Leg lower Right;Medial;Distal   Date First Assessed/Time First Assessed: 05/09/19 1508   Present on Hospital Admission: Yes  Location: Leg lower  Wound Location Orientation: Right;Medial;Distal   Dressing Status Clean, dry, and intact; Removed   Dressing Type Compression Wrap/Venous Stasis;Collagens/cell matrix;ABD pad   Wound Length (cm) 1 cm   Wound Width (cm) 0.7 cm   Wound Depth (cm) 0.3 cm   Wound Volume (cm^3) 0.21 cm^3   Drainage Amount Small   Drainage Color Purulent   Wound Odor None   Dana-wound Assessment Intact   Cleansing and Cleansing Agents  Soap and water   Wound Leg Lower Lateral;Left   Date First Assessed/Time First Assessed: 02/07/19 1401   POA: Yes  Wound Type: Venous  Location: Leg Lower  Orientation: Lateral;Left   Dressing Status  Clean, dry, and intact; Removed   Dressing Type  ABD pad   Non-Pressure Injury Full thickness (subcut/muscle)   Wound Length (cm) 0.6 cm   Wound Width (cm) 0.4 cm   Wound Depth (cm) 0.2   Wound Surface area (cm^2) 0.24 cm^2   Change in Wound Size % 87.76   Condition of Base Pink;Slough   Drainage Amount  Small    Drainage Color Serosanguinous Wound Odor None   Periwound Skin Condition Intact   Cleansing and Cleansing Agents  Soap and water     Visit Vitals  /80   Pulse 75   Temp 97.9 °F (36.6 °C)   Resp 18

## 2019-09-03 ENCOUNTER — HOSPITAL ENCOUNTER (OUTPATIENT)
Dept: MRI IMAGING | Age: 84
Discharge: HOME | End: 2019-09-03
Attending: NEUROLOGICAL SURGERY
Payer: MEDICARE

## 2019-09-03 VITALS — WEIGHT: 246 LBS | BODY MASS INDEX: 36.33 KG/M2

## 2019-09-03 DIAGNOSIS — D43.0 NEOPLASM OF UNCERTAIN BEHAVIOR OF SUPRATENTORIAL REGION OF BRAIN (HCC): ICD-10-CM

## 2019-09-03 PROCEDURE — 74011250636 HC RX REV CODE- 250/636: Performed by: NEUROLOGICAL SURGERY

## 2019-09-03 PROCEDURE — 70553 MRI BRAIN STEM W/O & W/DYE: CPT

## 2019-09-03 PROCEDURE — A9575 INJ GADOTERATE MEGLUMI 0.1ML: HCPCS | Performed by: NEUROLOGICAL SURGERY

## 2019-09-03 RX ORDER — GADOTERATE MEGLUMINE 376.9 MG/ML
20 INJECTION INTRAVENOUS
Status: COMPLETED | OUTPATIENT
Start: 2019-09-03 | End: 2019-09-03

## 2019-09-03 RX ADMIN — GADOTERATE MEGLUMINE 20 ML: 376.9 INJECTION INTRAVENOUS at 13:10

## 2019-09-05 ENCOUNTER — HOSPITAL ENCOUNTER (OUTPATIENT)
Dept: WOUND CARE | Age: 84
Discharge: HOME | End: 2019-09-05
Payer: MEDICARE

## 2019-09-05 VITALS
TEMPERATURE: 97.2 F | DIASTOLIC BLOOD PRESSURE: 70 MMHG | SYSTOLIC BLOOD PRESSURE: 115 MMHG | RESPIRATION RATE: 16 BRPM | HEART RATE: 74 BPM

## 2019-09-05 DIAGNOSIS — L97.912 NON-PRESSURE CHRONIC ULCER OF RIGHT LOWER LEG WITH FAT LAYER EXPOSED (HCC): ICD-10-CM

## 2019-09-05 DIAGNOSIS — T79.2XXD: ICD-10-CM

## 2019-09-05 DIAGNOSIS — L97.919 IDIOPATHIC CHRONIC VENOUS HYPERTENSION OF BOTH LOWER EXTREMITIES WITH ULCER (HCC): ICD-10-CM

## 2019-09-05 DIAGNOSIS — L97.922 NON-PRESSURE CHRONIC ULCER OF LEFT LOWER LEG WITH FAT LAYER EXPOSED (HCC): ICD-10-CM

## 2019-09-05 DIAGNOSIS — L97.929 IDIOPATHIC CHRONIC VENOUS HYPERTENSION OF BOTH LOWER EXTREMITIES WITH ULCER (HCC): ICD-10-CM

## 2019-09-05 DIAGNOSIS — I87.313 IDIOPATHIC CHRONIC VENOUS HYPERTENSION OF BOTH LOWER EXTREMITIES WITH ULCER (HCC): ICD-10-CM

## 2019-09-05 PROCEDURE — 29581 APPL MULTLAYER CMPRN SYS LEG: CPT

## 2019-09-05 NOTE — WOUND CARE
Nurse visit for bilateral wrap changes    Visit Vitals  /70 (BP 1 Location: Left arm, BP Patient Position: Sitting)   Pulse 74   Temp 97.2 °F (36.2 °C)   Resp 16        09/05/19 1325   Wound Knee Left   Date First Assessed/Time First Assessed: 08/22/19 1334   Primary Wound Type: Skin Tear  Location: Knee  Wound Location Orientation: Left   Dressing Type Collagens/cell matrix   Drainage Amount None   Wound Odor None   Cleansing and Cleansing Agents  Soap and water   Dressing Type Applied Xeroform; Foam   Wound Leg lower Right;Medial;Distal   Date First Assessed/Time First Assessed: 05/09/19 1508   Present on Hospital Admission: Yes  Location: Leg lower  Wound Location Orientation: Right;Medial;Distal   Dressing Status Clean, dry, and intact   Dressing Type Compression Wrap/Venous Stasis   Drainage Amount Small   Drainage Color Serosanguinous   Wound Odor None   Dana-wound Assessment Intact   Cleansing and Cleansing Agents  Soap and water   Dressing Type Applied Xeroform;Gauze; Compression Wrap/Venous Stasis   Wound Leg Lower Lateral;Left   Date First Assessed/Time First Assessed: 02/07/19 1401   POA: Yes  Wound Type: Venous  Location: Leg Lower  Orientation: Lateral;Left   Dressing Status  Clean, dry, and intact   Dressing Type  Gauze; Compression Wrap/Venous Stasis   Drainage Amount  None   Wound Odor None   Periwound Skin Condition Intact   Cleansing and Cleansing Agents  Soap and water   Dressing Type Applied Collagens/cell matrix; Compression Wrap/Venous Stasis       Discharge Condition:stable  Ambulatory Status: with walker  Discharge Destination: nursing home  Transportation:  Personal car  Accompanied by:  daughter

## 2019-09-10 ENCOUNTER — HOSPITAL ENCOUNTER (OUTPATIENT)
Age: 84
Setting detail: OBSERVATION
Discharge: HOSPICE | End: 2019-09-12
Attending: EMERGENCY MEDICINE | Admitting: INTERNAL MEDICINE
Payer: MEDICARE

## 2019-09-10 ENCOUNTER — APPOINTMENT (OUTPATIENT)
Dept: VASCULAR SURGERY | Age: 84
End: 2019-09-10
Attending: EMERGENCY MEDICINE
Payer: MEDICARE

## 2019-09-10 ENCOUNTER — APPOINTMENT (OUTPATIENT)
Dept: GENERAL RADIOLOGY | Age: 84
End: 2019-09-10
Attending: EMERGENCY MEDICINE
Payer: MEDICARE

## 2019-09-10 ENCOUNTER — APPOINTMENT (OUTPATIENT)
Dept: CT IMAGING | Age: 84
End: 2019-09-10
Attending: EMERGENCY MEDICINE
Payer: MEDICARE

## 2019-09-10 DIAGNOSIS — L03.114 LEFT ARM CELLULITIS: ICD-10-CM

## 2019-09-10 DIAGNOSIS — G93.41 ACUTE METABOLIC ENCEPHALOPATHY: Primary | ICD-10-CM

## 2019-09-10 LAB
ALBUMIN SERPL-MCNC: 2.7 G/DL (ref 3.5–5)
ALBUMIN/GLOB SERPL: 0.7 {RATIO} (ref 1.1–2.2)
ALP SERPL-CCNC: 53 U/L (ref 45–117)
ALT SERPL-CCNC: 55 U/L (ref 12–78)
AMPHET UR QL SCN: NEGATIVE
ANION GAP SERPL CALC-SCNC: 10 MMOL/L (ref 5–15)
APPEARANCE UR: CLEAR
ARTERIAL PATENCY WRIST A: NO
AST SERPL-CCNC: 22 U/L (ref 15–37)
BACTERIA URNS QL MICRO: ABNORMAL /HPF
BARBITURATES UR QL SCN: NEGATIVE
BASE EXCESS BLDV CALC-SCNC: 5 MMOL/L
BASOPHILS # BLD: 0.1 K/UL (ref 0–0.1)
BASOPHILS NFR BLD: 1 % (ref 0–1)
BDY SITE: ABNORMAL
BENZODIAZ UR QL: NEGATIVE
BILIRUB SERPL-MCNC: 0.7 MG/DL (ref 0.2–1)
BILIRUB UR QL: NEGATIVE
BUN SERPL-MCNC: 13 MG/DL (ref 6–20)
BUN/CREAT SERPL: 13 (ref 12–20)
CALCIUM SERPL-MCNC: 8.6 MG/DL (ref 8.5–10.1)
CANNABINOIDS UR QL SCN: NEGATIVE
CHLORIDE SERPL-SCNC: 97 MMOL/L (ref 97–108)
CO2 SERPL-SCNC: 29 MMOL/L (ref 21–32)
COCAINE UR QL SCN: NEGATIVE
COLOR UR: ABNORMAL
COMMENT, HOLDF: NORMAL
CREAT SERPL-MCNC: 1 MG/DL (ref 0.7–1.3)
DIFFERENTIAL METHOD BLD: ABNORMAL
DRUG SCRN COMMENT,DRGCM: NORMAL
EOSINOPHIL # BLD: 0.1 K/UL (ref 0–0.4)
EOSINOPHIL NFR BLD: 1 % (ref 0–7)
EPITH CASTS URNS QL MICRO: ABNORMAL /LPF
ERYTHROCYTE [DISTWIDTH] IN BLOOD BY AUTOMATED COUNT: 15.9 % (ref 11.5–14.5)
ETHANOL SERPL-MCNC: <10 MG/DL
GAS FLOW.O2 O2 DELIVERY SYS: ABNORMAL L/MIN
GAS FLOW.O2 SETTING OXYMISER: 2 L/M
GLOBULIN SER CALC-MCNC: 4 G/DL (ref 2–4)
GLUCOSE SERPL-MCNC: 144 MG/DL (ref 65–100)
GLUCOSE UR STRIP.AUTO-MCNC: NEGATIVE MG/DL
GRAN CASTS URNS QL MICRO: ABNORMAL /LPF
HCO3 BLDV-SCNC: 29.4 MMOL/L (ref 23–28)
HCT VFR BLD AUTO: 38.1 % (ref 36.6–50.3)
HGB BLD-MCNC: 12.1 G/DL (ref 12.1–17)
HGB UR QL STRIP: NEGATIVE
IMM GRANULOCYTES # BLD AUTO: 0.2 K/UL (ref 0–0.04)
IMM GRANULOCYTES NFR BLD AUTO: 2 % (ref 0–0.5)
KETONES UR QL STRIP.AUTO: NEGATIVE MG/DL
LACTATE BLD-SCNC: 1.4 MMOL/L (ref 0.4–2)
LEUKOCYTE ESTERASE UR QL STRIP.AUTO: NEGATIVE
LYMPHOCYTES # BLD: 1.8 K/UL (ref 0.8–3.5)
LYMPHOCYTES NFR BLD: 17 % (ref 12–49)
MCH RBC QN AUTO: 32.3 PG (ref 26–34)
MCHC RBC AUTO-ENTMCNC: 31.8 G/DL (ref 30–36.5)
MCV RBC AUTO: 101.6 FL (ref 80–99)
METHADONE UR QL: NEGATIVE
MONOCYTES # BLD: 1 K/UL (ref 0–1)
MONOCYTES NFR BLD: 9 % (ref 5–13)
NEUTS SEG # BLD: 7.4 K/UL (ref 1.8–8)
NEUTS SEG NFR BLD: 70 % (ref 32–75)
NITRITE UR QL STRIP.AUTO: NEGATIVE
NRBC # BLD: 0 K/UL (ref 0–0.01)
NRBC BLD-RTO: 0 PER 100 WBC
O2/TOTAL GAS SETTING VFR VENT: 28 %
OPIATES UR QL: NEGATIVE
PCO2 BLDV: 44.8 MMHG (ref 41–51)
PCP UR QL: NEGATIVE
PH BLDV: 7.42 [PH] (ref 7.32–7.42)
PH UR STRIP: 7 [PH] (ref 5–8)
PLATELET # BLD AUTO: 223 K/UL (ref 150–400)
PMV BLD AUTO: 9.2 FL (ref 8.9–12.9)
PO2 BLDV: 33 MMHG (ref 25–40)
POTASSIUM SERPL-SCNC: 4 MMOL/L (ref 3.5–5.1)
PROT SERPL-MCNC: 6.7 G/DL (ref 6.4–8.2)
PROT UR STRIP-MCNC: NEGATIVE MG/DL
RBC # BLD AUTO: 3.75 M/UL (ref 4.1–5.7)
RBC #/AREA URNS HPF: ABNORMAL /HPF (ref 0–5)
SAMPLES BEING HELD,HOLD: NORMAL
SAO2 % BLDV: 65 % (ref 65–88)
SODIUM SERPL-SCNC: 136 MMOL/L (ref 136–145)
SP GR UR REFRACTOMETRY: 1 (ref 1–1.03)
SPECIMEN TYPE: ABNORMAL
TOTAL RESP. RATE, ITRR: 12
TROPONIN I SERPL-MCNC: <0.05 NG/ML
UA: UC IF INDICATED,UAUC: ABNORMAL
UROBILINOGEN UR QL STRIP.AUTO: 0.2 EU/DL (ref 0.2–1)
WBC # BLD AUTO: 10.4 K/UL (ref 4.1–11.1)
WBC URNS QL MICRO: ABNORMAL /HPF (ref 0–4)

## 2019-09-10 PROCEDURE — 93005 ELECTROCARDIOGRAM TRACING: CPT

## 2019-09-10 PROCEDURE — 83605 ASSAY OF LACTIC ACID: CPT

## 2019-09-10 PROCEDURE — 81001 URINALYSIS AUTO W/SCOPE: CPT

## 2019-09-10 PROCEDURE — 87077 CULTURE AEROBIC IDENTIFY: CPT

## 2019-09-10 PROCEDURE — 74177 CT ABD & PELVIS W/CONTRAST: CPT

## 2019-09-10 PROCEDURE — 96365 THER/PROPH/DIAG IV INF INIT: CPT

## 2019-09-10 PROCEDURE — 80307 DRUG TEST PRSMV CHEM ANLYZR: CPT

## 2019-09-10 PROCEDURE — 74011000258 HC RX REV CODE- 258: Performed by: INTERNAL MEDICINE

## 2019-09-10 PROCEDURE — 74011636320 HC RX REV CODE- 636/320: Performed by: RADIOLOGY

## 2019-09-10 PROCEDURE — 96366 THER/PROPH/DIAG IV INF ADDON: CPT

## 2019-09-10 PROCEDURE — 99218 HC RM OBSERVATION: CPT

## 2019-09-10 PROCEDURE — 93971 EXTREMITY STUDY: CPT

## 2019-09-10 PROCEDURE — 74011000258 HC RX REV CODE- 258: Performed by: RADIOLOGY

## 2019-09-10 PROCEDURE — 74011250636 HC RX REV CODE- 250/636: Performed by: INTERNAL MEDICINE

## 2019-09-10 PROCEDURE — 51701 INSERT BLADDER CATHETER: CPT

## 2019-09-10 PROCEDURE — 36415 COLL VENOUS BLD VENIPUNCTURE: CPT

## 2019-09-10 PROCEDURE — 74011250636 HC RX REV CODE- 250/636: Performed by: EMERGENCY MEDICINE

## 2019-09-10 PROCEDURE — 96372 THER/PROPH/DIAG INJ SC/IM: CPT

## 2019-09-10 PROCEDURE — 84484 ASSAY OF TROPONIN QUANT: CPT

## 2019-09-10 PROCEDURE — 80053 COMPREHEN METABOLIC PANEL: CPT

## 2019-09-10 PROCEDURE — 74011250637 HC RX REV CODE- 250/637: Performed by: INTERNAL MEDICINE

## 2019-09-10 PROCEDURE — 99285 EMERGENCY DEPT VISIT HI MDM: CPT

## 2019-09-10 PROCEDURE — 96375 TX/PRO/DX INJ NEW DRUG ADDON: CPT

## 2019-09-10 PROCEDURE — 87086 URINE CULTURE/COLONY COUNT: CPT

## 2019-09-10 PROCEDURE — 70450 CT HEAD/BRAIN W/O DYE: CPT

## 2019-09-10 PROCEDURE — 82803 BLOOD GASES ANY COMBINATION: CPT

## 2019-09-10 PROCEDURE — 71045 X-RAY EXAM CHEST 1 VIEW: CPT

## 2019-09-10 PROCEDURE — 85025 COMPLETE CBC W/AUTO DIFF WBC: CPT

## 2019-09-10 RX ORDER — DOCUSATE SODIUM 100 MG/1
100 CAPSULE, LIQUID FILLED ORAL
Status: DISCONTINUED | OUTPATIENT
Start: 2019-09-10 | End: 2019-09-12 | Stop reason: HOSPADM

## 2019-09-10 RX ORDER — SODIUM CHLORIDE 0.9 % (FLUSH) 0.9 %
5-40 SYRINGE (ML) INJECTION EVERY 8 HOURS
Status: DISCONTINUED | OUTPATIENT
Start: 2019-09-10 | End: 2019-09-12 | Stop reason: HOSPADM

## 2019-09-10 RX ORDER — DEXAMETHASONE 4 MG/1
2 TABLET ORAL DAILY
Status: DISCONTINUED | OUTPATIENT
Start: 2019-09-11 | End: 2019-09-12 | Stop reason: HOSPADM

## 2019-09-10 RX ORDER — GABAPENTIN 600 MG/1
300 TABLET ORAL 2 TIMES DAILY
Status: DISCONTINUED | OUTPATIENT
Start: 2019-09-10 | End: 2019-09-12 | Stop reason: HOSPADM

## 2019-09-10 RX ORDER — ACETAMINOPHEN 325 MG/1
650 TABLET ORAL
Status: DISCONTINUED | OUTPATIENT
Start: 2019-09-10 | End: 2019-09-12 | Stop reason: HOSPADM

## 2019-09-10 RX ORDER — FUROSEMIDE 40 MG/1
40 TABLET ORAL DAILY
Status: DISCONTINUED | OUTPATIENT
Start: 2019-09-11 | End: 2019-09-12 | Stop reason: HOSPADM

## 2019-09-10 RX ORDER — ATORVASTATIN CALCIUM 10 MG/1
10 TABLET, FILM COATED ORAL
Status: DISCONTINUED | OUTPATIENT
Start: 2019-09-10 | End: 2019-09-12 | Stop reason: HOSPADM

## 2019-09-10 RX ORDER — CLOPIDOGREL BISULFATE 75 MG/1
75 TABLET ORAL DAILY
Status: DISCONTINUED | OUTPATIENT
Start: 2019-09-11 | End: 2019-09-12 | Stop reason: HOSPADM

## 2019-09-10 RX ORDER — FUROSEMIDE 40 MG/1
40 TABLET ORAL DAILY
COMMUNITY

## 2019-09-10 RX ORDER — ESCITALOPRAM OXALATE 10 MG/1
10 TABLET ORAL DAILY
Status: DISCONTINUED | OUTPATIENT
Start: 2019-09-11 | End: 2019-09-12 | Stop reason: HOSPADM

## 2019-09-10 RX ORDER — SODIUM CHLORIDE 0.9 % (FLUSH) 0.9 %
5-40 SYRINGE (ML) INJECTION AS NEEDED
Status: DISCONTINUED | OUTPATIENT
Start: 2019-09-10 | End: 2019-09-12 | Stop reason: HOSPADM

## 2019-09-10 RX ORDER — ENOXAPARIN SODIUM 100 MG/ML
40 INJECTION SUBCUTANEOUS EVERY 24 HOURS
Status: DISCONTINUED | OUTPATIENT
Start: 2019-09-10 | End: 2019-09-12 | Stop reason: HOSPADM

## 2019-09-10 RX ORDER — LEVETIRACETAM 500 MG/1
500 TABLET ORAL 2 TIMES DAILY
Status: DISCONTINUED | OUTPATIENT
Start: 2019-09-10 | End: 2019-09-12 | Stop reason: HOSPADM

## 2019-09-10 RX ORDER — SENNOSIDES 8.6 MG/1
1 TABLET ORAL DAILY
Status: DISCONTINUED | OUTPATIENT
Start: 2019-09-11 | End: 2019-09-12 | Stop reason: HOSPADM

## 2019-09-10 RX ORDER — ESCITALOPRAM OXALATE 10 MG/1
10 TABLET ORAL DAILY
COMMUNITY

## 2019-09-10 RX ORDER — CIPROFLOXACIN HYDROCHLORIDE 3.5 MG/ML
1 SOLUTION/ DROPS TOPICAL EVERY 12 HOURS
COMMUNITY
End: 2019-09-12

## 2019-09-10 RX ORDER — SODIUM CHLORIDE 0.9 % (FLUSH) 0.9 %
10 SYRINGE (ML) INJECTION
Status: COMPLETED | OUTPATIENT
Start: 2019-09-10 | End: 2019-09-10

## 2019-09-10 RX ORDER — CEFAZOLIN SODIUM/WATER 2 G/20 ML
2 SYRINGE (ML) INTRAVENOUS
Status: COMPLETED | OUTPATIENT
Start: 2019-09-10 | End: 2019-09-10

## 2019-09-10 RX ADMIN — SODIUM CHLORIDE 100 ML: 900 INJECTION, SOLUTION INTRAVENOUS at 15:08

## 2019-09-10 RX ADMIN — Medication 2 G: at 16:34

## 2019-09-10 RX ADMIN — IOPAMIDOL 100 ML: 755 INJECTION, SOLUTION INTRAVENOUS at 15:08

## 2019-09-10 RX ADMIN — GABAPENTIN 300 MG: 600 TABLET, FILM COATED ORAL at 23:59

## 2019-09-10 RX ADMIN — PIPERACILLIN SODIUM,TAZOBACTAM SODIUM 3.38 G: 3; .375 INJECTION, POWDER, FOR SOLUTION INTRAVENOUS at 19:34

## 2019-09-10 RX ADMIN — Medication 10 ML: at 15:08

## 2019-09-10 RX ADMIN — ENOXAPARIN SODIUM 40 MG: 40 INJECTION SUBCUTANEOUS at 19:29

## 2019-09-10 RX ADMIN — LEVETIRACETAM 500 MG: 500 TABLET, FILM COATED ORAL at 23:59

## 2019-09-10 RX ADMIN — Medication 10 ML: at 19:32

## 2019-09-10 RX ADMIN — ATORVASTATIN CALCIUM 10 MG: 10 TABLET, FILM COATED ORAL at 23:59

## 2019-09-10 NOTE — PROGRESS NOTES
Patient settled in bed, vital signs taken and assessment complete,  family at bedside. Patient is alert to voice, but falls back to sleep quickly. Bedside shift change report given to Sofie Gale (oncoming nurse) by Abdelrahman Heath RN (offgoing nurse). Report included the following information SBAR, Kardex, ED Summary, Intake/Output, MAR, Accordion, Recent Results, Med Rec Status and Cardiac Rhythm NSR.

## 2019-09-10 NOTE — ED PROVIDER NOTES
80 y.o. male with past medical history significant for GERD, TIA, maxillary sinus cancer, HTN, trigeminal neuralgia, and hypercholesterolemia who presents from home with chief complaint of fatigue. Per EMS, pt has had increased fatigue and lethargy for the past 3 days. EMS states the daughter reports the pt has been sleeping all day. EMS notes the pt has a swollen left arm. Pt started a new anti-depressant 3 weeks ago. There are no other acute medical concerns at this time. Full history, physical exam, and ROS unable to be obtained due to:  confusion. Chart Review: ED visit on 8/24/19 for confusion and was discharged home. Social hx: Former Smoker. Denies EtOH Use. PCP: Darylene Manifold, MD    Note written by Omega Carmen.  Lyly Qureshi, as dictated by Mike Contreras, DO 1:13 PM             Past Medical History:   Diagnosis Date    Arthritis     HANDS    Beta-blocker therapy     Cancer ([facility] [address] 2013    MAXILLARY SINUS CANCER, RADIATION AND CHEMO    Cancer ([facility] [address]     SKIN CA ON NOSE    GERD (gastroesophageal reflux disease)     Hypercholesterolemia     Hypertension     pt denies    Ill-defined condition     missing left eye    Nasal sinus tumor     Numbness of LEFT hand & LEFT face 2010    PUD (peptic ulcer disease)     GERD    RBBB (right bundle branch block)     TIA (transient ischemic attack)     12 TIA's over 9 YRS.1ST ONE IN 8/03- LAST IN 2/12      Trigeminal neuralgia        Past Surgical History:   Procedure Laterality Date    HX CATARACT REMOVAL Bilateral     HX GI      COLONOSCOPY    HX HEENT      BIOPSY OF SINUS     HX HEENT Left     sew eye closed    HX HERNIA REPAIR Right     INGUINAL    HX KNEE ARTHROSCOPY Right 2007    HX KNEE REPLACEMENT Right 2012    Total Knee replacement- right    HX ROTATOR CUFF REPAIR Right     right rotator cuff repair         Family History:   Problem Relation Age of Onset    Cancer Mother         colon    Cancer Father         throat    No Known Problems Brother     Anesth Problems Neg Hx        Social History     Socioeconomic History    Marital status:      Spouse name: Not on file    Number of children: Not on file    Years of education: Not on file    Highest education level: Not on file   Occupational History    Not on file   Social Needs    Financial resource strain: Not on file    Food insecurity:     Worry: Not on file     Inability: Not on file    Transportation needs:     Medical: Not on file     Non-medical: Not on file   Tobacco Use    Smoking status: Former Smoker     Packs/day: 1.00     Years: 40.00     Pack years: 40.00     Last attempt to quit: 1982     Years since quittin.7    Smokeless tobacco: Never Used    Tobacco comment: OCCAS CIGAR   Substance and Sexual Activity    Alcohol use: No     Alcohol/week: 17.5 standard drinks     Types: 21 Glasses of wine per week     Frequency: Never    Drug use: No    Sexual activity: Not on file   Lifestyle    Physical activity:     Days per week: Not on file     Minutes per session: Not on file    Stress: Not on file   Relationships    Social connections:     Talks on phone: Not on file     Gets together: Not on file     Attends Alevism service: Not on file     Active member of club or organization: Not on file     Attends meetings of clubs or organizations: Not on file     Relationship status: Not on file    Intimate partner violence:     Fear of current or ex partner: Not on file     Emotionally abused: Not on file     Physically abused: Not on file     Forced sexual activity: Not on file   Other Topics Concern     Service Not Asked    Blood Transfusions Not Asked    Caffeine Concern Not Asked    Occupational Exposure Not Asked   [employer] Hazards Not Asked    Sleep Concern Not Asked    Stress Concern Not Asked    Weight Concern Not Asked    Special Diet Not Asked    Back Care Not Asked    Exercise Not Asked    Bike Helmet Not Asked    [facility] Not Asked    Self-Exams Not Asked   Social History Narrative    Not on file         ALLERGIES: Adhesive tape-silicones; Aggrenox [aspirin-dipyridamole]; Amlodipine; Diuril [chlorothiazide]; and Hydrochlorothiazide    Review of Systems   Unable to perform ROS: Mental status change       Vitals:    09/10/19 1341   BP: 122/66   Pulse: 66   Resp: 15   Temp: 98.4 °F (36.9 °C)   SpO2: 95%            Physical Exam      Constitutional: chronically ill  Resting  Eyes closed  Responds to painful stim  Mouth/Throat: Oropharynx is clear. No oropharyngeal exudate. Dry mucous membranes. Eyes: Conjunctivae and extraocular motions are normal. 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 Right pupil is 3 mm. Right eye exhibits no discharge. No scleral icterus. Neck: No tracheal deviation present. Supple neck. Cardiovascular: Normal rate, regular rhythm, normal heart sounds and intact distal pulses. Exam reveals no gallop and no friction rub. No murmur heard. Pulmonary/Chest: Effort normal and breath sounds normal.  Pt  has no wheezes. Pt  has no rales. Abdominal: Soft. Tender diffusely lower. Obese. No mass. Edema. Musculoskeletal:  Pt  exhibits no edema and no tenderness. Edematous left arm that is warm. Legs are wrapped in coband. Bilateral lower extremity edema up to the abdomen. Thighs are not warm. Neurological: Responds to pain, and opening of the right eye. Note written by Yosi Stern. Leanne Salazar, as dictated by Alex Schmidt DO 1:23 PM      MDM  Number of Diagnoses or Management Options  Critical Care  Total time providing critical care: 30-74 minutes (30 minutes.)         Procedures    ED EKG interpretation:  Rhythm: normal sinus rhythm with a 1st degrees AV block; and regular . Rate (approx.): 66; Axis: normal; ST/T wave: normal; RBBB. No STEMI. QTC 4.71 ms. Note written by Yosi Stern. Leanne Salazar, as dictated by Alex Schmidt DO 1:03 PM          2:41 PM - I met his 2 daughters.   He has been sleeping excessively for 1-2 weeks  Ate supper last night  Walks with a rollator  Today, this am, he did not want to eat or drink - did not walk today at all. Just dead weight to them. His LUE has been swelling off and on for one month. He has been on abx once. Wound care dr is assessing his LUE as well. He has an appt this Thursday, in two days. They are worried that his LUE is infected. Had an MRI of brain last week. Has appt with Dr Jany Bergeron tomorrow at 9 am as a f/u for his head/neck cancer. MRI findings reviewed - stable changes. Some more fluid in sinuses. Hospitalist Claudine for Admission  2:59 PM    ED Room Number: ER16/16  Patient Name and age:  Marilyn Wisdom 80 y.o.  male  Working Diagnosis:   1. Acute metabolic encephalopathy    2. Left arm cellulitis      Readmission: no  Isolation Requirements:  no  Recommended Level of Care:  telemetry  Department:[facility] Adult ED - 21   Other:  No fever.   Nonpurulent cellulitis      abx  No fever so no blood cx done  Admit

## 2019-09-10 NOTE — H&P
HISTORY AND PHYSICAL      PCP: Brynn Prader, MD  History source: Daugter      CC: AMS      HPI: A 80year old male patient with PMH of Maxillary sinus cancer, HTN, HLD, DVT, GERD presented to ED from [facility] for evaluation of AMS. Patient is poor historian as he is altered. Most of hx obtained from daughter. His mentation was at his baseline last venin but appeared to be more tired than his normal. This morning, they were unable to wake him up. She noticed recent increase in swelling on his right arm and warmth. He follows with wound care for his bilateral leg swellings which has been improving but daughter noticed today that they are more erythematous. No documented fever. No discharge or pain. Abdomen looks distended. No known hx diarrhea or constipation. Patient has baseline dementia per daughter. New anti depreesant was started 3 weeks ago. In ED, CT head, CT abd and venous duplex ordered. Cefazolin given. hospitalist consulted for admission. Daughter dis mention that they are considering for hospice services.      PMH/PSH:  Past Medical History:   Diagnosis Date    Arthritis     HANDS    Beta-blocker therapy     Cancer ([facility] [address] 2013    MAXILLARY SINUS CANCER, RADIATION AND CHEMO    Cancer ([facility] [address]     SKIN CA ON NOSE    GERD (gastroesophageal reflux disease)     Hypercholesterolemia     Hypertension     pt denies    Ill-defined condition     missing left eye    Nasal sinus tumor     Numbness of LEFT hand & LEFT face 2010    PUD (peptic ulcer disease)     GERD    RBBB (right bundle branch block)     TIA (transient ischemic attack)     12 TIA's over 9 YRS.1ST ONE IN 8/03- LAST IN 2/12      Trigeminal neuralgia      Past Surgical History:   Procedure Laterality Date    HX CATARACT REMOVAL Bilateral     HX GI      COLONOSCOPY    HX HEENT      BIOPSY OF SINUS     HX HEENT Left     sew eye closed    HX HERNIA REPAIR Right     INGUINAL    HX KNEE ARTHROSCOPY Right 2007    HX KNEE REPLACEMENT Right 2012    Total Knee replacement- right    HX ROTATOR CUFF REPAIR Right     right rotator cuff repair       Home meds:   Prior to Admission medications    Medication Sig Start Date End Date Taking? Authorizing Provider   furosemide (LASIX) 40 mg tablet Take 40 mg by mouth daily. Yes Provider, Historical   escitalopram oxalate (LEXAPRO) 10 mg tablet Take 10 mg by mouth daily. Yes Provider, Historical   ciprofloxacin HCl (CILOXAN) 0.3 % ophthalmic solution Administer 1 Drop to right eye every twelve (12) hours. Yes Provider, Historical   potassium chloride (K-DUR, KLOR-CON) 20 mEq tablet Take 20 mEq by mouth two (2) times a day. Yes Provider, Historical   peg 400-propylene glycol (SYSTANE, PROPYLENE GLYCOL,) 0.4-0.3 % drop Administer 1 Drop to right eye four (4) times daily. Yes Other, MD Brayan   dexamethasone (DECADRON) 1 mg tablet Take 2 mg by mouth daily. Yes Jethro Lim MD   senna (SENNA LAXATIVE) 8.6 mg tablet Take 8.6 mg by mouth daily. 12/9/17  Yes Provider, Historical   acetaminophen (TYLENOL EXTRA STRENGTH) 500 mg tablet Take 2 Tabs by mouth as needed for Pain. No more than 3grams in 24hour period  Indications: Pain 11/25/17  Yes Ethan Hurley MD   gabapentin (NEURONTIN) 600 mg tablet Take 0.5 Tabs by mouth two (2) times a day. Indications: trigeminal neuralgia 11/25/17  Yes Ethan Hurley MD   levETIRAcetam (KEPPRA) 500 mg tablet Take 1 Tab by mouth two (2) times a day. 11/25/17  Yes Ethan Hurley MD   cyanocobalamin (VITAMIN B-12) 1,000 mcg tablet Take 1,000 mcg by mouth daily. Yes Provider, Historical   cholecalciferol (VITAMIN D3) 1,000 unit cap Take 1,000 Units by mouth daily. Yes Provider, Historical   docusate sodium (COLACE) 100 mg capsule Take 100 mg by mouth nightly. Yes Provider, Historical   bacitracin ophthalmic ointment Administer  to left eye daily. 1/2/15  Yes Provider, Historical   clopidogrel (PLAVIX) 75 mg tablet Take 75 mg by mouth daily.  TAKES IN AM   Yes Provider, Historical   atorvastatin (LIPITOR) 10 mg tablet Take 10 mg by mouth nightly. Yes Provider, Historical       Allergies: Allergies   Allergen Reactions    Adhesive Tape-Silicones Other (comments)     REDNESS    Aggrenox [Aspirin-Dipyridamole] Other (comments)     headache    Amlodipine Rash    Diuril [Chlorothiazide] Unknown (comments)    Hydrochlorothiazide Other (comments)     LOW NA       FH:  Family History   Problem Relation Age of Onset    Cancer Mother         colon    Cancer Father         throat    No Known Problems Brother     Anesth Problems Neg Hx        SH:  Social History     Tobacco Use    Smoking status: Former Smoker     Packs/day: 1.00     Years: 40.00     Pack years: 40.00     Last attempt to quit: 1982     Years since quittin.7    Smokeless tobacco: Never Used    Tobacco comment: OCCAS CIGAR   Substance Use Topics    Alcohol use: No     Alcohol/week: 17.5 standard drinks     Types: 21 Glasses of wine per week     Frequency: Never       ROS: Review of systems not obtained due to patient factors. PHYSICAL EXAM:  Visit Vitals  /66 (BP 1 Location: Right arm, BP Patient Position: At rest)   Pulse 63   Temp 98.1 °F (36.7 °C)   Resp 15   Ht 5' 9\" (1.753 m)   Wt 111.6 kg (246 lb)   SpO2 98%   BMI 36.33 kg/m²       Gen: NAD  HEENT: anicteric sclerae, normal conjunctiva, oropharynx clear, MM moist  Neck: supple, trachea midline, no adenopathy  Heart: RRR, no MRG, no JVD, no peripheral edema  Lungs: CTA b/l, non-labored respirations  Abd: soft, NT, Distended, BS+, no organomegaly  Extr: LUE: swollen erythematous. B/l LE: wrapped but erythematous. Edematous.    Skin: dry, no rash  Neuro:  Lethargic     Labs/Imaging:  Recent Results (from the past 24 hour(s))   EKG, 12 LEAD, INITIAL    Collection Time: 09/10/19  1:06 PM   Result Value Ref Range    Ventricular Rate 73 BPM    Atrial Rate 73 BPM    P-R Interval 212 ms    QRS Duration 124 ms    Q-T Interval 426 ms QTC Calculation (Bezet) 469 ms    Calculated P Axis 63 degrees    Calculated R Axis 5 degrees    Calculated T Axis 20 degrees    Diagnosis       Sinus rhythm with 1st degree AV block with occasional premature ventricular   complexes  Right bundle branch block  Inferior infarct (cited on or before 24-AUG-2019)  When compared with ECG of 24-AUG-2019 09:43,  premature ventricular complexes are now present  Questionable change in initial forces of Inferior leads     CBC WITH AUTOMATED DIFF    Collection Time: 09/10/19  1:23 PM   Result Value Ref Range    WBC 10.4 4.1 - 11.1 K/uL    RBC 3.75 (L) 4.10 - 5.70 M/uL    HGB 12.1 12.1 - 17.0 g/dL    HCT 38.1 36.6 - 50.3 %    .6 (H) 80.0 - 99.0 FL    MCH 32.3 26.0 - 34.0 PG    MCHC 31.8 30.0 - 36.5 g/dL    RDW 15.9 (H) 11.5 - 14.5 %    PLATELET 629 443 - 156 K/uL    MPV 9.2 8.9 - 12.9 FL    NRBC 0.0 0  WBC    ABSOLUTE NRBC 0.00 0.00 - 0.01 K/uL    NEUTROPHILS 70 32 - 75 %    LYMPHOCYTES 17 12 - 49 %    MONOCYTES 9 5 - 13 %    EOSINOPHILS 1 0 - 7 %    BASOPHILS 1 0 - 1 %    IMMATURE GRANULOCYTES 2 (H) 0.0 - 0.5 %    ABS. NEUTROPHILS 7.4 1.8 - 8.0 K/UL    ABS. LYMPHOCYTES 1.8 0.8 - 3.5 K/UL    ABS. MONOCYTES 1.0 0.0 - 1.0 K/UL    ABS. EOSINOPHILS 0.1 0.0 - 0.4 K/UL    ABS. BASOPHILS 0.1 0.0 - 0.1 K/UL    ABS. IMM. GRANS. 0.2 (H) 0.00 - 0.04 K/UL    DF AUTOMATED     METABOLIC PANEL, COMPREHENSIVE    Collection Time: 09/10/19  1:23 PM   Result Value Ref Range    Sodium 136 136 - 145 mmol/L    Potassium 4.0 3.5 - 5.1 mmol/L    Chloride 97 97 - 108 mmol/L    CO2 29 21 - 32 mmol/L    Anion gap 10 5 - 15 mmol/L    Glucose 144 (H) 65 - 100 mg/dL    BUN 13 6 - 20 MG/DL    Creatinine 1.00 0.70 - 1.30 MG/DL    BUN/Creatinine ratio 13 12 - 20      GFR est AA >60 >60 ml/min/1.73m2    GFR est non-AA >60 >60 ml/min/1.73m2    Calcium 8.6 8.5 - 10.1 MG/DL    Bilirubin, total 0.7 0.2 - 1.0 MG/DL    ALT (SGPT) 55 12 - 78 U/L    AST (SGOT) 22 15 - 37 U/L    Alk.  phosphatase 53 45 - 117 U/L    Protein, total 6.7 6.4 - 8.2 g/dL    Albumin 2.7 (L) 3.5 - 5.0 g/dL    Globulin 4.0 2.0 - 4.0 g/dL    A-G Ratio 0.7 (L) 1.1 - 2.2     SAMPLES BEING HELD    Collection Time: 09/10/19  1:23 PM   Result Value Ref Range    SAMPLES BEING HELD 1BLU     COMMENT        Add-on orders for these samples will be processed based on acceptable specimen integrity and analyte stability, which may vary by analyte.    TROPONIN I    Collection Time: 09/10/19  1:23 PM   Result Value Ref Range    Troponin-I, Qt. <0.05 <0.05 ng/mL   ETHYL ALCOHOL    Collection Time: 09/10/19  1:23 PM   Result Value Ref Range    ALCOHOL(ETHYL),SERUM <10 <10 MG/DL   DRUG SCREEN, URINE    Collection Time: 09/10/19  1:23 PM   Result Value Ref Range    AMPHETAMINES NEGATIVE  NEG      BARBITURATES NEGATIVE  NEG      BENZODIAZEPINES NEGATIVE  NEG      COCAINE NEGATIVE  NEG      METHADONE NEGATIVE  NEG      OPIATES NEGATIVE  NEG      PCP(PHENCYCLIDINE) NEGATIVE  NEG      THC (TH-CANNABINOL) NEGATIVE  NEG      Drug screen comment (NOTE)    URINALYSIS W/ REFLEX CULTURE    Collection Time: 09/10/19  1:23 PM   Result Value Ref Range    Color YELLOW/STRAW      Appearance CLEAR CLEAR      Specific gravity 1.005 1.003 - 1.030      pH (UA) 7.0 5.0 - 8.0      Protein NEGATIVE  NEG mg/dL    Glucose NEGATIVE  NEG mg/dL    Ketone NEGATIVE  NEG mg/dL    Bilirubin NEGATIVE  NEG      Blood NEGATIVE  NEG      Urobilinogen 0.2 0.2 - 1.0 EU/dL    Nitrites NEGATIVE  NEG      Leukocyte Esterase NEGATIVE  NEG      WBC 5-10 0 - 4 /hpf    RBC 0-5 0 - 5 /hpf    Epithelial cells FEW FEW /lpf    Bacteria 2+ (A) NEG /hpf    UA:UC IF INDICATED URINE CULTURE ORDERED (A) CNI      Granular cast 0-2 (A) NEG /lpf   POC LACTIC ACID    Collection Time: 09/10/19  1:25 PM   Result Value Ref Range    Lactic Acid (POC) 1.40 0.40 - 2.00 mmol/L   POC VENOUS BLOOD GAS    Collection Time: 09/10/19  1:51 PM   Result Value Ref Range    Device: NASAL CANNULA      Flow rate (POC) 2.0 L/M    FIO2 (POC) 28 %    pH, venous (POC) 7.424 (H) 7.32 - 7.42      pCO2, venous (POC) 44.8 41 - 51 MMHG    pO2, venous (POC) 33 25 - 40 mmHg    HCO3, venous (POC) 29.4 (H) 23.0 - 28.0 MMOL/L    sO2, venous (POC) 65 65 - 88 %    Base excess, venous (POC) 5 mmol/L    Allens test (POC) NO      Total resp. rate 12      Site OTHER      Specimen type (POC) VENOUS BLOOD         Recent Labs     09/10/19  1323   WBC 10.4   HGB 12.1   HCT 38.1        Recent Labs     09/10/19  1323      K 4.0   CL 97   CO2 29   BUN 13   CREA 1.00   *   CA 8.6     Recent Labs     09/10/19  1323   SGOT 22   ALT 55   AP 53   TBILI 0.7   TP 6.7   ALB 2.7*   GLOB 4.0       Recent Labs     09/10/19  1323   TROIQ <0.05       No results for input(s): INR, PTP, APTT in the last 72 hours. No lab exists for component: INREXT     No results for input(s): PH, PCO2, PO2 in the last 72 hours. Assessment & Plan:   Acute Metabolic encephalopathy  - possible due to Cellulitis  - admit to tele  - normal wbc, lactic acid  - CT head: Sinus disease. No acute intracranial findings.  - CT abd: . Hepatic steatosis.   Mild bibasilar atelectasis  - Venous duplex: no DVT  - Podiatry consulted  - started on IV zosyn    Bilateral leg swelling - chronic  - will cellulitis, abx as above  - c/w home meds gabapentin, lasix     Maxillary sinus cancer  Radiation necrosis s/p craniotomy   - follows with Dr. Isauro Del Real  - recent MRI brain on 9/3: stable and chronic changes   - c/w home meds keppra and decadron     Palliative care consulted     DVT ppx: Lovenox   Code status: DNR  Disposition: TBD    Signed By: Marybeth Padilla MD     September 10, 2019

## 2019-09-10 NOTE — ED TRIAGE NOTES
Patient brought in by EMS from [place] [address] with sleeping more than normal for 3 days.  12 lead additionally showed STEMI, second 12 lead did not show stemi

## 2019-09-10 NOTE — ED NOTES
Pt lives at [address] [facility]. Daughter reports yesterday when she visited pt he was \"barely able to walk. \" Pt uses a rollator to ambulate. LEFT hand swelling/redness has \"been going on a few days but the redness comes and goes and has been present for a couple days now. \"

## 2019-09-10 NOTE — ROUTINE PROCESS
TRANSFER - OUT REPORT:    Verbal report given to Dariusz Jones RN(name) on Nazanin Jimenes  being transferred to (unit) for routine progression of care       Report consisted of patients Situation, Background, Assessment and   Recommendations(SBAR). Information from the following report(s) SBAR, ED Summary, Procedure Summary, MAR and Recent Results was reviewed with the receiving nurse. Lines:   Peripheral IV 09/10/19 Right; Lower Forearm (Active)   Site Assessment Clean, dry, & intact 9/10/2019  1:56 PM   Phlebitis Assessment 0 9/10/2019  1:56 PM   Infiltration Assessment 0 9/10/2019  1:56 PM   Dressing Status Clean, dry, & intact 9/10/2019  1:56 PM        Opportunity for questions and clarification was provided.

## 2019-09-11 LAB
ANION GAP SERPL CALC-SCNC: 7 MMOL/L (ref 5–15)
ATRIAL RATE: 66 BPM
BASOPHILS # BLD: 0 K/UL (ref 0–0.1)
BASOPHILS NFR BLD: 0 % (ref 0–1)
BUN SERPL-MCNC: 13 MG/DL (ref 6–20)
BUN/CREAT SERPL: 15 (ref 12–20)
CALCIUM SERPL-MCNC: 8.7 MG/DL (ref 8.5–10.1)
CALCULATED P AXIS, ECG09: 71 DEGREES
CALCULATED R AXIS, ECG10: 0 DEGREES
CALCULATED T AXIS, ECG11: 5 DEGREES
CHLORIDE SERPL-SCNC: 97 MMOL/L (ref 97–108)
CO2 SERPL-SCNC: 30 MMOL/L (ref 21–32)
CREAT SERPL-MCNC: 0.86 MG/DL (ref 0.7–1.3)
DIAGNOSIS, 93000: NORMAL
DIFFERENTIAL METHOD BLD: ABNORMAL
EOSINOPHIL # BLD: 0.1 K/UL (ref 0–0.4)
EOSINOPHIL NFR BLD: 1 % (ref 0–7)
ERYTHROCYTE [DISTWIDTH] IN BLOOD BY AUTOMATED COUNT: 15.9 % (ref 11.5–14.5)
GLUCOSE SERPL-MCNC: 98 MG/DL (ref 65–100)
HCT VFR BLD AUTO: 35.5 % (ref 36.6–50.3)
HGB BLD-MCNC: 11.2 G/DL (ref 12.1–17)
IMM GRANULOCYTES # BLD AUTO: 0 K/UL
IMM GRANULOCYTES NFR BLD AUTO: 0 %
LYMPHOCYTES # BLD: 2 K/UL (ref 0.8–3.5)
LYMPHOCYTES NFR BLD: 25 % (ref 12–49)
MCH RBC QN AUTO: 31.9 PG (ref 26–34)
MCHC RBC AUTO-ENTMCNC: 31.5 G/DL (ref 30–36.5)
MCV RBC AUTO: 101.1 FL (ref 80–99)
MONOCYTES # BLD: 0.5 K/UL (ref 0–1)
MONOCYTES NFR BLD: 6 % (ref 5–13)
NEUTS SEG # BLD: 5.4 K/UL (ref 1.8–8)
NEUTS SEG NFR BLD: 68 % (ref 32–75)
NRBC # BLD: 0 K/UL (ref 0–0.01)
NRBC BLD-RTO: 0 PER 100 WBC
P-R INTERVAL, ECG05: 216 MS
PLATELET # BLD AUTO: 199 K/UL (ref 150–400)
PMV BLD AUTO: 9.3 FL (ref 8.9–12.9)
POTASSIUM SERPL-SCNC: 3.6 MMOL/L (ref 3.5–5.1)
Q-T INTERVAL, ECG07: 450 MS
QRS DURATION, ECG06: 128 MS
QTC CALCULATION (BEZET), ECG08: 471 MS
RBC # BLD AUTO: 3.51 M/UL (ref 4.1–5.7)
RBC MORPH BLD: ABNORMAL
RBC MORPH BLD: ABNORMAL
SODIUM SERPL-SCNC: 134 MMOL/L (ref 136–145)
VENTRICULAR RATE, ECG03: 66 BPM
WBC # BLD AUTO: 8 K/UL (ref 4.1–11.1)
WBC MORPH BLD: ABNORMAL

## 2019-09-11 PROCEDURE — 85025 COMPLETE CBC W/AUTO DIFF WBC: CPT

## 2019-09-11 PROCEDURE — 74011250637 HC RX REV CODE- 250/637: Performed by: INTERNAL MEDICINE

## 2019-09-11 PROCEDURE — 96372 THER/PROPH/DIAG INJ SC/IM: CPT

## 2019-09-11 PROCEDURE — 74011250636 HC RX REV CODE- 250/636: Performed by: INTERNAL MEDICINE

## 2019-09-11 PROCEDURE — 80048 BASIC METABOLIC PNL TOTAL CA: CPT

## 2019-09-11 PROCEDURE — 99218 HC RM OBSERVATION: CPT

## 2019-09-11 PROCEDURE — 74011000258 HC RX REV CODE- 258: Performed by: INTERNAL MEDICINE

## 2019-09-11 PROCEDURE — 76450000000

## 2019-09-11 PROCEDURE — 96366 THER/PROPH/DIAG IV INF ADDON: CPT

## 2019-09-11 PROCEDURE — 36415 COLL VENOUS BLD VENIPUNCTURE: CPT

## 2019-09-11 RX ADMIN — ENOXAPARIN SODIUM 40 MG: 40 INJECTION SUBCUTANEOUS at 17:32

## 2019-09-11 RX ADMIN — Medication 10 ML: at 20:39

## 2019-09-11 RX ADMIN — PIPERACILLIN SODIUM,TAZOBACTAM SODIUM 3.38 G: 3; .375 INJECTION, POWDER, FOR SOLUTION INTRAVENOUS at 19:39

## 2019-09-11 RX ADMIN — LEVETIRACETAM 500 MG: 500 TABLET, FILM COATED ORAL at 17:35

## 2019-09-11 RX ADMIN — LEVETIRACETAM 500 MG: 500 TABLET, FILM COATED ORAL at 08:41

## 2019-09-11 RX ADMIN — DOCUSATE SODIUM 100 MG: 100 CAPSULE, LIQUID FILLED ORAL at 20:39

## 2019-09-11 RX ADMIN — FUROSEMIDE 40 MG: 40 TABLET ORAL at 08:41

## 2019-09-11 RX ADMIN — PIPERACILLIN SODIUM,TAZOBACTAM SODIUM 3.38 G: 3; .375 INJECTION, POWDER, FOR SOLUTION INTRAVENOUS at 04:23

## 2019-09-11 RX ADMIN — Medication 10 ML: at 00:00

## 2019-09-11 RX ADMIN — Medication 10 ML: at 15:02

## 2019-09-11 RX ADMIN — CLOPIDOGREL BISULFATE 75 MG: 75 TABLET ORAL at 08:41

## 2019-09-11 RX ADMIN — DEXAMETHASONE 2 MG: 4 TABLET ORAL at 08:41

## 2019-09-11 RX ADMIN — SENNOSIDES 8.6 MG: 8.6 TABLET, FILM COATED ORAL at 08:41

## 2019-09-11 RX ADMIN — PIPERACILLIN SODIUM,TAZOBACTAM SODIUM 3.38 G: 3; .375 INJECTION, POWDER, FOR SOLUTION INTRAVENOUS at 12:03

## 2019-09-11 RX ADMIN — ATORVASTATIN CALCIUM 10 MG: 10 TABLET, FILM COATED ORAL at 20:39

## 2019-09-11 RX ADMIN — DOCUSATE SODIUM 100 MG: 100 CAPSULE, LIQUID FILLED ORAL at 00:30

## 2019-09-11 RX ADMIN — Medication 10 ML: at 06:00

## 2019-09-11 RX ADMIN — GABAPENTIN 300 MG: 600 TABLET, FILM COATED ORAL at 08:42

## 2019-09-11 RX ADMIN — ESCITALOPRAM OXALATE 10 MG: 10 TABLET ORAL at 08:41

## 2019-09-11 RX ADMIN — GABAPENTIN 300 MG: 600 TABLET, FILM COATED ORAL at 17:36

## 2019-09-11 NOTE — PALLIATIVE CARE
Palliative Medicine Social Work    Mr. 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 He was admitted on 9/10 from [address] [facility] with fatigue and lethargy, left hand swelling. CT of head was negative for acute findings; being treated for cellulitis. Recent admissions include 5/9-5/15 for cellulitis.      Patient has an AMD on file which designates his girlfriend, Ivon Aviles ([phone]) as primary mPOA; and son, Rogelio Jiang ([phone]) as secondary. He has also designated a decision for DNR. No DDNR on file. He lives at [address] [facility]. Palliative Medicine was consulted to assist with care decisions. Per chart, family has been considering hospice. Will follow up. Thank you for the opportunity to be involved in the care of Mr. Kaylah Rosas. Hilda Clarke, RAISSA, [facility]-  Palliative Medicine   [employer] ® ACP Facilitator   [phone]

## 2019-09-11 NOTE — PROGRESS NOTES
Occupational Therapy  Chart reviewed; note PT deferral at family request due to current pain level; also currently with other service.  Will retry tomorrow for OT eval. Suzi Luna OTR/L

## 2019-09-11 NOTE — PROGRESS NOTES
NATHALY:  1. Return to [address] [place] [address], possibly tomorrow. 2.  Called facility to coordinate [phone], [facility]ma[facility] Neoga or Fany are contacts, fax number to facility is [phone].  [address], nurse at facility indicated that family had discussed utilizing hospice upon pt's return to [facility]. If this becomes plan, [facility] prefers to set this up at their facility but will need a \"consult to hospice--evaluate and admit\" order sent to them in order for them to set up with their participating agency in the facility per family preference. 4.  Unit CM to follow. Beth Alcala MSW          Reason for Admission:   Acute metabolic encephalopathy                  RRAT Score:     17             Do you (patient/family) have any concerns for transition/discharge? CM met with pt's two daughters Samuel Dong and Lorelei Keller at the bedside. They identified no barriers. They expect pt to return to [address] [place] [address] tomorrow (phone [phone] or Padmaja Lockwood)     Observation notice provided in writing to patient and/or caregiver as well as verbal explanation of the policy. Patients who are in outpatient status also receive the Observation notice. Plan for utilizing home health:   PT and OT will evaluate    Current Advanced Directive/Advance Care Plan: On file, Lesvia Monroe is primary MPOA and son Heriberto Turner and daughter Samuel Dong are secondary MPOA. Transition of Care Plan:   Return to [facility]. CM called facility to speak regarding return. Chart indicates of conversations about possible transition to hospice in the future. Also, [facility] has therapy services on site if needed. Daughter plans to transport pending how pt does with PT tomorrow. Therapy was declined today by pt. Daughter reported if he is not able to walk, will need stretcher. He normally uses a rolling walker or wheelchair and has DME needed.     Marilee Wallace MSW

## 2019-09-11 NOTE — PROGRESS NOTES
Problem: Pressure Injury - Risk of  Goal: *Prevention of pressure injury  Description  Document Davy Scale and appropriate interventions in the flowsheet. Outcome: Progressing Towards Goal  Note:   Pressure Injury Interventions:  Sensory Interventions: Assess changes in LOC, Minimize linen layers, Turn and reposition approx.  every two hours (pillows and wedges if needed), Keep linens dry and wrinkle-free, Check visual cues for pain    Moisture Interventions: Offer toileting Q_hr, Moisture barrier, Minimize layers, Limit adult briefs, Contain wound drainage, Absorbent underpads, Internal/External urinary devices    Activity Interventions: Pressure redistribution bed/mattress(bed type), Increase time out of bed    Mobility Interventions: Pressure redistribution bed/mattress (bed type), HOB 30 degrees or less    Nutrition Interventions: Offer support with meals,snacks and hydration, Document food/fluid/supplement intake    Friction and Shear Interventions: Lift sheet, Foam dressings/transparent film/skin sealants, Apply protective barrier, creams and emollients

## 2019-09-11 NOTE — PROGRESS NOTES
Problem: Pressure Injury - Risk of  Goal: *Prevention of pressure injury  Description  Document Davy Scale and appropriate interventions in the flowsheet. Outcome: Progressing Towards Goal  Note:   Pressure Injury Interventions:  Sensory Interventions: Assess changes in LOC, Float heels, Keep linens dry and wrinkle-free, Minimize linen layers    Moisture Interventions: Internal/External urinary devices    Activity Interventions: Pressure redistribution bed/mattress(bed type)    Mobility Interventions: Float heels, HOB 30 degrees or less, Pressure redistribution bed/mattress (bed type)    Nutrition Interventions: Document food/fluid/supplement intake, Offer support with meals,snacks and hydration, Discuss nutritional consult with provider    Friction and Shear Interventions: HOB 30 degrees or less, Minimize layers, Lift sheet                Problem: Falls - Risk of  Goal: *Absence of Falls  Description  Document Madhu Fall Risk and appropriate interventions in the flowsheet. Outcome: Progressing Towards Goal  Note:   Fall Risk Interventions:  Mobility Interventions: Communicate number of staff needed for ambulation/transfer, OT consult for ADLs, PT Consult for mobility concerns    Mentation Interventions: Adequate sleep, hydration, pain control, Door open when patient unattended, Eyeglasses and hearing aids, Family/sitter at bedside, More frequent rounding, Toileting rounds    Medication Interventions: Teach patient to arise slowly    Elimination Interventions: Call light in reach, Patient to call for help with toileting needs, Stay With Me (per policy), Toileting schedule/hourly rounds    History of Falls Interventions: Bed/chair exit alarm, Door open when patient unattended, Vital signs minimum Q4HRs X 24 hrs (comment for end date)       Washed legs with soap and water, and put silicone cream as requested by wound care. Patient repositioned in bed. Sitting up eating in bed.  Family at bedside  [address] REPORT:    Verbal report given to CRUZ Mackey(name) on Jonas Leal  being transferred to (unit) for routine progression of care       Report consisted of patients Situation, Background, Assessment and   Recommendations(SBAR). Information from the following report(s) SBAR, Kardex, Intake/Output, MAR, Accordion, Recent Results and Med Rec Status was reviewed with the receiving nurse. Lines:   Peripheral IV 09/10/19 Right; Lower Forearm (Active)   Site Assessment Clean, dry, & intact 9/11/2019 11:57 AM   Phlebitis Assessment 0 9/11/2019 11:57 AM   Infiltration Assessment 0 9/11/2019 11:57 AM   Dressing Status Clean, dry, & intact 9/11/2019 11:57 AM   Dressing Type Transparent 9/11/2019 11:57 AM   Hub Color/Line Status Pink; Infusing;Patent 9/11/2019 11:57 AM   Alcohol Cap Used Yes 9/11/2019 11:57 AM        Opportunity for questions and clarification was provided.       Patient transported with:   O2 @ 2 liters

## 2019-09-11 NOTE — PROGRESS NOTES
Hospitalist Progress Note      Hospital summary: A 80year old male patient with PMH of Maxillary sinus cancer, HTN, HLD, DVT, GERD presented to ED from [facility] for evaluation of AMS. Patient is poor historian as he is altered. Most of hx obtained from daughter. His mentation was at his baseline last venin but appeared to be more tired than his normal. This morning, they were unable to wake him up. She noticed recent increase in swelling on his right arm and warmth. He follows with wound care for his bilateral leg swellings which has been improving but daughter noticed today that they are more erythematous. No documented fever. No discharge or pain. Abdomen looks distended. No known hx diarrhea or constipation. Patient has baseline dementia per daughter. New anti depreesant was started 3 weeks ago. In ED, CT head, CT abd and venous duplex ordered. Cefazolin given. hospitalist consulted for admission 9/10/2019      Assessment/Plan:  Acute Metabolic encephalopathy - improved   - possible due to Cellulitis - improving   - normal wbc, lactic acid  - CT head: Sinus disease. No acute intracranial findings.  - CT abd: . Hepatic steatosis. Mild bibasilar atelectasis  - Venous duplex: no DVT  - Podiatry consult pending   - c/w IV zosyn     Bilateral leg swelling - chronic  - will cellulitis, abx as above  - c/w home meds gabapentin, lasix      Maxillary sinus cancer  Radiation necrosis s/p craniotomy   - follows with Dr. Barrington Krishnamurthy  - recent MRI brain on 9/3: stable and chronic changes   - c/w home meds keppra and decadron      Palliative care consulted for goals of care     Transfer to Medical      DVT ppx: Lovenox   Code status: DNR  Disposition: TBD. Possible dc tomorrow. PT/OT eval   ----------------------------------------------    CC: AMS    S: Patient is seen and examined at bedside. Daughter present. Patient is awake and alert and near to baseline per family. Pt denied any complaints now. Family wants to discuss with palliative regarding hospice option     Review of Systems:  A comprehensive review of systems was negative. O:  Visit Vitals  /75 (BP 1 Location: Right arm, BP Patient Position: At rest)   Pulse 74   Temp 97.2 °F (36.2 °C)   Resp 22   Ht 5' 9\" (1.753 m)   Wt 108.1 kg (238 lb 5.1 oz)   SpO2 93%   BMI 35.19 kg/m²       PHYSICAL EXAM:  Gen: NAD, elderly   HEENT: anicteric sclerae, oropharynx clear, MM moist  Neck: supple, trachea midline, no adenopathy  Heart: RRR, no MRG, no JVD  Lungs: CTA b/l, non-labored respirations  Abd: soft, NT, Distended, BS+, no organomegaly  Extr: LUE: swollen erythematous. B/l LE: wrapped but erythematous. Edematous.    Skin: dry, no rash  Neuro: awake, alert. grossly normal        Intake/Output Summary (Last 24 hours) at 9/11/2019 1229  Last data filed at 9/11/2019 0900  Gross per 24 hour   Intake 260 ml   Output 1350 ml   Net -1090 ml        Recent labs & imaging reviewed:  Recent Results (from the past 24 hour(s))   EKG, 12 LEAD, INITIAL    Collection Time: 09/10/19  1:06 PM   Result Value Ref Range    Ventricular Rate 66 BPM    Atrial Rate 66 BPM    P-R Interval 216 ms    QRS Duration 128 ms    Q-T Interval 450 ms    QTC Calculation (Bezet) 471 ms    Calculated P Axis 71 degrees    Calculated R Axis 0 degrees    Calculated T Axis 5 degrees    Diagnosis       Sinus rhythm with 1st degree AV block  Right bundle branch block  Confirmed by Lydia Chand ([id number]) on 9/11/2019 10:00:29 AM     CBC WITH AUTOMATED DIFF    Collection Time: 09/10/19  1:23 PM   Result Value Ref Range    WBC 10.4 4.1 - 11.1 K/uL    RBC 3.75 (L) 4.10 - 5.70 M/uL    HGB 12.1 12.1 - 17.0 g/dL    HCT 38.1 36.6 - 50.3 %    .6 (H) 80.0 - 99.0 FL    MCH 32.3 26.0 - 34.0 PG    MCHC 31.8 30.0 - 36.5 g/dL    RDW 15.9 (H) 11.5 - 14.5 %    PLATELET 043 125 - 140 K/uL    MPV 9.2 8.9 - 12.9 FL    NRBC 0.0 0  WBC    ABSOLUTE NRBC 0.00 0.00 - 0.01 K/uL    NEUTROPHILS 70 32 - 75 % LYMPHOCYTES 17 12 - 49 %    MONOCYTES 9 5 - 13 %    EOSINOPHILS 1 0 - 7 %    BASOPHILS 1 0 - 1 %    IMMATURE GRANULOCYTES 2 (H) 0.0 - 0.5 %    ABS. NEUTROPHILS 7.4 1.8 - 8.0 K/UL    ABS. LYMPHOCYTES 1.8 0.8 - 3.5 K/UL    ABS. MONOCYTES 1.0 0.0 - 1.0 K/UL    ABS. EOSINOPHILS 0.1 0.0 - 0.4 K/UL    ABS. BASOPHILS 0.1 0.0 - 0.1 K/UL    ABS. IMM. GRANS. 0.2 (H) 0.00 - 0.04 K/UL    DF AUTOMATED     METABOLIC PANEL, COMPREHENSIVE    Collection Time: 09/10/19  1:23 PM   Result Value Ref Range    Sodium 136 136 - 145 mmol/L    Potassium 4.0 3.5 - 5.1 mmol/L    Chloride 97 97 - 108 mmol/L    CO2 29 21 - 32 mmol/L    Anion gap 10 5 - 15 mmol/L    Glucose 144 (H) 65 - 100 mg/dL    BUN 13 6 - 20 MG/DL    Creatinine 1.00 0.70 - 1.30 MG/DL    BUN/Creatinine ratio 13 12 - 20      GFR est AA >60 >60 ml/min/1.73m2    GFR est non-AA >60 >60 ml/min/1.73m2    Calcium 8.6 8.5 - 10.1 MG/DL    Bilirubin, total 0.7 0.2 - 1.0 MG/DL    ALT (SGPT) 55 12 - 78 U/L    AST (SGOT) 22 15 - 37 U/L    Alk. phosphatase 53 45 - 117 U/L    Protein, total 6.7 6.4 - 8.2 g/dL    Albumin 2.7 (L) 3.5 - 5.0 g/dL    Globulin 4.0 2.0 - 4.0 g/dL    A-G Ratio 0.7 (L) 1.1 - 2.2     SAMPLES BEING HELD    Collection Time: 09/10/19  1:23 PM   Result Value Ref Range    SAMPLES BEING HELD 1BLU     COMMENT        Add-on orders for these samples will be processed based on acceptable specimen integrity and analyte stability, which may vary by analyte.    TROPONIN I    Collection Time: 09/10/19  1:23 PM   Result Value Ref Range    Troponin-I, Qt. <0.05 <0.05 ng/mL   ETHYL ALCOHOL    Collection Time: 09/10/19  1:23 PM   Result Value Ref Range    ALCOHOL(ETHYL),SERUM <10 <10 MG/DL   DRUG SCREEN, URINE    Collection Time: 09/10/19  1:23 PM   Result Value Ref Range    AMPHETAMINES NEGATIVE  NEG      BARBITURATES NEGATIVE  NEG      BENZODIAZEPINES NEGATIVE  NEG      COCAINE NEGATIVE  NEG      METHADONE NEGATIVE  NEG      OPIATES NEGATIVE  NEG      PCP(PHENCYCLIDINE) NEGATIVE  NEG THC (TH-CANNABINOL) NEGATIVE  NEG      Drug screen comment (NOTE)    URINALYSIS W/ REFLEX CULTURE    Collection Time: 09/10/19  1:23 PM   Result Value Ref Range    Color YELLOW/STRAW      Appearance CLEAR CLEAR      Specific gravity 1.005 1.003 - 1.030      pH (UA) 7.0 5.0 - 8.0      Protein NEGATIVE  NEG mg/dL    Glucose NEGATIVE  NEG mg/dL    Ketone NEGATIVE  NEG mg/dL    Bilirubin NEGATIVE  NEG      Blood NEGATIVE  NEG      Urobilinogen 0.2 0.2 - 1.0 EU/dL    Nitrites NEGATIVE  NEG      Leukocyte Esterase NEGATIVE  NEG      WBC 5-10 0 - 4 /hpf    RBC 0-5 0 - 5 /hpf    Epithelial cells FEW FEW /lpf    Bacteria 2+ (A) NEG /hpf    UA:UC IF INDICATED URINE CULTURE ORDERED (A) CNI      Granular cast 0-2 (A) NEG /lpf   CULTURE, URINE    Collection Time: 09/10/19  1:23 PM   Result Value Ref Range    Special Requests: NO SPECIAL REQUESTS  Reflexed from [id number]        Gypsum Count >100,000  COLONIES/mL        Culture result: POSSIBLE AEROCOCCUS SPECIES (A)     POC LACTIC ACID    Collection Time: 09/10/19  1:25 PM   Result Value Ref Range    Lactic Acid (POC) 1.40 0.40 - 2.00 mmol/L   POC VENOUS BLOOD GAS    Collection Time: 09/10/19  1:51 PM   Result Value Ref Range    Device: NASAL CANNULA      Flow rate (POC) 2.0 L/M    FIO2 (POC) 28 %    pH, venous (POC) 7.424 (H) 7.32 - 7.42      pCO2, venous (POC) 44.8 41 - 51 MMHG    pO2, venous (POC) 33 25 - 40 mmHg    HCO3, venous (POC) 29.4 (H) 23.0 - 28.0 MMOL/L    sO2, venous (POC) 65 65 - 88 %    Base excess, venous (POC) 5 mmol/L    Allens test (POC) NO      Total resp.  rate 12      Site OTHER      Specimen type (POC) VENOUS BLOOD     CBC WITH AUTOMATED DIFF    Collection Time: 09/11/19  4:38 AM   Result Value Ref Range    WBC 8.0 4.1 - 11.1 K/uL    RBC 3.51 (L) 4.10 - 5.70 M/uL    HGB 11.2 (L) 12.1 - 17.0 g/dL    HCT 35.5 (L) 36.6 - 50.3 %    .1 (H) 80.0 - 99.0 FL    MCH 31.9 26.0 - 34.0 PG    MCHC 31.5 30.0 - 36.5 g/dL    RDW 15.9 (H) 11.5 - 14.5 %    PLATELET 199 150 - 400 K/uL    MPV 9.3 8.9 - 12.9 FL    NRBC 0.0 0  WBC    ABSOLUTE NRBC 0.00 0.00 - 0.01 K/uL    DF PENDING     NEUTROPHILS 68 32 - 75 %    LYMPHOCYTES 25 12 - 49 %    MONOCYTES 6 5 - 13 %    EOSINOPHILS 1 0 - 7 %    BASOPHILS 0 0 - 1 %    IMMATURE GRANULOCYTES 0 %    ABS. NEUTROPHILS 5.4 1.8 - 8.0 K/UL    ABS. LYMPHOCYTES 2.0 0.8 - 3.5 K/UL    ABS. MONOCYTES 0.5 0.0 - 1.0 K/UL    ABS. EOSINOPHILS 0.1 0.0 - 0.4 K/UL    ABS. BASOPHILS 0.0 0.0 - 0.1 K/UL    ABS. IMM. GRANS. 0.0 K/UL    RBC COMMENTS MACROCYTOSIS  1+        RBC COMMENTS ANISOCYTOSIS  1+        WBC COMMENTS ATYPICAL LYMPHOCYTES PRESENT     METABOLIC PANEL, BASIC    Collection Time: 09/11/19  4:38 AM   Result Value Ref Range    Sodium 134 (L) 136 - 145 mmol/L    Potassium 3.6 3.5 - 5.1 mmol/L    Chloride 97 97 - 108 mmol/L    CO2 30 21 - 32 mmol/L    Anion gap 7 5 - 15 mmol/L    Glucose 98 65 - 100 mg/dL    BUN 13 6 - 20 MG/DL    Creatinine 0.86 0.70 - 1.30 MG/DL    BUN/Creatinine ratio 15 12 - 20      GFR est AA >60 >60 ml/min/1.73m2    GFR est non-AA >60 >60 ml/min/1.73m2    Calcium 8.7 8.5 - 10.1 MG/DL     Recent Labs     09/11/19  0438 09/10/19  1323   WBC 8.0 10.4   HGB 11.2* 12.1   HCT 35.5* 38.1    223     Recent Labs     09/11/19  0438 09/10/19  1323   * 136   K 3.6 4.0   CL 97 97   CO2 30 29   BUN 13 13   CREA 0.86 1.00   GLU 98 144*   CA 8.7 8.6     Recent Labs     09/10/19  1323   SGOT 22   ALT 55   AP 53   TBILI 0.7   TP 6.7   ALB 2.7*   GLOB 4.0     No results for input(s): INR, PTP, APTT in the last 72 hours. No lab exists for component: INREXT   No results for input(s): FE, TIBC, PSAT, FERR in the last 72 hours. Lab Results   Component Value Date/Time    Folate 8.6 11/20/2017 03:03 PM      No results for input(s): PH, PCO2, PO2 in the last 72 hours.   Recent Labs     09/10/19  1323   TROIQ <0.05     Lab Results   Component Value Date/Time    Cholesterol, total 163 07/14/2018 12:42 PM    HDL Cholesterol 89 07/14/2018 12:42 PM    LDL, calculated 59.4 07/14/2018 12:42 PM    Triglyceride 73 07/14/2018 12:42 PM    CHOL/HDL Ratio 1.8 07/14/2018 12:42 PM     Lab Results   Component Value Date/Time    Glucose (POC) 197 (H) 11/25/2017 11:27 AM    Glucose (POC) 128 (H) 11/25/2017 05:36 AM    Glucose (POC) 117 (H) 11/24/2017 11:22 PM    Glucose (POC) 113 (H) 11/24/2017 05:47 PM    Glucose (POC) 198 (H) 11/24/2017 11:57 AM     Lab Results   Component Value Date/Time    Color YELLOW/STRAW 09/10/2019 01:23 PM    Appearance CLEAR 09/10/2019 01:23 PM    Specific gravity 1.005 09/10/2019 01:23 PM    Specific gravity 1.005 08/23/2018 05:34 PM    pH (UA) 7.0 09/10/2019 01:23 PM    Protein NEGATIVE  09/10/2019 01:23 PM    Glucose NEGATIVE  09/10/2019 01:23 PM    Ketone NEGATIVE  09/10/2019 01:23 PM    Bilirubin NEGATIVE  09/10/2019 01:23 PM    Urobilinogen 0.2 09/10/2019 01:23 PM    Nitrites NEGATIVE  09/10/2019 01:23 PM    Leukocyte Esterase NEGATIVE  09/10/2019 01:23 PM    Epithelial cells FEW 09/10/2019 01:23 PM    Bacteria 2+ (A) 09/10/2019 01:23 PM    WBC 5-10 09/10/2019 01:23 PM    RBC 0-5 09/10/2019 01:23 PM       Med list reviewed  Current Facility-Administered Medications   Medication Dose Route Frequency    sodium chloride (NS) flush 5-40 mL  5-40 mL IntraVENous Q8H    sodium chloride (NS) flush 5-40 mL  5-40 mL IntraVENous PRN    acetaminophen (TYLENOL) tablet 650 mg  650 mg Oral Q4H PRN    enoxaparin (LOVENOX) injection 40 mg  40 mg SubCUTAneous Q24H    piperacillin-tazobactam (ZOSYN) 3.375 g in 0.9% sodium chloride (MBP/ADV) 100 mL  3.375 g IntraVENous Q8H    atorvastatin (LIPITOR) tablet 10 mg  10 mg Oral QHS    clopidogrel (PLAVIX) tablet 75 mg  75 mg Oral DAILY    dexAMETHasone (DECADRON) tablet 2 mg  2 mg Oral DAILY    docusate sodium (COLACE) capsule 100 mg  100 mg Oral QHS    escitalopram oxalate (LEXAPRO) tablet 10 mg  10 mg Oral DAILY    furosemide (LASIX) tablet 40 mg  40 mg Oral DAILY    gabapentin (NEURONTIN) tablet 300 mg  300 mg Oral BID    levETIRAcetam (KEPPRA) tablet 500 mg  500 mg Oral BID    senna (SENOKOT) tablet 8.6 mg  1 Tab Oral DAILY       Care Plan discussed with:  Patient/Family, Nurse and     Joe Rojas MD  Internal Medicine  Date of Service: 9/11/2019

## 2019-09-11 NOTE — WOUND CARE
WOCN Note:     New consult placed by RN for leg wounds. 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 WBC = 8.0  On = 9-11-19  Admitted from [address] [place] [address]    Assessment:   Patient is A&O x 3, communicative, incontinent with schafer and brief. Not mobile uses walker and wheel chair. Patient wearing briefs for incontinence has a schafer. Bed: Versa Care Bed  Diet: regular diet  Patient reports no pain presently    Bilateral heels, buttocks and sacral skin intact and without erythema. Heels offloaded on pillows. Bilateral legs with 3 layer wraps: Both legs with all wounds healed. Right shin and left lateral leg scabs. Some hyperemia noted with pinkness from wraps but clearing as assessment continued. Feet with scabs on toes and noted fungating toe nails. Left foot with special dressing for bunion family did not want removed. Recommendations:    -daily to bilateral lower extremities: wash with warm soap and water and twice daily apply Remedy nourishing cream. ( Purple tube). Minimize layers of linen/pads under patient to optimize support surface. Turn/reposition approximately every 2 hours and offload heels  Manage incontinence / promote continence; Remedy Hydraguard to buttocks and sacrum daily and as needed with incontinence care. Specialty bed: Versa Care Bed  Discussed above plan with patient, family and RN: [address].     Transition of Care: Plan to follow weekly and as needed while admitted to hospital.      Virginia ANDRADE RN  Wound Care Department  Office: [phone]  Pager: [phone]

## 2019-09-11 NOTE — PROGRESS NOTES
Spiritual Care Assessment/Progress Note  [facility]      NAME: Aramis Saravia      MRN: [id number]  AGE: 80 y.o. SEX: male  L[employer]-day Affiliation: [employer]ar[employer]   Language: English     9/11/2019           Spiritual Assessment begun in [facility] 3[facility] TELEMETRY through conversation with:         []Patient        [x] Family    [] Friend(s)        Reason for Consult: Palliative Care, Initial/Spiritual Assessment     Spiritual beliefs: (Please include comment if needed)     [x] Identifies with a charissa tradition: [employer]        [] Supported by a charissa community:            [] Claims no spiritual orientation:           [] Seeking spiritual identity:                [] Adheres to an individual form of spirituality:           [] Not able to assess:                           Identified resources for coping:      [x] Prayer                               [] Music                  [] Guided Imagery     [x] Family/friends                 [] Pet visits     [] Devotional reading                         [] Unknown     [x] Other: Communion                                              Interventions offered during this visit: (See comments for more details)    Patient Interventions: Initial/Spiritual assessment, Critical care     Family/Friend(s):  Affirmation of emotions/emotional suffering, Catharsis/review of pertinent events in supportive environment, Initial Assessment, Prayer (assurance of)     Plan of Care:     [] Support spiritual and/or cultural needs    [] Support AMD and/or advance care planning process      [] Support grieving process   [] Coordinate Rites and/or Rituals    [] Coordination with community clergy   [x] No spiritual needs identified at this time   [] Detailed Plan of Care below (See Comments)  [] Make referral to Music Therapy  [] Make referral to Pet Therapy     [] Make referral to Addiction services  [] Make referral to [facility]  [] Make referral to Spiritual Care Partner  [] No future visits requested        [x] Follow up visits as needed     Comments: Visited Mr Cookie Xiao in room [phone] for initial Palliative Care spiritual assessment. Mr Cookie Xiao was lying quietly in bed and his nurse was attending to him. Patient's daughter was present; provided active listening as daughter shared that she thought patient was doing well and that they had no needs to share at that time. Daughter stated that Mr Cookie Xiao was a member of [employer] and that she knew he would like to receive communion whenever it was available. Assured her that  would make sure he was on the list to receive communion. Also assured her of prayers on their behalf and of ongoing  availability for support. : Rev. Ernesto Brito.  Shasta Velasquez; [facility], to contact [phone] call: [phone]

## 2019-09-11 NOTE — PROGRESS NOTES
Physical Therapy  Order received, chart reviewed, evaluation attempted. Pt resting and spoke with pt's eldest daughter, Jesse Maravilla. She reports that pt has been ambulatory (walking to meals and \"all around\" senior care) but recently has been more lethargic and sleeping more. She reports his rollator broke and now he is being encouraged to use a RW; however, he insists on being pushed in a w/c. She reports his LE edema and UE pain have been limiting him significantly. She requested PT come back tomorrow for evaluation as she suspects he will do better once his pain is better controlled and edema improves. Will do so as able and appropriate.   Thank you,  Lizette Meckel, PT, DPT

## 2019-09-11 NOTE — PROGRESS NOTES
Physical Therapy Screening:    An [facility] screening referral was triggered for physical therapy based on results obtained during the nursing admission assessment. The patients chart was reviewed and the patient is appropriate for a skilled therapy evaluation if there is a decline in functional mobility from baseline. Please order a consult for physical therapy if you are in agreement and would like an evaluation to be completed. Thank you.     Kavin Beckman, PT no

## 2019-09-12 VITALS
OXYGEN SATURATION: 96 % | RESPIRATION RATE: 18 BRPM | DIASTOLIC BLOOD PRESSURE: 80 MMHG | HEART RATE: 80 BPM | WEIGHT: 238.32 LBS | BODY MASS INDEX: 35.3 KG/M2 | SYSTOLIC BLOOD PRESSURE: 147 MMHG | TEMPERATURE: 98.5 F | HEIGHT: 69 IN

## 2019-09-12 LAB
BACTERIA SPEC CULT: ABNORMAL
CC UR VC: ABNORMAL
SERVICE CMNT-IMP: ABNORMAL

## 2019-09-12 PROCEDURE — 74011000258 HC RX REV CODE- 258: Performed by: INTERNAL MEDICINE

## 2019-09-12 PROCEDURE — 99218 HC RM OBSERVATION: CPT

## 2019-09-12 PROCEDURE — 97161 PT EVAL LOW COMPLEX 20 MIN: CPT

## 2019-09-12 PROCEDURE — 97530 THERAPEUTIC ACTIVITIES: CPT

## 2019-09-12 PROCEDURE — 74011250637 HC RX REV CODE- 250/637: Performed by: INTERNAL MEDICINE

## 2019-09-12 PROCEDURE — 97165 OT EVAL LOW COMPLEX 30 MIN: CPT

## 2019-09-12 PROCEDURE — 74011250636 HC RX REV CODE- 250/636: Performed by: INTERNAL MEDICINE

## 2019-09-12 PROCEDURE — 96366 THER/PROPH/DIAG IV INF ADDON: CPT

## 2019-09-12 RX ORDER — AMOXICILLIN AND CLAVULANATE POTASSIUM 875; 125 MG/1; MG/1
1 TABLET, FILM COATED ORAL EVERY 12 HOURS
Qty: 12 TAB | Refills: 0 | Status: SHIPPED | OUTPATIENT
Start: 2019-09-12 | End: 2019-09-18

## 2019-09-12 RX ADMIN — SENNOSIDES 8.6 MG: 8.6 TABLET, FILM COATED ORAL at 11:07

## 2019-09-12 RX ADMIN — ESCITALOPRAM OXALATE 10 MG: 10 TABLET ORAL at 11:07

## 2019-09-12 RX ADMIN — PIPERACILLIN SODIUM,TAZOBACTAM SODIUM 3.38 G: 3; .375 INJECTION, POWDER, FOR SOLUTION INTRAVENOUS at 11:14

## 2019-09-12 RX ADMIN — CLOPIDOGREL BISULFATE 75 MG: 75 TABLET ORAL at 11:07

## 2019-09-12 RX ADMIN — LEVETIRACETAM 500 MG: 500 TABLET, FILM COATED ORAL at 11:07

## 2019-09-12 RX ADMIN — Medication 10 ML: at 05:24

## 2019-09-12 RX ADMIN — PIPERACILLIN SODIUM,TAZOBACTAM SODIUM 3.38 G: 3; .375 INJECTION, POWDER, FOR SOLUTION INTRAVENOUS at 03:54

## 2019-09-12 RX ADMIN — ACETAMINOPHEN 650 MG: 325 TABLET, FILM COATED ORAL at 01:26

## 2019-09-12 RX ADMIN — GABAPENTIN 300 MG: 600 TABLET, FILM COATED ORAL at 11:08

## 2019-09-12 RX ADMIN — DEXAMETHASONE 2 MG: 4 TABLET ORAL at 11:07

## 2019-09-12 RX ADMIN — FUROSEMIDE 40 MG: 40 TABLET ORAL at 11:14

## 2019-09-12 NOTE — PROGRESS NOTES
Occupational Therapy    Occupational therapy evaluation completed. Full documentation to follow. Patient is below baseline. Per RN, patient is planning to return to correction with hospice care. Per daughter, correction can provide additional assistance to meet current level of functioning.     Tai Johnson OTR/L

## 2019-09-12 NOTE — PHYSICIAN ADVISORY
Letter of Status Determination:   Recommend hospitalization status upgraded from   OBSERVATION  to INPATIENT  Status     Pt Name:  Josefina Falk   MR#   [address] # [id number] /  [id number]  Payor: Sandra Fulton / Plan: Mickey Gasper [facility] / Product Type: Medicare /    PETER#  [id number]   Room and Hospital  620/02  @ [facility]   Hospitalization date  9/10/2019  1:01 PM   Current Attending Physician  Roberto Priest MD   Principal diagnosis  Acute metabolic encephalopathy [Y63.94]     Clinicals  80 y.o. y.o  male hospitalized with above diagnosis   This pt suffers from known complex chronic illnesses including but not limited to hx of maxillary sinus CA s/p XRT. He remains in acute care setting while receiving Rx for possible cellulitis. It is noted that with IVF and other Rx his mentation had improved. We also note that urine culture collected during this episode of hospital care is growing aerococcus. Due to appropriate and necessary medical care, this pt's hospitalization has now exceeded two midnights . [place] ([facility]) criteria   Does  NOT apply    STATUS DETERMINATION  This patient is at above high risk of deterioration based on documented presenting clinical data, comorbid conditions, high risk of adverse events and current acute care course. Mr. Josefina Falk now meets Inpatient Admission status criteria in accordance with CMS regulation Section 43 .3. Specifically, due to medical necessity the patient's stay now exceeds Two Midnights. It is our recommendation that this patient's hospitalization status should be upgraded from  OBSERVATION to INPATIENT status.      The final decision of the patient's hospitalization status depends on the attending physician's judgment              Additional comments     Payor: VA MEDICARE / Plan: VA MEDICARE PART A & B / Product Type: Medicare /     The information in this document is a recommendation to be used for utilization review and utilization management purposes only. This recommendation is not an order. The recommendation is made based on the information reviewed at the time of the referral, is pursuant to the [facility] Conditions of Participation (42 CFR Part 482), and is neither a judgment nor an assessment with regard to the appropriateness or quality of clinical care. For all Managed Care patients: The Criteria are intended solely for use as screening guidelines with respect to the medical appropriateness of healthcare services and not for final clinical or payment determinations concerning the type or level of medical care provided, or proposed to be provided, to a patient. They help the reviewers determine whether a patient is appropriate for observation or inpatient admission at the time a decision to admit the patient is being made. All efforts are made to apply the pertinent payor criteria ([employer] or [employer]) as well as the clinical judgements based on the information reviewed at the time of the referral.\" Nothing in this document may be used to limit, alter, or affect clinical services provided to the patient named below. Rica Echevarria MD MPH FAC   Cell: [phone]  Physician [address]       Cell  [phone]        [phone]    .

## 2019-09-12 NOTE — PROGRESS NOTES
Dual skin assessment performed on patient with CRUZ Salazar. Patient has skin tears and scars to both knees, bilateral lower extremity cellulitis, red raised spots on right buttock region, and bruises to bilateral upper extremities. Dual skin assessment has been documented with these areas noted. Patient was repositioned and resting quietly, bed in lowest position, call light within reach.

## 2019-09-12 NOTE — PROGRESS NOTES
Problem: Mobility Impaired (Adult and Pediatric)  Goal: *Acute Goals and Plan of Care (Insert Text)  Description  FUNCTIONAL STATUS PRIOR TO ADMISSION: patient living in [facility], ambulatory with RW modified independent in facility; received assist with ADL's    HOME SUPPORT PRIOR TO ADMISSION: Assisted Living    Physical Therapy Goals  Initiated 9/12/2019  1. Patient will move from supine to sit and sit to supine  and roll side to side in bed with moderate assistance  within 7 day(s). 2.  Patient will transfer from bed to chair and chair to bed with minimal assistance/contact guard assist using the least restrictive device within 7 day(s). 3.  Patient will perform sit to stand with minimal assistance/contact guard assist within 7 day(s). 4.  Patient will ambulate with minimal assistance/contact guard assist for 100 feet with the least restrictive device within 7 day(s). Outcome: Progressing Towards Goal   PHYSICAL THERAPY EVALUATION  Patient: Francesco Mendoza (25 y.o. male)  Date: 9/12/2019  Primary Diagnosis: Acute metabolic encephalopathy [L07.80]        Precautions:   Fall, DNR, Skin(B Robinson; L eye missing)      ASSESSMENT  Based on the objective data described below, the patient presents with overall significantly decreased mobility from his baseline requiring up to maximum with bed mobility (per OT assessment), moderate assist with sit to stand and minimal assist with gait but decreased tolerance for activity. Was assessed on room air and maintained saturations in the 90's but noted SOB with activity. In speaking with daughter, plan is to return to [facility] and receive hospice care. If he does return and receives hospice do not anticipate therapy needs. If there is a delay in hospice, patient could benefit from therapy but will also require increased physical assist for mobility.     Current Level of Function Impacting Discharge (mobility/balance): minimal to maximum assist for mobility; limited gait distance    Functional Outcome Measure: The patient scored 0 on the Timed Up and Go as unable to complete outcome measure which is indicative of high risk for falls. Other factors to consider for discharge: considering hospice     Patient will benefit from skilled therapy intervention to address the above noted impairments. PLAN :  Recommendations and Planned Interventions: bed mobility training, transfer training, gait training, therapeutic exercises, patient and family training/education and therapeutic activities      Frequency/Duration: Patient will be followed by physical therapy:  3 times a week to address goals. Recommendation for discharge: (in order for the patient to meet his/her long term goals)  To be determined: hospice vs home health     This discharge recommendation:  Has been made in collaboration with the attending provider and/or case management    Equipment recommendations for successful discharge (if) home: none         SUBJECTIVE:   Patient stated Yudy Ventura   In response to therapy assessment  OBJECTIVE DATA SUMMARY:   HISTORY:    Past Medical History:   Diagnosis Date    Arthritis     HANDS    Beta-blocker therapy     Cancer ([facility] 2013    MAXILLARY SINUS CANCER, RADIATION AND CHEMO    Cancer ([facility]     SKIN CA ON NOSE    GERD (gastroesophageal reflux disease)     Hypercholesterolemia     Hypertension     pt denies    Ill-defined condition     missing left eye    Nasal sinus tumor     Numbness of LEFT hand & LEFT face 2010    PUD (peptic ulcer disease)     GERD    RBBB (right bundle branch block)     TIA (transient ischemic attack)     12 TIA's over 9 YRS.1ST ONE IN 8/03- LAST IN 2/12      Trigeminal neuralgia      Past Surgical History:   Procedure Laterality Date    HX CATARACT REMOVAL Bilateral     HX GI      COLONOSCOPY    HX HEENT      BIOPSY OF SINUS     HX HEENT Left     sew eye closed    HX HERNIA REPAIR Right     INGUINAL    HX KNEE ARTHROSCOPY Right 2007  HX KNEE REPLACEMENT Right 2012    Total Knee replacement- right    HX ROTATOR CUFF REPAIR Right     right rotator cuff repair       Personal factors and/or comorbidities impacting plan of care:     Home Situation  Home Environment: [address] Name: [place]  One/Two Story Residence: One story  Living Alone: No  Support Systems: Child(bonifacio), Family member(s)  Patient Expects to be Discharged to[de-identified] Assisted living  Current DME Used/Available at Home: [employer]    EXAMINATION/PRESENTATION/DECISION MAKING:   Critical Behavior:  Neurologic State: Alert, Confused  Orientation Level: Oriented to person        Hearing: Auditory  Auditory Impairment: Hard of hearing, bilateral  Skin:  discolored LE's after sitting in chair  Edema: LUE  Range Of Motion:  AROM: Generally decreased, functional         Strength:    Strength: Generally decreased, functional         Tone & Sensation:   Tone: Normal          Coordination:  Coordination: Generally decreased, functional  Functional Mobility:  Bed Mobility:   Refer to OT assessment           Transfers:  Sit to Stand: Moderate assistance; Additional time;Assist x1  Stand to Sit: Minimum assistance                       Balance:   Sitting: Impaired; Without support  Sitting - Static: Good (unsupported)  Sitting - Dynamic: Good (unsupported)  Standing: Impaired; With support  Standing - Static: Fair  Standing - Dynamic : Fair  Ambulation/Gait Training:  Distance (ft): 6 Feet (ft)  Assistive Device: Gait belt;Walker, rolling  Ambulation - Level of Assistance: Minimal assistance     Gait Description (WDL): Exceptions to WDL  Gait Abnormalities: Decreased step clearance        Base of Support: Widened     Speed/Olive: Shuffled;Pace decreased (<100 feet/min)  Step Length: Right shortened;Left shortened              Functional Measure:  Timed up and go:    Timed Get Up And Go Test: (unable to complete)       < than 10 seconds=Normal  Greater then 13.5 seconds (in elderly)=Increased fall risk   Harley Chand [place]ott M. Predicting the probability for falls in community dwelling older adults using the Timed Up and Go Test. Phys Ther. 2000;80:896-903. Physical Therapy Evaluation Charge Determination   History Examination Presentation Decision-Making   HIGH Complexity :3+ comorbidities / personal factors will impact the outcome/ POC  LOW Complexity : 1-2 Standardized tests and measures addressing body structure, function, activity limitation and / or participation in recreation  LOW Complexity : Stable, uncomplicated        Based on the above components, the patient evaluation is determined to be of the following complexity level: LOW       Activity Tolerance:   Fair and SpO2 stable on RA  Please refer to the flowsheet for vital signs taken during this treatment. After treatment patient left in no apparent distress:   Sitting in chair, Call bell within reach, Bed / chair alarm activated and Caregiver / family present    COMMUNICATION/EDUCATION:   The patients plan of care was discussed with: Occupational Therapist and Registered Nurse. Patient is unable to participate in goal setting and plan of care.     Thank you for this referral.  Ree Renteria, PT

## 2019-09-12 NOTE — DISCHARGE SUMMARY
Discharge Summary     Patient:  Braulio Lyman       MRN: [id number]       YOB: 1930       Age: 80 y.o. Date of admission:  9/10/2019    Date of discharge:  9/12/2019    Primary care provider: Dr. Natividad Frazier MD    Admitting provider:  Moody Patel MD    Discharging provider:  Raheem [address] [phone]. If unavailable, call [phone] and ask the  to page the triage hospitalist.    Consultations  · IP CONSULT TO HOSPITALIST  · IP CONSULT TO [address]  · IP CONSULT TO PODIATRY    Procedures  · * No surgery found *    Discharge destination: [facility] with hospice care. The patient is stable for discharge. Admission diagnosis  · Acute metabolic encephalopathy [L45.83]    Current Discharge Medication List      START taking these medications    Details   amoxicillin-clavulanate (AUGMENTIN) 875-125 mg per tablet Take 1 Tab by mouth every twelve (12) hours for 6 days. Qty: 12 Tab, Refills: 0         CONTINUE these medications which have NOT CHANGED    Details   furosemide (LASIX) 40 mg tablet Take 40 mg by mouth daily. escitalopram oxalate (LEXAPRO) 10 mg tablet Take 10 mg by mouth daily. potassium chloride (K-DUR, KLOR-CON) 20 mEq tablet Take 20 mEq by mouth two (2) times a day. peg 400-propylene glycol (SYSTANE, PROPYLENE GLYCOL,) 0.4-0.3 % drop Administer 1 Drop to right eye four (4) times daily. dexamethasone (DECADRON) 1 mg tablet Take 2 mg by mouth daily. senna (SENNA LAXATIVE) 8.6 mg tablet Take 8.6 mg by mouth daily. acetaminophen (TYLENOL EXTRA STRENGTH) 500 mg tablet Take 2 Tabs by mouth as needed for Pain. No more than 3grams in 24hour period  Indications: Pain  Qty: 10 Tab, Refills: 0      gabapentin (NEURONTIN) 600 mg tablet Take 0.5 Tabs by mouth two (2) times a day.  Indications: trigeminal neuralgia  Qty: 30 Tab, Refills: 0      levETIRAcetam (KEPPRA) 500 mg tablet Take 1 Tab by mouth two (2) times a day. Qty: 60 Tab, Refills: 0      cyanocobalamin (VITAMIN B-12) 1,000 mcg tablet Take 1,000 mcg by mouth daily. cholecalciferol (VITAMIN D3) 1,000 unit cap Take 1,000 Units by mouth daily. docusate sodium (COLACE) 100 mg capsule Take 100 mg by mouth nightly. bacitracin ophthalmic ointment Administer  to left eye daily. Refills: 2      clopidogrel (PLAVIX) 75 mg tablet Take 75 mg by mouth daily. TAKES IN AM      atorvastatin (LIPITOR) 10 mg tablet Take 10 mg by mouth nightly. STOP taking these medications       ciprofloxacin HCl (CILOXAN) 0.3 % ophthalmic solution Comments:   Reason for Stopping: Follow-up Information     Follow up With Specialties Details [address], [address]  [phone]            Final discharge diagnoses and brief hospital course  A 80year old male patient with PMH of Maxillary sinus cancer, HTN, HLD, DVT, GERD presented to ED from [facility] for evaluation of AMS. Patient is poor historian as he is altered. Most of hx obtained from daughter. His mentation was at his baseline last venin but appeared to be more tired than his normal. This morning, they were unable to wake him up. She noticed recent increase in swelling on his right arm and warmth. He follows with wound care for his bilateral leg swellings which has been improving but daughter noticed today that they are more erythematous. No documented fever. No discharge or pain. Abdomen looks distended. No known hx diarrhea or constipation. Patient has baseline dementia per daughter. New anti depreesant was started 3 weeks ago. In ED, CT head, CT abd and venous duplex ordered. Cefazolin given.  hospitalist consulted for admission 9/26/8169    Acute Metabolic encephalopathy - improved   - possible due to Cellulitis - improved  - normal wbc, lactic acid  - CT head: Sinus disease. No acute intracranial findings.  - CT abd: . Hepatic steatosis.  Mild bibasilar atelectasis  - Venous duplex: no DVT  - on IV zosyn, discharge on po Augmentin x 6 more days      Bilateral leg swelling - chronic  - will cellulitis, abx as above  - c/w home meds gabapentin, lasix      Maxillary sinus cancer  Radiation necrosis s/p craniotomy   - follows with Dr. Yisroel Lesch  - recent MRI brain on 9/3: stable and chronic changes   - c/w home meds keppra and decadron     Family decided for hospice care at [address] [place] [address]. Case management discussed with facility at [address] [place] [address] and hospice consult is required and they will arrange hospice care. Family in agreement with the above plan      Physical examination at discharge  Visit Vitals  /81   Pulse 72   Temp 98 °F (36.7 °C)   Resp 18   Ht 5' 9\" (1.753 m)   Wt 108.1 kg (238 lb 5.1 oz)   SpO2 96%   BMI 35.19 kg/m²     Gen: NAD, elderly   HEENT: anicteric sclerae, oropharynx clear, MM moist  Neck: supple, trachea midline, no adenopathy  Heart: RRR, no MRG, no JVD  Lungs: CTA b/l, non-labored respirations  Abd: soft, NT, Distended, BS+, no organomegaly  Extr: LUE: swollen erythematous. B/l LE: wrapped but erythematous. Edematous.   Skin: dry, no rash  Neuro: awake, alert. grossly normal    Pertinent imaging studies:    None     Recent Labs     09/11/19  0438 09/10/19  1323   WBC 8.0 10.4   HGB 11.2* 12.1   HCT 35.5* 38.1    223     Recent Labs     09/11/19  0438 09/10/19  1323   * 136   K 3.6 4.0   CL 97 97   CO2 30 29   BUN 13 13   CREA 0.86 1.00   GLU 98 144*   CA 8.7 8.6     Recent Labs     09/10/19  1323   SGOT 22   AP 53   TP 6.7   ALB 2.7*   GLOB 4.0     No results for input(s): INR, PTP, APTT in the last 72 hours. No lab exists for component: INREXT   No results for input(s): FE, TIBC, PSAT, FERR in the last 72 hours.    No results for input(s): PH, PCO2, PO2 in the last 72 hours. No results for input(s): CPK, CKMB in the last 72 hours. No lab exists for component: TROPONINI  No components found for: [place]    Chronic Diagnoses:    Problem List as of 9/12/2019 Date Reviewed: 8/29/2019          Codes Class Noted - Resolved    Acute metabolic encephalopathy BKR-07-MZ: G93.41  ICD-9-CM: 348.31  9/10/2019 - Present        Idiopathic chronic venous hypertension of both lower extremities with ulcer ([facility] ICD-10-CM: I87.313, L97.919, L97.929  ICD-9-CM: 459.31  7/18/2019 - Present        Non-pressure chronic ulcer of right lower leg with fat layer exposed ([facility] ICD-10-CM: J56.559  ICD-9-CM: 707.10  7/18/2019 - Present        Non-pressure chronic ulcer of left lower leg with fat layer exposed ([facility] ICD-10-CM: N34.464  ICD-9-CM: 707.10  7/18/2019 - Present        Hemorrhage, secondary or recurrent due to trauma, subsequent encounter ICD-10-CM: T79. 2XXD  ICD-9-CM: V58.89, 958.2  7/18/2019 - Present        Secondary and recurrent hemorrhage as an early complication of trauma ([facility] ICD-10-CM: T79. 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 ICD-10-CM: I67.89, Y84.2  ICD-9-CM: 437.8, E879.2  11/25/2017 - Present        Aspiration into airway ICD-10-CM: T17.908A  ICD-9-CM: 934.9  11/25/2017 - Present        Sepsis ([facility] ICD-10-CM: A41.9  ICD-9-CM: 038.9, 995.91  11/25/2017 - Present        Trigeminal neuralgia (Chronic) ICD-10-CM: G50.0  ICD-9-CM: 350.1  11/25/2017 - Present Brain tumor [facility]) ICD-10-CM: D49.6  ICD-9-CM: 239.6  10/19/2017 - Present        Pneumonia ICD-10-CM: J18.9  ICD-9-CM: 315  10/23/2015 - Present        Head and neck cancer ([facility] ICD-10-CM: C76.0  ICD-9-CM: 195.0  10/13/2014 - Present        Cancer of sinus (HCC) ICD-10-CM: C31.9  ICD-9-CM: 160.9  10/13/2014 - Present        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  ICD-9-CM: 715.96  3/13/2012 - Present    Overview Signed 3/13/2012  2:30 PM by Alpheus Severin, PA-C     Scheduled for RIGHT UNI vs TKR on 03-14-12             History of TIAs ICD-10-CM: Z86.73  ICD-9-CM: V12.54  3/13/2012 - Present    Overview Addendum 3/13/2012  2:31 PM by Alpheus Severin, PA-C     11 in last 7 yrs             Stroke [facility]) ICD-10-CM: I63.9  ICD-9-CM: 434.91  3/13/2012 - Present    Overview Signed 3/13/2012  2:32 PM by Alpheus Severin, PA-C     Residual left-sided facial numbness             Hypercholesteremia ICD-10-CM: E78.00  ICD-9-CM: 272.0  3/13/2012 - Present        GERD (gastroesophageal reflux disease) ICD-10-CM: K21.9  ICD-9-CM: 530.81  3/13/2012 - Present        Numbness and tingling in left hand ICD-10-CM: R20.0, R20.2  ICD-9-CM: 782.0  2/25/2012 - Present        TIA (transient ischemic attack) ICD-10-CM: G45.9  ICD-9-CM: 435.9  12/23/2010 - Present        HTN (hypertension) ICD-10-CM: I10  ICD-9-CM: 401.9  12/23/2010 - Present        RESOLVED: Pulmonary embolism ([facility] ICD-10-CM: I26.99  ICD-9-CM: 415.19  8/23/2018 - 8/25/2018        RESOLVED: Altered mental state ICD-10-CM: R41.82  ICD-9-CM: 780.97  7/14/2018 - 7/16/2018        RESOLVED: Acute encephalopathy ICD-10-CM: G93.40  ICD-9-CM: 348.30  11/20/2017 - 11/24/2017              Time spent on discharge related activities today greater than 30 minutes.       Signed:  Jesse Mark MD                 Hospitalist, Internal Medicine      Cc: Alberto Romero MD

## 2019-09-12 NOTE — DISCHARGE INSTRUCTIONS
Patient Education        Cellulitis: Care Instructions  Your Care Instructions    Cellulitis is a skin infection caused by bacteria, most often strep or staph. It often occurs after a break in the skin from a scrape, cut, bite, or puncture, or after a rash. Cellulitis may be treated without doing tests to find out what caused it. But your doctor may do tests, if needed, to look for a specific bacteria, like methicillin-resistant Staphylococcus aureus (MRSA). The doctor has checked you carefully, but problems can develop later. If you notice any problems or new symptoms, get medical treatment right away. Follow-up care is a key part of your treatment and safety. Be sure to make and go to all appointments, and call your doctor if you are having problems. It's also a good idea to know your test results and keep a list of the medicines you take. How can you care for yourself at home? · Take your antibiotics as directed. Do not stop taking them just because you feel better. You need to take the full course of antibiotics. · Prop up the infected area on pillows to reduce pain and swelling. Try to keep the area above the level of your heart as often as you can. · If your doctor told you how to care for your wound, follow your doctor's instructions. If you did not get instructions, follow this general advice:  ? Wash the wound with clean water 2 times a day. Don't use hydrogen peroxide or alcohol, which can slow healing. ? You may cover the wound with a thin layer of petroleum jelly, such as Vaseline, and a nonstick bandage. ? Apply more petroleum jelly and replace the bandage as needed. · Be safe with medicines. Take pain medicines exactly as directed. ? If the doctor gave you a prescription medicine for pain, take it as prescribed. ? If you are not taking a prescription pain medicine, ask your doctor if you can take an over-the-counter medicine.   To prevent cellulitis in the future  · Try to prevent cuts, scrapes, or other injuries to your skin. Cellulitis most often occurs where there is a break in the skin. · If you get a scrape, cut, mild burn, or bite, wash the wound with clean water as soon as you can to help avoid infection. Don't use hydrogen peroxide or alcohol, which can slow healing. · If you have swelling in your legs (edema), support stockings and good skin care may help prevent leg sores and cellulitis. · Take care of your feet, especially if you have diabetes or other conditions that increase the risk of infection. Wear shoes and socks. Do not go barefoot. If you have athlete's foot or other skin problems on your feet, talk to your doctor about how to treat them. When should you call for help? Call your doctor now or seek immediate medical care if:    · You have signs that your infection is getting worse, such as:  ? Increased pain, swelling, warmth, or redness. ? Red streaks leading from the area. ? Pus draining from the area. ? A fever.     · You get a rash.    Watch closely for changes in your health, and be sure to contact your doctor if:    · You do not get better as expected. Where can you learn more? Go to http://aixa-josette.info/. Jairon Duran in the search box to learn more about \"Cellulitis: Care Instructions. \"  Current as of: April 1, 2019  Content Version: 12.1  © 0112-9502 [employer]. Care instructions adapted under license by [employer] (which disclaims liability or warranty for this information). If you have questions about a medical condition or this instruction, always ask your healthcare professional. [address] any warranty or liability for your use of this information. Please bring this form with you to show your primary care provider at your follow-up appointment.     Primary care provider:  Dr. Ming Duarte MD    Discharging provider:  Daren Bello MD    You have been admitted to the hospital with the following diagnoses:  · Acute metabolic encephalopathy [F24.10]    FOLLOW-UP CARE RECOMMENDATIONS:    Hospice care at Assisted living facility    APPOINTMENTS:  · Follow-up with primary care provider, Dr. Kathryn Matthew MD  -  Please call [phone] shortly after discharge to set up an appointment to be seen in  1 week     FOLLOW-UP TESTS recommended: none    PENDING TEST RESULTS:  At the time of your discharge the following test results are still pending: none  Please make sure you review these results with your outpatient follow-up provider(s). SYMPTOMS to watch for: chest pain, shortness of breath, fever, chills, nausea, vomiting, diarrhea, change in mentation, falling, weakness, bleeding. DIET/what to eat:  Resume previous diet    ACTIVITY:  Activity as tolerated    WOUND CARE: per wound care center      What to do if new or unexpected symptoms occur? If you experience any of the above symptoms (or should other concerns or questions arise after discharge) please call your primary care physician. Return to the emergency room if you cannot get hold of your doctor. · It is very important that you keep your follow-up appointment(s). · Please bring discharge papers, medication list (and/or medication bottles) to your follow-up appointments for review by your outpatient provider(s). · Please check the list of medications and be sure it includes every medication (even non-prescription medications) that your provider wants you to take. · It is important that you take the medication exactly as they are prescribed. · Keep your medication in the bottles provided by the pharmacist and keep a list of the medication names, dosages, and times to be taken in your wallet. · Do not take other medications without consulting your doctor.    · If you have any questions about your medications or other instructions, please talk to your nurse or care provider before you leave the hospital.    I understand that if any problems occur once I am at home I am to contact my physician. These instructions were explained to me and I had the opportunity to ask questions.

## 2019-09-12 NOTE — PROGRESS NOTES
Problem: Falls - Risk of  Goal: *Absence of Falls  Description  Document Tobias Tran Fall Risk and appropriate interventions in the flowsheet.   Outcome: Progressing Towards Goal  Note:   Fall Risk Interventions:  Mobility Interventions: Communicate number of staff needed for ambulation/transfer, Patient to call before getting OOB    Mentation Interventions: Adequate sleep, hydration, pain control, Door open when patient unattended, Evaluate medications/consider consulting pharmacy, Toileting rounds    Medication Interventions: Patient to call before getting OOB    Elimination Interventions: Call light in reach, Toileting schedule/hourly rounds    History of Falls Interventions: Door open when patient unattended, Evaluate medications/consider consulting pharmacy         Problem: Patient Education: Go to Patient Education Activity  Goal: Patient/Family Education  Outcome: Progressing Towards Goal

## 2019-09-12 NOTE — PROGRESS NOTES
OCCUPATIONAL THERAPY EVALUATION/DISCHARGE  Patient: Sam Zuluaga (1 y.o. male)  Date: 9/12/2019  Primary Diagnosis: Acute metabolic encephalopathy [U27.10]       Precautions:  Fall, DNR, Skin(B [place]; L eye missing)    ASSESSMENT  Based on the objective data described below, the patient presents below baseline for ADLs and mobility. Noted patient has d/c plan to return to long term this afternoon with hospice. Per daughter, long term can provide additional assistance to meet current level of functioning. Current Level of Function (ADLs/self-care): Set-up to minimum assistance UB ADLs, Maximum assistance LB ADLs, Maximum assistance/ additional time supine-sit, Minimum assistance sit-stand/ pivot to chair with RW     Functional Outcome Measure: The patient scored 50/100 on the Barthel Index outcome measure which is indicative of ~50% impairment in functional ADL performance. PLAN :    Recommendation for discharge: (in order for the patient to meet his/her long term goals)  Given plan for hospice, recommend no follow up OT. Per daughter, [facility] can provide additional assistance to meet current level of functioning. This discharge recommendation:  Has been made in collaboration with the attending provider and/or case management       SUBJECTIVE:   Patient stated I'm okay.     OBJECTIVE DATA SUMMARY:   HISTORY:   Past Medical History:   Diagnosis Date    Arthritis     HANDS    Beta-blocker therapy     Cancer ([facility] [address] 2013    MAXILLARY SINUS CANCER, RADIATION AND CHEMO    Cancer ([facility] [address]     SKIN CA ON NOSE    GERD (gastroesophageal reflux disease)     Hypercholesterolemia     Hypertension     pt denies    Ill-defined condition     missing left eye    Nasal sinus tumor     Numbness of LEFT hand & LEFT face 2010    PUD (peptic ulcer disease)     GERD    RBBB (right bundle branch block)     TIA (transient ischemic attack)     12 TIA's over 9 YRS.1ST ONE IN 8/03- LAST IN 2/12      Trigeminal neuralgia Past Surgical History:   Procedure Laterality Date    HX CATARACT REMOVAL Bilateral     HX GI      COLONOSCOPY    HX HEENT      BIOPSY OF SINUS     HX HEENT Left     sew eye closed    HX HERNIA REPAIR Right     INGUINAL    HX KNEE ARTHROSCOPY Right 2007    HX KNEE REPLACEMENT Right 2012    Total Knee replacement- right    HX ROTATOR CUFF REPAIR Right     right rotator cuff repair       Prior Level of Function/Environment/Context: [facility] from [facility]. Typically ambulatory with RW without assistance but has had recent decline and relying more on staff assistance/ wheelchair. Staff also assisting with bathing, dressing, and occasionally toileting. Expanded or extensive additional review of patient history:   Home Situation  Home Environment: [address] [place] [address] Road Name: Sandy Johnson  One/Two Story Residence: One story  Living Alone: No  Support Systems: Child(bonifacio), Family member(s)  Patient Expects to be Discharged to[de-identified] Assisted living  Current DME Used/Available at Home: [address], rollator    EXAMINATION OF PERFORMANCE DEFICITS:  Cognitive/Behavioral Status:  Neurologic State: Alert;Confused  Orientation Level: Oriented to person;Disoriented to place; Disoriented to situation;Disoriented to time  Cognition: Follows commands  Perception: Appears intact  Perseveration: No perseveration noted       Skin: visible skin appears intact    Edema: LUE mild edema    Hearing:   Auditory  Auditory Impairment: Hard of hearing, bilateral    Vision/Perceptual:                   Visual Fields: (able to detect in all fields with R eye)       Acuity: Within Defined Limits(with R eye, L eyelid sewn shut at baseline)         Range of Motion:    AROM: Generally decreased, functional                         Strength:    Strength: Generally decreased, functional                Coordination:  Coordination: Generally decreased, functional  Fine Motor Skills-Upper: Left Impaired;Right Impaired    Gross Motor Skills-Upper: Left Impaired;Right Impaired    Tone & Sensation:    Tone: Normal  Sensation: Intact                      Balance:  Sitting: Impaired; Without support  Sitting - Static: Good (unsupported)  Sitting - Dynamic: Good (unsupported)  Standing: Impaired; With support  Standing - Static: Fair  Standing - Dynamic : Fair    Functional Mobility and Transfers for ADLs:  Bed Mobility:  Supine to Sit: Maximum assistance; Additional time    Transfers:  Sit to Stand: Moderate assistance; Additional time;Assist x1  Stand to Sit: Minimum assistance  Bed to Chair: Minimum assistance(using RW)    ADL Assessment:  Feeding: Setup(inferred)    Oral Facial Hygiene/Grooming: Setup(inferred)    Bathing: Maximum assistance(inferred due to AROM, coordination, activity tolerance)    Upper Body Dressing: Minimum assistance(inferred)    Lower Body Dressing: Maximum assistance(inferred)    Toileting: Maximum assistance(inferred)            Functional Measure:  Barthel Index:    Bathin  Bladder: 10  Bowels: 10  Groomin  Dressin  Feeding: 10  Mobility: 0  Stairs: 0  Toilet Use: 0  Transfer (Bed to Chair and Back): 10  Total: 50/100        The Barthel ADL Index: Guidelines  1. The index should be used as a record of what a patient does, not as a record of what a patient could do. 2. The main aim is to establish degree of independence from any help, physical or verbal, however minor and for whatever reason. 3. The need for supervision renders the patient not independent. 4. A patient's performance should be established using the best available evidence. Asking the patient, friends/relatives and nurses are the usual sources, but direct observation and common sense are also important. However direct testing is not needed. 5. Usually the patient's performance over the preceding 24-48 hours is important, but occasionally longer periods will be relevant. 6. Middle categories imply that the patient supplies over 50 per cent of the effort.   7. Use of aids to be independent is allowed. Sharonda Hale., Barthel, D.W. ([id number]). Functional evaluation: the Barthel Index. [address] (14)2. CHARLI Read, Sherin Merritt., Sugey Sullivan., [place], [address] (1999). Measuring the change indisability after inpatient rehabilitation; comparison of the responsiveness of the Barthel Index and Functional [place] Measure. Journal of Neurology, Neurosurgery, and Psychiatry, 66(4), 303-394. ISMA Bowman, CHRISTINA Gregory, & Gladis John M.A. (2004.) Assessment of post-stroke quality of life in cost-effectiveness studies: The usefulness of the Barthel Index and the EuroQoL-5D. Quality of Life Research, 15, 089-99         Occupational Therapy Evaluation Charge Determination   History Examination Decision-Making   LOW Complexity : Brief history review  MEDIUM Complexity : 3-5 performance deficits relating to physical, cognitive , or psychosocial skils that result in activity limitations and / or participation restrictions MEDIUM Complexity : Patient may present with comorbidities that affect occupational performnce. Miniml to moderate modification of tasks or assistance (eg, physical or verbal ) with assesment(s) is necessary to enable patient to complete evaluation       Based on the above components, the patient evaluation is determined to be of the following complexity level: LOW   Pain Rating:  Patient reported LUE pain with AROM but did not rate    Activity Tolerance:   VSS    After treatment patient left in no apparent distress:    Sitting in chair   Chair alarm activated  Daughter present  Call bell left within reach    COMMUNICATION/EDUCATION:   The patients plan of care was discussed with: Physical Therapist and Registered Nurse.     Thank you for this referral.  Aileen Mathias, OT  Time Calculation: 40 mins

## 2019-09-12 NOTE — PROGRESS NOTES
NATHALY:  CM noted patient transfer to 6 E. Patient is a resident at [address] [place] [address]. Noted orders for patient d/c today. CM met with patient and spouse. CM called the facility [phone] and spoke with the DON to advise of plans for patient d/c today. Spouse requests assistance with d/c transportation. [facility] to transport patient at 4pm. CM returned to room to advise of transport time. CM faxed clinicals to facility at [phone]. Assigned RN to call report to [phone].   Verner Martyr, BSW, CRM    3:18p  CM faxed the [facility] to facility per DON's request.  Verner Martyr, BSW, CRM

## 2019-09-13 ENCOUNTER — TELEPHONE (OUTPATIENT)
Dept: PALLATIVE CARE | Age: 84
End: 2019-09-13

## 2019-09-13 NOTE — TELEPHONE ENCOUNTER
[facility] Palliative Medicine Office  Nursing Note  [phone]  Fax [phone]     Name:  Kristi Gamboa  YOB: 1930     Patient's daughter Roxanne Plasencia called asking for an explanation for the difference between hospice and palliative services. She states that her father Kath Pritchard was hospitalized at [facility] and discharged back to [facility] yesterday with orders for hospice. Nurse explained Palliative Medicine and Hospice services (similarities and differences) including:     Palliative Medicine is a specialty service. Provides \"extra layer of support\" for patients and families living with serious illness. Focuses on  symptom management, care decisions, improving quality of life. Palliative team works alongside current health care team and is provided with all other medical treatments. Palliative Medicine does not provide personal care aides in the home. Our team does have a  that can assist families with finding help in the home but the cost is not covered. Tanisha Mcgowan sees patients in the latter stages of a progressive serious illness. Patient can be receiving aggressive treatments or they can be seeking comfort measures. Hospice is an all-inclusive service in terms of payment (meds, DME, oxygen, RN visits, aides, respite are covered). Treatment concentrates on comfort rather than aggressive disease abatement. Hospice care is generally for people with a life expectancy of 6 months or less (if the illness runs its normal course). Nurse answered questions about Palliative and hospice. Daughter states she is going to call [address] [place] [address] now to request info visit.     Milagro Cuevas, CYNDEEN, RN  Clinical Referral Navigator
